# Patient Record
Sex: FEMALE | Race: WHITE | Employment: UNEMPLOYED | URBAN - METROPOLITAN AREA
[De-identification: names, ages, dates, MRNs, and addresses within clinical notes are randomized per-mention and may not be internally consistent; named-entity substitution may affect disease eponyms.]

---

## 2017-01-03 ENCOUNTER — OFFICE VISIT (OUTPATIENT)
Dept: INTERNAL MEDICINE CLINIC | Age: 67
End: 2017-01-03

## 2017-01-03 VITALS
WEIGHT: 277 LBS | BODY MASS INDEX: 46.15 KG/M2 | HEART RATE: 61 BPM | SYSTOLIC BLOOD PRESSURE: 111 MMHG | TEMPERATURE: 95.8 F | RESPIRATION RATE: 18 BRPM | HEIGHT: 65 IN | DIASTOLIC BLOOD PRESSURE: 62 MMHG | OXYGEN SATURATION: 96 %

## 2017-01-03 DIAGNOSIS — E66.01 OBESITY, MORBID, BMI 40.0-49.9 (HCC): ICD-10-CM

## 2017-01-03 DIAGNOSIS — H91.93 HEARING LOSS, BILATERAL: ICD-10-CM

## 2017-01-03 DIAGNOSIS — K58.0 IRRITABLE BOWEL SYNDROME WITH DIARRHEA: Primary | ICD-10-CM

## 2017-01-03 DIAGNOSIS — H93.13 TINNITUS, BILATERAL: ICD-10-CM

## 2017-01-03 DIAGNOSIS — Z23 ENCOUNTER FOR IMMUNIZATION: ICD-10-CM

## 2017-01-03 DIAGNOSIS — E78.5 HYPERLIPIDEMIA, UNSPECIFIED HYPERLIPIDEMIA TYPE: ICD-10-CM

## 2017-01-03 DIAGNOSIS — I10 ESSENTIAL HYPERTENSION: ICD-10-CM

## 2017-01-03 RX ORDER — ATENOLOL AND CHLORTHALIDONE TABLET 100; 25 MG/1; MG/1
1 TABLET ORAL DAILY
Qty: 90 TAB | Refills: 3 | Status: SHIPPED | OUTPATIENT
Start: 2017-01-03 | End: 2018-03-23 | Stop reason: SDUPTHER

## 2017-01-03 RX ORDER — ROSUVASTATIN CALCIUM 10 MG/1
10 TABLET, COATED ORAL
Qty: 90 TAB | Refills: 3 | Status: SHIPPED | OUTPATIENT
Start: 2017-01-03 | End: 2018-02-12 | Stop reason: SDUPTHER

## 2017-01-03 NOTE — PROGRESS NOTES
INTERNISTS [facility]:  1/3/2017, MRN: [id number]      Roman Diehl is a 61 y.o. female and presents to clinic for Hypertension; Irritable Bowel Syndrome (is getting better); and Immunization/Injection (t dap)    Subjective:   1. HTN:   - Scheduled to lab work 2/1/17   - On Tenoretic, which is affordable and w/o adverse side effects    - Pt needs a refill on this rx    2. IBS-Diarrhea:   - Has had IBS-diarrhea for years   - Last colonoscopy was unremarkable per pt hx; f/u colonoscopy is due in 3 yrs per pt hx   - Significant improvement/resolution of sx after taking Viberzi. The rx caused constipation when taking it bid so she only takes it once a day. She has 1 BM per day usually at night when taking Viberzi. Her copay was 30 dollars and affordable per pt hx. She denies any adverse side effects from taking this    3. Right Toe Pain:   - Due to a \"hammer toe\" per pt hx   - Saw Podiatry team since her last apt   - Using \"corn pads\" per pt hx and was asked to return to Podiatry team when sx are worse for surgical intervention    4. Hearing Loss:   - Present for several yrs   - Has a 20yr h/o tinnitus, worse at night   - Had a bout of vertigo in 2016; pt denies sx of vertigo since then   - She wants audiometry to screen for hearing loss   - Pt's  bought the pt 2 [employer] hearing aids. Pt has only used one and had good results with this. - Followed by Neurology team given h/o CVA and migraine HAs   - Pt associates tinnitus beginning after taking NSAIDs yrs ago    5. Health Maintenance:   - Due for a Tdap today   - Mammogram: done since her last apt and unremarkable    6.  Depression/Anxiety:   - Stable sx since d/c mirtazapine due to weight gain   - Pt continues to take lexapro        Patient Active Problem List    Diagnosis Date Noted    Obesity, morbid, BMI 40.0-49.9  12/06/2016    Allergic rhinitis 12/06/2016    Irritable bowel syndrome with diarrhea 12/06/2016    Onychomycosis 12/06/2016    Glucose intolerance (impaired glucose tolerance) 12/01/2016    Single current episode of major depressive disorder (has tried celexa, wellbutrin, mirtazapine with adverse effects) 12/01/2016    Essential hypertension 12/01/2016    Adrenal incidentaloma (1.3 cm and left sided) - per CT scan from 2015 12/01/2016    History of cerebral infarction - per head MRI findings 12/01/2016       Current Outpatient Prescriptions   Medication Sig Dispense Refill    FLUZONE QUAD [id number] susp injection       rosuvastatin (CRESTOR) 10 mg tablet Take 1 Tab by mouth nightly. 90 Tab 3    atenolol-chlorthalidone (TENORETIC) 100-25 mg per tablet Take 1 Tab by mouth daily. 90 Tab 3    escitalopram oxalate (LEXAPRO) 10 mg tablet Take 10 mg by mouth daily.  fluticasone (FLONASE) 50 mcg/actuation nasal spray 2 Sprays by Both Nostrils route daily.  diclofenac EC (VOLTAREN) 50 mg EC tablet Take  by mouth two (2) times a day.  eluxadoline (VIBERZI) 75 mg tablet Take 1 Tab by mouth two (2) times daily (with meals). Max Daily Amount: 150 mg. (Patient taking differently: Take 75 mg by mouth daily.) 60 Tab 5    Cholecalciferol, Vitamin D3, (VITAMIN D3) 1,000 unit cap Take 1,000 Units by mouth daily.  aspirin-dipyridamole (AGGRENOX)  mg per SR capsule Take 1 Cap by mouth two (2) times a day.          Allergies   Allergen Reactions    Sulfa (Sulfonamide Antibiotics) Diarrhea       Past Medical History   Diagnosis Date    Hypercholesterolemia     Hypertension     Stroke [facility])        Past Surgical History   Procedure Laterality Date    Hx cholecystectomy      Hx gyn       partial hysterectomy    Hx appendectomy  09/09/2015       Family History   Problem Relation Age of Onset    Dementia Mother     Stroke Father     Cancer Brother     Hypertension Brother        Social History   Substance Use Topics    Smoking status: Never Smoker    Smokeless tobacco: Never Used    Alcohol use No       ROS   Review of Systems Constitutional: Negative for chills and fever. HENT: Negative for ear pain and sore throat. Eyes: Negative for blurred vision and pain. Respiratory: Negative for cough and shortness of breath. Cardiovascular: Negative for chest pain. Gastrointestinal: Negative for abdominal pain. Genitourinary: Negative for dysuria. Musculoskeletal: Positive for joint pain (mild right toe pain - unchanged). Negative for myalgias. Skin: Negative for rash. Neurological: Negative for focal weakness and headaches. Endo/Heme/Allergies: Positive for environmental allergies. Psychiatric/Behavioral: Negative for substance abuse. Objective     Vitals:    01/03/17 0915   BP: 111/62   Pulse: 61   Resp: 18   Temp: 95.8 °F (35.4 °C)   TempSrc: Oral   SpO2: 96%   Weight: 277 lb (125.6 kg)   Height: 5' 5\" (1.651 m)   PainSc:   0 - No pain       Physical Exam   Constitutional: She is oriented to person, place, and time and well-developed, well-nourished, and in no distress. HENT:   Head: Normocephalic and atraumatic. Right Ear: External ear normal.   Left Ear: External ear normal.   Nose: Nose normal.   Mouth/Throat: Oropharynx is clear and moist. No oropharyngeal exudate. Eyes: Conjunctivae and EOM are normal. Pupils are equal, round, and reactive to light. Right eye exhibits no discharge. Left eye exhibits no discharge. No scleral icterus. Neck: Neck supple. Cardiovascular: Normal rate, regular rhythm, normal heart sounds and intact distal pulses. Exam reveals no gallop and no friction rub. No murmur heard. Pulmonary/Chest: Effort normal and breath sounds normal. No respiratory distress. She has no wheezes. She has no rales. Abdominal: Soft. Bowel sounds are normal. She exhibits no distension. There is no tenderness. There is no rebound and no guarding. Musculoskeletal: She exhibits no edema or tenderness (BUE are NTTP). Lymphadenopathy:     She has no cervical adenopathy.    Neurological: She is alert and oriented to person, place, and time. She exhibits normal muscle tone. Gait normal.   Skin: Skin is warm and dry. No erythema. Left cyst medial to [facility] jt is unchanged in size   Psychiatric: Affect normal.   Nursing note and vitals reviewed. LABS   Data Review:   Lab Results   Component Value Date/Time    WBC 4.7 09/11/2015 01:36 AM    HGB 11.2 09/11/2015 01:36 AM    HCT 35.1 09/11/2015 01:36 AM    PLATELET 665 42/56/8310 01:36 AM    MCV 95.9 09/11/2015 01:36 AM       Lab Results   Component Value Date/Time    Sodium 141 09/11/2015 01:36 AM    Potassium 2.9 09/11/2015 01:36 AM    Chloride 106 09/11/2015 01:36 AM    CO2 28 09/11/2015 01:36 AM    Anion gap 7 09/11/2015 01:36 AM    Glucose 96 09/11/2015 01:36 AM    BUN 15 09/11/2015 01:36 AM    Creatinine 0.93 09/11/2015 01:36 AM    BUN/Creatinine ratio 16 09/11/2015 01:36 AM    GFR est AA >60 09/11/2015 01:36 AM    GFR est non-AA >60 09/11/2015 01:36 AM    Calcium 8.4 09/11/2015 01:36 AM       Lab Results   Component Value Date/Time    Hemoglobin A1c, External 5.8 09/08/2015     Wt Readings from Last 3 Encounters:   01/03/17 277 lb (125.6 kg)   12/06/16 275 lb (124.7 kg)   09/09/15 250 lb (113.4 kg)       Assessment/Plan:   1. Essential hypertension: BP is at goal.  - C/w tenoretic. Tenoretic refilled. ORDERS:  - atenolol-chlorthalidone (TENORETIC) 100-25 mg per tablet; Take 1 Tab by mouth daily. Dispense: 90 Tab; Refill: 3    2. Hyperlipidemia, unspecified hyperlipidemia type  - Crestor refilled. ORDERS:  - rosuvastatin (CRESTOR) 10 mg tablet; Take 1 Tab by mouth nightly. Dispense: 90 Tab; Refill: 3    3. Hearing loss, bilateral: Associated with tinnitus and vertigo  - Audiometry testing ordered and referral to ENT team for evaluation of all sx     ORDERS:  - REFERRAL TO ENT-OTOLARYNGOLOGY    4. IBS-Diarrhea: Improved with Viberzi once a day. BID dosing caused constipation.  - C/w Viberzi    5.  Health Maintenance:  - Tdap given today    ORDERS:  - Tetanus, diphtheria toxoids and acellular pertussis (TDAP) vaccine, in individuals >=7 years, IM  - [place] IMMUNIZ ADMIN,1 SINGLE/COMB VAC/TOXOID    6. Right Toe Pain: from \"hammertoe\"   - Observation and f/u with Podiatry team    7. Depression/Anxiety: Stable. - C/w lexapro. Lab review: orders written for new lab studies as appropriate; see orders    I have discussed the diagnosis with the patient and the intended plan as seen in the above orders. The patient has received an after-visit summary and questions were answered concerning future plans. I have discussed medication side effects and warnings with the patient as well. I have reviewed the plan of care with the patient, accepted their input and they are in agreement with the treatment goals. All questions were answered. The patient understands the plan of care. Handouts provided today with above information. Pt instructed if symptoms worsen to call the office or report to the ED for continued care. Greater than 50% of the visit time was spent in counseling and/or coordination of care. Follow-up Disposition:  Return in about 6 months (around 7/3/2017) for BP check.     Shamar Hyatt MD

## 2017-01-03 NOTE — PROGRESS NOTES
Chief Complaint   Patient presents with    Hypertension    Irritable Bowel Syndrome     is getting better     Patient was given a copy of the Advanced Directive form and understands to bring it in once completed. 1. Have you been to the ER, urgent care clinic since your last visit? Hospitalized since your last visit? No    2. Have you seen or consulted any other health care providers outside of the [address] [place] [address] since your last visit? Include any pap smears or colon screening.  No

## 2017-01-03 NOTE — PROGRESS NOTES
Alexi Morales is a 61 y.o. female who presents for routine immunizations. She denies any symptoms , reactions or allergies that would exclude them from being immunized today. Risks and adverse reactions were discussed and the VIS was given to them. All questions were addressed. She was observed for 5 min post injection. There were no reactions observed.     Brooke Moctezuma LPN

## 2017-01-03 NOTE — PATIENT INSTRUCTIONS
Patient was given a copy of the Advanced Directive form and understands to bring it in once completed. Health Maintenance Due   Topic Date Due    Hepatitis C Screening  1950    DTaP/Tdap/Td series (1 - Tdap) 10/30/1973    FOBT Q 1 YEAR AGE 50-75  10/30/2002    ZOSTER VACCINE AGE 60>  10/30/2012       PLAN:  Key points we discussed today:  1. Call our office if symptoms worsen or if you have any questions    2. Audiometry - hearing testing ordered - with the ENT team. A referral was placed to see ENT team for these services and to investigate your ringing in your ears and occasional vertigo    3. Don't forget to get your lab tests 2/1/17. 4. Crestor and Tenoretic refilled. Dr. Layne Cooks  [facility] of [address] [place] [address] [address]  Phone: [phone]  Fax: [phone]    Thank you for choosing [employer] for all of your health care needs. Hearing Loss: Care Instructions  Your Care Instructions  Hearing loss is a sudden or slow decrease in how well you hear. It can range from mild to severe. Permanent hearing loss can occur with aging. It also can happen when you are exposed long-term to loud noise. Examples include listening to loud music, riding motorcycles, or being around other loud machines. Hearing loss can affect your work and home life. It can make you feel lonely or depressed. You may feel that you have lost your independence. But hearing aids and other devices can help you hear better and feel connected to others. Follow-up care is a key part of your treatment and safety. Be sure to make and go to all appointments, and call your doctor if you are having problems. It's also a good idea to know your test results and keep a list of the medicines you take. How can you care for yourself at home? · Avoid loud noises whenever possible. This helps keep your hearing from getting worse.   · Always wear hearing protection around loud noises. · Wear a hearing aid as directed. See a person who can help you pick a hearing aid that fits you. · Have hearing tests as your doctor suggests. They can show whether your hearing has changed. Your hearing aid may need to be adjusted. · Use other devices as needed. These may include:  ¨ Telephone amplifiers and hearing aids that can connect to a television, stereo, radio, or microphone. ¨ Devices that use lights or vibrations. These alert you to the doorbell, a ringing telephone, or a baby monitor. ¨ Television closed-captioning. This shows the words at the bottom of the screen. Most new TVs can do this. Ranjeet Martinez (text telephone). This lets you type messages back and forth on the telephone instead of talking or listening. These devices are also called TDD. When messages are typed on the keyboard, they are sent over the phone line to a receiving TTY. The message is shown on a monitor. · Use pagers, fax machines, and email if it is hard for you to communicate by telephone. · Try to learn a listening technique called speech-reading. It is not lip-reading. You pay attention to people's gestures, expressions, posture, and tone of voice. These clues can help you understand what a person is saying. Face the person you are talking to, and have him or her face you. Make sure the lighting is good. You need to see the other person's face clearly. · Think about counseling if you need help to adjust to your hearing loss. When should you call for help? Call 911 anytime you think you may need emergency care. For example, call if:  · You have hearing loss that occurs with an injury to the head or ear. · You have symptoms of a stroke. These may include:  ¨ Sudden numbness, tingling, weakness, or loss of movement in your face, arm, or leg, especially on only one side of your body. ¨ Sudden vision changes. ¨ Sudden trouble speaking. ¨ Sudden confusion or trouble understanding simple statements.   ¨ Sudden problems with walking or balance. ¨ A sudden, severe headache that is different from past headaches. Call your doctor now or seek immediate medical care if:  · You have sudden, severe hearing loss. · You have nausea or vomiting. Watch closely for changes in your health, and be sure to contact your doctor if:  · You think your hearing is gradually getting worse. · You wonder if you need a hearing aid. Where can you learn more? Go to http://miguel-mamta.info/. Enter D829 in the search box to learn more about \"Hearing Loss: Care Instructions. \"  Current as of: July 29, 2016  Content Version: 11.1  © 5688-1142 [employer]. Care instructions adapted under license by [employer] (which disclaims liability or warranty for this information). If you have questions about a medical condition or this instruction, always ask your healthcare professional. [address] any warranty or liability for your use of this information.

## 2017-01-03 NOTE — MR AVS SNAPSHOT
Visit Information Date & Time Provider Department Dept. Phone Encounter #  
 1/3/2017  9:15 AM Neema Aragon MD Internist of [address] [place] [address] Place [id number] Follow-up Instructions Return in about 6 months (around 7/3/2017) for BP check. Your Appointments 2/1/2017  8:40 AM  
LAB with [facility] NURSE VISIT Internist of [facility] Appt Note: labs per damico [address], [address] [place] [address] [address]  
  
   
 [address], [address]  
  
    
 7/6/2017  9:00 AM  
Office Visit with Neema Aragon MD  
Internist of [address] [place] [address] Appt Note: 6 mth f/u  
 [address] [address] [address]  
  
   
 [address], [address] Upcoming Health Maintenance Date Due Hepatitis C Screening 1950 DTaP/Tdap/Td series (1 - Tdap) 10/30/1973 FOBT Q 1 YEAR AGE 50-75 10/30/2002 ZOSTER VACCINE AGE 60> 10/30/2012 BREAST CANCER SCRN MAMMOGRAM 12/14/2018 Allergies as of 1/3/2017  Review Complete On: 1/3/2017 By: Neema Aragon MD  
  
 Severity Noted Reaction Type Reactions Sulfa (Sulfonamide Antibiotics)  09/09/2015    Diarrhea Current Immunizations  Never Reviewed Name Date Influenza Vaccine 10/3/2016 Not reviewed this visit You Were Diagnosed With   
  
 Codes Comments Essential hypertension    -  Primary ICD-10-CM: I10 
ICD-9-CM: 401.9 Obesity, morbid, BMI 40.0-49.9 (HCC)     ICD-10-CM: E66.01 
ICD-9-CM: 278.01 Hyperlipidemia, unspecified hyperlipidemia type     ICD-10-CM: E78.5 ICD-9-CM: 272.4 Hearing loss, bilateral     ICD-10-CM: H91.93 
ICD-9-CM: 389.9 Tinnitus, bilateral     ICD-10-CM: H93.13 
ICD-9-CM: 388.30 Vitals BP Pulse Temp Resp Height(growth percentile) Weight(growth percentile) 111/62 (BP 1 Location: Left arm, BP Patient Position: Sitting) 61 95.8 °F (35.4 °C) (Oral) 18 5' 5\" (1.651 m) 277 lb (125.6 kg) SpO2 BMI OB Status Smoking Status 96% 46.1 kg/m2 Postmenopausal Never Smoker BMI and BSA Data Body Mass Index Body Surface Area  
 46.1 kg/m 2 2.4 m 2 Preferred Pharmacy Pharmacy Name Phone [facility] [address] [phone] Your Updated Medication List  
  
   
This list is accurate as of: 1/3/17 10:05 AM.  Always use your most recent med list.  
  
  
  
  
 AGGRENOX  mg per SR capsule Generic drug:  aspirin-dipyridamole Take 1 Cap by mouth two (2) times a day. atenolol-chlorthalidone 100-25 mg per tablet Commonly known as:  Luisa Cheryl Take 1 Tab by mouth daily. diclofenac EC 50 mg EC tablet Commonly known as:  VOLTAREN Take  by mouth two (2) times a day.  
  
 eluxadoline 75 mg tablet Commonly known as:  VIBERZI Take 1 Tab by mouth two (2) times daily (with meals). Max Daily Amount: 150 mg. FLONASE 50 mcg/actuation nasal spray Generic drug:  fluticasone 2 Sprays by Both Nostrils route daily. FLUZONE QUAD [id number] Susp injection Generic drug:  influenza vaccine 2016-17 (6 mos+) LEXAPRO 10 mg tablet Generic drug:  escitalopram oxalate Take 10 mg by mouth daily. rosuvastatin 10 mg tablet Commonly known as:  CRESTOR Take 1 Tab by mouth nightly. VITAMIN D3 1,000 unit Cap Generic drug:  cholecalciferol Take 1,000 Units by mouth daily. Prescriptions Sent to Pharmacy Refills  
 rosuvastatin (CRESTOR) 10 mg tablet 3 Sig: Take 1 Tab by mouth nightly. Class: Normal  
 Pharmacy: [facility] [address] [facility] Ph #: [phone] Route: Oral  
 atenolol-chlorthalidone (TENORETIC) 100-25 mg per tablet 3 Sig: Take 1 Tab by mouth daily.   
 Class: Normal  
 Pharmacy: [facility] [address] [facility] #: [phone] Route: Oral  
  
Follow-up Instructions Return in about 6 months (around 7/3/2017) for BP check. Referral Information Referral ID Referred By Referred To  
  
 [id number] Salome Rhoades Not Available Visits Status Start Date End Date 1 New Request 1/3/17 1/3/18 If your referral has a status of pending review or denied, additional information will be sent to support the outcome of this decision. Patient Instructions Patient was given a copy of the Advanced Directive form and understands to bring it in once completed. Health Maintenance Due Topic Date Due  
 Hepatitis C Screening  1950  DTaP/Tdap/Td series (1 - Tdap) 10/30/1973  
 FOBT Q 1 YEAR AGE 50-75  10/30/2002  ZOSTER VACCINE AGE 60>  10/30/2012 PLAN: 
Key points we discussed today: 1. Call our office if symptoms worsen or if you have any questions 2. Audiometry - hearing testing ordered - with the ENT team. A referral was placed to see ENT team for these services and to investigate your ringing in your ears and occasional vertigo 3. Don't forget to get your lab tests 2/1/17. 4. Crestor and Tenoretic refilled. Dr. Zena Morris Internists of [address] [place] [address] [address] Phone: [phone] Fax: [phone] Thank you for choosing [facility] for all of your health care needs. Hearing Loss: Care Instructions Your Care Instructions Hearing loss is a sudden or slow decrease in how well you hear. It can range from mild to severe. Permanent hearing loss can occur with aging. It also can happen when you are exposed long-term to loud noise. Examples include listening to loud music, riding motorcycles, or being around other loud machines. Hearing loss can affect your work and home life.  It can make you feel lonely or depressed. You may feel that you have lost your independence. But hearing aids and other devices can help you hear better and feel connected to others. Follow-up care is a key part of your treatment and safety. Be sure to make and go to all appointments, and call your doctor if you are having problems. It's also a good idea to know your test results and keep a list of the medicines you take. How can you care for yourself at home? · Avoid loud noises whenever possible. This helps keep your hearing from getting worse. · Always wear hearing protection around loud noises. · Wear a hearing aid as directed. See a person who can help you pick a hearing aid that fits you. · Have hearing tests as your doctor suggests. They can show whether your hearing has changed. Your hearing aid may need to be adjusted. · Use other devices as needed. These may include: ¨ Telephone amplifiers and hearing aids that can connect to a television, stereo, radio, or microphone. ¨ Devices that use lights or vibrations. These alert you to the doorbell, a ringing telephone, or a baby monitor. ¨ Television closed-captioning. This shows the words at the bottom of the screen. Most new TVs can do this. Esha Thomason (text telephone). This lets you type messages back and forth on the telephone instead of talking or listening. These devices are also called TDD. When messages are typed on the keyboard, they are sent over the phone line to a receiving TTY. The message is shown on a monitor. · Use pagers, fax machines, and email if it is hard for you to communicate by telephone. · Try to learn a listening technique called speech-reading. It is not lip-reading. You pay attention to people's gestures, expressions, posture, and tone of voice. These clues can help you understand what a person is saying. Face the person you are talking to, and have him or her face you. Make sure the lighting is good.  You need to see the other person's face clearly. · Think about counseling if you need help to adjust to your hearing loss. When should you call for help? Call 911 anytime you think you may need emergency care. For example, call if: 
· You have hearing loss that occurs with an injury to the head or ear. · You have symptoms of a stroke. These may include: 
¨ Sudden numbness, tingling, weakness, or loss of movement in your face, arm, or leg, especially on only one side of your body. ¨ Sudden vision changes. ¨ Sudden trouble speaking. ¨ Sudden confusion or trouble understanding simple statements. ¨ Sudden problems with walking or balance. ¨ A sudden, severe headache that is different from past headaches. Call your doctor now or seek immediate medical care if: 
· You have sudden, severe hearing loss. · You have nausea or vomiting. Watch closely for changes in your health, and be sure to contact your doctor if: 
· You think your hearing is gradually getting worse. · You wonder if you need a hearing aid. Where can you learn more? Go to http://miguel-mamta.info/. Enter V017 in the search box to learn more about \"Hearing Loss: Care Instructions. \" Current as of: July 29, 2016 Content Version: 11.1 © 3917-4728 [employer]. Care instructions adapted under license by [employer] (which disclaims liability or warranty for this information). If you have questions about a medical condition or this instruction, always ask your healthcare professional. [address] [place] [address] any warranty or liability for your use of this information. Introducing [facility] & HEALTH SERVICES! Dear Curly [place]: Thank you for requesting a [employer] account. Our records indicate that you already have an active [employer] account. You can access your account anytime at [email or web address] [email or web address] Did you know that you can access your hospital and ER discharge instructions at any time in [employer]? You can also review all of your test results from your hospital stay or ER visit. Additional Information If you have questions, please visit the Frequently Asked Questions section of the [employer] website at [email or web address] Remember, [employer] is NOT to be used for urgent needs. For medical emergencies, dial 911. Now available from your iPhone and Android! Please provide this summary of care documentation to your next provider. Your primary care clinician is listed as Rhonda Fuller. If you have any questions after today's visit, please call [phone].

## 2017-01-23 ENCOUNTER — OFFICE VISIT (OUTPATIENT)
Dept: INTERNAL MEDICINE CLINIC | Age: 67
End: 2017-01-23

## 2017-01-23 VITALS
SYSTOLIC BLOOD PRESSURE: 127 MMHG | DIASTOLIC BLOOD PRESSURE: 57 MMHG | HEIGHT: 65 IN | WEIGHT: 277.8 LBS | BODY MASS INDEX: 46.28 KG/M2 | RESPIRATION RATE: 18 BRPM | OXYGEN SATURATION: 97 % | HEART RATE: 61 BPM | TEMPERATURE: 96.4 F

## 2017-01-23 DIAGNOSIS — J01.00 ACUTE NON-RECURRENT MAXILLARY SINUSITIS: Primary | ICD-10-CM

## 2017-01-23 RX ORDER — AZITHROMYCIN 500 MG/1
500 TABLET, FILM COATED ORAL DAILY
Qty: 3 TAB | Refills: 0 | Status: SHIPPED | OUTPATIENT
Start: 2017-01-23 | End: 2017-01-26

## 2017-01-23 RX ORDER — PROMETHAZINE HYDROCHLORIDE AND CODEINE PHOSPHATE 6.25; 1 MG/5ML; MG/5ML
5 SOLUTION ORAL
Qty: 240 ML | Refills: 1 | Status: SHIPPED | OUTPATIENT
Start: 2017-01-23 | End: 2017-07-06 | Stop reason: ALTCHOICE

## 2017-01-23 NOTE — PROGRESS NOTES
INTERNISTS [facility]:  1/23/2017, MRN: [id number]      Warden Palmer is a 61 y.o. female and presents to clinic for Sinus Pain (x 2 weeks); Nasal Discharge (white nasal discharge); Nasal Congestion; and Hoarse (sore throat)    Subjective:   Pt reports a 2 wk h/o \"sinus pain\" and HA associated with a scratchy sore throat (made worse with coughing spells to clear phlegm from her throat) and nasal congestion. Sx are not improved with mucinex. Sx are worsening. Pt denies fever/chills, ear/eye pain, and rash. Her father was just hospitalized for pneumonia. No additional sick contacts other than family members. Pt was referred to ENT team last visit given progressive worsening hearing loss. She was referred from there to audiometry team for hearing aid placement given audiometry results. Pt reported her sx to the ENT team at the time and was given 1 g of azithromycin as a prescription for presumed sinus infection. Pt just completed this rx. Patient Active Problem List    Diagnosis Date Noted    Obesity, morbid, BMI 40.0-49.9  12/06/2016    Allergic rhinitis 12/06/2016    Irritable bowel syndrome with diarrhea 12/06/2016    Onychomycosis 12/06/2016    Glucose intolerance (impaired glucose tolerance) 12/01/2016    Single current episode of major depressive disorder (has tried celexa, wellbutrin, mirtazapine with adverse effects) 12/01/2016    Essential hypertension 12/01/2016    Adrenal incidentaloma (1.3 cm and left sided) - per CT scan from 2015 12/01/2016    History of cerebral infarction - per head MRI findings 12/01/2016       Current Outpatient Prescriptions   Medication Sig Dispense Refill    azithromycin (ZITHROMAX) 500 mg tab Take 1 Tab by mouth daily for 3 days. 3 Tab 0    promethazine-codeine (PHENERGAN WITH CODEINE) 6.25-10 mg/5 mL syrup Take 5 mL by mouth four (4) times daily as needed for Cough.  Max Daily Amount: 20 mL. 240 mL 1    FLUZONE QUAD [id number] susp injection       rosuvastatin (CRESTOR) 10 mg tablet Take 1 Tab by mouth nightly. 90 Tab 3    atenolol-chlorthalidone (TENORETIC) 100-25 mg per tablet Take 1 Tab by mouth daily. (Patient taking differently: Take 1 Tab by mouth daily. Indications: Hypertension) 90 Tab 3    escitalopram oxalate (LEXAPRO) 10 mg tablet Take 10 mg by mouth daily.  fluticasone (FLONASE) 50 mcg/actuation nasal spray 2 Sprays by Both Nostrils route daily.  diclofenac EC (VOLTAREN) 50 mg EC tablet Take 50 mg by mouth two (2) times daily as needed. Indications: for headache      eluxadoline (VIBERZI) 75 mg tablet Take 1 Tab by mouth two (2) times daily (with meals). Max Daily Amount: 150 mg. (Patient taking differently: Take 75 mg by mouth daily.) 60 Tab 5    Cholecalciferol, Vitamin D3, (VITAMIN D3) 1,000 unit cap Take 1,000 Units by mouth daily.  aspirin-dipyridamole (AGGRENOX)  mg per SR capsule Take 1 Cap by mouth two (2) times a day. Allergies   Allergen Reactions    Sulfa (Sulfonamide Antibiotics) Diarrhea       Past Medical History   Diagnosis Date    Hypercholesterolemia     Hypertension     Stroke [facility])        Past Surgical History   Procedure Laterality Date    Hx cholecystectomy      Hx gyn       partial hysterectomy    Hx appendectomy  09/09/2015       Family History   Problem Relation Age of Onset    Dementia Mother     Stroke Father     Cancer Brother     Hypertension Brother        Social History   Substance Use Topics    Smoking status: Never Smoker    Smokeless tobacco: Never Used    Alcohol use No       ROS   Review of Systems   Constitutional: Negative for chills and fever. HENT: Positive for congestion, hearing loss (followed by ENT/Audiometry - in the process of getting a hearing aid) and sore throat. Negative for ear pain. Eyes: Negative for blurred vision and pain. Respiratory: Positive for cough and sputum production. Negative for shortness of breath. Cardiovascular: Negative for chest pain. Gastrointestinal: Negative for abdominal pain. Genitourinary: Negative for dysuria. Musculoskeletal: Negative for joint pain and myalgias. Skin: Negative for rash. Neurological: Positive for headaches. Negative for tingling and focal weakness. Endo/Heme/Allergies: Positive for environmental allergies. Does not bruise/bleed easily. Psychiatric/Behavioral: Negative for substance abuse. Objective     Vitals:    01/23/17 1548   BP: 127/57   Pulse: 61   Resp: 18   Temp: 96.4 °F (35.8 °C)   TempSrc: Oral   SpO2: 97%   Weight: 277 lb 12.8 oz (126 kg)   Height: 5' 5\" (1.651 m)   PainSc:   5   PainLoc: Face       Physical Exam   Constitutional: She is oriented to person, place, and time and well-developed, well-nourished, and in no distress. HENT:   Head: Normocephalic and atraumatic. Right Ear: External ear normal.   Left Ear: External ear normal.   Nose: Nose normal.   Mouth/Throat: Oropharynx is clear and moist. No oropharyngeal exudate. Clear TMs bilaterally. +Nasal congestion with erythematous boggy nasal turbinates   Eyes: Conjunctivae and EOM are normal. Pupils are equal, round, and reactive to light. Right eye exhibits no discharge. Left eye exhibits no discharge. No scleral icterus. Neck: Neck supple. Cardiovascular: Normal rate, regular rhythm, normal heart sounds and intact distal pulses. Exam reveals no gallop and no friction rub. No murmur heard. Pulmonary/Chest: Effort normal and breath sounds normal. No respiratory distress. She has no wheezes. She has no rales. Abdominal: Soft. Bowel sounds are normal. She exhibits no distension and no mass. There is no tenderness. There is no rebound and no guarding. No hepatosplenomegaly   Musculoskeletal: She exhibits no edema or tenderness (BUE are NTTP). Lymphadenopathy:     She has no cervical adenopathy. Neurological: She is alert and oriented to person, place, and time. She exhibits normal muscle tone.  Gait normal.   Skin: Skin is warm and dry. No erythema. Psychiatric: Affect normal.   Nursing note and vitals reviewed. LABS   Data Review:   Lab Results   Component Value Date/Time    WBC 4.7 09/11/2015 01:36 AM    HGB 11.2 09/11/2015 01:36 AM    HCT 35.1 09/11/2015 01:36 AM    PLATELET 914 53/39/4764 01:36 AM    MCV 95.9 09/11/2015 01:36 AM       Lab Results   Component Value Date/Time    Sodium 141 09/11/2015 01:36 AM    Potassium 2.9 09/11/2015 01:36 AM    Chloride 106 09/11/2015 01:36 AM    CO2 28 09/11/2015 01:36 AM    Anion gap 7 09/11/2015 01:36 AM    Glucose 96 09/11/2015 01:36 AM    BUN 15 09/11/2015 01:36 AM    Creatinine 0.93 09/11/2015 01:36 AM    BUN/Creatinine ratio 16 09/11/2015 01:36 AM    GFR est AA >60 09/11/2015 01:36 AM    GFR est non-AA >60 09/11/2015 01:36 AM    Calcium 8.4 09/11/2015 01:36 AM     Lab Results   Component Value Date/Time    Hemoglobin A1c, External 5.8 09/08/2015       Assessment/Plan:   1. Acute non-recurrent maxillary sinusitis  - Azithromycin 500mg daily x 3 days prescribed. Pt asked to contact us if she develops d/c or diarrhea. Pt encouraged to take probiotics while on abx. - C/w OTC nasal steroid. Discussed nasal saline lavage for symptomatic relief. Discouraged use of nasal decongestants  - Requesting ENT notes  - Promethazine-codeine syrup prn cough prescribed; pt asked to use this sparingly for severe coughing sx/spells. Health Maintenance Due   Topic Date Due    Hepatitis C Screening  1950    FOBT Q 1 YEAR AGE 50-75  10/30/2002    ZOSTER VACCINE AGE 60>  10/30/2012       Lab review: labs are reviewed in EHR    I have discussed the diagnosis with the patient and the intended plan as seen in the above orders. The patient has received an after-visit summary and questions were answered concerning future plans. I have discussed medication side effects and warnings with the patient as well.  I have reviewed the plan of care with the patient, accepted their input and they are in agreement with the treatment goals. All questions were answered. The patient understands the plan of care. Handouts provided today with above information. Pt instructed if symptoms worsen to call the office or report to the ED for continued care. Greater than 50% of the visit time was spent in counseling and/or coordination of care. Follow-up Disposition:  Return if symptoms worsen or fail to improve.     Aspen Lau MD

## 2017-01-23 NOTE — PROGRESS NOTES
Chief Complaint   Patient presents with    Sinus Pain     x 2 weeks    Nasal Discharge     white nasal discharge    Nasal Congestion    Hoarse     sore throat     1. Have you been to the ER, urgent care clinic since your last visit? Hospitalized since your last visit? No    2. Have you seen or consulted any other health care providers outside of the [address] [place] [address] since your last visit? Include any pap smears or colon screening.  No

## 2017-01-23 NOTE — PATIENT INSTRUCTIONS
Health Maintenance Due   Topic Date Due    Hepatitis C Screening  1950    FOBT Q 1 YEAR AGE 50-75  10/30/2002    ZOSTER VACCINE AGE 60>  10/30/2012       PLAN:  Key points we discussed today:  1. Call our office if symptoms worsen or if you have any questions    2. Azithromycin - antibiotic - for presumed sinusitis infection. Please take probiotics supplements/food products while on antibiotics to replenish the good bacteria in your intestinal tract that may be destroyed by the antibiotic medication I am prescribing for you today. Notify us if you develop diarrhea (multiple loose stools per day) while on antibiotics. 3. Cough syrup ordered. Take as needed. Dr. Usha Crow  [facility], [address] [place] [address] [address]  Phone: [phone]  Fax: [phone]    Thank you for choosing [facility] for all of your health care needs. Sinusitis: Care Instructions  Your Care Instructions    Sinusitis is an infection of the lining of the sinus cavities in your head. Sinusitis often follows a cold. It causes pain and pressure in your head and face. In most cases, sinusitis gets better on its own in 1 to 2 weeks. But some mild symptoms may last for several weeks. Sometimes antibiotics are needed. Follow-up care is a key part of your treatment and safety. Be sure to make and go to all appointments, and call your doctor if you are having problems. It's also a good idea to know your test results and keep a list of the medicines you take. How can you care for yourself at home? · Take an over-the-counter pain medicine, such as acetaminophen (Tylenol), ibuprofen (Advil, Motrin), or naproxen (Aleve). Read and follow all instructions on the label. · If the doctor prescribed antibiotics, take them as directed. Do not stop taking them just because you feel better. You need to take the full course of antibiotics.   · Be careful when taking over-the-counter cold or flu medicines and Tylenol at the same time. Many of these medicines have acetaminophen, which is Tylenol. Read the labels to make sure that you are not taking more than the recommended dose. Too much acetaminophen (Tylenol) can be harmful. · Breathe warm, moist air from a steamy shower, a hot bath, or a sink filled with hot water. Avoid cold, dry air. Using a humidifier in your home may help. Follow the directions for cleaning the machine. · Use saline (saltwater) nasal washes to help keep your nasal passages open and wash out mucus and bacteria. You can buy saline nose drops at a grocery store or drugstore. Or you can make your own at home by adding 1 teaspoon of salt and 1 teaspoon of baking soda to 2 cups of distilled water. If you make your own, fill a bulb syringe with the solution, insert the tip into your nostril, and squeeze gently. Dimitri Dipika your nose. · Put a hot, wet towel or a warm gel pack on your face 3 or 4 times a day for 5 to 10 minutes each time. · Try a decongestant nasal spray like oxymetazoline (Afrin). Do not use it for more than 3 days in a row. Using it for more than 3 days can make your congestion worse. When should you call for help? Call your doctor now or seek immediate medical care if:  · You have new or worse swelling or redness in your face or around your eyes. · You have a new or higher fever. Watch closely for changes in your health, and be sure to contact your doctor if:  · You have new or worse facial pain. · The mucus from your nose becomes thicker (like pus) or has new blood in it. · You are not getting better as expected. Where can you learn more? Go to http://miguel-mamta.info/. Enter Q722 in the search box to learn more about \"Sinusitis: Care Instructions. \"  Current as of: July 29, 2016  Content Version: 11.1  © 0431-4059 [employer].  Care instructions adapted under license by [employer] (which disclaims liability or warranty for this information). If you have questions about a medical condition or this instruction, always ask your healthcare professional. [place] [address] any warranty or liability for your use of this information.

## 2017-01-23 NOTE — MR AVS SNAPSHOT
Visit Information Date & Time Provider Department Dept. Phone Encounter #  
 1/23/2017  3:30 PM Renetta Barcenas MD Internist of [address] [id number] Follow-up Instructions Return if symptoms worsen or fail to improve. Your Appointments 2/1/2017  8:40 AM  
LAB with [facility] NURSE VISIT Internist of [facility] (Lizabeth Das) Appt Note: labs per damico [address], [address] [place] [address] [address] [place]  
  
   
 [address], [address]  
  
    
 7/6/2017  9:00 AM  
Office Visit with Renetta Barcenas MD  
Internist of [address] [place] [address] Lizabethsara Das Appt Note: 6 mth f/u  
 [address] [facility], [place] [address] [address] [place]  
  
   
 [address], [address] Upcoming Health Maintenance Date Due Hepatitis C Screening 1950 FOBT Q 1 YEAR AGE 50-75 10/30/2002 ZOSTER VACCINE AGE 60> 10/30/2012 BREAST CANCER SCRN MAMMOGRAM 12/14/2018 DTaP/Tdap/Td series (2 - Td) 1/3/2027 Allergies as of 1/23/2017  Review Complete On: 1/23/2017 By: Jami Acevedo Severity Noted Reaction Type Reactions Sulfa (Sulfonamide Antibiotics)  09/09/2015    Diarrhea Current Immunizations  Reviewed on 1/3/2017 Name Date Influenza Vaccine 10/3/2016 Tdap 1/3/2017 Not reviewed this visit You Were Diagnosed With   
  
 Codes Comments Acute non-recurrent maxillary sinusitis    -  Primary ICD-10-CM: J01.00 ICD-9-CM: 461.0 Vitals BP Pulse Temp Resp Height(growth percentile) Weight(growth percentile) 127/57 (BP 1 Location: Left arm, BP Patient Position: Sitting) 61 96.4 °F (35.8 °C) (Oral) 18 5' 5\" (1.651 m) 277 lb 12.8 oz (126 kg) SpO2 BMI OB Status Smoking Status 97% 46.23 kg/m2 Postmenopausal Never Smoker BMI and BSA Data  Body Mass Index Body Surface Area  
 46.23 kg/m 2 2.4 m 2  
  
  
 Preferred Pharmacy Pharmacy Name Phone [facility] [phone] [phone] Your Updated Medication List  
  
   
This list is accurate as of: 1/23/17  4:13 PM.  Always use your most recent med list.  
  
  
  
  
 AGGRENOX  mg per SR capsule Generic drug:  aspirin-dipyridamole Take 1 Cap by mouth two (2) times a day. atenolol-chlorthalidone 100-25 mg per tablet Commonly known as:  Fayrene Punch Take 1 Tab by mouth daily. azithromycin 500 mg Tab Commonly known as:  Charlotta Primrose Take 1 Tab by mouth daily for 3 days. diclofenac EC 50 mg EC tablet Commonly known as:  VOLTAREN Take 50 mg by mouth two (2) times daily as needed. Indications: for headache  
  
 eluxadoline 75 mg tablet Commonly known as:  VIBERZI Take 1 Tab by mouth two (2) times daily (with meals). Max Daily Amount: 150 mg. FLONASE 50 mcg/actuation nasal spray Generic drug:  fluticasone 2 Sprays by Both Nostrils route daily. FLUZONE QUAD [id number] Susp injection Generic drug:  influenza vaccine 2016-17 (6 mos+) LEXAPRO 10 mg tablet Generic drug:  escitalopram oxalate Take 10 mg by mouth daily. promethazine-codeine 6.25-10 mg/5 mL syrup Commonly known as:  PHENERGAN with CODEINE Take 5 mL by mouth four (4) times daily as needed for Cough. Max Daily Amount: 20 mL. rosuvastatin 10 mg tablet Commonly known as:  CRESTOR Take 1 Tab by mouth nightly. VITAMIN D3 1,000 unit Cap Generic drug:  cholecalciferol Take 1,000 Units by mouth daily. Prescriptions Printed Refills  
 promethazine-codeine (PHENERGAN WITH CODEINE) 6.25-10 mg/5 mL syrup 1 Sig: Take 5 mL by mouth four (4) times daily as needed for Cough. Max Daily Amount: 20 mL. Class: Print Route: Oral  
  
Prescriptions Sent to Pharmacy  Refills  
 azithromycin (ZITHROMAX) 500 mg tab 0  
 Sig: Take 1 Tab by mouth daily for 3 days. Class: Normal  
 Pharmacy: [facility] [address] [facility] #: [phone] Route: Oral  
  
Follow-up Instructions Return if symptoms worsen or fail to improve. Patient Instructions Health Maintenance Due Topic Date Due  
 Hepatitis C Screening  1950  FOBT Q 1 YEAR AGE 50-75  10/30/2002  ZOSTER VACCINE AGE 60>  10/30/2012 PLAN: 
Key points we discussed today: 1. Call our office if symptoms worsen or if you have any questions 2. Azithromycin - antibiotic - for presumed sinusitis infection. Please take probiotics supplements/food products while on antibiotics to replenish the good bacteria in your intestinal tract that may be destroyed by the antibiotic medication I am prescribing for you today. Notify us if you develop diarrhea (multiple loose stools per day) while on antibiotics. 3. Cough syrup ordered. Take as needed. Dr. Eyad Arriola Internists of [address] [place] [address], [address] [place] [address] [address] Phone: [phone] Fax: [phone] Thank you for choosing [employer] for all of your health care needs. Sinusitis: Care Instructions Your Care Instructions Sinusitis is an infection of the lining of the sinus cavities in your head. Sinusitis often follows a cold. It causes pain and pressure in your head and face. In most cases, sinusitis gets better on its own in 1 to 2 weeks. But some mild symptoms may last for several weeks. Sometimes antibiotics are needed. Follow-up care is a key part of your treatment and safety. Be sure to make and go to all appointments, and call your doctor if you are having problems. It's also a good idea to know your test results and keep a list of the medicines you take. How can you care for yourself at home?  
· Take an over-the-counter pain medicine, such as acetaminophen (Tylenol), ibuprofen (Advil, Motrin), or naproxen (Aleve). Read and follow all instructions on the label. · If the doctor prescribed antibiotics, take them as directed. Do not stop taking them just because you feel better. You need to take the full course of antibiotics. · Be careful when taking over-the-counter cold or flu medicines and Tylenol at the same time. Many of these medicines have acetaminophen, which is Tylenol. Read the labels to make sure that you are not taking more than the recommended dose. Too much acetaminophen (Tylenol) can be harmful. · Breathe warm, moist air from a steamy shower, a hot bath, or a sink filled with hot water. Avoid cold, dry air. Using a humidifier in your home may help. Follow the directions for cleaning the machine. · Use saline (saltwater) nasal washes to help keep your nasal passages open and wash out mucus and bacteria. You can buy saline nose drops at a grocery store or drugstore. Or you can make your own at home by adding 1 teaspoon of salt and 1 teaspoon of baking soda to 2 cups of distilled water. If you make your own, fill a bulb syringe with the solution, insert the tip into your nostril, and squeeze gently. Willia Slocumb your nose. · Put a hot, wet towel or a warm gel pack on your face 3 or 4 times a day for 5 to 10 minutes each time. · Try a decongestant nasal spray like oxymetazoline (Afrin). Do not use it for more than 3 days in a row. Using it for more than 3 days can make your congestion worse. When should you call for help? Call your doctor now or seek immediate medical care if: 
· You have new or worse swelling or redness in your face or around your eyes. · You have a new or higher fever. Watch closely for changes in your health, and be sure to contact your doctor if: 
· You have new or worse facial pain. · The mucus from your nose becomes thicker (like pus) or has new blood in it. · You are not getting better as expected. Where can you learn more? Go to http://miguel-mamta.info/. Enter D348 in the search box to learn more about \"Sinusitis: Care Instructions. \" Current as of: July 29, 2016 Content Version: 11.1 © 8185-0746 StockUp. Care instructions adapted under license by [employer] direct [employer] (which disclaims liability or warranty for this information). If you have questions about a medical condition or this instruction, always ask your healthcare professional. [address] any warranty or liability for your use of this information. Introducing \[facility]\"" & HEALTH SERVICES! Dear Ivet [place]: Thank you for requesting a [employer] account. Our records indicate that you already have an active [employer] account. You can access your account anytime at [email or web address] Did you know that you can access your hospital and ER discharge instructions at any time in [employer]? You can also review all of your test results from your hospital stay or ER visit. Additional Information If you have questions, please visit the Frequently Asked Questions section of the [employer] website at [email or web address] Remember, [employer] is NOT to be used for urgent needs. For medical emergencies, dial 911. Now available from your iPhone and Android! Please provide this summary of care documentation to your next provider. Your primary care clinician is listed as Alonzo Reinoso. If you have any questions after today's visit, please call [phone].

## 2017-02-01 ENCOUNTER — HOSPITAL ENCOUNTER (OUTPATIENT)
Dept: LAB | Age: 67
Discharge: HOME | End: 2017-02-01
Payer: COMMERCIAL

## 2017-02-01 DIAGNOSIS — E66.01 OBESITY, MORBID, BMI 40.0-49.9 (HCC): ICD-10-CM

## 2017-02-01 DIAGNOSIS — I10 ESSENTIAL HYPERTENSION: ICD-10-CM

## 2017-02-01 DIAGNOSIS — Z86.73 HISTORY OF CEREBRAL INFARCTION: ICD-10-CM

## 2017-02-01 DIAGNOSIS — R73.02 GLUCOSE INTOLERANCE (IMPAIRED GLUCOSE TOLERANCE): ICD-10-CM

## 2017-02-01 DIAGNOSIS — Z11.59 NEED FOR HEPATITIS C SCREENING TEST: ICD-10-CM

## 2017-02-01 LAB
CHOLEST SERPL-MCNC: 118 MG/DL
HBA1C MFR BLD: 6.1 % (ref 4.2–5.6)
HDLC SERPL-MCNC: 58 MG/DL (ref 40–60)
HDLC SERPL: 2 {RATIO} (ref 0–5)
LDLC SERPL CALC-MCNC: 37 MG/DL (ref 0–100)
LIPID PROFILE,FLP: NORMAL
TRIGL SERPL-MCNC: 115 MG/DL (ref ?–150)
VLDLC SERPL CALC-MCNC: 23 MG/DL

## 2017-02-01 PROCEDURE — 86803 HEPATITIS C AB TEST: CPT | Performed by: INTERNAL MEDICINE

## 2017-02-01 PROCEDURE — 83036 HEMOGLOBIN GLYCOSYLATED A1C: CPT | Performed by: INTERNAL MEDICINE

## 2017-02-01 PROCEDURE — 36415 COLL VENOUS BLD VENIPUNCTURE: CPT | Performed by: INTERNAL MEDICINE

## 2017-02-01 PROCEDURE — 82043 UR ALBUMIN QUANTITATIVE: CPT | Performed by: INTERNAL MEDICINE

## 2017-02-01 PROCEDURE — 80061 LIPID PANEL: CPT | Performed by: INTERNAL MEDICINE

## 2017-02-02 LAB
CREAT UR-MCNC: 47.84 MG/DL (ref 30–125)
HCV AB SER IA-ACNC: 0.08 INDEX
HCV AB SERPL QL IA: NEGATIVE
HCV COMMENT,HCGAC: NORMAL
MICROALBUMIN UR-MCNC: 0.3 MG/DL (ref 0–3)
MICROALBUMIN/CREAT UR-RTO: 6 MG/G (ref 0–30)

## 2017-02-02 NOTE — PROGRESS NOTES
Please let Ms. Seo know that her cholesterol labs indicate that the Crestor is working well to reduce her cholesterol and she should continue taking it at the prescribed dose. Meanwhile, her A1C is 6.1 and consistent with her history of prediabetes. Encourage lifestyle modification. No medication is warranted at this time since she is a prediabetic.     Dr. Benny Neri  Internists of [address]  Phone: [phone]  Fax: [phone]

## 2017-02-07 NOTE — PROGRESS NOTES
Please notify pt that she has no kidney disease and no evidence of hepatitis C infection.     Dr. Armando Velazquez  Internists of [address]  Phone: [phone]  Fax: [phone]

## 2017-02-14 ENCOUNTER — TELEPHONE (OUTPATIENT)
Dept: INTERNAL MEDICINE CLINIC | Age: 67
End: 2017-02-14

## 2017-02-14 NOTE — TELEPHONE ENCOUNTER
Pt called to inq about whether we recvd any medical records release forms from her disability/insurance Lillie Toribio

## 2017-02-22 ENCOUNTER — HOSPITAL ENCOUNTER (OUTPATIENT)
Dept: GENERAL RADIOLOGY | Age: 67
Discharge: HOME | End: 2017-02-22
Payer: COMMERCIAL

## 2017-02-22 ENCOUNTER — OFFICE VISIT (OUTPATIENT)
Dept: INTERNAL MEDICINE CLINIC | Age: 67
End: 2017-02-22

## 2017-02-22 VITALS
TEMPERATURE: 100.2 F | SYSTOLIC BLOOD PRESSURE: 128 MMHG | WEIGHT: 280 LBS | RESPIRATION RATE: 16 BRPM | BODY MASS INDEX: 46.65 KG/M2 | OXYGEN SATURATION: 96 % | DIASTOLIC BLOOD PRESSURE: 61 MMHG | HEIGHT: 65 IN | HEART RATE: 70 BPM

## 2017-02-22 DIAGNOSIS — J06.9 UPPER RESPIRATORY TRACT INFECTION, UNSPECIFIED TYPE: Primary | ICD-10-CM

## 2017-02-22 DIAGNOSIS — J06.9 UPPER RESPIRATORY TRACT INFECTION, UNSPECIFIED TYPE: ICD-10-CM

## 2017-02-22 LAB
S PYO AG THROAT QL: NEGATIVE
VALID INTERNAL CONTROL?: YES

## 2017-02-22 PROCEDURE — 71020 XR CHEST PA LAT: CPT

## 2017-02-22 RX ORDER — AMOXICILLIN AND CLAVULANATE POTASSIUM 875; 125 MG/1; MG/1
1 TABLET, FILM COATED ORAL 2 TIMES DAILY
Qty: 14 TAB | Refills: 0 | Status: SHIPPED | OUTPATIENT
Start: 2017-02-22 | End: 2017-07-06

## 2017-02-22 RX ORDER — BENZONATATE 200 MG/1
200 CAPSULE ORAL
Qty: 45 CAP | Refills: 0 | Status: SHIPPED | OUTPATIENT
Start: 2017-02-22 | End: 2017-03-01

## 2017-02-22 NOTE — MR AVS SNAPSHOT
Visit Information Date & Time Provider Department Dept. Phone Encounter #  
 2/22/2017  9:30 AM Kerline Maurice DO Internists at [employer] [phone] Follow-up Instructions Return if symptoms worsen or fail to improve. Your Appointments 7/6/2017  9:00 AM  
Office Visit with Elif Gómez MD  
Internist of [address] [place] [address] [facility]) Appt Note: 6 mth f/u  
 [address] [facility], [address] [address] [address] [place]  
  
   
 [address], [address] Upcoming Health Maintenance Date Due FOBT Q 1 YEAR AGE 50-75 10/30/2002 ZOSTER VACCINE AGE 60> 10/30/2012 BREAST CANCER SCRN MAMMOGRAM 12/14/2018 DTaP/Tdap/Td series (2 - Td) 1/3/2027 Allergies as of 2/22/2017  Review Complete On: 2/22/2017 By: Kerline Maurice DO Severity Noted Reaction Type Reactions Sulfa (Sulfonamide Antibiotics)  09/09/2015    Diarrhea Current Immunizations  Reviewed on 1/3/2017 Name Date Influenza Vaccine 10/3/2016 Tdap 1/3/2017 Not reviewed this visit You Were Diagnosed With   
  
 Codes Comments Upper respiratory tract infection, unspecified type    -  Primary ICD-10-CM: J06.9 ICD-9-CM: 465.9 Vitals BP  
  
  
  
  
  
 128/61 Vitals History BMI and BSA Data Body Mass Index Body Surface Area  
 46.59 kg/m 2 2.41 m 2 Preferred Pharmacy Pharmacy Name Phone [facility] [address] [phone] Your Updated Medication List  
  
   
This list is accurate as of: 2/22/17 10:30 AM.  Always use your most recent med list.  
  
  
  
  
 AGGRENOX  mg per SR capsule Generic drug:  aspirin-dipyridamole Take 1 Cap by mouth two (2) times a day. amoxicillin-clavulanate 875-125 mg per tablet Commonly known as:  AUGMENTIN Take 1 Tab by mouth two (2) times a day. atenolol-chlorthalidone 100-25 mg per tablet Commonly known as:  Edger Se Take 1 Tab by mouth daily. diclofenac EC 50 mg EC tablet Commonly known as:  VOLTAREN Take 50 mg by mouth two (2) times daily as needed. Indications: for headache  
  
 eluxadoline 75 mg tablet Commonly known as:  VIBERZI Take 1 Tab by mouth two (2) times daily (with meals). Max Daily Amount: 150 mg. FLONASE 50 mcg/actuation nasal spray Generic drug:  fluticasone 2 Sprays by Both Nostrils route daily. FLUZONE QUAD [id number] Susp injection Generic drug:  influenza vaccine 2016-17 (6 mos+) LEXAPRO 10 mg tablet Generic drug:  escitalopram oxalate Take 10 mg by mouth daily. promethazine-codeine 6.25-10 mg/5 mL syrup Commonly known as:  PHENERGAN with CODEINE Take 5 mL by mouth four (4) times daily as needed for Cough. Max Daily Amount: 20 mL. rosuvastatin 10 mg tablet Commonly known as:  CRESTOR Take 1 Tab by mouth nightly. VITAMIN D3 1,000 unit Cap Generic drug:  cholecalciferol Take 1,000 Units by mouth daily. Prescriptions Sent to Pharmacy Refills  
 amoxicillin-clavulanate (AUGMENTIN) 875-125 mg per tablet 0 Sig: Take 1 Tab by mouth two (2) times a day. Class: Normal  
 Pharmacy: [facility] [address] [facility] Ph #: [phone] Route: Oral  
  
We Performed the Following AMB POC RAPID STREP A [04694 CPT(R)] Follow-up Instructions Return if symptoms worsen or fail to improve. To-Do List   
 02/22/2017 Imaging:  XR CHEST PA LAT Patient Instructions A Healthy Lifestyle: Care Instructions Your Care Instructions A healthy lifestyle can help you feel good, stay at a healthy weight, and have plenty of energy for both work and play. A healthy lifestyle is something you can share with your whole family.  
A healthy lifestyle also can lower your risk for serious health problems, such as high blood pressure, heart disease, and diabetes. You can follow a few steps listed below to improve your health and the health of your family. Follow-up care is a key part of your treatment and safety. Be sure to make and go to all appointments, and call your doctor if you are having problems. Its also a good idea to know your test results and keep a list of the medicines you take. How can you care for yourself at home? · Do not eat too much sugar, fat, or fast foods. You can still have dessert and treats now and then. The goal is moderation. · Start small to improve your eating habits. Pay attention to portion sizes, drink less juice and soda pop, and eat more fruits and vegetables. ¨ Eat a healthy amount of food. A 3-ounce serving of meat, for example, is about the size of a deck of cards. Fill the rest of your plate with vegetables and whole grains. ¨ Limit the amount of soda and sports drinks you have every day. Drink more water when you are thirsty. ¨ Eat at least 5 servings of fruits and vegetables every day. It may seem like a lot, but it is not hard to reach this goal. A serving or helping is 1 piece of fruit, 1 cup of vegetables, or 2 cups of leafy, raw vegetables. Have an apple or some carrot sticks as an afternoon snack instead of a candy bar. Try to have fruits and/or vegetables at every meal. 
· Make exercise part of your daily routine. You may want to start with simple activities, such as walking, bicycling, or slow swimming. Try to be active 30 to 60 minutes every day. You do not need to do all 30 to 60 minutes all at once. For example, you can exercise 3 times a day for 10 or 20 minutes. Moderate exercise is safe for most people, but it is always a good idea to talk to your doctor before starting an exercise program. 
· Keep moving. Kristin Limb the lawn, work in the garden, or [employer]. Take the stairs instead of the elevator at work. · If you smoke, quit. People who smoke have an increased risk for heart attack, stroke, cancer, and other lung illnesses. Quitting is hard, but there are ways to boost your chance of quitting tobacco for good. ¨ Use nicotine gum, patches, or lozenges. ¨ Ask your doctor about stop-smoking programs and medicines. ¨ Keep trying. In addition to reducing your risk of diseases in the future, you will notice some benefits soon after you stop using tobacco. If you have shortness of breath or asthma symptoms, they will likely get better within a few weeks after you quit. · Limit how much alcohol you drink. Moderate amounts of alcohol (up to 2 drinks a day for men, 1 drink a day for women) are okay. But drinking too much can lead to liver problems, high blood pressure, and other health problems. Family health If you have a family, there are many things you can do together to improve your health. · Eat meals together as a family as often as possible. · Eat healthy foods. This includes fruits, vegetables, lean meats and dairy, and whole grains. · Include your family in your fitness plan. Most people think of activities such as jogging or tennis as the way to fitness, but there are many ways you and your family can be more active. Anything that makes you breathe hard and gets your heart pumping is exercise. Here are some tips: 
¨ Walk to do errands or to take your child to school or the bus. ¨ Go for a family bike ride after dinner instead of watching TV. Where can you learn more? Go to http://miguel-mamta.info/. Enter S213 in the search box to learn more about \"A Healthy Lifestyle: Care Instructions. \" Current as of: July 26, 2016 Content Version: 11.1 © 7875-0723 Cartasite. Care instructions adapted under license by [employer] (which disclaims liability or warranty for this information).  If you have questions about a medical condition or this instruction, always ask your healthcare professional. [address] any warranty or liability for your use of this information. Introducing \[facility]\"" & HEALTH SERVICES! Dear Jama Wall: Thank you for requesting a [employer] account. Our records indicate that you already have an active [employer] account. You can access your account anytime at [email or web address] Did you know that you can access your hospital and ER discharge instructions at any time in [employer]? You can also review all of your test results from your hospital stay or ER visit. Additional Information If you have questions, please visit the Frequently Asked Questions section of the [employer] website at [email or web address] Remember, [employer] is NOT to be used for urgent needs. For medical emergencies, dial 911. Now available from your iPhone and Android! Please provide this summary of care documentation to your next provider. Your primary care clinician is listed as Adrian Segura. If you have any questions after today's visit, please call [phone].

## 2017-02-22 NOTE — PATIENT INSTRUCTIONS

## 2017-02-22 NOTE — PROGRESS NOTES
Pt is here for cough/congestion, sinus headache, chills x 3 days . C Pt. Do you have an advance directive no  Request Pt bring a copy of advance directive for scanning. Do you want information on an advance directive no        1. Have you been to the ER, urgent care clinic since your last visit? Hospitalized since your last visit? No    2. Have you seen or consulted any other health care providers outside of the [address] [place] [address] since your last visit? Include any pap smears or colon screening.  No

## 2017-02-26 NOTE — PROGRESS NOTES
HISTORY OF PRESENT ILLNESS  Johnny Chopra is a 61 y.o. female. HPI  61year old established female patient in today for an acute concern    She reports cough, sore throat, painful glands, rhinorrhea, watery eyes, HA and chills X 3 days. She denies fevers and sweats  Patient has been taking codeine cough syrup and ASA with no improvement. She says cough is worse at night  She denies sick contacts or sputum  She reports recurrent illness    Patient reports emotional distress as she recently lost her father      Allergies   Allergen Reactions    Sulfa (Sulfonamide Antibiotics) Diarrhea       Past Medical History:   Diagnosis Date    Hypercholesterolemia     Hypertension     Stroke ([facility] [address]        Family History   Problem Relation Age of Onset    Dementia Mother     Stroke Father     Cancer Brother     Hypertension Brother        Social History   Substance Use Topics    Smoking status: Never Smoker    Smokeless tobacco: Never Used    Alcohol use No        Current Outpatient Prescriptions   Medication Sig    amoxicillin-clavulanate (AUGMENTIN) 875-125 mg per tablet Take 1 Tab by mouth two (2) times a day.  benzonatate (TESSALON) 200 mg capsule Take 1 Cap by mouth three (3) times daily as needed for Cough for up to 7 days.  promethazine-codeine (PHENERGAN WITH CODEINE) 6.25-10 mg/5 mL syrup Take 5 mL by mouth four (4) times daily as needed for Cough. Max Daily Amount: 20 mL.  rosuvastatin (CRESTOR) 10 mg tablet Take 1 Tab by mouth nightly.  atenolol-chlorthalidone (TENORETIC) 100-25 mg per tablet Take 1 Tab by mouth daily. (Patient taking differently: Take 1 Tab by mouth daily. Indications: Hypertension)    escitalopram oxalate (LEXAPRO) 10 mg tablet Take 10 mg by mouth daily.  fluticasone (FLONASE) 50 mcg/actuation nasal spray 2 Sprays by Both Nostrils route daily.  eluxadoline (VIBERZI) 75 mg tablet Take 1 Tab by mouth two (2) times daily (with meals). Max Daily Amount: 150 mg.  (Patient taking differently: Take 75 mg by mouth daily.)    Cholecalciferol, Vitamin D3, (VITAMIN D3) 1,000 unit cap Take 1,000 Units by mouth daily.  aspirin-dipyridamole (AGGRENOX)  mg per SR capsule Take 1 Cap by mouth two (2) times a day.  FLUZONE QUAD [id number] susp injection     diclofenac EC (VOLTAREN) 50 mg EC tablet Take 50 mg by mouth two (2) times daily as needed. Indications: for headache     No current facility-administered medications for this visit. Past Surgical History:   Procedure Laterality Date    HX APPENDECTOMY  09/09/2015    HX CHOLECYSTECTOMY      HX GYN      partial hysterectomy       ROS  See HPI    Visit Vitals    /61    Pulse 70    Temp 100.2 °F (37.9 °C) (Oral)    Resp 16    Ht 5' 5\" (1.651 m)    Wt 280 lb (127 kg)    SpO2 96%    BMI 46.59 kg/m2     Physical Exam   Constitutional:   Obese habitus, ill appearing   HENT:   Right Ear: No middle ear effusion. Left Ear:  No middle ear effusion. Nose: Mucosal edema and rhinorrhea present. Right sinus exhibits no maxillary sinus tenderness and no frontal sinus tenderness. Left sinus exhibits no maxillary sinus tenderness and no frontal sinus tenderness. Mouth/Throat: Posterior oropharyngeal erythema present. Cardiovascular: Normal rate, regular rhythm and normal heart sounds. Pulmonary/Chest: Effort normal and breath sounds normal. No respiratory distress. Psychiatric:   Tearful during the exam       ASSESSMENT and PLAN    ICD-10-CM ICD-9-CM    1. Upper respiratory tract infection, unspecified type J06.9 465.9 XR CHEST PA LAT      AMB POC RAPID STREP A      amoxicillin-clavulanate (AUGMENTIN) 875-125 mg per tablet      benzonatate (TESSALON) 200 mg capsule     -Advised symptomatic care  -RTC prn    Additional Instructions: The patient understands that they should contact the office at any time if any questions or concerns develop.   They are also aware that they can call our main office number at [phone] at any time if they would like to address any concerns with the physician. They also understand that they should dial 911 if any acute emergency arises. The patient understands that they should give us a minimum of 48 hours to complete prescription refills once they are requested. The patient has also been instructed to contact us by calling the main office number if they have not received feedback within 2 weeks of having any tests completed. The patient is a aware that they should read all package insert information when picking up the medications and that they should consult the pharmacist of a physician if they have any questions or concerns regarding the prescribed medications. Discussed with the patient new medications given and patient instructed to read pharmacy literature regarding side effects and drug interactions. Instructions for taking the medications were provided to the patient and the consequences of not taking it. Follow-up Disposition:   Return if symptoms worsen or fail to improve. Risk and benefits of new medication discussed in detail when indicated, patient was given the opportunity to ask questions   AVS provided  reviewed diet, exercise and weight control when indicated  Alarm signals discussed. ER precautions reviewed when indicated  Plan of care reviewed with patient. Understanding verbalized and they are in agreement with plan of care. Please note that this document was created with voice recognition software. Unrecognized errors in transcription may be present.     Stacy Jolly DO

## 2017-07-06 ENCOUNTER — OFFICE VISIT (OUTPATIENT)
Dept: INTERNAL MEDICINE CLINIC | Age: 67
End: 2017-07-06

## 2017-07-06 VITALS
HEIGHT: 65 IN | WEIGHT: 285.8 LBS | RESPIRATION RATE: 18 BRPM | BODY MASS INDEX: 47.62 KG/M2 | SYSTOLIC BLOOD PRESSURE: 120 MMHG | TEMPERATURE: 96.9 F | OXYGEN SATURATION: 97 % | DIASTOLIC BLOOD PRESSURE: 56 MMHG | HEART RATE: 58 BPM

## 2017-07-06 DIAGNOSIS — I10 ESSENTIAL HYPERTENSION: ICD-10-CM

## 2017-07-06 DIAGNOSIS — Z79.899 CONTROLLED SUBSTANCE AGREEMENT SIGNED: ICD-10-CM

## 2017-07-06 DIAGNOSIS — F32.9 SINGLE CURRENT EPISODE OF MAJOR DEPRESSIVE DISORDER, UNSPECIFIED DEPRESSION EPISODE SEVERITY: ICD-10-CM

## 2017-07-06 DIAGNOSIS — K58.0 IRRITABLE BOWEL SYNDROME WITH DIARRHEA: Primary | ICD-10-CM

## 2017-07-06 NOTE — MR AVS SNAPSHOT
Visit Information Date & Time Provider Department Dept. Phone Encounter #  
 7/6/2017  9:00 AM Arnaud Reyes MD Internist of [facility] [address] [id number] Follow-up Instructions Return in about 6 months (around 1/6/2018) for BP check. Your Appointments 1/9/2018  8:00 AM  
Office Visit with Arnaud Reyes MD  
Internist of [address] [place] [address]) Appt Note: 6 month f/u  
 [address] [facility], [address]  
  
   
 [address] [facility] Upcoming Health Maintenance Date Due FOBT Q 1 YEAR AGE 50-75 10/30/2002 ZOSTER VACCINE AGE 60> 10/30/2012 INFLUENZA AGE 9 TO ADULT 8/1/2017 BREAST CANCER SCRN MAMMOGRAM 12/14/2018 DTaP/Tdap/Td series (2 - Td) 1/3/2027 Allergies as of 7/6/2017  Review Complete On: 7/6/2017 By: Arnaud Reyes MD  
  
 Severity Noted Reaction Type Reactions Amoxicillin-pot Clavulanate High 07/06/2017    Diarrhea Sulfa (Sulfonamide Antibiotics)  09/09/2015    Diarrhea Current Immunizations  Reviewed on 1/3/2017 Name Date Influenza Vaccine 10/3/2016 Tdap 1/3/2017 Not reviewed this visit You Were Diagnosed With   
  
 Codes Comments Irritable bowel syndrome with diarrhea     ICD-10-CM: K58.0 ICD-9-CM: 214.4 Vitals BP Pulse Temp Resp Height(growth percentile) Weight(growth percentile) 120/56 (BP 1 Location: Left arm, BP Patient Position: Sitting) (!) 58 96.9 °F (36.1 °C) (Oral) 18 5' 5\" (1.651 m) 285 lb 12.8 oz (129.6 kg) SpO2 BMI OB Status Smoking Status 97% 47.56 kg/m2 Postmenopausal Never Smoker BMI and BSA Data Body Mass Index Body Surface Area  
 47.56 kg/m 2 2.44 m 2 Preferred Pharmacy Pharmacy Name Phone Counts include 234 beds at the [facility] [address] [phone] Your Updated Medication List  
  
   
 This list is accurate as of: 7/6/17  9:48 AM.  Always use your most recent med list.  
  
  
  
  
 AGGRENOX  mg per SR capsule Generic drug:  aspirin-dipyridamole Take 1 Cap by mouth two (2) times a day. atenolol-chlorthalidone 100-25 mg per tablet Commonly known as:  Eduin Reji Take 1 Tab by mouth daily. diclofenac EC 50 mg EC tablet Commonly known as:  VOLTAREN Take 50 mg by mouth two (2) times daily as needed. Indications: for headache  
  
 eluxadoline 75 mg tablet Commonly known as:  VIBERZI Take 1 Tab by mouth two (2) times daily (with meals). Max Daily Amount: 150 mg. FLONASE 50 mcg/actuation nasal spray Generic drug:  fluticasone 2 Sprays by Both Nostrils route daily. FLUZONE QUAD [id number] Susp injection Generic drug:  influenza vaccine 2016-17 (6 mos+) LEXAPRO 10 mg tablet Generic drug:  escitalopram oxalate Take 10 mg by mouth daily. rosuvastatin 10 mg tablet Commonly known as:  CRESTOR Take 1 Tab by mouth nightly. VITAMIN D3 1,000 unit Cap Generic drug:  cholecalciferol Take 1,000 Units by mouth daily. Prescriptions Printed Refills  
 eluxadoline (VIBERZI) 75 mg tablet 5 Sig: Take 1 Tab by mouth two (2) times daily (with meals). Max Daily Amount: 150 mg.  
 Class: Print Route: Oral  
  
Follow-up Instructions Return in about 6 months (around 1/6/2018) for BP check. Patient Instructions Patient still has a copy of the Advanced Directive form and understands to bring it in once completed. Health Maintenance Due Topic Date Due  
 FOBT Q 1 YEAR AGE 50-75  10/30/2002  ZOSTER VACCINE AGE 60>  10/30/2012 Eluxadoline (By mouth) Eluxadoline (ii-se-EJ-oh-mallorie) Treats irritable bowel syndrome with diarrhea. Brand Name(s): Tru Willingham There may be other brand names for this medicine. When This Medicine Should Not Be Used: This medicine is not right for everyone. Do not use it if you had an allergic reaction to eluxadoline, or if you have a gallbladder blockage, sphincter of Oddi disease, history of pancreatitis, pancreas disease (including a blockage), or a bowel blockage. How to Use This Medicine:  
Tablet · Your doctor will tell you how much medicine to use. Do not use more than directed. · It is best to take this medicine with food or milk. · This medicine should come with a Medication Guide. Ask your pharmacist for a copy if you do not have one. · Missed dose: Skip the missed dose and go back to your regular dosing schedule. Do not double doses. · Store the medicine in a closed container at room temperature, away from heat, moisture, and direct light. Drugs and Foods to Avoid: Ask your doctor or pharmacist before using any other medicine, including over-the-counter medicines, vitamins, and herbal products. · Some foods and medicines can affect how eluxadoline works. Tell your doctor if you are using any of the following: ¨ Alfentanil, alosetron, bupropion, ciprofloxacin, clarithromycin, cyclosporine, dihydroergotamine, eltrombopag, ergotamine, fentanyl, fluconazole, gemfibrozil, loperamide, paroxetine, pimozide, quinidine, rifampin, rosuvastatin, sirolimus, tacrolimus ¨ Medicine to treat HIV/AIDS (including atazanavir, lopinavir, ritonavir, saquinavir, tipranavir) ¨ Narcotic pain medicines · Tell your doctor if you drink alcohol. You should limit the amount of alcohol you drink while you are taking this medicine. You should not take this medicine if you have a history of alcohol addiction or if you drink more than 3 alcoholic drinks a day. Warnings While Using This Medicine: · Tell your doctor right away if you are pregnant or breastfeeding, or if you have liver disease or a history of chronic or severe constipation. Tell your doctor if you do not have a gallbladder. · This medicine may cause the following problems: ¨ Sphincter of Oddi spasm (increased risk if you do not have a gallbladder) ¨ Pancreatitis · This medicine may make you dizzy or drowsy. Do not drive or do anything that could be dangerous until you know how this medicine affects you. · Call your doctor if your symptoms do not improve or if they get worse. Call your doctor if you have constipation for more than 4 days. · Your doctor will do lab tests at regular visits to check on the effects of this medicine. Keep all appointments. · Keep all medicine out of the reach of children. Never share your medicine with anyone. Possible Side Effects While Using This Medicine:  
Call your doctor right away if you notice any of these side effects: · Allergic reaction: Itching or hives, swelling in your face or hands, swelling or tingling in your mouth or throat, chest tightness, trouble breathing · Sudden and severe stomach pain, nausea, vomiting, fever, lightheadedness If you notice these less serious side effects, talk with your doctor: · Constipation If you notice other side effects that you think are caused by this medicine, tell your doctor. Call your doctor for medical advice about side effects. You may report side effects to FDA at 2-378-FDA-4439 © 2017 [address]  Information is for End User's use only and may not be sold, redistributed or otherwise used for commercial purposes. The above information is an  only. It is not intended as medical advice for individual conditions or treatments. Talk to your doctor, nurse or pharmacist before following any medical regimen to see if it is safe and effective for you. Diet for Irritable Bowel Syndrome: Care Instructions Your Care Instructions Irritable bowel syndrome, or IBS, is a problem with the intestines. IBS can cause belly pain, bloating, gas, constipation, and diarrhea.  Most people can control their symptoms by changing their diet and easing stress. No specific foods cause everyone with IBS to have symptoms. Doctors don't offer a specific diet to manage symptoms. But many people find that they feel better when they stop eating certain foods. A high-fiber diet may help if you have constipation. Follow-up care is a key part of your treatment and safety. Be sure to make and go to all appointments, and call your doctor if you are having problems. It's also a good idea to know your test results and keep a list of the medicines you take. How can you care for yourself at home? To reduce constipation · Include fruits, vegetables, beans, and whole grains in your diet each day. These foods are high in fiber. Slowly increase the amount of fiber you eat. This helps you avoid a lot of gas. · Drink plenty of fluids, enough so that your urine is light yellow or clear like water. If you have kidney, heart, or liver disease and have to limit fluids, talk with your doctor before you increase the amount of fluids you drink. · Get some exercise every day. Build up slowly to 30 to 60 minutes a day on 5 or more days of the week. · Take a fiber supplement, such as Citrucel or Metamucil, every day if needed. Read and follow all instructions on the label. · Schedule time each day for a bowel movement. Having a daily routine may help. Take your time and do not strain when having a bowel movement. · Check with your doctor before you increase the amount of fiber in your diet. For some people who have IBS, eating more fiber may make some symptoms worse. This includes bloating. To reduce diarrhea You may try giving up foods or drinks one at a time to see whether symptoms improve. Limit or avoid the following: · Alcohol · Caffeine, which is found in coffee, tea, cola drinks, and chocolate · Nicotine, from smoking or chewing tobacco 
· Gas-producing foods, such as beans, broccoli, cabbage, and apples · Dairy products that contain lactose (milk sugar), such as ice cream, milk, cheese, and sour cream 
· Foods and drinks high in sugar, especially fruit juice, soda, candy, and other packaged sweets (such as cookies) · Foods high in fat, including santos, sausage, butter, oils, and anything deep-fried · Sorbitol and xylitol, artificial sweeteners found in some sugarless candies and chewing gum Keep track of foods · Some people with IBS use a daily food diary to keep track of what they eat and whether they have any symptoms after eating certain foods. The diary also can be a good way to record what is going on in your life. · Stress plays a role in IBS. So if you are aware that certain stresses bring on symptoms, you can try to reduce those stresses. Keep mealtimes pleasant · Try to maintain a pleasant environment when you eat. This may reduce stress that can make symptoms likely to occur. · Give yourself plenty of time to eat, rather than eating on the go. Chew your food slowly. Try not to swallow air, which can cause bloating. Where can you learn more? Go to http://miguel-mamta.info/. Enter L625 in the search box to learn more about \"Diet for Irritable Bowel Syndrome: Care Instructions. \" Current as of: July 26, 2016 Content Version: 11.3 © 2187-8493 [employer]. Care instructions adapted under license by [employer] (which disclaims liability or warranty for this information). If you have questions about a medical condition or this instruction, always ask your healthcare professional. [place] [address] any warranty or liability for your use of this information. Introducing [facility]! Dear Lora Price: Thank you for requesting a [email or web address] account. Our records indicate that you already have an active [email or web address] account. You can access your account anytime at [email or web address] Did you know that you can access your hospital and ER discharge instructions at any time in GLSS? You can also review all of your test results from your hospital stay or ER visit. Additional Information If you have questions, please visit the Frequently Asked Questions section of the GLSS website at [email or web address] Remember, GLSS is NOT to be used for urgent needs. For medical emergencies, dial 911. Now available from your iPhone and Android! Please provide this summary of care documentation to your next provider. Your primary care clinician is listed as Deandra Mariano. If you have any questions after today's visit, please call [phone].

## 2017-07-06 NOTE — PROGRESS NOTES
INTERNISTS [facility]:  7/9/2017, MRN: [id number]      Lg Carbajal is a 61 y.o. female and presents to clinic for Hypertension (follow up); Cholesterol Problem (follow up); Blood sugar problem (Pre Diabetes follow up); and Medication Refill    Subjective: The patient is a 65-year-old female with history of obesity, allergic rhinitis, IBS-diarrhea, onychomycosis, prediabetes, depression, hypertension, adrenal incidentaloma seen on CT scan 2015, and history of cerebral infarction her head MRI findings. 1.  IBS-Diarrhea: This is a chronic condition, present over 6 months. The patient is taking Viberzi. This medication is working very well to control symptoms of IBS predominantly diarrhea. The patient denies ever side effects of taking this medication. She needs a refill on his medication. No melena or hematochezia. No abdominal pain. No drug use. No tobacco use. 2. Depression: The patient's mother recently passed away. She has a history of depression >3 months but has been comforted by multiple family members recently with the death of her mother. Her  has also been a good source of strength. She finds strength with prayer as well. The patient takes Lexapro for underlying depression symptoms. She reports no adverse side effects taking his medication. No refills are needed. 3.  Hypertension: This is a chronic condition, present over 3 months. The patient takes atenolol-chlorthalidone. She reports no adverse side effects to take this medication. No refills are needed. Her blood pressure is well controlled and less than 140/90 today.         Patient Active Problem List    Diagnosis Date Noted    Obesity, morbid, BMI 40.0-49.9  12/06/2016    Allergic rhinitis 12/06/2016    Irritable bowel syndrome with diarrhea 12/06/2016    Onychomycosis 12/06/2016    Glucose intolerance (impaired glucose tolerance) 12/01/2016    Single current episode of major depressive disorder (has tried celexa, wellbutrin, mirtazapine with adverse effects) 12/01/2016    Essential hypertension 12/01/2016    Adrenal incidentaloma (1.3 cm and left sided) - per CT scan from 2015 12/01/2016    History of cerebral infarction - per head MRI findings 12/01/2016       Current Outpatient Prescriptions   Medication Sig Dispense Refill    eluxadoline (VIBERZI) 75 mg tablet Take 1 Tab by mouth two (2) times daily (with meals). Max Daily Amount: 150 mg. 60 Tab 5    FLUZONE QUAD [id number] susp injection       rosuvastatin (CRESTOR) 10 mg tablet Take 1 Tab by mouth nightly. 90 Tab 3    atenolol-chlorthalidone (TENORETIC) 100-25 mg per tablet Take 1 Tab by mouth daily. (Patient taking differently: Take 1 Tab by mouth daily. Indications: Hypertension) 90 Tab 3    escitalopram oxalate (LEXAPRO) 10 mg tablet Take 10 mg by mouth daily.  fluticasone (FLONASE) 50 mcg/actuation nasal spray 2 Sprays by Both Nostrils route daily.  diclofenac EC (VOLTAREN) 50 mg EC tablet Take 50 mg by mouth two (2) times daily as needed. Indications: for headache      Cholecalciferol, Vitamin D3, (VITAMIN D3) 1,000 unit cap Take 1,000 Units by mouth daily.  aspirin-dipyridamole (AGGRENOX)  mg per SR capsule Take 1 Cap by mouth two (2) times a day.          Allergies   Allergen Reactions    Amoxicillin-Pot Clavulanate Diarrhea    Sulfa (Sulfonamide Antibiotics) Diarrhea       Past Medical History:   Diagnosis Date    Hypercholesterolemia     Hypertension     Stroke [facility])        Past Surgical History:   Procedure Laterality Date    HX APPENDECTOMY  09/09/2015    HX CHOLECYSTECTOMY      HX GYN      partial hysterectomy       Family History   Problem Relation Age of Onset    Dementia Mother     Stroke Father     Cancer Brother     Hypertension Brother        Social History   Substance Use Topics    Smoking status: Never Smoker    Smokeless tobacco: Never Used    Alcohol use No       ROS   Review of Systems Constitutional: Negative for chills and fever. HENT: Negative for ear pain and sore throat. Eyes: Negative for blurred vision and pain. Respiratory: Negative for cough and shortness of breath. Cardiovascular: Negative for chest pain. Gastrointestinal: Negative for abdominal pain, blood in stool and melena. Genitourinary: Negative for dysuria and hematuria. Musculoskeletal: Negative for joint pain and myalgias. Skin: Negative for rash. Neurological: Negative for tingling, focal weakness and headaches. Endo/Heme/Allergies: Does not bruise/bleed easily. Psychiatric/Behavioral: Negative for substance abuse. Objective     Vitals:    07/06/17 0907   BP: 120/56   Pulse: (!) 58   Resp: 18   Temp: 96.9 °F (36.1 °C)   TempSrc: Oral   SpO2: 97%   Weight: 285 lb 12.8 oz (129.6 kg)   Height: 5' 5\" (1.651 m)   PainSc:   0 - No pain       Physical Exam   Constitutional: She is oriented to person, place, and time and well-developed, well-nourished, and in no distress. HENT:   Head: Normocephalic and atraumatic. Right Ear: External ear normal.   Left Ear: External ear normal.   Nose: Nose normal.   Mouth/Throat: Oropharynx is clear and moist. No oropharyngeal exudate. Eyes: Conjunctivae and EOM are normal. Pupils are equal, round, and reactive to light. Right eye exhibits no discharge. Left eye exhibits no discharge. No scleral icterus. Neck: Neck supple. Cardiovascular: Normal rate, regular rhythm, normal heart sounds and intact distal pulses. Exam reveals no gallop and no friction rub. No murmur heard. Pulmonary/Chest: Effort normal and breath sounds normal. No respiratory distress. She has no wheezes. She has no rales. Abdominal: Soft. Bowel sounds are normal. She exhibits no distension. There is no tenderness. There is no rebound and no guarding. Musculoskeletal: She exhibits no edema or tenderness (BUE are NTTP). Lymphadenopathy:     She has no cervical adenopathy. Neurological: She is alert and oriented to person, place, and time. She exhibits normal muscle tone. Gait normal.   Skin: Skin is warm and dry. No erythema. Psychiatric: Affect normal.   Nursing note and vitals reviewed. LABS   Data Review:   Lab Results   Component Value Date/Time    WBC 4.7 09/11/2015 01:36 AM    HGB 11.2 09/11/2015 01:36 AM    HCT 35.1 09/11/2015 01:36 AM    PLATELET 823 67/52/4743 01:36 AM    MCV 95.9 09/11/2015 01:36 AM       Lab Results   Component Value Date/Time    Sodium 141 09/11/2015 01:36 AM    Potassium 2.9 09/11/2015 01:36 AM    Chloride 106 09/11/2015 01:36 AM    CO2 28 09/11/2015 01:36 AM    Anion gap 7 09/11/2015 01:36 AM    Glucose 96 09/11/2015 01:36 AM    BUN 15 09/11/2015 01:36 AM    Creatinine 0.93 09/11/2015 01:36 AM    BUN/Creatinine ratio 16 09/11/2015 01:36 AM    GFR est AA >60 09/11/2015 01:36 AM    GFR est non-AA >60 09/11/2015 01:36 AM    Calcium 8.4 09/11/2015 01:36 AM       Lab Results   Component Value Date/Time    Cholesterol, total 118 02/01/2017 08:48 AM    HDL Cholesterol 58 02/01/2017 08:48 AM    LDL, calculated 37 02/01/2017 08:48 AM    VLDL, calculated 23 02/01/2017 08:48 AM    Triglyceride 115 02/01/2017 08:48 AM    CHOL/HDL Ratio 2.0 02/01/2017 08:48 AM       Lab Results   Component Value Date/Time    Hemoglobin A1c 6.1 02/01/2017 08:48 AM    Hemoglobin A1c, External 5.8 09/08/2015       Assessment/Plan:   1. Irritable bowel syndrome with diarrhea:   -Ordering Viberzi. This medication is controlled. The patient was thus asked to complete a controlled substance agreement today. All questions were answered. ORDERS:  - eluxadoline (VIBERZI) 75 mg tablet; Take 1 Tab by mouth two (2) times daily (with meals). Max Daily Amount: 150 mg. Dispense: 60 Tab; Refill: 5    2. Hypertension: Stable. Blood pressure is at goal.  -Continue with medications as prescribed. 3.  Depression: Stable. The patient is naturally grieving the death of her mother. She appears to have a good support system in place.  -Continue with Lexapro. -The patient was instructed to notify us if her symptoms worsen. Health Maintenance Due   Topic Date Due    FOBT Q 1 YEAR AGE 50-75  10/30/2002    ZOSTER VACCINE AGE 60>  10/30/2012       Lab review: labs are reviewed in the EHR    I have discussed the diagnosis with the patient and the intended plan as seen in the above orders. The patient has received an after-visit summary and questions were answered concerning future plans. I have discussed medication side effects and warnings with the patient as well. I have reviewed the plan of care with the patient, accepted their input and they are in agreement with the treatment goals. All questions were answered. The patient understands the plan of care. Handouts provided today with above information. Pt instructed if symptoms worsen to call the office or report to the ED for continued care. Greater than 50% of the visit time was spent in counseling and/or coordination of care. Follow-up Disposition:  Return in about 6 months (around 1/6/2018) for BP check.     Toñito Good MD

## 2017-07-06 NOTE — PROGRESS NOTES
Chief Complaint   Patient presents with    Hypertension     follow up    Cholesterol Problem     follow up    Blood sugar problem     Pre Diabetes follow up    Medication Refill     Patient still has a copy of the Advanced Directive form and understands to bring it in once completed. 1. Have you been to the ER, urgent care clinic since your last visit? Hospitalized since your last visit? No    2. Have you seen or consulted any other health care providers outside of the [address] [place] [address] since your last visit? Include any pap smears or colon screening.  No

## 2017-07-06 NOTE — PATIENT INSTRUCTIONS
Patient still has a copy of the Advanced Directive form and understands to bring it in once completed. Health Maintenance Due   Topic Date Due    FOBT Q 1 YEAR AGE 50-75  10/30/2002    ZOSTER VACCINE AGE 60>  10/30/2012         Eluxadoline (By mouth)   Eluxadoline (si-sf-ID-oh-mallorie)  Treats irritable bowel syndrome with diarrhea. Brand Name(s): Viberzi   There may be other brand names for this medicine. When This Medicine Should Not Be Used: This medicine is not right for everyone. Do not use it if you had an allergic reaction to eluxadoline, or if you have a gallbladder blockage, sphincter of Oddi disease, history of pancreatitis, pancreas disease (including a blockage), or a bowel blockage. How to Use This Medicine:   Tablet  · Your doctor will tell you how much medicine to use. Do not use more than directed. · It is best to take this medicine with food or milk. · This medicine should come with a Medication Guide. Ask your pharmacist for a copy if you do not have one. · Missed dose: Skip the missed dose and go back to your regular dosing schedule. Do not double doses. · Store the medicine in a closed container at room temperature, away from heat, moisture, and direct light. Drugs and Foods to Avoid:   Ask your doctor or pharmacist before using any other medicine, including over-the-counter medicines, vitamins, and herbal products. · Some foods and medicines can affect how eluxadoline works. Tell your doctor if you are using any of the following:  ¨ Alfentanil, alosetron, bupropion, ciprofloxacin, clarithromycin, cyclosporine, dihydroergotamine, eltrombopag, ergotamine, fentanyl, fluconazole, gemfibrozil, loperamide, paroxetine, pimozide, quinidine, rifampin, rosuvastatin, sirolimus, tacrolimus  ¨ Medicine to treat HIV/AIDS (including atazanavir, lopinavir, ritonavir, saquinavir, tipranavir)  ¨ Narcotic pain medicines  · Tell your doctor if you drink alcohol.  You should limit the amount of alcohol you drink while you are taking this medicine. You should not take this medicine if you have a history of alcohol addiction or if you drink more than 3 alcoholic drinks a day. Warnings While Using This Medicine:   · Tell your doctor right away if you are pregnant or breastfeeding, or if you have liver disease or a history of chronic or severe constipation. Tell your doctor if you do not have a gallbladder. · This medicine may cause the following problems:  ¨ Sphincter of Oddi spasm (increased risk if you do not have a gallbladder)  ¨ Pancreatitis  · This medicine may make you dizzy or drowsy. Do not drive or do anything that could be dangerous until you know how this medicine affects you. · Call your doctor if your symptoms do not improve or if they get worse. Call your doctor if you have constipation for more than 4 days. · Your doctor will do lab tests at regular visits to check on the effects of this medicine. Keep all appointments. · Keep all medicine out of the reach of children. Never share your medicine with anyone. Possible Side Effects While Using This Medicine:   Call your doctor right away if you notice any of these side effects:  · Allergic reaction: Itching or hives, swelling in your face or hands, swelling or tingling in your mouth or throat, chest tightness, trouble breathing  · Sudden and severe stomach pain, nausea, vomiting, fever, lightheadedness  If you notice these less serious side effects, talk with your doctor:   · Constipation  If you notice other side effects that you think are caused by this medicine, tell your doctor. Call your doctor for medical advice about side effects. You may report side effects to FDA at 9-523-AUP-2563  © 2017 [facility] Information is for End User's use only and may not be sold, redistributed or otherwise used for commercial purposes. The above information is an  only.  It is not intended as medical advice for individual conditions or treatments. Talk to your doctor, nurse or pharmacist before following any medical regimen to see if it is safe and effective for you. Diet for Irritable Bowel Syndrome: Care Instructions  Your Care Instructions  Irritable bowel syndrome, or IBS, is a problem with the intestines. IBS can cause belly pain, bloating, gas, constipation, and diarrhea. Most people can control their symptoms by changing their diet and easing stress. No specific foods cause everyone with IBS to have symptoms. Doctors don't offer a specific diet to manage symptoms. But many people find that they feel better when they stop eating certain foods. A high-fiber diet may help if you have constipation. Follow-up care is a key part of your treatment and safety. Be sure to make and go to all appointments, and call your doctor if you are having problems. It's also a good idea to know your test results and keep a list of the medicines you take. How can you care for yourself at home? To reduce constipation  · Include fruits, vegetables, beans, and whole grains in your diet each day. These foods are high in fiber. Slowly increase the amount of fiber you eat. This helps you avoid a lot of gas. · Drink plenty of fluids, enough so that your urine is light yellow or clear like water. If you have kidney, heart, or liver disease and have to limit fluids, talk with your doctor before you increase the amount of fluids you drink. · Get some exercise every day. Build up slowly to 30 to 60 minutes a day on 5 or more days of the week. · Take a fiber supplement, such as Citrucel or Metamucil, every day if needed. Read and follow all instructions on the label. · Schedule time each day for a bowel movement. Having a daily routine may help. Take your time and do not strain when having a bowel movement. · Check with your doctor before you increase the amount of fiber in your diet.  For some people who have IBS, eating more fiber may make some symptoms worse. This includes bloating. To reduce diarrhea  You may try giving up foods or drinks one at a time to see whether symptoms improve. Limit or avoid the following:  · Alcohol  · Caffeine, which is found in coffee, tea, cola drinks, and chocolate  · Nicotine, from smoking or chewing tobacco  · Gas-producing foods, such as beans, broccoli, cabbage, and apples  · Dairy products that contain lactose (milk sugar), such as ice cream, milk, cheese, and sour cream  · Foods and drinks high in sugar, especially fruit juice, soda, candy, and other packaged sweets (such as cookies)  · Foods high in fat, including santos, sausage, butter, oils, and anything deep-fried  · Sorbitol and xylitol, artificial sweeteners found in some sugarless candies and chewing gum  Keep track of foods  · Some people with IBS use a daily food diary to keep track of what they eat and whether they have any symptoms after eating certain foods. The diary also can be a good way to record what is going on in your life. · Stress plays a role in IBS. So if you are aware that certain stresses bring on symptoms, you can try to reduce those stresses. Keep mealtimes pleasant  · Try to maintain a pleasant environment when you eat. This may reduce stress that can make symptoms likely to occur. · Give yourself plenty of time to eat, rather than eating on the go. Chew your food slowly. Try not to swallow air, which can cause bloating. Where can you learn more? Go to http://miguel-mamta.info/. Enter Q772 in the search box to learn more about \"Diet for Irritable Bowel Syndrome: Care Instructions. \"  Current as of: July 26, 2016  Content Version: 11.3  © 3827-0098 [employer]. Care instructions adapted under license by [employer] (which disclaims liability or warranty for this information).  If you have questions about a medical condition or this instruction, always ask your healthcare professional. [employer] disclaims any warranty or liability for your use of this information.

## 2017-07-09 PROBLEM — Z79.899 CONTROLLED SUBSTANCE AGREEMENT SIGNED: Status: ACTIVE | Noted: 2017-07-09

## 2017-09-06 RX ORDER — FLUTICASONE PROPIONATE 50 MCG
2 SPRAY, SUSPENSION (ML) NASAL DAILY
Qty: 1 BOTTLE | Refills: 11 | Status: SHIPPED | OUTPATIENT
Start: 2017-09-06

## 2017-12-04 ENCOUNTER — HOSPITAL ENCOUNTER (OUTPATIENT)
Dept: LAB | Age: 67
Discharge: HOME | End: 2017-12-04
Payer: COMMERCIAL

## 2017-12-04 LAB
FERRITIN SERPL-MCNC: 133 NG/ML (ref 8–388)
FOLATE SERPL-MCNC: 11 NG/ML (ref 3.1–17.5)
VIT B12 SERPL-MCNC: 393 PG/ML (ref 211–911)

## 2017-12-04 PROCEDURE — 36415 COLL VENOUS BLD VENIPUNCTURE: CPT | Performed by: PSYCHIATRY & NEUROLOGY

## 2017-12-04 PROCEDURE — 82607 VITAMIN B-12: CPT | Performed by: PSYCHIATRY & NEUROLOGY

## 2017-12-04 PROCEDURE — 82728 ASSAY OF FERRITIN: CPT | Performed by: PSYCHIATRY & NEUROLOGY

## 2017-12-31 DIAGNOSIS — K58.0 IRRITABLE BOWEL SYNDROME WITH DIARRHEA: ICD-10-CM

## 2017-12-31 RX ORDER — ELUXADOLINE 75 MG/1
TABLET, FILM COATED ORAL
Qty: 60 TAB | Refills: 4 | Status: SHIPPED | OUTPATIENT
Start: 2017-12-31 | End: 2018-01-03 | Stop reason: SDUPTHER

## 2018-01-03 DIAGNOSIS — K58.0 IRRITABLE BOWEL SYNDROME WITH DIARRHEA: ICD-10-CM

## 2018-01-03 RX ORDER — ELUXADOLINE 75 MG/1
TABLET, FILM COATED ORAL
Qty: 60 TAB | Refills: 4 | Status: SHIPPED | OUTPATIENT
Start: 2018-01-03 | End: 2018-04-14 | Stop reason: SDUPTHER

## 2018-01-09 ENCOUNTER — OFFICE VISIT (OUTPATIENT)
Dept: INTERNAL MEDICINE CLINIC | Age: 68
End: 2018-01-09

## 2018-01-09 VITALS
OXYGEN SATURATION: 95 % | HEIGHT: 65 IN | SYSTOLIC BLOOD PRESSURE: 124 MMHG | BODY MASS INDEX: 47.68 KG/M2 | TEMPERATURE: 95.9 F | HEART RATE: 54 BPM | DIASTOLIC BLOOD PRESSURE: 59 MMHG | RESPIRATION RATE: 14 BRPM | WEIGHT: 286.2 LBS

## 2018-01-09 DIAGNOSIS — Z12.11 SCREENING FOR COLON CANCER: ICD-10-CM

## 2018-01-09 DIAGNOSIS — E27.8 ADRENAL INCIDENTALOMA (HCC): ICD-10-CM

## 2018-01-09 DIAGNOSIS — I10 ESSENTIAL HYPERTENSION: Primary | ICD-10-CM

## 2018-01-09 DIAGNOSIS — Z78.0 POSTMENOPAUSAL: ICD-10-CM

## 2018-01-09 DIAGNOSIS — Z13.0 SCREENING FOR DEFICIENCY ANEMIA: ICD-10-CM

## 2018-01-09 DIAGNOSIS — E66.01 OBESITY, MORBID, BMI 40.0-49.9 (HCC): ICD-10-CM

## 2018-01-09 DIAGNOSIS — R73.02 GLUCOSE INTOLERANCE (IMPAIRED GLUCOSE TOLERANCE): ICD-10-CM

## 2018-01-09 PROBLEM — F33.9 RECURRENT DEPRESSION (HCC): Status: ACTIVE | Noted: 2018-01-09

## 2018-01-09 RX ORDER — ZOLPIDEM TARTRATE 5 MG/1
5 TABLET ORAL
Refills: 0 | COMMUNITY
Start: 2017-11-16 | End: 2018-01-09 | Stop reason: ALTCHOICE

## 2018-01-09 RX ORDER — GABAPENTIN 300 MG/1
300 CAPSULE ORAL
COMMUNITY
Start: 2018-01-08 | End: 2022-04-28 | Stop reason: ALTCHOICE

## 2018-01-09 NOTE — PROGRESS NOTES
INTERNISTS [facility]:  1/9/2018, MRN: [id number]      Bautista Boyd is a 74 y.o. female and presents to clinic for Hypertension (follow up) and Cholesterol Problem (follow up)    Subjective: The patient is a 62-year-old female with history of obesity, allergic rhinitis, IBS-diarrhea, onychomycosis, prediabetes, depression, hypertension, adrenal incidentaloma seen on CT scan 2015, and history of cerebral infarction her head MRI findings. 1.  Health maintenance: The patient turned 67 since her last appointment. She does not have Medicare part B coverage. Colon cancer screening: last done 6/23/18. No hematochezia/melena. No abdominal pain. Flu vaccine: Already had this vaccine this season  Pneumococcal 13 vaccine: unknown as to whether pt received this. She had a pneumonia vaccine within the past 5 yrs but does not know which one she had. 2.  Hypertension: This is a chronic condition, present for at least 6 months. She is on atenolol-chlorthalidone. She reports no adverse effects of taking his medication. Her blood pressure today is 124/59. 3.  History of an adrenal adenoma: The patient had an incidental finding on a CT scan 2015, showing a left-sided 1.3 cm in size adrenal incidentaloma. 4.  Obesity, Prediabetes, and HLD: The patient's weight today is 286lbs. Her weight has been steadily increasing since her last apt. She has a history of prediabetes and hyperlipidemia. She is presently on Crestor. She reports no adverse side effects with taking this medication. She is not regularly exercising. She is dieting and just joined [employer] one week ago. 5.  IBS-diarrhea: This is a chronic condition, present for at least 6 months. She is on Viberzi which is working well to control her symptoms. She needs a refill on this medication today. A controlled substance agreement in place for this medication. No hematochezia or melena. No abdominal pain.      6. CVA Hx and Depression: She has a h/o depression s/p lacunar infarction. She is followed by the Neurology team. +Aggrenox. She also has neuropathy in her BLE. She was placed on gabapentin in between apts. She has some drowsiness associated with this rx. Otherwise, she is doing well on lexapro. The latter helps to control sx of depression. No suicidal ideation. She will have episodes of tearfulness for no particular reason. PHQ 9 is 14 today. Patient Active Problem List    Diagnosis Date Noted    Recurrent depression ([facility] [address] 01/09/2018    Controlled substance agreement signed 7/7/17 07/09/2017    Obesity, morbid, BMI 40.0-49.9  12/06/2016    Allergic rhinitis 12/06/2016    Irritable bowel syndrome with diarrhea 12/06/2016    Onychomycosis 12/06/2016    Glucose intolerance (impaired glucose tolerance) 12/01/2016    Single current episode of major depressive disorder (has tried celexa, wellbutrin, mirtazapine with adverse effects) 12/01/2016    Essential hypertension 12/01/2016    Adrenal incidentaloma (1.3 cm and left sided) - per CT scan from 2015 12/01/2016    History of cerebral infarction - per head MRI findings 12/01/2016       Current Outpatient Prescriptions   Medication Sig Dispense Refill    gabapentin (NEURONTIN) 300 mg capsule Take 300 mg by mouth nightly.  VIBERZI 75 mg tablet TAKE ONE TABLET BY MOUTH TWO TIMES A DAY WITH MEALS (MAXIMUM DAILY AMOUNT 150MG)  60 Tab 4    fluticasone (FLONASE) 50 mcg/actuation nasal spray 2 Sprays by Both Nostrils route daily. 1 Bottle 11    rosuvastatin (CRESTOR) 10 mg tablet Take 1 Tab by mouth nightly. 90 Tab 3    atenolol-chlorthalidone (TENORETIC) 100-25 mg per tablet Take 1 Tab by mouth daily. (Patient taking differently: Take 1 Tab by mouth daily. Indications: Hypertension) 90 Tab 3    escitalopram oxalate (LEXAPRO) 10 mg tablet Take 10 mg by mouth daily.  diclofenac EC (VOLTAREN) 50 mg EC tablet Take 50 mg by mouth two (2) times daily as needed.  Indications: for headache      Cholecalciferol, Vitamin D3, (VITAMIN D3) 1,000 unit cap Take 1,000 Units by mouth daily.  aspirin-dipyridamole (AGGRENOX)  mg per SR capsule Take 1 Cap by mouth two (2) times a day. Allergies   Allergen Reactions    Amoxicillin-Pot Clavulanate Diarrhea    Sulfa (Sulfonamide Antibiotics) Diarrhea       Past Medical History:   Diagnosis Date    Hypercholesterolemia     Hypertension     Stroke [facility])        Past Surgical History:   Procedure Laterality Date    HX APPENDECTOMY  09/09/2015    HX CHOLECYSTECTOMY      HX GYN      partial hysterectomy       Family History   Problem Relation Age of Onset    Dementia Mother     Stroke Father     Cancer Brother     Hypertension Brother        Social History   Substance Use Topics    Smoking status: Never Smoker    Smokeless tobacco: Never Used    Alcohol use No       ROS   Review of Systems   Constitutional: Negative for chills and fever. HENT: Positive for hearing loss (improved with b/l amplifiers). Negative for ear pain and sore throat. Eyes: Negative for blurred vision and pain. Respiratory: Negative for cough and shortness of breath (at rest). Cardiovascular: Negative for chest pain. Gastrointestinal: Negative for abdominal pain, blood in stool and melena. Genitourinary: Negative for dysuria and hematuria. Musculoskeletal: Negative for myalgias. Skin: Negative for rash. Neurological: Positive for tingling (chronic). Negative for focal weakness and headaches. Endo/Heme/Allergies: Does not bruise/bleed easily. Psychiatric/Behavioral: Positive for depression. Negative for substance abuse and suicidal ideas.        Objective     Vitals:    01/09/18 0814   BP: 124/59   Pulse: (!) 54   Resp: 14   Temp: 95.9 °F (35.5 °C)   TempSrc: Oral   SpO2: 95%   Weight: 286 lb 3.2 oz (129.8 kg)   Height: 5' 5\" (1.651 m)   PainSc:   0 - No pain       Physical Exam   Constitutional: She is oriented to person, place, and time and well-developed, well-nourished, and in no distress. HENT:   Head: Normocephalic and atraumatic. Right Ear: External ear normal.   Left Ear: External ear normal.   Nose: Nose normal.   Mouth/Throat: Oropharynx is clear and moist. No oropharyngeal exudate. Clear TMs   Eyes: Conjunctivae and EOM are normal. Right eye exhibits no discharge. Left eye exhibits no discharge. No scleral icterus. Neck: Neck supple. Cardiovascular: Normal rate, regular rhythm, normal heart sounds and intact distal pulses. Exam reveals no gallop and no friction rub. No murmur heard. Pulmonary/Chest: Effort normal and breath sounds normal. No respiratory distress. She has no wheezes. She has no rales. Abdominal: Soft. Bowel sounds are normal. She exhibits no distension. There is no tenderness. There is no rebound and no guarding. Musculoskeletal: She exhibits no edema or tenderness (Bue). Lymphadenopathy:     She has no cervical adenopathy. Neurological: She is alert and oriented to person, place, and time. She exhibits normal muscle tone. Gait normal.   Skin: Skin is warm and dry. No erythema. Psychiatric:   +Tearfulness despite being euthymic. Nursing note and vitals reviewed.       LABS   Data Review:   Lab Results   Component Value Date/Time    WBC 4.7 09/11/2015 01:36 AM    HGB 11.2 09/11/2015 01:36 AM    HCT 35.1 09/11/2015 01:36 AM    PLATELET 994 13/64/0926 01:36 AM    MCV 95.9 09/11/2015 01:36 AM       Lab Results   Component Value Date/Time    Sodium 141 09/11/2015 01:36 AM    Potassium 2.9 09/11/2015 01:36 AM    Chloride 106 09/11/2015 01:36 AM    CO2 28 09/11/2015 01:36 AM    Anion gap 7 09/11/2015 01:36 AM    Glucose 96 09/11/2015 01:36 AM    BUN 15 09/11/2015 01:36 AM    Creatinine 0.93 09/11/2015 01:36 AM    BUN/Creatinine ratio 16 09/11/2015 01:36 AM    GFR est AA >60 09/11/2015 01:36 AM    GFR est non-AA >60 09/11/2015 01:36 AM    Calcium 8.4 09/11/2015 01:36 AM       Lab Results   Component Value Date/Time    Cholesterol, total 118 02/01/2017 08:48 AM    HDL Cholesterol 58 02/01/2017 08:48 AM    LDL, calculated 37 02/01/2017 08:48 AM    VLDL, calculated 23 02/01/2017 08:48 AM    Triglyceride 115 02/01/2017 08:48 AM    CHOL/HDL Ratio 2.0 02/01/2017 08:48 AM       Lab Results   Component Value Date/Time    Hemoglobin A1c 6.1 02/01/2017 08:48 AM    Hemoglobin A1c, External 5.8 09/08/2015       Assessment/Plan:   1. Obesity, morbid, BMI 40.0-49.9: Weight today is 286lbs. +H/o HLD and Prediabetes. -I encouraged the patient to lose weight as a means to improve her cardiovascular health. We discussed the importance of portion control and getting regular aerobic exercise. I encouraged her to c/w Weight Watchers plan. I will recheck her weight at her follow-up appointment.  -Checking a lipid panel and an A1c.    2. Adrenal incidentaloma (1.3 cm and left sided) - per CT scan from 2015  -Checking a BMP and an A1c.    3. IBS-Diarrhea: Stable.  -Viberzi refilled. 4. Essential hypertension: Stable. -Checking an A1c, lipid panel, BMP, and the urine protein screen.  -Continue with medication as prescribed. 5. Health Maintenance:  -We discussed getting a bone density study to rule out osteoporosis. A dexa scan was ordered.  -We discussed getting the flu and pneumococcal 13 vaccine today. Requesting her vaccine records.  -We discussed the importance of colon cancer screening. Referral placed for colonoscopy    6. Depression, BLE neuropathy, and H/o CVA:  +PHQ9 score is 14 today. - C/w Neurology f/u. C/w rx (aggrenox) per Neurology recommendations.   - C/w lexapro          Health Maintenance Due   Topic Date Due    FOBT Q 1 YEAR AGE 50-75  10/30/2002    ZOSTER VACCINE AGE 60>  08/30/2012    OSTEOPOROSIS SCREENING (DEXA)  10/30/2017    Pneumococcal 65+ Low/Medium Risk (1 of 2 - PCV13) 10/30/2017     Lab review: labs are reviewed in the EHR    I have discussed the diagnosis with the patient and the intended plan as seen in the above orders. The patient has received an after-visit summary and questions were answered concerning future plans. I have discussed medication side effects and warnings with the patient as well. I have reviewed the plan of care with the patient, accepted their input and they are in agreement with the treatment goals. All questions were answered. The patient understands the plan of care. Handouts provided today with above information. Pt instructed if symptoms worsen to call the office or report to the ED for continued care. Greater than 50% of the visit time was spent in counseling and/or coordination of care. Follow-up Disposition:  Return in about 6 months (around 7/9/2018) for BP check.     Patricia Mccabe MD

## 2018-01-09 NOTE — PATIENT INSTRUCTIONS
Body Mass Index: Care Instructions  Your Care Instructions    Body mass index (BMI) can help you see if your weight is raising your risk for health problems. It uses a formula to compare how much you weigh with how tall you are. · A BMI lower than 18.5 is considered underweight. · A BMI between 18.5 and 24.9 is considered healthy. · A BMI between 25 and 29.9 is considered overweight. A BMI of 30 or higher is considered obese. If your BMI is in the normal range, it means that you have a lower risk for weight-related health problems. If your BMI is in the overweight or obese range, you may be at increased risk for weight-related health problems, such as high blood pressure, heart disease, stroke, arthritis or joint pain, and diabetes. If your BMI is in the underweight range, you may be at increased risk for health problems such as fatigue, lower protection (immunity) against illness, muscle loss, bone loss, hair loss, and hormone problems. BMI is just one measure of your risk for weight-related health problems. You may be at higher risk for health problems if you are not active, you eat an unhealthy diet, or you drink too much alcohol or use tobacco products. Follow-up care is a key part of your treatment and safety. Be sure to make and go to all appointments, and call your doctor if you are having problems. It's also a good idea to know your test results and keep a list of the medicines you take. How can you care for yourself at home? · Practice healthy eating habits. This includes eating plenty of fruits, vegetables, whole grains, lean protein, and low-fat dairy. · If your doctor recommends it, get more exercise. Walking is a good choice. Bit by bit, increase the amount you walk every day. Try for at least 30 minutes on most days of the week. · Do not smoke. Smoking can increase your risk for health problems. If you need help quitting, talk to your doctor about stop-smoking programs and medicines. These can increase your chances of quitting for good. · Limit alcohol to 2 drinks a day for men and 1 drink a day for women. Too much alcohol can cause health problems. If you have a BMI higher than 25  · Your doctor may do other tests to check your risk for weight-related health problems. This may include measuring the distance around your waist. A waist measurement of more than 40 inches in men or 35 inches in women can increase the risk of weight-related health problems. · Talk with your doctor about steps you can take to stay healthy or improve your health. You may need to make lifestyle changes to lose weight and stay healthy, such as changing your diet and getting regular exercise. If you have a BMI lower than 18.5  · Your doctor may do other tests to check your risk for health problems. · Talk with your doctor about steps you can take to stay healthy or improve your health. You may need to make lifestyle changes to gain or maintain weight and stay healthy, such as getting more healthy foods in your diet and doing exercises to build muscle. Where can you learn more? Go to http://miguel-mamta.info/. Enter S176 in the search box to learn more about \"Body Mass Index: Care Instructions. \"  Current as of: October 13, 2016  Content Version: 11.4  © 1468-5474 [employer]. Care instructions adapted under license by [employer] (which disclaims liability or warranty for this information). If you have questions about a medical condition or this instruction, always ask your healthcare professional. [place] [address] any warranty or liability for your use of this information. Patient still has a copy of the Advanced Directive form and understands to bring it in once completed.   Health Maintenance Due   Topic Date Due    FOBT Q 1 YEAR AGE 50-75  10/30/2002    ZOSTER VACCINE AGE 60>  08/30/2012    OSTEOPOROSIS SCREENING (DEXA)  10/30/2017    Pneumococcal 65+ Low/Medium Risk (1 of 2 - PCV13) 10/30/2017

## 2018-01-09 NOTE — PROGRESS NOTES
Chief Complaint   Patient presents with    Hypertension     follow up    Cholesterol Problem     follow up     Patient still has a copy of the Advanced Directive form and understands to bring it in once completed. 1. Have you been to the ER, urgent care clinic since your last visit? Hospitalized since your last visit? No    2. Have you seen or consulted any other health care providers outside of the [address] [place] [address] since your last visit? Include any pap smears or colon screening.  No

## 2018-01-09 NOTE — MR AVS SNAPSHOT
Visit Information Date & Time Provider Department Dept. Phone Encounter #  
 1/9/2018  8:00 AM Korey Tesfaye MD [facility] [phone] Follow-up Instructions Return in about 6 months (around 7/9/2018) for BP check. Your Appointments 1/23/2018  7:50 AM  
LAB with C NURSE VISIT [facility] ([address]) Appt Note: labs per [address] [facility], [address] [place]  
  
   
 [address], [address]  
  
    
 7/9/2018  8:00 AM  
Office Visit with Korey Tesfaye MD  
[facility] [address]) Appt Note: 6 month f/u  
 [address] [facility], [address] [place]  
  
   
 [address], [address] Upcoming Health Maintenance Date Due FOBT Q 1 YEAR AGE 50-75 10/30/2002 ZOSTER VACCINE AGE 60> 8/30/2012 OSTEOPOROSIS SCREENING (DEXA) 10/30/2017 Pneumococcal 65+ Low/Medium Risk (1 of 2 - PCV13) 10/30/2017 BREAST CANCER SCRN MAMMOGRAM 12/14/2018 MEDICARE YEARLY EXAM 1/10/2019 GLAUCOMA SCREENING Q2Y 3/8/2019 DTaP/Tdap/Td series (2 - Td) 1/3/2027 Allergies as of 1/9/2018  Review Complete On: 1/9/2018 By: Billie Gómez Severity Noted Reaction Type Reactions Amoxicillin-pot Clavulanate High 07/06/2017    Diarrhea Sulfa (Sulfonamide Antibiotics)  09/09/2015    Diarrhea Current Immunizations  Reviewed on 1/3/2017 Name Date Influenza Vaccine 10/5/2017 Tdap 1/3/2017 Not reviewed this visit You Were Diagnosed With   
  
 Codes Comments Obesity, morbid, BMI 40.0-49.9 ([facility] [address]    -  Primary ICD-10-CM: E66.01 
ICD-9-CM: 278.01 Adrenal incidentaloma ([facility] [address]     ICD-10-CM: E27.8 ICD-9-CM: 255.8 Glucose intolerance (impaired glucose tolerance)     ICD-10-CM: R73.02 
ICD-9-CM: 790.22  Essential hypertension     ICD-10-CM: I10 
 ICD-9-CM: 401.9 Postmenopausal     ICD-10-CM: Z78.0 ICD-9-CM: V49.81 Screening for deficiency anemia     ICD-10-CM: Z13.0 ICD-9-CM: V78.1 Screening for colon cancer     ICD-10-CM: Z12.11 ICD-9-CM: V76.51 Vitals BP Pulse Temp Resp Height(growth percentile) Weight(growth percentile) 124/59 (BP 1 Location: Left arm, BP Patient Position: Sitting) (!) 54 95.9 °F (35.5 °C) (Oral) 14 5' 5\" (1.651 m) 286 lb 3.2 oz (129.8 kg) SpO2 BMI OB Status Smoking Status 95% 47.63 kg/m2 Postmenopausal Never Smoker BMI and BSA Data Body Mass Index Body Surface Area  
 47.63 kg/m 2 2.44 m 2 Preferred Pharmacy Pharmacy Name Phone [facility] [address] [place] [address] [phone] Your Updated Medication List  
  
   
This list is accurate as of: 1/9/18  8:59 AM.  Always use your most recent med list.  
  
  
  
  
 AGGRENOX  mg per SR capsule Generic drug:  aspirin-dipyridamole Take 1 Cap by mouth two (2) times a day. atenolol-chlorthalidone 100-25 mg per tablet Commonly known as:  Renelda Hay Take 1 Tab by mouth daily. diclofenac EC 50 mg EC tablet Commonly known as:  VOLTAREN Take 50 mg by mouth two (2) times daily as needed. Indications: for headache  
  
 fluticasone 50 mcg/actuation nasal spray Commonly known as:  Theadore Darius 2 Sprays by Both Nostrils route daily. gabapentin 300 mg capsule Commonly known as:  NEURONTIN Take 300 mg by mouth nightly. LEXAPRO 10 mg tablet Generic drug:  escitalopram oxalate Take 10 mg by mouth daily. rosuvastatin 10 mg tablet Commonly known as:  CRESTOR Take 1 Tab by mouth nightly. VIBERZI 75 mg tablet Generic drug:  eluxadoline TAKE ONE TABLET BY MOUTH TWO TIMES A DAY WITH MEALS (MAXIMUM DAILY AMOUNT 150MG) VITAMIN D3 1,000 unit Cap Generic drug:  cholecalciferol Take 1,000 Units by mouth daily. We Performed the Following REFERRAL TO GASTROENTEROLOGY [KYS96 Custom] Comments:  
 Colonoscopy is due after June 23, 2018. Follow-up Instructions Return in about 6 months (around 7/9/2018) for BP check. To-Do List   
 01/09/2018 Lab:  CBC WITH AUTOMATED DIFF   
  
 01/09/2018 Imaging:  DEXA BONE DENSITY STUDY AXIAL Around 01/09/2018 Lab:  HEMOGLOBIN A1C W/O EAG   
  
 01/09/2018 Lab:  LIPID PANEL Around 01/09/2018 Lab:  METABOLIC PANEL, BASIC   
  
 01/09/2018 Lab:  MICROALBUMIN, UR, RAND W/ MICROALBUMIN/CREA RATIO Referral Information Referral ID Referred By Referred To  
  
 [id number] Jaye Darius Not Available Visits Status Start Date End Date 1 New Request 1/9/18 1/9/19 If your referral has a status of pending review or denied, additional information will be sent to support the outcome of this decision. Referral ID Referred By Referred To  
 [id number] Vitoyulissa Mccoy Not Available Visits Status Start Date End Date 1 New Request 1/9/18 1/9/19 If your referral has a status of pending review or denied, additional information will be sent to support the outcome of this decision. Patient Instructions Body Mass Index: Care Instructions Your Care Instructions Body mass index (BMI) can help you see if your weight is raising your risk for health problems. It uses a formula to compare how much you weigh with how tall you are. · A BMI lower than 18.5 is considered underweight. · A BMI between 18.5 and 24.9 is considered healthy. · A BMI between 25 and 29.9 is considered overweight. A BMI of 30 or higher is considered obese. If your BMI is in the normal range, it means that you have a lower risk for weight-related health problems.  If your BMI is in the overweight or obese range, you may be at increased risk for weight-related health problems, such as high blood pressure, heart disease, stroke, arthritis or joint pain, and diabetes. If your BMI is in the underweight range, you may be at increased risk for health problems such as fatigue, lower protection (immunity) against illness, muscle loss, bone loss, hair loss, and hormone problems. BMI is just one measure of your risk for weight-related health problems. You may be at higher risk for health problems if you are not active, you eat an unhealthy diet, or you drink too much alcohol or use tobacco products. Follow-up care is a key part of your treatment and safety. Be sure to make and go to all appointments, and call your doctor if you are having problems. It's also a good idea to know your test results and keep a list of the medicines you take. How can you care for yourself at home? · Practice healthy eating habits. This includes eating plenty of fruits, vegetables, whole grains, lean protein, and low-fat dairy. · If your doctor recommends it, get more exercise. Walking is a good choice. Bit by bit, increase the amount you walk every day. Try for at least 30 minutes on most days of the week. · Do not smoke. Smoking can increase your risk for health problems. If you need help quitting, talk to your doctor about stop-smoking programs and medicines. These can increase your chances of quitting for good. · Limit alcohol to 2 drinks a day for men and 1 drink a day for women. Too much alcohol can cause health problems. If you have a BMI higher than 25 · Your doctor may do other tests to check your risk for weight-related health problems. This may include measuring the distance around your waist. A waist measurement of more than 40 inches in men or 35 inches in women can increase the risk of weight-related health problems. · Talk with your doctor about steps you can take to stay healthy or improve your health.  You may need to make lifestyle changes to lose weight and stay healthy, such as changing your diet and getting regular exercise. If you have a BMI lower than 18.5 · Your doctor may do other tests to check your risk for health problems. · Talk with your doctor about steps you can take to stay healthy or improve your health. You may need to make lifestyle changes to gain or maintain weight and stay healthy, such as getting more healthy foods in your diet and doing exercises to build muscle. Where can you learn more? Go to http://miguel-mamta.info/. Enter S176 in the search box to learn more about \"Body Mass Index: Care Instructions. \" Current as of: October 13, 2016 Content Version: 11.4 © 1196-4450 Space Star Technology. Care instructions adapted under license by [employer] (which disclaims liability or warranty for this information). If you have questions about a medical condition or this instruction, always ask your healthcare professional. [place] [address] any warranty or liability for your use of this information. Patient still has a copy of the Advanced Directive form and understands to bring it in once completed. Health Maintenance Due Topic Date Due  
 FOBT Q 1 YEAR AGE 50-75  10/30/2002  ZOSTER VACCINE AGE 60>  08/30/2012  
 OSTEOPOROSIS SCREENING (DEXA)  10/30/2017  Pneumococcal 65+ Low/Medium Risk (1 of 2 - PCV13) 10/30/2017 Introducing [facility] & HEALTH SERVICES! Dear Joseph Granado: Thank you for requesting a Semmle account. Our records indicate that you already have an active Semmle account. You can access your account anytime at [email or web address] Did you know that you can access your hospital and ER discharge instructions at any time in Semmle? You can also review all of your test results from your hospital stay or ER visit. Additional Information If you have questions, please visit the Frequently Asked Questions section of the StopTheHacker website at [email or web address] Remember, StopTheHacker is NOT to be used for urgent needs. For medical emergencies, dial 911. Now available from your iPhone and Android! Please provide this summary of care documentation to your next provider. Your primary care clinician is listed as Joanne Guzman. If you have any questions after today's visit, please call [phone].

## 2018-01-22 ENCOUNTER — HOSPITAL ENCOUNTER (OUTPATIENT)
Dept: BONE DENSITY | Age: 68
Discharge: HOME | End: 2018-01-22
Attending: INTERNAL MEDICINE
Payer: COMMERCIAL

## 2018-01-22 DIAGNOSIS — Z78.0 POSTMENOPAUSAL: ICD-10-CM

## 2018-01-22 PROCEDURE — 77080 DXA BONE DENSITY AXIAL: CPT

## 2018-01-23 ENCOUNTER — HOSPITAL ENCOUNTER (OUTPATIENT)
Dept: LAB | Age: 68
Discharge: HOME | End: 2018-01-23
Payer: COMMERCIAL

## 2018-01-23 ENCOUNTER — TELEPHONE (OUTPATIENT)
Dept: INTERNAL MEDICINE CLINIC | Age: 68
End: 2018-01-23

## 2018-01-23 DIAGNOSIS — E66.01 OBESITY, MORBID, BMI 40.0-49.9 (HCC): ICD-10-CM

## 2018-01-23 DIAGNOSIS — E27.8 ADRENAL INCIDENTALOMA (HCC): ICD-10-CM

## 2018-01-23 DIAGNOSIS — Z13.0 SCREENING FOR DEFICIENCY ANEMIA: ICD-10-CM

## 2018-01-23 DIAGNOSIS — I10 ESSENTIAL HYPERTENSION: ICD-10-CM

## 2018-01-23 DIAGNOSIS — R73.02 GLUCOSE INTOLERANCE (IMPAIRED GLUCOSE TOLERANCE): ICD-10-CM

## 2018-01-23 LAB
ANION GAP SERPL CALC-SCNC: 10 MMOL/L (ref 3–18)
BASOPHILS # BLD: 0 K/UL (ref 0–0.06)
BASOPHILS NFR BLD: 0 % (ref 0–2)
BUN SERPL-MCNC: 18 MG/DL (ref 7–18)
BUN/CREAT SERPL: 22 (ref 12–20)
CALCIUM SERPL-MCNC: 9.7 MG/DL (ref 8.5–10.1)
CHLORIDE SERPL-SCNC: 102 MMOL/L (ref 100–108)
CHOLEST SERPL-MCNC: 113 MG/DL
CO2 SERPL-SCNC: 31 MMOL/L (ref 21–32)
CREAT SERPL-MCNC: 0.83 MG/DL (ref 0.6–1.3)
DIFFERENTIAL METHOD BLD: ABNORMAL
EOSINOPHIL # BLD: 0.1 K/UL (ref 0–0.4)
EOSINOPHIL NFR BLD: 2 % (ref 0–5)
ERYTHROCYTE [DISTWIDTH] IN BLOOD BY AUTOMATED COUNT: 15 % (ref 11.6–14.5)
GLUCOSE SERPL-MCNC: 108 MG/DL (ref 74–99)
HBA1C MFR BLD: 6.2 % (ref 4.2–5.6)
HCT VFR BLD AUTO: 45.4 % (ref 35–45)
HDLC SERPL-MCNC: 49 MG/DL (ref 40–60)
HDLC SERPL: 2.3 {RATIO} (ref 0–5)
HGB BLD-MCNC: 13.6 G/DL (ref 12–16)
LDLC SERPL CALC-MCNC: 42.4 MG/DL (ref 0–100)
LIPID PROFILE,FLP: NORMAL
LYMPHOCYTES # BLD: 1.7 K/UL (ref 0.9–3.6)
LYMPHOCYTES NFR BLD: 24 % (ref 21–52)
MCH RBC QN AUTO: 30.8 PG (ref 24–34)
MCHC RBC AUTO-ENTMCNC: 30 G/DL (ref 31–37)
MCV RBC AUTO: 102.7 FL (ref 74–97)
MONOCYTES # BLD: 0.5 K/UL (ref 0.05–1.2)
MONOCYTES NFR BLD: 8 % (ref 3–10)
NEUTS SEG # BLD: 4.6 K/UL (ref 1.8–8)
NEUTS SEG NFR BLD: 66 % (ref 40–73)
PLATELET # BLD AUTO: 241 K/UL (ref 135–420)
PMV BLD AUTO: 11.5 FL (ref 9.2–11.8)
POTASSIUM SERPL-SCNC: 3.5 MMOL/L (ref 3.5–5.5)
RBC # BLD AUTO: 4.42 M/UL (ref 4.2–5.3)
SODIUM SERPL-SCNC: 143 MMOL/L (ref 136–145)
TRIGL SERPL-MCNC: 108 MG/DL (ref ?–150)
VLDLC SERPL CALC-MCNC: 21.6 MG/DL
WBC # BLD AUTO: 6.9 K/UL (ref 4.6–13.2)

## 2018-01-23 PROCEDURE — 80061 LIPID PANEL: CPT | Performed by: INTERNAL MEDICINE

## 2018-01-23 PROCEDURE — 82043 UR ALBUMIN QUANTITATIVE: CPT | Performed by: INTERNAL MEDICINE

## 2018-01-23 PROCEDURE — 85025 COMPLETE CBC W/AUTO DIFF WBC: CPT | Performed by: INTERNAL MEDICINE

## 2018-01-23 PROCEDURE — 36415 COLL VENOUS BLD VENIPUNCTURE: CPT | Performed by: INTERNAL MEDICINE

## 2018-01-23 PROCEDURE — 80048 BASIC METABOLIC PNL TOTAL CA: CPT | Performed by: INTERNAL MEDICINE

## 2018-01-23 PROCEDURE — 83036 HEMOGLOBIN GLYCOSYLATED A1C: CPT | Performed by: INTERNAL MEDICINE

## 2018-01-23 NOTE — PROGRESS NOTES
Please let the pt know that she has osteopenia. A f/u bone density study will need to be obtained in 3-5 yrs to assess her bone density. Her bones at present, are not soft enough, to warrant medication.     Dr. Raheem Jarrett  [facility] [address] [facility] [address]  Phone: [phone]  Fax: [phone]

## 2018-01-23 NOTE — TELEPHONE ENCOUNTER
----- Message from Rafael Carvajal MD sent at 1/23/2018 12:32 PM EST -----  Please let the pt know that she has osteopenia. A f/u bone density study will need to be obtained in 3-5 yrs to assess her bone density. Her bones at present, are not soft enough, to warrant medication.     Dr. Lacie Oshea  [facility] [place] [address] [address]  Phone: [phone]  Fax: [phone]

## 2018-01-24 LAB
CREAT UR-MCNC: 191.75 MG/DL (ref 30–125)
MICROALBUMIN UR-MCNC: 0.8 MG/DL (ref 0–3)
MICROALBUMIN/CREAT UR-RTO: 4 MG/G (ref 0–30)

## 2018-01-24 NOTE — PROGRESS NOTES
Please let the pt know that her cholesterol is roughly unchanged from last year. Her cholesterol values look reassuring/well. Meanwhile, her A1C is 6.2, slightly up from 1 year ago. She needs to lower her carb intake to prevent progression to type 2 DM and reduce her weight with regular aerobic activity. Her blood counts show no significant abnormality. There is no evidence of chronic kidney disease. She should continue all medications as prescribed.     Dr. Vesna Isaac  [facility] [address]  Phone: [phone]  Fax: [phone]

## 2018-02-12 DIAGNOSIS — E66.01 OBESITY, MORBID, BMI 40.0-49.9 (HCC): ICD-10-CM

## 2018-02-12 DIAGNOSIS — E78.5 HYPERLIPIDEMIA, UNSPECIFIED HYPERLIPIDEMIA TYPE: ICD-10-CM

## 2018-02-12 RX ORDER — ROSUVASTATIN CALCIUM 10 MG/1
10 TABLET, COATED ORAL
Qty: 90 TAB | Refills: 3 | Status: SHIPPED | OUTPATIENT
Start: 2018-02-12 | End: 2019-01-25 | Stop reason: SDUPTHER

## 2018-03-23 DIAGNOSIS — I10 ESSENTIAL HYPERTENSION: ICD-10-CM

## 2018-03-23 RX ORDER — ATENOLOL AND CHLORTHALIDONE TABLET 100; 25 MG/1; MG/1
TABLET ORAL
Qty: 90 TAB | Refills: 2 | Status: SHIPPED | OUTPATIENT
Start: 2018-03-23 | End: 2018-12-16 | Stop reason: SDUPTHER

## 2018-04-14 DIAGNOSIS — K58.0 IRRITABLE BOWEL SYNDROME WITH DIARRHEA: ICD-10-CM

## 2018-04-16 DIAGNOSIS — K58.0 IRRITABLE BOWEL SYNDROME WITH DIARRHEA: ICD-10-CM

## 2018-04-16 NOTE — TELEPHONE ENCOUNTER
[employer]. Pt is a [employer] employee and they are loosing their job and benefits. Wants to know if patient can get a 90 day supply of Viberzi so they can fill before her benefits end.

## 2018-04-16 NOTE — TELEPHONE ENCOUNTER
PHONE IN RX    The patient requests a 90 d/s of medication since she will be losing benefits soon. Requested Prescriptions     Pending Prescriptions Disp Refills    eluxadoline (VIBERZI) 75 mg tablet 180 Tab 1     Sig: Take 1 Tab by mouth two (2) times daily (with meals). Max Daily Amount: 150 mg.

## 2018-04-16 NOTE — TELEPHONE ENCOUNTER
From: Socorro Daly  To: Elaine Ojeda MD  Sent: 4/14/2018 10:42 AM EDT  Subject: Medication Renewal Request    Original authorizing provider: MD Kevin Montilla. Butt would like a refill of the following medications:  VIBERZI 75 mg tablet Elaine Ojeda MD]    Preferred pharmacy: [facility]    Comment:  Please do for a 90-day supply. Thank you.

## 2018-07-09 ENCOUNTER — HOSPITAL ENCOUNTER (OUTPATIENT)
Dept: GENERAL RADIOLOGY | Age: 68
Discharge: HOME | End: 2018-07-09
Payer: COMMERCIAL

## 2018-07-09 ENCOUNTER — OFFICE VISIT (OUTPATIENT)
Dept: INTERNAL MEDICINE CLINIC | Age: 68
End: 2018-07-09

## 2018-07-09 VITALS
BODY MASS INDEX: 44.82 KG/M2 | WEIGHT: 269 LBS | HEIGHT: 65 IN | RESPIRATION RATE: 18 BRPM | HEART RATE: 54 BPM | DIASTOLIC BLOOD PRESSURE: 59 MMHG | OXYGEN SATURATION: 97 % | TEMPERATURE: 96.5 F | SYSTOLIC BLOOD PRESSURE: 115 MMHG

## 2018-07-09 DIAGNOSIS — F32.9 SINGLE CURRENT EPISODE OF MAJOR DEPRESSIVE DISORDER, UNSPECIFIED DEPRESSION EPISODE SEVERITY: ICD-10-CM

## 2018-07-09 DIAGNOSIS — E66.01 OBESITY, MORBID, BMI 40.0-49.9 (HCC): ICD-10-CM

## 2018-07-09 DIAGNOSIS — Z13.0 SCREENING FOR DEFICIENCY ANEMIA: ICD-10-CM

## 2018-07-09 DIAGNOSIS — Z86.73 HISTORY OF CEREBRAL INFARCTION: ICD-10-CM

## 2018-07-09 DIAGNOSIS — Z23 ENCOUNTER FOR IMMUNIZATION: ICD-10-CM

## 2018-07-09 DIAGNOSIS — I10 ESSENTIAL HYPERTENSION: ICD-10-CM

## 2018-07-09 DIAGNOSIS — R73.02 GLUCOSE INTOLERANCE (IMPAIRED GLUCOSE TOLERANCE): ICD-10-CM

## 2018-07-09 DIAGNOSIS — K58.0 IRRITABLE BOWEL SYNDROME WITH DIARRHEA: ICD-10-CM

## 2018-07-09 DIAGNOSIS — Z12.31 ENCOUNTER FOR SCREENING MAMMOGRAM FOR BREAST CANCER: ICD-10-CM

## 2018-07-09 DIAGNOSIS — M54.16 LUMBAR RADICULOPATHY: ICD-10-CM

## 2018-07-09 DIAGNOSIS — M54.16 LUMBAR RADICULOPATHY: Primary | ICD-10-CM

## 2018-07-09 PROCEDURE — 72114 X-RAY EXAM L-S SPINE BENDING: CPT

## 2018-07-09 NOTE — PATIENT INSTRUCTIONS
Body Mass Index: Care Instructions  Your Care Instructions    Body mass index (BMI) can help you see if your weight is raising your risk for health problems. It uses a formula to compare how much you weigh with how tall you are. · A BMI lower than 18.5 is considered underweight. · A BMI between 18.5 and 24.9 is considered healthy. · A BMI between 25 and 29.9 is considered overweight. A BMI of 30 or higher is considered obese. If your BMI is in the normal range, it means that you have a lower risk for weight-related health problems. If your BMI is in the overweight or obese range, you may be at increased risk for weight-related health problems, such as high blood pressure, heart disease, stroke, arthritis or joint pain, and diabetes. If your BMI is in the underweight range, you may be at increased risk for health problems such as fatigue, lower protection (immunity) against illness, muscle loss, bone loss, hair loss, and hormone problems. BMI is just one measure of your risk for weight-related health problems. You may be at higher risk for health problems if you are not active, you eat an unhealthy diet, or you drink too much alcohol or use tobacco products. Follow-up care is a key part of your treatment and safety. Be sure to make and go to all appointments, and call your doctor if you are having problems. It's also a good idea to know your test results and keep a list of the medicines you take. How can you care for yourself at home? · Practice healthy eating habits. This includes eating plenty of fruits, vegetables, whole grains, lean protein, and low-fat dairy. · If your doctor recommends it, get more exercise. Walking is a good choice. Bit by bit, increase the amount you walk every day. Try for at least 30 minutes on most days of the week. · Do not smoke. Smoking can increase your risk for health problems. If you need help quitting, talk to your doctor about stop-smoking programs and medicines. These can increase your chances of quitting for good. · Limit alcohol to 2 drinks a day for men and 1 drink a day for women. Too much alcohol can cause health problems. If you have a BMI higher than 25  · Your doctor may do other tests to check your risk for weight-related health problems. This may include measuring the distance around your waist. A waist measurement of more than 40 inches in men or 35 inches in women can increase the risk of weight-related health problems. · Talk with your doctor about steps you can take to stay healthy or improve your health. You may need to make lifestyle changes to lose weight and stay healthy, such as changing your diet and getting regular exercise. If you have a BMI lower than 18.5  · Your doctor may do other tests to check your risk for health problems. · Talk with your doctor about steps you can take to stay healthy or improve your health. You may need to make lifestyle changes to gain or maintain weight and stay healthy, such as getting more healthy foods in your diet and doing exercises to build muscle. Where can you learn more? Go to http://miguel-mamta.info/. Enter S176 in the search box to learn more about \"Body Mass Index: Care Instructions. \"  Current as of: October 13, 2016  Content Version: 11.4  © 5367-7157 Healthwise, Incorporated. Care instructions adapted under license by Sk[employer] (which disclaims liability or warranty for this information). If you have questions about a medical condition or this instruction, always ask your healthcare professional. [address] any warranty or liability for your use of this information.

## 2018-07-09 NOTE — MR AVS SNAPSHOT
Alberto Membrenon 
 
 
 [address], [facility] [address] [facility] [address] 
[phone] Patient: Adelia Ibarra MRN: [id number] Visit Information Date & Time Provider Department Dept. Phone Encounter #  
 7/9/2018  8:00 AM Rudy Luu MD [facility] [phone] [id number] Your Appointments 12/4/2018  9:15 AM  
LAB with IOC NURSE VISIT [facility] ([facility]) Appt Note: labs [address], [address] [facility] [address] [place]  
  
   
 [address], [address]  
  
    
 12/10/2018  9:30 AM  
Office Visit with Rudy Luu MD  
[facility]) Appt Note: ov 6mos. damico [address], [facility] [place] [place]  
  
   
 [address], [address] Upcoming Health Maintenance Date Due Pneumococcal 65+ Low/Medium Risk (1 of 2 - PCV13) 10/30/2017 COLONOSCOPY 7/1/2019* Influenza Age 5 to Adult 8/1/2018 BREAST CANCER SCRN MAMMOGRAM 12/14/2018 GLAUCOMA SCREENING Q2Y 3/8/2020 DTaP/Tdap/Td series (2 - Td) 1/3/2027 *Topic was postponed. The date shown is not the original due date. Allergies as of 7/9/2018  Review Complete On: 7/9/2018 By: Rudy Luu MD  
  
 Severity Noted Reaction Type Reactions Amoxicillin-pot Clavulanate High 07/06/2017    Diarrhea Sulfa (Sulfonamide Antibiotics)  09/09/2015    Diarrhea Current Immunizations  Reviewed on 1/3/2017 Name Date Influenza Vaccine 10/5/2017 Pneumococcal Conjugate (PCV-13)  Incomplete Pneumococcal Polysaccharide (PPSV-23) 1/5/2013 Tdap 1/3/2017 Zoster Vaccine, Live 3/3/2013 Not reviewed this visit You Were Diagnosed With   
  
 Codes Comments Lumbar radiculopathy    -  Primary ICD-10-CM: M54.16 
ICD-9-CM: 724.4 Encounter for screening mammogram for breast cancer     ICD-10-CM: Z12.31 
ICD-9-CM: V76.12 Encounter for immunization     ICD-10-CM: X00 ICD-9-CM: V03.89 Vitals BP Pulse Temp Resp Height(growth percentile) Weight(growth percentile) 115/59 (!) 54 96.5 °F (35.8 °C) 18 5' 5\" (1.651 m) 269 lb (122 kg) SpO2 BMI OB Status Smoking Status 97% 44.76 kg/m2 Postmenopausal Never Smoker Vitals History BMI and BSA Data Body Mass Index Body Surface Area 44.76 kg/m 2 2.37 m 2 Your Updated Medication List  
  
   
This list is accurate as of 7/9/18  8:44 AM.  Always use your most recent med list.  
  
  
  
  
 AGGRENOX  mg per SR capsule Generic drug:  aspirin-dipyridamole Take 1 Cap by mouth two (2) times a day. atenolol-chlorthalidone 100-25 mg per tablet Commonly known as:  TENORETIC  
TAKE 1 TABLET BY MOUTH ONCE DAILY  
  
 eluxadoline 75 mg tablet Commonly known as:  VIBERZI Take 1 Tab by mouth two (2) times daily (with meals). Max Daily Amount: 150 mg.  
  
 fluticasone 50 mcg/actuation nasal spray Commonly known as:  Caffie Euler 2 Sprays by Both Nostrils route daily. gabapentin 300 mg capsule Commonly known as:  NEURONTIN Take 300 mg by mouth nightly. LEXAPRO 10 mg tablet Generic drug:  escitalopram oxalate Take 10 mg by mouth daily. rosuvastatin 10 mg tablet Commonly known as:  CRESTOR Take 1 Tab by mouth nightly. VITAMIN D3 1,000 unit Cap Generic drug:  cholecalciferol Take 1,000 Units by mouth daily. We Performed the Following PNEUMOCOCCAL CONJ VACCINE 13 VALENT IM [id number] CPT(R)] To-Do List   
 07/09/2018 Imaging:  LISA MAMMO BI SCREENING INCL CAD Around 07/09/2018 Imaging:  XR SPINE LUMB COMP W BEND Patient Instructions Body Mass Index: Care Instructions Your Care Instructions Body mass index (BMI) can help you see if your weight is raising your risk for health problems. It uses a formula to compare how much you weigh with how tall you are. · A BMI lower than 18.5 is considered underweight. · A BMI between 18.5 and 24.9 is considered healthy. · A BMI between 25 and 29.9 is considered overweight. A BMI of 30 or higher is considered obese. If your BMI is in the normal range, it means that you have a lower risk for weight-related health problems. If your BMI is in the overweight or obese range, you may be at increased risk for weight-related health problems, such as high blood pressure, heart disease, stroke, arthritis or joint pain, and diabetes. If your BMI is in the underweight range, you may be at increased risk for health problems such as fatigue, lower protection (immunity) against illness, muscle loss, bone loss, hair loss, and hormone problems. BMI is just one measure of your risk for weight-related health problems. You may be at higher risk for health problems if you are not active, you eat an unhealthy diet, or you drink too much alcohol or use tobacco products. Follow-up care is a key part of your treatment and safety. Be sure to make and go to all appointments, and call your doctor if you are having problems. It's also a good idea to know your test results and keep a list of the medicines you take. How can you care for yourself at home? · Practice healthy eating habits. This includes eating plenty of fruits, vegetables, whole grains, lean protein, and low-fat dairy. · If your doctor recommends it, get more exercise. Walking is a good choice. Bit by bit, increase the amount you walk every day. Try for at least 30 minutes on most days of the week. · Do not smoke. Smoking can increase your risk for health problems. If you need help quitting, talk to your doctor about stop-smoking programs and medicines. These can increase your chances of quitting for good. · Limit alcohol to 2 drinks a day for men and 1 drink a day for women. Too much alcohol can cause health problems. If you have a BMI higher than 25 · Your doctor may do other tests to check your risk for weight-related health problems. This may include measuring the distance around your waist. A waist measurement of more than 40 inches in men or 35 inches in women can increase the risk of weight-related health problems. · Talk with your doctor about steps you can take to stay healthy or improve your health. You may need to make lifestyle changes to lose weight and stay healthy, such as changing your diet and getting regular exercise. If you have a BMI lower than 18.5 · Your doctor may do other tests to check your risk for health problems. · Talk with your doctor about steps you can take to stay healthy or improve your health. You may need to make lifestyle changes to gain or maintain weight and stay healthy, such as getting more healthy foods in your diet and doing exercises to build muscle. Where can you learn more? Go to http://miguel-mamta.info/. Enter S176 in the search box to learn more about \"Body Mass Index: Care Instructions. \" Current as of: October 13, 2016 Content Version: 11.4 © 9089-8214 iCurrent. Care instructions adapted under license by [employer] (which disclaims liability or warranty for this information). If you have questions about a medical condition or this instruction, always ask your healthcare professional. [address] any warranty or liability for your use of this information. Introducing [facility] & HEALTH SERVICES! Dear Bill Moreno: Thank you for requesting a DialedIN account. Our records indicate that you already have an active DialedIN account. You can access your account anytime at [email or web address] Did you know that you can access your hospital and ER discharge instructions at any time in GiveSurance? You can also review all of your test results from your hospital stay or ER visit. Additional Information If you have questions, please visit the Frequently Asked Questions section of the GiveSurance website at [email or web address] Remember, GiveSurance is NOT to be used for urgent needs. For medical emergencies, dial 911. Now available from your iPhone and Android! Please provide this summary of care documentation to your next provider. Your primary care clinician is listed as Shara Anaya. If you have any questions after today's visit, please call [phone].

## 2018-07-09 NOTE — PROGRESS NOTES
INTERNISTS [facility]:  7/18/2018, MRN: [id number]      Carl Hope is a 74 y.o. female and presents to clinic for Hypertension (follow up); Numbness (feet and left hand); and Immunization/Injection (prevnar 13)    Subjective: The patient is a 72-year-old female with history of obesity, allergic rhinitis, IBS-diarrhea, onychomycosis, prediabetes, depression, hypertension, adrenal incidentaloma seen on CT scan 2015, and history of cerebral infarction her head MRI findings (followed by Neurology). 1. Health Maintenance:  - Breast cancer screening: Overdue. No breast pain/masses. - Colon cancer screening: She plans to get this done soon. No hematochezia/melena. 2. HTN: Present >6 months. On atenolol-chlorthalidone. Her BP is 115/59 today. She reports no adverse side effects of taking his medication. 3.  Obesity/Prediabetes and Hyperlipidemia: She is on Crestor for hyperlipidemia, which she has been taking for the past 6 months at least.  Her weight today is 269lbs. At her last appointment, she just joined [employer]. She is losing weight! Wt Readings from Last 3 Encounters:   07/09/18 269 lb (122 kg)   01/09/18 286 lb 3.2 oz (129.8 kg)   07/06/17 285 lb 12.8 oz (129.6 kg)     4. IBS-Diarrhea: Present for over 6 months. +On Viberzi. No adverse side effects to this medication. 5. Depression, BLE Neuropathy, and CVA Hx: Present since diagnosis >6 months ago of lacunar infarctions. On Lexapro. She reports no adverse side effects from taking his medication. Her PHQ 9 score was 14 at her last apt. Depression sx will manifest as episodes of unexplained tearfulness. She is on aggrenox for h/o CVA. She is followed by the Neurology team given h/o CVA and BLE neuropathy, treated with gabapentin. After her first TIA, she had LLE paresthesias along her toes. After her first CVA, she had LLE paresthesias along her foot/calf areas. Eventually, she had similar sx that developed along her RLE. Recently she has had left hand paresthesias that began 2 wks ago. No weakness. No neck pain. Patient Active Problem List    Diagnosis Date Noted    Controlled substance agreement signed 7/7/17 07/09/2017    Obesity, morbid, BMI 40.0-49.9  12/06/2016    Allergic rhinitis 12/06/2016    Irritable bowel syndrome with diarrhea 12/06/2016    Onychomycosis 12/06/2016    Glucose intolerance (impaired glucose tolerance) 12/01/2016    Single current episode of major depressive disorder (has tried celexa, wellbutrin, mirtazapine with adverse effects) 12/01/2016    Essential hypertension 12/01/2016    Adrenal incidentaloma (1.3 cm and left sided) - per CT scan from 2015 12/01/2016    History of cerebral infarction - per head MRI findings 12/01/2016       Current Outpatient Prescriptions   Medication Sig Dispense Refill    eluxadoline (VIBERZI) 75 mg tablet Take 1 Tab by mouth two (2) times daily (with meals). Max Daily Amount: 150 mg. 180 Tab 1    atenolol-chlorthalidone (TENORETIC) 100-25 mg per tablet TAKE 1 TABLET BY MOUTH ONCE DAILY  90 Tab 2    rosuvastatin (CRESTOR) 10 mg tablet Take 1 Tab by mouth nightly. 90 Tab 3    gabapentin (NEURONTIN) 300 mg capsule Take 300 mg by mouth nightly.  fluticasone (FLONASE) 50 mcg/actuation nasal spray 2 Sprays by Both Nostrils route daily. 1 Bottle 11    escitalopram oxalate (LEXAPRO) 10 mg tablet Take 10 mg by mouth daily.  Cholecalciferol, Vitamin D3, (VITAMIN D3) 1,000 unit cap Take 1,000 Units by mouth daily.  aspirin-dipyridamole (AGGRENOX)  mg per SR capsule Take 1 Cap by mouth two (2) times a day.          Allergies   Allergen Reactions    Amoxicillin-Pot Clavulanate Diarrhea    Sulfa (Sulfonamide Antibiotics) Diarrhea       Past Medical History:   Diagnosis Date    Hypercholesterolemia     Hypertension     Stroke [facility])        Past Surgical History:   Procedure Laterality Date    HX APPENDECTOMY  09/09/2015    HX CHOLECYSTECTOMY      HX GYN partial hysterectomy       Family History   Problem Relation Age of Onset    Dementia Mother     Stroke Father     Cancer Brother     Hypertension Brother        Social History   Substance Use Topics    Smoking status: Never Smoker    Smokeless tobacco: Never Used    Alcohol use No       ROS   Review of Systems   Constitutional: Negative for chills and fever. HENT: Negative for ear pain and sore throat. Eyes: Negative for blurred vision and pain. Respiratory: Negative for cough and shortness of breath. Cardiovascular: Negative for chest pain. Gastrointestinal: Negative for abdominal pain, blood in stool and melena. Genitourinary: Negative for dysuria and hematuria. Musculoskeletal: Positive for joint pain. Negative for myalgias and neck pain. Skin: Negative for rash. Neurological: Positive for tingling (chronic). Negative for focal weakness and headaches. Endo/Heme/Allergies: Does not bruise/bleed easily. Psychiatric/Behavioral: Negative for substance abuse. Objective     Vitals:    07/09/18 0802   BP: 115/59   Pulse: (!) 54   Resp: 18   Temp: 96.5 °F (35.8 °C)   SpO2: 97%   Weight: 269 lb (122 kg)   Height: 5' 5\" (1.651 m)   PainSc:   0 - No pain       Physical Exam   Constitutional: She is oriented to person, place, and time and well-developed, well-nourished, and in no distress. HENT:   Head: Normocephalic and atraumatic. Right Ear: External ear normal.   Left Ear: External ear normal.   Nose: Nose normal.   Mouth/Throat: Oropharynx is clear and moist. No oropharyngeal exudate. Eyes: Conjunctivae and EOM are normal. Right eye exhibits no discharge. Left eye exhibits no discharge. No scleral icterus. Neck: Neck supple. Cardiovascular: Normal rate, regular rhythm, normal heart sounds and intact distal pulses. Exam reveals no gallop and no friction rub. No murmur heard. Pulmonary/Chest: Effort normal and breath sounds normal. No respiratory distress.  She has no wheezes. She has no rales. Abdominal: Soft. Bowel sounds are normal. She exhibits no distension. There is no tenderness. There is no rebound and no guarding. Musculoskeletal: She exhibits no edema or tenderness (BUE). Lymphadenopathy:     She has no cervical adenopathy. Neurological: She is alert and oriented to person, place, and time. She exhibits normal muscle tone. Gait normal.   Negative b/l Phalen's sign. +Symmetric strength with flexion/extension exercises in BUE/BLE. She has intact sensation along BUE/BLE. All spinous processes are NTTP except along her lumbar spine. No paraspinal muscles are TTP. Skin: Skin is warm and dry. No erythema. Psychiatric: Affect normal.   Nursing note and vitals reviewed. LABS   Data Review:   Lab Results   Component Value Date/Time    WBC 6.9 01/23/2018 07:47 AM    HGB 13.6 01/23/2018 07:47 AM    HCT 45.4 (H) 01/23/2018 07:47 AM    PLATELET 862 86/59/4101 07:47 AM    .7 (H) 01/23/2018 07:47 AM       Lab Results   Component Value Date/Time    Sodium 143 01/23/2018 07:47 AM    Potassium 3.5 01/23/2018 07:47 AM    Chloride 102 01/23/2018 07:47 AM    CO2 31 01/23/2018 07:47 AM    Anion gap 10 01/23/2018 07:47 AM    Glucose 108 (H) 01/23/2018 07:47 AM    BUN 18 01/23/2018 07:47 AM    Creatinine 0.83 01/23/2018 07:47 AM    BUN/Creatinine ratio 22 (H) 01/23/2018 07:47 AM    GFR est AA >60 01/23/2018 07:47 AM    GFR est non-AA >60 01/23/2018 07:47 AM    Calcium 9.7 01/23/2018 07:47 AM       Lab Results   Component Value Date/Time    Cholesterol, total 113 01/23/2018 07:47 AM    HDL Cholesterol 49 01/23/2018 07:47 AM    LDL, calculated 42.4 01/23/2018 07:47 AM    VLDL, calculated 21.6 01/23/2018 07:47 AM    Triglyceride 108 01/23/2018 07:47 AM    CHOL/HDL Ratio 2.3 01/23/2018 07:47 AM       Lab Results   Component Value Date/Time    Hemoglobin A1c 6.2 (H) 01/23/2018 07:47 AM    Hemoglobin A1c, External 5.8 09/08/2015       Assessment/Plan:   1.  Lumbar radiculopathy:  Suspected based on hx. Her hand paresthesias are likely from a peripheral neuropathy - which was discussed with her today. Note that her sx may also be masked by her chronic use of gabapentin.   -Ordering a lumbar x-ray series given BLE paresthesias. ORDERS:  - XR SPINE LUMB COMP W BEND; Future    2. Health Maintenance:  -A mammogram was ordered to screen for breast cancer.  -I encouraged her to get her colon cancer screening.  -A pneumococcal 13 vaccine was administered today. - Screening for anemia with a CBC just before her f/u apt    ORDERS:  - LISA MAMMO BI SCREENING INCL CAD; Future  - PNEUMOCOCCAL CONJ VACCINE 13 VALENT IM (Age 48 and over)    3. IBS: Stable. -Continue with Naaman Simple. 4.  Hypertension: +HLD  -Continue with medication as prescribed.  -I encouraged her to continue her weight loss journey. I encouraged her to continue with weight watchers. I will recheck her weight at her follow-up appointment. - Checking routine labs including an A1C (given h/o prediabetes) just before her f/u apt.    5. CVA Hx and Depression Hx: Stable. -Continue with medication as prescribed. There are no preventive care reminders to display for this patient. Lab review: labs are reviewed in the EHR    I have discussed the diagnosis with the patient and the intended plan as seen in the above orders. The patient has received an after-visit summary and questions were answered concerning future plans. I have discussed medication side effects and warnings with the patient as well. I have reviewed the plan of care with the patient, accepted their input and they are in agreement with the treatment goals. All questions were answered. The patient understands the plan of care. Handouts provided today with above information. Pt instructed if symptoms worsen to call the office or report to the ED for continued care.   Greater than 50% of the visit time was spent in counseling and/or coordination of care.      Voice recognition was used to generate this report, which may have resulted in some phonetic based errors in grammar and contents. Even though attempts were made to correct all the mistakes, some may have been missed, and remained in the body of the document.       Mouna Palafox MD

## 2018-07-10 ENCOUNTER — TELEPHONE (OUTPATIENT)
Dept: INTERNAL MEDICINE CLINIC | Age: 68
End: 2018-07-10

## 2018-07-10 NOTE — TELEPHONE ENCOUNTER
----- Message from Mouna Palafox MD sent at 7/10/2018  7:38 AM EDT -----  Please let her know that her xray findings are suggestive of pinched nerves along her lower back and disc disease. Her neuropathic pain- numbness/tingling in her legs are likely from these findings. If her symptoms worsen, she should let me know so that I can refer her to Orthopedics.     Dr. Vera Arguello  [facility] of [address]  Phone: [phone]  Fax: [phone]

## 2018-07-10 NOTE — PROGRESS NOTES
Please let her know that her xray findings are suggestive of pinched nerves along her lower back and disc disease. Her neuropathic pain- numbness/tingling in her legs are likely from these findings. If her symptoms worsen, she should let me know so that I can refer her to Orthopedics.     Dr. Kristina Caro  [facility] of [address], [address] [place] [address] Str[address]  Phone: [phone]  Fax: [phone]

## 2018-07-27 ENCOUNTER — HOSPITAL ENCOUNTER (OUTPATIENT)
Dept: MAMMOGRAPHY | Age: 68
Discharge: HOME | End: 2018-07-27
Attending: INTERNAL MEDICINE
Payer: COMMERCIAL

## 2018-07-27 DIAGNOSIS — Z12.31 VISIT FOR SCREENING MAMMOGRAM: ICD-10-CM

## 2018-07-27 PROCEDURE — 77067 SCR MAMMO BI INCL CAD: CPT

## 2018-07-29 NOTE — PROGRESS NOTES
Please let her know that her mammogram shows no suspicious findings for breast cancer.     Dr. Birder Cushing  [facility] of [facility], [address] [facility], [address]  Phone: [phone]  Fax: [phone]

## 2018-08-03 ENCOUNTER — TELEPHONE (OUTPATIENT)
Dept: INTERNAL MEDICINE CLINIC | Age: 68
End: 2018-08-03

## 2018-08-03 NOTE — TELEPHONE ENCOUNTER
Pt called and stated shant Hassan sent over a pre-auth for norma viberzi on 07/30/18, pls check to see it has been recvd, JM

## 2018-08-06 NOTE — TELEPHONE ENCOUNTER
Received a message from a representative with [employer] stating additional information is needed in order to process the prior authorization.  Please contact [phone]

## 2018-08-07 ENCOUNTER — TELEPHONE (OUTPATIENT)
Dept: INTERNAL MEDICINE CLINIC | Age: 68
End: 2018-08-07

## 2018-08-07 NOTE — TELEPHONE ENCOUNTER
Message left for patient to return call I need to know if she has ever taken dicyclomine amitriptyline or any tricyclic antidepressant if not her viberzi will not be approved by her insurance company until she has tried one of them.

## 2018-08-07 NOTE — TELEPHONE ENCOUNTER
Patient called back and has not taken any of the meds listed I will fax prior auth back and wait for results

## 2018-08-10 ENCOUNTER — TELEPHONE (OUTPATIENT)
Dept: INTERNAL MEDICINE CLINIC | Age: 68
End: 2018-08-10

## 2018-08-10 NOTE — TELEPHONE ENCOUNTER
Pt called and stated tht she is at home and wants to discuss viberzi with nurse per prev message, JM

## 2018-08-16 ENCOUNTER — TELEPHONE (OUTPATIENT)
Dept: INTERNAL MEDICINE CLINIC | Age: 68
End: 2018-08-16

## 2018-08-20 ENCOUNTER — TELEPHONE (OUTPATIENT)
Dept: INTERNAL MEDICINE CLINIC | Age: 68
End: 2018-08-20

## 2018-08-20 ENCOUNTER — OFFICE VISIT (OUTPATIENT)
Dept: INTERNAL MEDICINE CLINIC | Age: 68
End: 2018-08-20

## 2018-08-20 VITALS
WEIGHT: 269.8 LBS | RESPIRATION RATE: 14 BRPM | HEIGHT: 65 IN | SYSTOLIC BLOOD PRESSURE: 105 MMHG | OXYGEN SATURATION: 95 % | DIASTOLIC BLOOD PRESSURE: 44 MMHG | BODY MASS INDEX: 44.95 KG/M2 | HEART RATE: 61 BPM | TEMPERATURE: 96.5 F

## 2018-08-20 DIAGNOSIS — K58.0 IRRITABLE BOWEL SYNDROME WITH DIARRHEA: Primary | ICD-10-CM

## 2018-08-20 NOTE — PROGRESS NOTES
[facility] OF [facility]:  8/20/2018, MRN: [id number]      Payam Becker is a 74 y.o. female and presents to clinic for Diarrhea (x 3 weeks)    Subjective: The patient is a 74-year-old female with history of obesity, allergic rhinitis, IBS-diarrhea, onychomycosis, prediabetes, depression, hypertension, adrenal incidentaloma seen on CT scan 2015, and history of cerebral infarction her head MRI findings (followed by Neurology). Diarrhea: From IBS. Present for 3 wks. Her insurance is no longer covering Viberzi. They are requiring her to now try additional rx before considering covering this rx again. Note that she took this rx over the past yr and it worked great to control her sx. No hematochezia/melena. No relief with Imodium. She was told by her insurance company that she needs to fail: amitriptyline, desipramine, dicyclomine, and rifaximin. She is having on avg 2 loose stools per day - but will have \"sometimes more. \"       Patient Active Problem List    Diagnosis Date Noted    Controlled substance agreement signed 7/7/17 07/09/2017    Obesity, morbid, BMI 40.0-49.9  12/06/2016    Allergic rhinitis 12/06/2016    Irritable bowel syndrome with diarrhea 12/06/2016    Onychomycosis 12/06/2016    Glucose intolerance (impaired glucose tolerance) 12/01/2016    Single current episode of major depressive disorder (has tried celexa, wellbutrin, mirtazapine with adverse effects) 12/01/2016    Essential hypertension 12/01/2016    Adrenal incidentaloma (1.3 cm and left sided) - per CT scan from 2015 12/01/2016    History of cerebral infarction - per head MRI findings 12/01/2016       Current Outpatient Prescriptions   Medication Sig Dispense Refill    rifAXIMin (XIFAXAN) 550 mg tablet Take 1 Tab by mouth three (3) times daily for 14 days. 42 Tab 0    atenolol-chlorthalidone (TENORETIC) 100-25 mg per tablet TAKE 1 TABLET BY MOUTH ONCE DAILY  90 Tab 2    rosuvastatin (CRESTOR) 10 mg tablet Take 1 Tab by mouth nightly. 90 Tab 3    gabapentin (NEURONTIN) 300 mg capsule Take 300 mg by mouth nightly.  fluticasone (FLONASE) 50 mcg/actuation nasal spray 2 Sprays by Both Nostrils route daily. 1 Bottle 11    escitalopram oxalate (LEXAPRO) 10 mg tablet Take 10 mg by mouth daily.  Cholecalciferol, Vitamin D3, (VITAMIN D3) 1,000 unit cap Take 1,000 Units by mouth daily.  aspirin-dipyridamole (AGGRENOX)  mg per SR capsule Take 1 Cap by mouth two (2) times a day. Allergies   Allergen Reactions    Amoxicillin-Pot Clavulanate Diarrhea    Azithromycin Diarrhea    Seroquel [Quetiapine] Other (comments)     Tremors    Sulfa (Sulfonamide Antibiotics) Diarrhea    Wellbutrin [Bupropion Hcl] Itching       Past Medical History:   Diagnosis Date    Hypercholesterolemia     Hypertension     Stroke [facility])        Past Surgical History:   Procedure Laterality Date    HX APPENDECTOMY  09/09/2015    HX CHOLECYSTECTOMY      HX GYN      partial hysterectomy       Family History   Problem Relation Age of Onset    Dementia Mother     Stroke Father     Cancer Brother     Hypertension Brother        Social History   Substance Use Topics    Smoking status: Never Smoker    Smokeless tobacco: Never Used    Alcohol use No       ROS   Review of Systems   Constitutional: Negative for chills and fever. HENT: Negative for ear pain and sore throat. Eyes: Negative for blurred vision and pain. Respiratory: Negative for cough and shortness of breath. Cardiovascular: Negative for chest pain. Gastrointestinal: Negative for abdominal pain, blood in stool and melena. Genitourinary: Negative for dysuria and hematuria. Musculoskeletal: Positive for joint pain. Negative for myalgias. Skin: Negative for rash. Neurological: Positive for tingling (chronic). Negative for focal weakness and headaches. Endo/Heme/Allergies: Does not bruise/bleed easily. Psychiatric/Behavioral: Negative for substance abuse. Objective     Vitals:    08/20/18 0841   BP: 105/44   Pulse: 61   Resp: 14   Temp: 96.5 °F (35.8 °C)   TempSrc: Oral   SpO2: 95%   Weight: 269 lb 12.8 oz (122.4 kg)   Height: 5' 5\" (1.651 m)   PainSc:   0 - No pain       Physical Exam   Constitutional: She is oriented to person, place, and time and well-developed, well-nourished, and in no distress. HENT:   Head: Normocephalic and atraumatic. Right Ear: External ear normal.   Left Ear: External ear normal.   Nose: Nose normal.   Mouth/Throat: Oropharynx is clear and moist. No oropharyngeal exudate. Eyes: Conjunctivae and EOM are normal. Right eye exhibits no discharge. Left eye exhibits no discharge. No scleral icterus. Neck: Neck supple. Cardiovascular: Normal rate, regular rhythm, normal heart sounds and intact distal pulses. Exam reveals no gallop and no friction rub. No murmur heard. Pulmonary/Chest: Effort normal and breath sounds normal. No respiratory distress. She has no wheezes. She has no rales. Abdominal: Soft. Bowel sounds are normal. She exhibits no distension. There is no tenderness. There is no rebound and no guarding. Musculoskeletal: She exhibits no edema or tenderness (BUE). Lymphadenopathy:     She has no cervical adenopathy. Neurological: She is alert and oriented to person, place, and time. She exhibits normal muscle tone. Gait normal.   Skin: Skin is warm and dry. No erythema. Psychiatric: Affect normal.   Nursing note and vitals reviewed.       LABS   Data Review:   Lab Results   Component Value Date/Time    WBC 6.9 01/23/2018 07:47 AM    HGB 13.6 01/23/2018 07:47 AM    HCT 45.4 (H) 01/23/2018 07:47 AM    PLATELET 693 00/60/4418 07:47 AM    .7 (H) 01/23/2018 07:47 AM       Lab Results   Component Value Date/Time    Sodium 143 01/23/2018 07:47 AM    Potassium 3.5 01/23/2018 07:47 AM    Chloride 102 01/23/2018 07:47 AM    CO2 31 01/23/2018 07:47 AM    Anion gap 10 01/23/2018 07:47 AM    Glucose 108 (H) 01/23/2018 07:47 AM    BUN 18 01/23/2018 07:47 AM    Creatinine 0.83 01/23/2018 07:47 AM    BUN/Creatinine ratio 22 (H) 01/23/2018 07:47 AM    GFR est AA >60 01/23/2018 07:47 AM    GFR est non-AA >60 01/23/2018 07:47 AM    Calcium 9.7 01/23/2018 07:47 AM       Lab Results   Component Value Date/Time    Cholesterol, total 113 01/23/2018 07:47 AM    HDL Cholesterol 49 01/23/2018 07:47 AM    LDL, calculated 42.4 01/23/2018 07:47 AM    VLDL, calculated 21.6 01/23/2018 07:47 AM    Triglyceride 108 01/23/2018 07:47 AM    CHOL/HDL Ratio 2.3 01/23/2018 07:47 AM       Lab Results   Component Value Date/Time    Hemoglobin A1c 6.2 (H) 01/23/2018 07:47 AM    Hemoglobin A1c, External 5.8 09/08/2015       Assessment/Plan:   Diarrhea:  From IBS. She had a great response to Fanny Bays but her insurance is now requiring her to try other meds first.  - Trying a trial of Rifaximin.  - RTC if sx do not improve at which time she will need to try desipramine, amitripline, and dicyclomine. Health Maintenance Due   Topic Date Due    Influenza Age 5 to Adult  08/01/2018     Lab review: labs are reviewed in the EHR    I have discussed the diagnosis with the patient and the intended plan as seen in the above orders. The patient has received an after-visit summary and questions were answered concerning future plans. I have discussed medication side effects and warnings with the patient as well. I have reviewed the plan of care with the patient, accepted their input and they are in agreement with the treatment goals. All questions were answered. The patient understands the plan of care. Handouts provided today with above information. Pt instructed if symptoms worsen to call the office or report to the ED for continued care. Greater than 50% of the visit time was spent in counseling and/or coordination of care.       Voice recognition was used to generate this report, which may have resulted in some phonetic based errors in grammar and contents. Even though attempts were made to correct all the mistakes, some may have been missed, and remained in the body of the document. Follow-up Disposition:  Return if symptoms worsen or fail to improve.     Andreia De La Cruz MD

## 2018-08-20 NOTE — PATIENT INSTRUCTIONS
Health Maintenance Due   Topic Date Due    Influenza Age 5 to Adult  08/01/2018          Diarrhea: Care Instructions  Your Care Instructions    Diarrhea is loose, watery stools (bowel movements). The exact cause is often hard to find. Sometimes diarrhea is your body's way of getting rid of what caused an upset stomach. Viruses, food poisoning, and many medicines can cause diarrhea. Some people get diarrhea in response to emotional stress, anxiety, or certain foods. Almost everyone has diarrhea now and then. It usually isn't serious, and your stools will return to normal soon. The important thing to do is replace the fluids you have lost, so you can prevent dehydration. The doctor has checked you carefully, but problems can develop later. If you notice any problems or new symptoms, get medical treatment right away. Follow-up care is a key part of your treatment and safety. Be sure to make and go to all appointments, and call your doctor if you are having problems. It's also a good idea to know your test results and keep a list of the medicines you take. How can you care for yourself at home? · Watch for signs of dehydration, which means your body has lost too much water. Dehydration is a serious condition and should be treated right away. Signs of dehydration are:  ¨ Increasing thirst and dry eyes and mouth. ¨ Feeling faint or lightheaded. ¨ Darker urine, and a smaller amount of urine than normal.  · To prevent dehydration, drink plenty of fluids, enough so that your urine is light yellow or clear like water. Choose water and other caffeine-free clear liquids until you feel better. If you have kidney, heart, or liver disease and have to limit fluids, talk with your doctor before you increase the amount of fluids you drink. · Begin eating small amounts of mild foods the next day, if you feel like it. ¨ Try yogurt that has live cultures of Lactobacillus.  (Check the label.)  ¨ Avoid spicy foods, fruits, alcohol, and caffeine until 48 hours after all symptoms are gone. ¨ Avoid chewing gum that contains sorbitol. ¨ Avoid dairy products (except for yogurt with Lactobacillus) while you have diarrhea and for 3 days after symptoms are gone. · The doctor may recommend that you take over-the-counter medicine, such as loperamide (Imodium), if you still have diarrhea after 6 hours. Read and follow all instructions on the label. Do not use this medicine if you have bloody diarrhea, a high fever, or other signs of serious illness. Call your doctor if you think you are having a problem with your medicine. When should you call for help? Call 911 anytime you think you may need emergency care. For example, call if:    · You passed out (lost consciousness).     · Your stools are maroon or very bloody.    Call your doctor now or seek immediate medical care if:    · You are dizzy or lightheaded, or you feel like you may faint.     · Your stools are black and look like tar, or they have streaks of blood.     · You have new or worse belly pain.     · You have symptoms of dehydration, such as:  ¨ Dry eyes and a dry mouth. ¨ Passing only a little dark urine. ¨ Feeling thirstier than usual.     · You have a new or higher fever.    Watch closely for changes in your health, and be sure to contact your doctor if:    · Your diarrhea is getting worse.     · You see pus in the diarrhea.     · You are not getting better after 2 days (48 hours). Where can you learn more? Go to http://miguel-mamta.info/. Enter J728 in the search box to learn more about \"Diarrhea: Care Instructions. \"  Current as of: November 20, 2017  Content Version: 11.7  © 4250-7420 [employer]. Care instructions adapted under license by [employer] (which disclaims liability or warranty for this information).  If you have questions about a medical condition or this instruction, always ask your healthcare professional. [employer], [facility] disclaims any warranty or liability for your use of this information. Irritable Bowel Syndrome: Care Instructions  Your Care Instructions  Irritable bowel syndrome, or IBS, is a problem with the intestines that causes belly pain, bloating, gas, constipation, and diarrhea. The cause of IBS is not well known. IBS can last for many years, but it does not get worse over time or lead to serious disease. Most people can control their symptoms by changing their diet and reducing stress. Follow-up care is a key part of your treatment and safety. Be sure to make and go to all appointments, and call your doctor if you are having problems. It's also a good idea to know your test results and keep a list of the medicines you take. How can you care for yourself at home? · For constipation:  ¨ Include fruits, vegetables, beans, and whole grains in your diet each day. These foods are high in fiber. ¨ Drink plenty of fluids, enough so that your urine is light yellow or clear like water. If you have kidney, heart, or liver disease and have to limit fluids, talk with your doctor before you increase the amount of fluids you drink. ¨ Get some exercise every day. Build up slowly to 30 to 60 minutes a day on 5 or more days of the week. ¨ Take a fiber supplement, such as Citrucel or Metamucil, every day if needed. Read and follow all instructions on the label. ¨ Schedule time each day for a bowel movement. Having a daily routine may help. Take your time and do not strain when having a bowel movement. · If you often have diarrhea, limit foods and drinks that make it worse. These are different for each person but may include caffeine (found in coffee, tea, chocolate, and cola drinks), alcohol, fatty foods, gas-producing foods (such as beans, cabbage, and broccoli), some dairy products, and spicy foods. Do not eat candy or gum that contains sorbitol.   · Keep a daily diary of what you eat and what symptoms you have. This may help find foods that cause you problems. · Eat slowly. Try to make mealtime relaxing. · Find ways to reduce stress. · Get at least 30 minutes of exercise on most days of the week. Exercise can help reduce tension and prevent constipation. Walking is a good choice. You also may want to do other activities, such as running, swimming, cycling, or playing tennis or team sports. When should you call for help? Call your doctor now or seek immediate medical care if:    · Your pain is different than usual or occurs with fever.     · You lose weight without trying, or you lose your appetite and you do not know why.     · Your symptoms often wake you from sleep.     · Your stools are black and tarlike or have streaks of blood.    Watch closely for changes in your health, and be sure to contact your doctor if:    · Your IBS symptoms get worse or begin to disrupt your day-to-day life.     · You become more tired than usual.     · Your home treatment stops working. Where can you learn more? Go to http://miguel-mamta.info/. Enter W661 in the search box to learn more about \"Irritable Bowel Syndrome: Care Instructions. \"  Current as of: May 12, 2017  Content Version: 11.7  © 1482-4339 [employer]. Care instructions adapted under license by [employer] (which disclaims liability or warranty for this information). If you have questions about a medical condition or this instruction, always ask your healthcare professional. [place] [address] any warranty or liability for your use of this information.

## 2018-08-20 NOTE — PROGRESS NOTES
Chief Complaint   Patient presents with    Diarrhea     x 3 weeks     1. Have you been to the ER, urgent care clinic since your last visit? Hospitalized since your last visit? No    2. Have you seen or consulted any other health care providers outside of the [address] [place] [address] since your last visit? Include any pap smears or colon screening.  No

## 2018-08-20 NOTE — MR AVS SNAPSHOT
[facility] 
 
 
 [address], [address] [place] [facility] 
[phone] Patient: Blanca Romano MRN: [id number] Visit Information Date & Time Provider Department Dept. Phone Encounter #  
 8/20/2018  9:00 AM Zoe Deleon MD [facility] [phone] [id number] Follow-up Instructions Return if symptoms worsen or fail to improve. Your Appointments 12/4/2018  9:15 AM  
LAB with [facility] NURSE VISIT [facility] ([facility]) Appt Note: labs [address], [facility] [address] [place]  
  
   
 [address], [facility]  
  
    
 12/10/2018  9:30 AM  
Office Visit with Zoe Deleon MD  
[facility]) Appt Note: ov 6mos. damico [address], [address] [place]  
  
   
 [address], [facility] Upcoming Health Maintenance Date Due Influenza Age 5 to Adult 8/1/2018 COLONOSCOPY 7/1/2019* Pneumococcal 65+ Low/Medium Risk (2 of 2 - PPSV23) 7/9/2019 GLAUCOMA SCREENING Q2Y 3/8/2020 BREAST CANCER SCRN MAMMOGRAM 7/27/2020 DTaP/Tdap/Td series (2 - Td) 1/3/2027 *Topic was postponed. The date shown is not the original due date. Allergies as of 8/20/2018  Review Complete On: 8/20/2018 By: Zoe Deleon MD  
  
 Severity Noted Reaction Type Reactions Amoxicillin-pot Clavulanate High 07/06/2017    Diarrhea Azithromycin High 08/20/2018    Diarrhea Seroquel [Quetiapine] High 08/20/2018    Other (comments) Tremors Sulfa (Sulfonamide Antibiotics) High 09/09/2015    Diarrhea Wellbutrin [Bupropion Hcl] High 08/20/2018    Itching Current Immunizations  Reviewed on 7/9/2018 Name Date Influenza Vaccine 10/5/2017 Pneumococcal Conjugate (PCV-13) 7/9/2018 Pneumococcal Polysaccharide (PPSV-23) 1/5/2013 Tdap 1/3/2017 Zoster Vaccine, Live 3/3/2013 Not reviewed this visit You Were Diagnosed With   
  
 Codes Comments Irritable bowel syndrome with diarrhea    -  Primary ICD-10-CM: K58.0 ICD-9-CM: 016.3 Vitals BP Pulse Temp Resp Height(growth percentile) Weight(growth percentile) 105/44 (BP 1 Location: Left arm, BP Patient Position: Sitting) 61 96.5 °F (35.8 °C) (Oral) 14 5' 5\" (1.651 m) 269 lb 12.8 oz (122.4 kg) SpO2 BMI OB Status Smoking Status 95% 44.9 kg/m2 Postmenopausal Never Smoker BMI and BSA Data Body Mass Index Body Surface Area 44.9 kg/m 2 2.37 m 2 Preferred Pharmacy Pharmacy Name Phone [address] [place] [address] [phone] Your Updated Medication List  
  
   
This list is accurate as of 8/20/18  9:26 AM.  Always use your most recent med list.  
  
  
  
  
 AGGRENOX  mg per SR capsule Generic drug:  aspirin-dipyridamole Take 1 Cap by mouth two (2) times a day. atenolol-chlorthalidone 100-25 mg per tablet Commonly known as:  TENORETIC  
TAKE 1 TABLET BY MOUTH ONCE DAILY  
  
 eluxadoline 75 mg tablet Commonly known as:  VIBERZI Take 1 Tab by mouth two (2) times daily (with meals). Max Daily Amount: 150 mg.  
  
 fluticasone 50 mcg/actuation nasal spray Commonly known as:  Cathlyn Alexa 2 Sprays by Both Nostrils route daily. gabapentin 300 mg capsule Commonly known as:  NEURONTIN Take 300 mg by mouth nightly. LEXAPRO 10 mg tablet Generic drug:  escitalopram oxalate Take 10 mg by mouth daily. rifAXIMin 550 mg tablet Commonly known as:  Coral Potters Take 1 Tab by mouth three (3) times daily for 14 days. rosuvastatin 10 mg tablet Commonly known as:  CRESTOR Take 1 Tab by mouth nightly. VITAMIN D3 1,000 unit Cap Generic drug:  cholecalciferol Take 1,000 Units by mouth daily. Prescriptions Sent to Pharmacy Refills  
 rifAXIMin (XIFAXAN) 550 mg tablet 0 Sig: Take 1 Tab by mouth three (3) times daily for 14 days. Class: Normal  
 Pharmacy: [id number] - [place] [address], [address]  #: [phone] Route: Oral  
  
Follow-up Instructions Return if symptoms worsen or fail to improve. Patient Instructions Health Maintenance Due Topic Date Due  Influenza Age 5 to Adult  08/01/2018 Diarrhea: Care Instructions Your Care Instructions Diarrhea is loose, watery stools (bowel movements). The exact cause is often hard to find. Sometimes diarrhea is your body's way of getting rid of what caused an upset stomach. Viruses, food poisoning, and many medicines can cause diarrhea. Some people get diarrhea in response to emotional stress, anxiety, or certain foods. Almost everyone has diarrhea now and then. It usually isn't serious, and your stools will return to normal soon. The important thing to do is replace the fluids you have lost, so you can prevent dehydration. The doctor has checked you carefully, but problems can develop later. If you notice any problems or new symptoms, get medical treatment right away. Follow-up care is a key part of your treatment and safety. Be sure to make and go to all appointments, and call your doctor if you are having problems. It's also a good idea to know your test results and keep a list of the medicines you take. How can you care for yourself at home? · Watch for signs of dehydration, which means your body has lost too much water. Dehydration is a serious condition and should be treated right away. Signs of dehydration are: 
¨ Increasing thirst and dry eyes and mouth. ¨ Feeling faint or lightheaded. ¨ Darker urine, and a smaller amount of urine than normal. 
· To prevent dehydration, drink plenty of fluids, enough so that your urine is light yellow or clear like water.  Choose water and other caffeine-free clear liquids until you feel better. If you have kidney, heart, or liver disease and have to limit fluids, talk with your doctor before you increase the amount of fluids you drink. · Begin eating small amounts of mild foods the next day, if you feel like it. ¨ Try yogurt that has live cultures of Lactobacillus. (Check the label.) ¨ Avoid spicy foods, fruits, alcohol, and caffeine until 48 hours after all symptoms are gone. ¨ Avoid chewing gum that contains sorbitol. ¨ Avoid dairy products (except for yogurt with Lactobacillus) while you have diarrhea and for 3 days after symptoms are gone. · The doctor may recommend that you take over-the-counter medicine, such as loperamide (Imodium), if you still have diarrhea after 6 hours. Read and follow all instructions on the label. Do not use this medicine if you have bloody diarrhea, a high fever, or other signs of serious illness. Call your doctor if you think you are having a problem with your medicine. When should you call for help? Call 911 anytime you think you may need emergency care. For example, call if: 
  · You passed out (lost consciousness).  
  · Your stools are maroon or very bloody.  
 Call your doctor now or seek immediate medical care if: 
  · You are dizzy or lightheaded, or you feel like you may faint.  
  · Your stools are black and look like tar, or they have streaks of blood.  
  · You have new or worse belly pain.  
  · You have symptoms of dehydration, such as: ¨ Dry eyes and a dry mouth. ¨ Passing only a little dark urine. ¨ Feeling thirstier than usual.  
  · You have a new or higher fever.  
 Watch closely for changes in your health, and be sure to contact your doctor if: 
  · Your diarrhea is getting worse.  
  · You see pus in the diarrhea.  
  · You are not getting better after 2 days (48 hours). Where can you learn more? Go to http://miguel-mamta.info/. Enter O552 in the search box to learn more about \"Diarrhea: Care Instructions. \" Current as of: November 20, 2017 Content Version: 11.7 © 8019-7026 [employer]. Care instructions adapted under license by [employer] (which disclaims liability or warranty for this information). If you have questions about a medical condition or this instruction, always ask your healthcare professional. [address] any warranty or liability for your use of this information. Irritable Bowel Syndrome: Care Instructions Your Care Instructions Irritable bowel syndrome, or IBS, is a problem with the intestines that causes belly pain, bloating, gas, constipation, and diarrhea. The cause of IBS is not well known. IBS can last for many years, but it does not get worse over time or lead to serious disease. Most people can control their symptoms by changing their diet and reducing stress. Follow-up care is a key part of your treatment and safety. Be sure to make and go to all appointments, and call your doctor if you are having problems. It's also a good idea to know your test results and keep a list of the medicines you take. How can you care for yourself at home? · For constipation: 
¨ Include fruits, vegetables, beans, and whole grains in your diet each day. These foods are high in fiber. ¨ Drink plenty of fluids, enough so that your urine is light yellow or clear like water. If you have kidney, heart, or liver disease and have to limit fluids, talk with your doctor before you increase the amount of fluids you drink. ¨ Get some exercise every day. Build up slowly to 30 to 60 minutes a day on 5 or more days of the week. ¨ Take a fiber supplement, such as Citrucel or Metamucil, every day if needed. Read and follow all instructions on the label. ¨ Schedule time each day for a bowel movement.  Having a daily routine may help. Take your time and do not strain when having a bowel movement. · If you often have diarrhea, limit foods and drinks that make it worse. These are different for each person but may include caffeine (found in coffee, tea, chocolate, and cola drinks), alcohol, fatty foods, gas-producing foods (such as beans, cabbage, and broccoli), some dairy products, and spicy foods. Do not eat candy or gum that contains sorbitol. · Keep a daily diary of what you eat and what symptoms you have. This may help find foods that cause you problems. · Eat slowly. Try to make mealtime relaxing. · Find ways to reduce stress. · Get at least 30 minutes of exercise on most days of the week. Exercise can help reduce tension and prevent constipation. Walking is a good choice. You also may want to do other activities, such as running, swimming, cycling, or playing tennis or team sports. When should you call for help? Call your doctor now or seek immediate medical care if: 
  · Your pain is different than usual or occurs with fever.  
  · You lose weight without trying, or you lose your appetite and you do not know why.  
  · Your symptoms often wake you from sleep.  
  · Your stools are black and tarlike or have streaks of blood.  
 Watch closely for changes in your health, and be sure to contact your doctor if: 
  · Your IBS symptoms get worse or begin to disrupt your day-to-day life.  
  · You become more tired than usual.  
  · Your home treatment stops working. Where can you learn more? Go to http://miguel-mamta.info/. Enter S788 in the search box to learn more about \"Irritable Bowel Syndrome: Care Instructions. \" Current as of: May 12, 2017 Content Version: 11.7 © 6203-9002 [employer]. Care instructions adapted under license by [employer] (which disclaims liability or warranty for this information).  If you have questions about a medical condition or this instruction, always ask your healthcare professional. [address] any warranty or liability for your use of this information. Introducing [facility]! Dear [place]: Thank you for requesting a HashParade account. Our records indicate that you already have an active HashParade account. You can access your account anytime at [email or web address]. [employer] Did you know that you can access your hospital and ER discharge instructions at any time in HashParade? You can also review all of your test results from your hospital stay or ER visit. Additional Information If you have questions, please visit the Frequently Asked Questions section of the HashParade website at [email or web address]/. Remember, HashParade is NOT to be used for urgent needs. For medical emergencies, dial 911. Now available from your iPhone and Android! Please provide this summary of care documentation to your next provider. Your primary care clinician is listed as Kishor Stewart. If you have any questions after today's visit, please call [phone].

## 2018-08-23 DIAGNOSIS — K58.0 IRRITABLE BOWEL SYNDROME WITH DIARRHEA: Primary | ICD-10-CM

## 2018-08-23 RX ORDER — DICYCLOMINE HYDROCHLORIDE 10 MG/1
20 CAPSULE ORAL 4 TIMES DAILY
Qty: 112 CAP | Refills: 0 | Status: SHIPPED | OUTPATIENT
Start: 2018-08-23 | End: 2018-09-03 | Stop reason: SDUPTHER

## 2018-08-27 ENCOUNTER — DOCUMENTATION ONLY (OUTPATIENT)
Dept: INTERNAL MEDICINE CLINIC | Age: 68
End: 2018-08-27

## 2018-08-27 ENCOUNTER — OFFICE VISIT (OUTPATIENT)
Dept: INTERNAL MEDICINE CLINIC | Age: 68
End: 2018-08-27

## 2018-08-27 VITALS
WEIGHT: 269.4 LBS | SYSTOLIC BLOOD PRESSURE: 100 MMHG | DIASTOLIC BLOOD PRESSURE: 50 MMHG | HEIGHT: 65 IN | BODY MASS INDEX: 44.89 KG/M2 | HEART RATE: 54 BPM | OXYGEN SATURATION: 98 % | TEMPERATURE: 98 F | RESPIRATION RATE: 14 BRPM

## 2018-08-27 DIAGNOSIS — J06.9 UPPER RESPIRATORY TRACT INFECTION, UNSPECIFIED TYPE: Primary | ICD-10-CM

## 2018-08-27 RX ORDER — DOXYCYCLINE 100 MG/1
100 CAPSULE ORAL 2 TIMES DAILY
Qty: 20 CAP | Refills: 0 | Status: SHIPPED | OUTPATIENT
Start: 2018-08-27 | End: 2019-01-10 | Stop reason: ALTCHOICE

## 2018-08-27 NOTE — MR AVS SNAPSHOT
Christin Dupont 
 
 
 [address], [address] [place] [address] [place] [phone] Patient: Julio Fairchild MRN: [id number] CUP:[id number] Visit Information Date & Time Provider Department Dept. Phone Encounter #  
 8/27/2018 11:00 AM Acosta Vicente MD [facility] [id number] Your Appointments 12/4/2018  9:15 AM  
LAB with IOC NURSE VISIT [facility] ([address] [place] [address]) Appt Note: labs [address], [facility] [place] [address] [place]  
  
   
 [address], [facility]  
  
    
 12/10/2018  9:30 AM  
Office Visit with Shea Vogel MD  
[facility] [address]) Appt Note: ov 6mos. damico [address], [facility] [place] [address] [place]  
  
   
 [address], [facility] Upcoming Health Maintenance Date Due Influenza Age 5 to Adult 8/1/2018 COLONOSCOPY 7/1/2019* Pneumococcal 65+ Low/Medium Risk (2 of 2 - PPSV23) 7/9/2019 GLAUCOMA SCREENING Q2Y 3/8/2020 BREAST CANCER SCRN MAMMOGRAM 7/27/2020 DTaP/Tdap/Td series (2 - Td) 1/3/2027 *Topic was postponed. The date shown is not the original due date. Allergies as of 8/27/2018  Review Complete On: 8/27/2018 By: Acosta Vicente MD  
  
 Severity Noted Reaction Type Reactions Amoxicillin-pot Clavulanate High 07/06/2017    Diarrhea Azithromycin High 08/20/2018    Diarrhea Seroquel [Quetiapine] High 08/20/2018    Other (comments) Tremors Sulfa (Sulfonamide Antibiotics) High 09/09/2015    Diarrhea Wellbutrin [Bupropion Hcl] High 08/20/2018    Itching Current Immunizations  Reviewed on 7/9/2018 Name Date Influenza Vaccine 10/5/2017 Pneumococcal Conjugate (PCV-13) 7/9/2018 Pneumococcal Polysaccharide (PPSV-23) 1/5/2013 Tdap 1/3/2017 Zoster Vaccine, Live 3/3/2013 Not reviewed this visit You Were Diagnosed With   
  
 Codes Comments Upper respiratory tract infection, unspecified type    -  Primary ICD-10-CM: J06.9 ICD-9-CM: 465.9 Vitals BP Pulse Temp Resp Height(growth percentile) Weight(growth percentile) 100/50 (!) 54 98 °F (36.7 °C) (Oral) 14 5' 5\" (1.651 m) 269 lb 6.4 oz (122.2 kg) SpO2 BMI OB Status Smoking Status 98% 44.83 kg/m2 Postmenopausal Never Smoker Vitals History BMI and BSA Data Body Mass Index Body Surface Area 44.83 kg/m 2 2.37 m 2 Preferred Pharmacy Pharmacy Name Phone [address] [place] [address] [phone] Your Updated Medication List  
  
   
This list is accurate as of 8/27/18 11:38 AM.  Always use your most recent med list.  
  
  
  
  
 AGGRENOX  mg per SR capsule Generic drug:  aspirin-dipyridamole Take 1 Cap by mouth two (2) times a day. atenolol-chlorthalidone 100-25 mg per tablet Commonly known as:  TENORETIC  
TAKE 1 TABLET BY MOUTH ONCE DAILY  
  
 dicyclomine 10 mg capsule Commonly known as:  BENTYL Take 2 Caps by mouth four (4) times daily for 14 days. doxycycline 100 mg capsule Commonly known as:  Hillary Jarvis Take 1 Cap by mouth two (2) times a day. fluticasone 50 mcg/actuation nasal spray Commonly known as:  Tona Jumper 2 Sprays by Both Nostrils route daily. gabapentin 300 mg capsule Commonly known as:  NEURONTIN Take 300 mg by mouth nightly. LEXAPRO 10 mg tablet Generic drug:  escitalopram oxalate Take 10 mg by mouth daily. rosuvastatin 10 mg tablet Commonly known as:  CRESTOR Take 1 Tab by mouth nightly. VITAMIN D3 1,000 unit Cap Generic drug:  cholecalciferol Take 1,000 Units by mouth daily. Prescriptions Sent to Pharmacy Refills  
 doxycycline (MONODOX) 100 mg capsule 0 Sig: Take 1 Cap by mouth two (2) times a day.   
 Class: Normal  
 Pharmacy: [facility] Serr[facility] [address], [address] Ph #: [phone] Route: Oral  
  
Introducing [facility]! Dear Anne Maldonado: Thank you for requesting a [employer] account. Our records indicate that you already have an active [employer] account. You can access your account anytime at [email or web address] Did you know that you can access your hospital and ER discharge instructions at any time in [employer]? You can also review all of your test results from your hospital stay or ER visit. Additional Information If you have questions, please visit the Frequently Asked Questions section of the [employer] website at [email or web address] ([email or web address] Remember, [employer] is NOT to be used for urgent needs. For medical emergencies, dial 911. Now available from your iPhone and Android! Please provide this summary of care documentation to your next provider. Your primary care clinician is listed as Marshall Johnson. If you have any questions after today's visit, please call [phone].

## 2018-08-27 NOTE — PROGRESS NOTES
PA for Xifaxan denied. Insurance requires a prescription by a gastroenterologist and a trial of, or contraindication to, A tricyclic anti-depressant or dicyclomine. Patient has already contacted Dr. Richard Mendoza to get a new script for Dicyclomine.

## 2018-08-27 NOTE — PROGRESS NOTES
Gracie Duarte 1950, is a 74 y.o. female, who is seen today for a one-week history of respiratory symptoms. Initially she had some soreness of her throat scratchiness of her throat and then some nasal congestion but over the last 5 days she has had worsening cough. She coughs a lot through the nighttime as well as intermittently through the daytime is used a lot of cough drops to help suppress cough. She is tried Mucinex without benefit and she has continued to use Flonase. She has not had any chills or sweats. Cough is never productive. Nasal congestion persists sometimes with slight pressure into the maxillary regions but no symptoms in her ears. Past Medical History:   Diagnosis Date    Hypercholesterolemia     Hypertension     Stroke [facility])      Current Outpatient Prescriptions   Medication Sig Dispense Refill    dicyclomine (BENTYL) 10 mg capsule Take 2 Caps by mouth four (4) times daily for 14 days. 112 Cap 0    atenolol-chlorthalidone (TENORETIC) 100-25 mg per tablet TAKE 1 TABLET BY MOUTH ONCE DAILY  90 Tab 2    rosuvastatin (CRESTOR) 10 mg tablet Take 1 Tab by mouth nightly. 90 Tab 3    gabapentin (NEURONTIN) 300 mg capsule Take 300 mg by mouth nightly.  fluticasone (FLONASE) 50 mcg/actuation nasal spray 2 Sprays by Both Nostrils route daily. 1 Bottle 11    escitalopram oxalate (LEXAPRO) 10 mg tablet Take 10 mg by mouth daily.  Cholecalciferol, Vitamin D3, (VITAMIN D3) 1,000 unit cap Take 1,000 Units by mouth daily.  aspirin-dipyridamole (AGGRENOX)  mg per SR capsule Take 1 Cap by mouth two (2) times a day. Visit Vitals    /50    Pulse (!) 54    Temp 98 °F (36.7 °C) (Oral)    Resp 14    Ht 5' 5\" (1.651 m)    Wt 269 lb 6.4 oz (122.2 kg)    SpO2 98%    BMI 44.83 kg/m2     Nasal passages reveal no secretions bilaterally. Pharynx reveals no redness exudate or drainage. Neck reveals no adenopathy or tenderness. Sinuses are nontender.   Lungs are clear to percussion. Good breath sounds with no wheezing or crackles. She did not cough while she was here in the office today. Assessment: Upper respiratory infection, cough continues to worsen with no evidence of bronchospasm and no good evidence for bacterial infection, but worsening somewhat. She will continue Flonase and we will add doxycycline to be taken 100 mg twice a day with food. I have explained to her that it is less likely cause nausea if she takes it with food. Wale Shrestha MD FACP    Please note: This document has been produced using voice recognition software. Unrecognized errors in transcription may be present.

## 2018-08-30 DIAGNOSIS — K58.0 IRRITABLE BOWEL SYNDROME WITH DIARRHEA: ICD-10-CM

## 2018-09-03 DIAGNOSIS — K58.0 IRRITABLE BOWEL SYNDROME WITH DIARRHEA: ICD-10-CM

## 2018-09-04 RX ORDER — DICYCLOMINE HYDROCHLORIDE 10 MG/1
CAPSULE ORAL
Qty: 112 CAP | Refills: 1 | Status: SHIPPED | OUTPATIENT
Start: 2018-09-04 | End: 2019-01-10 | Stop reason: ALTCHOICE

## 2018-09-11 NOTE — PROGRESS NOTES
PA appeal for Xifaxan approved for a maximum of 1 fill from 09/08/2018 - 12/01/2018. The approval is approved for 42 tablets for 14 days. Sent to provider & Pharmacy.

## 2018-09-12 ENCOUNTER — TELEPHONE (OUTPATIENT)
Dept: INTERNAL MEDICINE CLINIC | Age: 68
End: 2018-09-12

## 2018-09-12 NOTE — TELEPHONE ENCOUNTER
Castro Mandujano is calling with Cover My Meds. Stating the Xifaxan prior [place] was denied. They faxed over an appeal form to us on 8/27 and 8/29. She is following up to see if we received that and if is has been completed.     Reference Key [facility]

## 2018-09-12 NOTE — TELEPHONE ENCOUNTER
Po Can, TRUONG        0/61/10 79:84 AM   Note      Pardeep Higginbotham received an approval yesterday on this.     Status: Signed               PA appeal for Xifaxan approved for a maximum of 1 fill from 09/08/2018 - 12/01/2018.  The approval is approved for 42 tablets for 14 days. Sent to provider & Pharmacy              9-12-18 around 1:11 pm I left a voice message for the patient to check her pharmacy for  on this medication.

## 2018-09-12 NOTE — TELEPHONE ENCOUNTER
Gilma Good received an approval yesterday on this. Status: Signed          PA appeal for Xifaxan approved for a maximum of 1 fill from 09/08/2018 - 12/01/2018. The approval is approved for 42 tablets for 14 days. Sent to provider & Pharmacy.

## 2018-09-20 ENCOUNTER — TELEPHONE (OUTPATIENT)
Dept: INTERNAL MEDICINE CLINIC | Age: 68
End: 2018-09-20

## 2018-10-03 ENCOUNTER — DOCUMENTATION ONLY (OUTPATIENT)
Dept: INTERNAL MEDICINE CLINIC | Age: 68
End: 2018-10-03

## 2018-10-03 NOTE — PROGRESS NOTES
PA for Viberzi 75mg approved. Insurance approval reasoning: The authorization is effective for a maximum of 3 fills from 10/02/2018 - 12/25/2018. The request is approved for up to 2 tablets per day. Sent copy to patient pharmacy.

## 2018-10-08 DIAGNOSIS — K58.0 IRRITABLE BOWEL SYNDROME WITH DIARRHEA: ICD-10-CM

## 2018-10-09 ENCOUNTER — TELEPHONE (OUTPATIENT)
Dept: INTERNAL MEDICINE CLINIC | Age: 68
End: 2018-10-09

## 2018-11-23 ENCOUNTER — DOCUMENTATION ONLY (OUTPATIENT)
Dept: INTERNAL MEDICINE CLINIC | Age: 68
End: 2018-11-23

## 2018-12-06 DIAGNOSIS — K58.0 IRRITABLE BOWEL SYNDROME WITH DIARRHEA: ICD-10-CM

## 2018-12-07 RX ORDER — ELUXADOLINE 75 MG/1
TABLET, FILM COATED ORAL
Qty: 60 TAB | Refills: 5 | Status: SHIPPED | OUTPATIENT
Start: 2018-12-07 | End: 2019-05-13 | Stop reason: ALTCHOICE

## 2018-12-07 NOTE — TELEPHONE ENCOUNTER
Chief Complaint   Patient presents with    Medication Refill     Printed for Viberzi 75 mg per Dr Giana Scott is ready for      Patient reached and informed, and understands this printed script is in a secure area at the  at [facility] [place] [facility]\"".

## 2018-12-16 DIAGNOSIS — I10 ESSENTIAL HYPERTENSION: ICD-10-CM

## 2018-12-17 RX ORDER — ATENOLOL AND CHLORTHALIDONE TABLET 100; 25 MG/1; MG/1
TABLET ORAL
Qty: 90 TAB | Refills: 1 | Status: SHIPPED | OUTPATIENT
Start: 2018-12-17 | End: 2019-05-13 | Stop reason: SDUPTHER

## 2019-01-10 ENCOUNTER — HOSPITAL ENCOUNTER (OUTPATIENT)
Dept: LAB | Age: 69
Discharge: HOME | End: 2019-01-10
Payer: COMMERCIAL

## 2019-01-10 ENCOUNTER — OFFICE VISIT (OUTPATIENT)
Dept: INTERNAL MEDICINE CLINIC | Age: 69
End: 2019-01-10

## 2019-01-10 ENCOUNTER — TELEPHONE (OUTPATIENT)
Dept: INTERNAL MEDICINE CLINIC | Age: 69
End: 2019-01-10

## 2019-01-10 VITALS
RESPIRATION RATE: 18 BRPM | SYSTOLIC BLOOD PRESSURE: 129 MMHG | TEMPERATURE: 95.6 F | WEIGHT: 275.4 LBS | HEIGHT: 65 IN | DIASTOLIC BLOOD PRESSURE: 58 MMHG | HEART RATE: 56 BPM | BODY MASS INDEX: 45.88 KG/M2 | OXYGEN SATURATION: 97 %

## 2019-01-10 DIAGNOSIS — Z86.73 HISTORY OF CEREBRAL INFARCTION: ICD-10-CM

## 2019-01-10 DIAGNOSIS — K58.0 IRRITABLE BOWEL SYNDROME WITH DIARRHEA: ICD-10-CM

## 2019-01-10 DIAGNOSIS — R73.02 GLUCOSE INTOLERANCE (IMPAIRED GLUCOSE TOLERANCE): ICD-10-CM

## 2019-01-10 DIAGNOSIS — Z13.0 SCREENING FOR DEFICIENCY ANEMIA: ICD-10-CM

## 2019-01-10 DIAGNOSIS — E66.01 OBESITY, MORBID, BMI 40.0-49.9 (HCC): ICD-10-CM

## 2019-01-10 DIAGNOSIS — I10 ESSENTIAL HYPERTENSION: ICD-10-CM

## 2019-01-10 DIAGNOSIS — L03.316 CELLULITIS, UMBILICAL: Primary | ICD-10-CM

## 2019-01-10 LAB
ANION GAP SERPL CALC-SCNC: 8 MMOL/L (ref 3–18)
BASOPHILS # BLD: 0 K/UL (ref 0–0.1)
BASOPHILS NFR BLD: 0 % (ref 0–2)
BUN SERPL-MCNC: 19 MG/DL (ref 7–18)
BUN/CREAT SERPL: 23 (ref 12–20)
CALCIUM SERPL-MCNC: 9.3 MG/DL (ref 8.5–10.1)
CHLORIDE SERPL-SCNC: 101 MMOL/L (ref 100–108)
CHOLEST SERPL-MCNC: 140 MG/DL
CO2 SERPL-SCNC: 31 MMOL/L (ref 21–32)
CREAT SERPL-MCNC: 0.83 MG/DL (ref 0.6–1.3)
DIFFERENTIAL METHOD BLD: ABNORMAL
EOSINOPHIL # BLD: 0.1 K/UL (ref 0–0.4)
EOSINOPHIL NFR BLD: 1 % (ref 0–5)
ERYTHROCYTE [DISTWIDTH] IN BLOOD BY AUTOMATED COUNT: 15.1 % (ref 11.6–14.5)
GLUCOSE SERPL-MCNC: 95 MG/DL (ref 74–99)
HCT VFR BLD AUTO: 44.6 % (ref 35–45)
HDLC SERPL-MCNC: 57 MG/DL (ref 40–60)
HDLC SERPL: 2.5 {RATIO} (ref 0–5)
HGB BLD-MCNC: 13.6 G/DL (ref 12–16)
LDLC SERPL CALC-MCNC: 59.8 MG/DL (ref 0–100)
LIPID PROFILE,FLP: NORMAL
LYMPHOCYTES # BLD: 1.8 K/UL (ref 0.9–3.6)
LYMPHOCYTES NFR BLD: 25 % (ref 21–52)
MCH RBC QN AUTO: 30.4 PG (ref 24–34)
MCHC RBC AUTO-ENTMCNC: 30.5 G/DL (ref 31–37)
MCV RBC AUTO: 99.6 FL (ref 74–97)
MONOCYTES # BLD: 0.5 K/UL (ref 0.05–1.2)
MONOCYTES NFR BLD: 7 % (ref 3–10)
NEUTS SEG # BLD: 4.7 K/UL (ref 1.8–8)
NEUTS SEG NFR BLD: 67 % (ref 40–73)
PLATELET # BLD AUTO: 247 K/UL (ref 135–420)
PMV BLD AUTO: 11.2 FL (ref 9.2–11.8)
POTASSIUM SERPL-SCNC: 3.3 MMOL/L (ref 3.5–5.5)
RBC # BLD AUTO: 4.48 M/UL (ref 4.2–5.3)
SODIUM SERPL-SCNC: 140 MMOL/L (ref 136–145)
TRIGL SERPL-MCNC: 116 MG/DL (ref ?–150)
VLDLC SERPL CALC-MCNC: 23.2 MG/DL
WBC # BLD AUTO: 7.1 K/UL (ref 4.6–13.2)

## 2019-01-10 PROCEDURE — 83036 HEMOGLOBIN GLYCOSYLATED A1C: CPT

## 2019-01-10 PROCEDURE — 36415 COLL VENOUS BLD VENIPUNCTURE: CPT

## 2019-01-10 PROCEDURE — 80061 LIPID PANEL: CPT

## 2019-01-10 PROCEDURE — 82043 UR ALBUMIN QUANTITATIVE: CPT

## 2019-01-10 PROCEDURE — 80048 BASIC METABOLIC PNL TOTAL CA: CPT

## 2019-01-10 PROCEDURE — 85025 COMPLETE CBC W/AUTO DIFF WBC: CPT

## 2019-01-10 RX ORDER — DOXYCYCLINE 100 MG/1
100 TABLET ORAL 2 TIMES DAILY
Qty: 14 TAB | Refills: 0 | Status: SHIPPED | OUTPATIENT
Start: 2019-01-10 | End: 2019-01-17

## 2019-01-10 NOTE — TELEPHONE ENCOUNTER
Dr. Paul Joseph prescribed Monohydrate for the patient, unfortunately [facility] run out of that med. They want to find out if they can replace it with Doxycycline Hyclate instead.

## 2019-01-10 NOTE — PROGRESS NOTES
Chief Complaint   Patient presents with    Bleeding/Bruising     x 4 days bleeding discharge from the [facility]     1. Have you been to the ER, urgent care clinic since your last visit? Hospitalized since your last visit? No    2. Have you seen or consulted any other health care providers outside of the [address] [place] [address] since your last visit? Include any pap smears or colon screening.  No    Health Maintenance Due   Topic Date Due    Shingrix Vaccine Age 49> (1 of 2) 10/30/2002    MEDICARE YEARLY EXAM  01/10/2019

## 2019-01-10 NOTE — PROGRESS NOTES
[facility]:  1/19/2019, MRN: [id number]      Bobbi Garvin is a 79 y.o. female and presents to clinic for Bleeding/Bruising (x 4 days bleeding discharge from the [facility])    Subjective: The patient is a 57-year-old female with history of obesity, allergic rhinitis, IBS-diarrhea, onychomycosis, prediabetes, depression, hypertension, adrenal incidentaloma seen on CT scan 2015, and history of cerebral infarction her head MRI findings (followed by Neurology). 1. Periumbilical Pain: She reports a pain x 4 days along her umbilicus associated with bleeding. This has never happened before. No h/o trauma. No pruritus. No fever/chills. No alleviating factors are known. She applied hydrogen peroxide w/o relief. It continues to hurt. Pain is localized to the affected area. Sx are not worsening or improving; they are persisting. 2. HTN/Obesity/Prediabetes: Her weight is up to 275lbs. She is not exercising regularly or dieting and splurged a bit with her diet over the holidays. She has a h/o prediabetes and HTN. BP is 129/58 today. She is taking atenolol-chlorthalidone. No adverse side effects. She is also on Crestor.       Patient Active Problem List    Diagnosis Date Noted    Controlled substance agreement signed 7/7/17 07/09/2017    Obesity, morbid, BMI 40.0-49.9  12/06/2016    Allergic rhinitis 12/06/2016    Irritable bowel syndrome with diarrhea 12/06/2016    Onychomycosis 12/06/2016    Glucose intolerance (impaired glucose tolerance) 12/01/2016    Single current episode of major depressive disorder (has tried celexa, wellbutrin, mirtazapine with adverse effects) 12/01/2016    Essential hypertension 12/01/2016    Adrenal incidentaloma (1.3 cm and left sided) - per CT scan from 2015 12/01/2016    History of cerebral infarction - per head MRI findings 12/01/2016       Current Outpatient Medications   Medication Sig Dispense Refill    atenolol-chlorthalidone (TENORETIC) 100-25 mg per tablet TAKE 1 TABLET BY MOUTH ONCE DAILY 90 Tab 1    VIBERZI 75 mg tablet TAKE ONE TABLET BY MOUTH TWICE A DAY WITH MEALS 60 Tab 5    rosuvastatin (CRESTOR) 10 mg tablet Take 1 Tab by mouth nightly. 90 Tab 3    gabapentin (NEURONTIN) 300 mg capsule Take 300 mg by mouth nightly.  fluticasone (FLONASE) 50 mcg/actuation nasal spray 2 Sprays by Both Nostrils route daily. 1 Bottle 11    escitalopram oxalate (LEXAPRO) 10 mg tablet Take 10 mg by mouth daily.  Cholecalciferol, Vitamin D3, (VITAMIN D3) 1,000 unit cap Take 1,000 Units by mouth daily.  aspirin-dipyridamole (AGGRENOX)  mg per SR capsule Take 1 Cap by mouth two (2) times a day.  potassium chloride (K-DUR, KLOR-CON) 20 mEq tablet Take 1 Tab by mouth daily. 30 Tab 11       Allergies   Allergen Reactions    Amoxicillin-Pot Clavulanate Diarrhea    Azithromycin Diarrhea    Seroquel [Quetiapine] Other (comments)     Tremors    Sulfa (Sulfonamide Antibiotics) Diarrhea    Wellbutrin [Bupropion Hcl] Itching       Past Medical History:   Diagnosis Date    Hypercholesterolemia     Hypertension     Stroke [facility])        Past Surgical History:   Procedure Laterality Date    HX APPENDECTOMY  09/09/2015    HX CHOLECYSTECTOMY      HX GYN      partial hysterectomy       Family History   Problem Relation Age of Onset    Dementia Mother     Stroke Father     Cancer Brother     Hypertension Brother        Social History     Tobacco Use    Smoking status: Never Smoker    Smokeless tobacco: Never Used   Substance Use Topics    Alcohol use: No       ROS   Review of Systems   Constitutional: Negative for chills and fever. HENT: Negative for ear pain and sore throat. Eyes: Negative for blurred vision and pain. Respiratory: Negative for cough and shortness of breath. Cardiovascular: Negative for chest pain. Gastrointestinal: Positive for abdominal pain and diarrhea (chronic). Negative for blood in stool and melena.    Genitourinary: Negative for dysuria and hematuria. Musculoskeletal: Positive for joint pain (chronic). Negative for myalgias. Skin: Positive for rash. Negative for itching. Neurological: Positive for tingling (chronic). Negative for focal weakness and headaches. Endo/Heme/Allergies: Does not bruise/bleed easily. Psychiatric/Behavioral: Negative for substance abuse. Objective     Vitals:    01/10/19 0810   BP: 129/58   Pulse: (!) 56   Resp: 18   Temp: 95.6 °F (35.3 °C)   TempSrc: Oral   SpO2: 97%   Weight: 275 lb 6.4 oz (124.9 kg)   Height: 5' 5\" (1.651 m)   PainSc:   4   PainLoc: Abdomen       Physical Exam   Constitutional: She is oriented to person, place, and time and well-developed, well-nourished, and in no distress. HENT:   Head: Normocephalic and atraumatic. Right Ear: External ear normal.   Left Ear: External ear normal.   Nose: Nose normal.   Mouth/Throat: Oropharynx is clear and moist. No oropharyngeal exudate. Eyes: Conjunctivae and EOM are normal. Right eye exhibits no discharge. Left eye exhibits no discharge. No scleral icterus. Neck: Neck supple. Cardiovascular: Normal rate, regular rhythm, normal heart sounds and intact distal pulses. Exam reveals no gallop and no friction rub. No murmur heard. Pulmonary/Chest: Effort normal and breath sounds normal. No respiratory distress. She has no wheezes. She has no rales. Abdominal: Soft. Bowel sounds are normal. She exhibits no distension. There is tenderness (Ttp only along her umbilicus which is slightly erythematous but w/o drainage and surrounding erythema; no increased warmth along this area). There is no rebound and no guarding. Musculoskeletal: She exhibits no edema or tenderness (BUE). Lymphadenopathy:     She has no cervical adenopathy. Neurological: She is alert and oriented to person, place, and time. She exhibits normal muscle tone. Gait normal.   Skin: Skin is warm and dry. No erythema.    Psychiatric: Affect normal.   Nursing note and vitals reviewed. LABS   Data Review:   Lab Results   Component Value Date/Time    WBC 7.1 01/10/2019 08:55 AM    HGB 13.6 01/10/2019 08:55 AM    HCT 44.6 01/10/2019 08:55 AM    PLATELET 578 33/48/4241 08:55 AM    MCV 99.6 (H) 01/10/2019 08:55 AM       Lab Results   Component Value Date/Time    Sodium 140 01/10/2019 08:55 AM    Potassium 3.3 (L) 01/10/2019 08:55 AM    Chloride 101 01/10/2019 08:55 AM    CO2 31 01/10/2019 08:55 AM    Anion gap 8 01/10/2019 08:55 AM    Glucose 95 01/10/2019 08:55 AM    BUN 19 (H) 01/10/2019 08:55 AM    Creatinine 0.83 01/10/2019 08:55 AM    BUN/Creatinine ratio 23 (H) 01/10/2019 08:55 AM    GFR est AA >60 01/10/2019 08:55 AM    GFR est non-AA >60 01/10/2019 08:55 AM    Calcium 9.3 01/10/2019 08:55 AM       Lab Results   Component Value Date/Time    Cholesterol, total 140 01/10/2019 08:55 AM    HDL Cholesterol 57 01/10/2019 08:55 AM    LDL, calculated 59.8 01/10/2019 08:55 AM    VLDL, calculated 23.2 01/10/2019 08:55 AM    Triglyceride 116 01/10/2019 08:55 AM    CHOL/HDL Ratio 2.5 01/10/2019 08:55 AM       Lab Results   Component Value Date/Time    Hemoglobin A1c 6.1 (H) 01/10/2019 08:55 AM    Hemoglobin A1c, External 5.8 09/08/2015       Assessment/Plan:   1. Cellulitis, umbilical: Per PE findings.  - Doxycycline ordered. RTC if sx worsen or do not resolve with abx. I instructed her to notify me if she develops worsening of her chronic diarrhea. I encouraged her to take probiotics while on abx,    ORDERS:  - doxycycline (ADOXA) 100 mg tablet; Take 1 Tab by mouth two (2) times a day for 7 days. Dispense: 14 Tab; Refill: 0    2. HTN/Prediabetes/Obesity: Her weight is 275lbs. - C/w rx as prescribed. - I encouraged her to limit her carb intake and caloric intake.  I will recheck her weight at her f/u apt      Health Maintenance Due   Topic Date Due    Shingrix Vaccine Age 49> (1 of 2) 10/30/2002    MEDICARE YEARLY EXAM  01/10/2019     Lab review: labs are reviewed in the EHR    I have discussed the diagnosis with the patient and the intended plan as seen in the above orders. The patient has received an after-visit summary and questions were answered concerning future plans. I have discussed medication side effects and warnings with the patient as well. I have reviewed the plan of care with the patient, accepted their input and they are in agreement with the treatment goals. All questions were answered. The patient understands the plan of care. Handouts provided today with above information. Pt instructed if symptoms worsen to call the office or report to the ED for continued care. Greater than 50% of the visit time was spent in counseling and/or coordination of care. Voice recognition was used to generate this report, which may have resulted in some phonetic based errors in grammar and contents. Even though attempts were made to correct all the mistakes, some may have been missed, and remained in the body of the document. Follow-up Disposition:  Return in about 4 months (around 5/3/2019) for BP check, weight.     Cat Hernandez MD

## 2019-01-10 NOTE — TELEPHONE ENCOUNTER
Ok the switch to the doxycycline that they [pharmacy] have in stock.     Dr. Devyn London  [facility], [address] [place] [address], [address] [place] [address] Str.  Phone: [phone]  Fax: [phone]

## 2019-01-10 NOTE — PATIENT INSTRUCTIONS
Health Maintenance Due Topic Date Due  Shingrix Vaccine Age 50> (1 of 2) 10/30/2002  MEDICARE YEARLY EXAM  01/10/2019 Cellulitis: Care Instructions Your Care Instructions Cellulitis is a skin infection caused by bacteria, most often strep or staph. It often occurs after a break in the skin from a scrape, cut, bite, or puncture, or after a rash. Cellulitis may be treated without doing tests to find out what caused it. But your doctor may do tests, if needed, to look for a specific bacteria, like methicillin-resistant Staphylococcus aureus (MRSA). The doctor has checked you carefully, but problems can develop later. If you notice any problems or new symptoms, get medical treatment right away. Follow-up care is a key part of your treatment and safety. Be sure to make and go to all appointments, and call your doctor if you are having problems. It's also a good idea to know your test results and keep a list of the medicines you take. How can you care for yourself at home? · Take your antibiotics as directed. Do not stop taking them just because you feel better. You need to take the full course of antibiotics. · Prop up the infected area on pillows to reduce pain and swelling. Try to keep the area above the level of your heart as often as you can. · If your doctor told you how to care for your wound, follow your doctor's instructions. If you did not get instructions, follow this general advice: 
? Wash the wound with clean water 2 times a day. Don't use hydrogen peroxide or alcohol, which can slow healing. ? You may cover the wound with a thin layer of petroleum jelly, such as Vaseline, and a nonstick bandage. ? Apply more petroleum jelly and replace the bandage as needed. · Be safe with medicines. Take pain medicines exactly as directed. ? If the doctor gave you a prescription medicine for pain, take it as prescribed. ? If you are not taking a prescription pain medicine, ask your doctor if you can take an over-the-counter medicine. To prevent cellulitis in the future · Try to prevent cuts, scrapes, or other injuries to your skin. Cellulitis most often occurs where there is a break in the skin. · If you get a scrape, cut, mild burn, or bite, wash the wound with clean water as soon as you can to help avoid infection. Don't use hydrogen peroxide or alcohol, which can slow healing. · If you have swelling in your legs (edema), support stockings and good skin care may help prevent leg sores and cellulitis. · Take care of your feet, especially if you have diabetes or other conditions that increase the risk of infection. Wear shoes and socks. Do not go barefoot. If you have athlete's foot or other skin problems on your feet, talk to your doctor about how to treat them. When should you call for help? Call your doctor now or seek immediate medical care if: 
  · You have signs that your infection is getting worse, such as: 
? Increased pain, swelling, warmth, or redness. ? Red streaks leading from the area. ? Pus draining from the area. ? A fever.  
  · You get a rash.  
 Watch closely for changes in your health, and be sure to contact your doctor if: 
  · You do not get better as expected. Where can you learn more? Go to http://miguel-mamta.info/. Jalil Valadez in the search box to learn more about \"Cellulitis: Care Instructions. \" Current as of: April 18, 2018 Content Version: 11.8 © 8919-8907 [employer]. Care instructions adapted under license by [employer] (which disclaims liability or warranty for this information). If you have questions about a medical condition or this instruction, always ask your healthcare professional. [address] any warranty or liability for your use of this information.

## 2019-01-11 LAB
CREAT UR-MCNC: 123 MG/DL (ref 30–125)
HBA1C MFR BLD: 6.1 % (ref 4.2–5.6)
MICROALBUMIN UR-MCNC: 0.6 MG/DL (ref 0–3)
MICROALBUMIN/CREAT UR-RTO: 5 MG/G (ref 0–30)

## 2019-01-14 ENCOUNTER — TELEPHONE (OUTPATIENT)
Dept: INTERNAL MEDICINE CLINIC | Age: 69
End: 2019-01-14

## 2019-01-14 DIAGNOSIS — I10 ESSENTIAL HYPERTENSION: Primary | ICD-10-CM

## 2019-01-14 DIAGNOSIS — L03.316 CELLULITIS, UMBILICAL: ICD-10-CM

## 2019-01-14 RX ORDER — POTASSIUM CHLORIDE 20 MEQ/1
20 TABLET, EXTENDED RELEASE ORAL DAILY
Qty: 30 TAB | Refills: 11 | Status: SHIPPED | OUTPATIENT
Start: 2019-01-14 | End: 2019-05-13 | Stop reason: ALTCHOICE

## 2019-01-14 NOTE — PROGRESS NOTES
Please let her know that her labs show a slight improvement in her A1C to 6.1 from 6.2. She needs to continue losing weight, lowering her carb intake as a means to prevent progression to type 2 DM. Meanwhile, her renal function is normal. Her potassium is slightly low at 3.3. I am ordering potassium for her to take daily. Her cholesterol is 140. Her triglycerides are 116. Her HDL is 57. Her LDL is 59. No changes need to be made to the remainder of her medications. Her CBC shows no significant abnormalities. Dr. Wenceslao Ibarra [facility] [address] [place] [address], [facility] [facility], [facility] [address] Phone: [phone] Fax: [phone]

## 2019-01-14 NOTE — TELEPHONE ENCOUNTER
----- Message from Cat Hernandez MD sent at 1/14/2019  5:08 AM EST -----  Please let her know that her labs show a slight improvement in her A1C to 6.1 from 6.2. She needs to continue losing weight, lowering her carb intake as a means to prevent progression to type 2 DM. Meanwhile, her renal function is normal. Her potassium is slightly low at 3.3. I am ordering potassium for her to take daily. Her cholesterol is 140. Her triglycerides are 116. Her HDL is 57. Her LDL is 59. No changes need to be made to the remainder of her medications. Her CBC shows no significant abnormalities.     Dr. Robbie Benavidez  [facility], [facility], [facility] [address]  Phone: [phone]  Fax: [phone]

## 2019-01-14 NOTE — TELEPHONE ENCOUNTER
Patient contacted, patient identified with two identifiers (Name & ). Patient is aware of results per DR. Fab Elliott. Patient states that she has redness around the area of the [employer].   Per DR. Fab Elliott, patient advised to c/w with her antibiotic, and to call if the redness spreads. Patient verbalizes understanding.

## 2019-01-23 ENCOUNTER — HOSPITAL ENCOUNTER (EMERGENCY)
Age: 69
Discharge: HOME | End: 2019-01-23
Attending: EMERGENCY MEDICINE
Payer: COMMERCIAL

## 2019-01-23 VITALS
BODY MASS INDEX: 41.78 KG/M2 | DIASTOLIC BLOOD PRESSURE: 72 MMHG | SYSTOLIC BLOOD PRESSURE: 152 MMHG | OXYGEN SATURATION: 97 % | WEIGHT: 260 LBS | HEART RATE: 72 BPM | RESPIRATION RATE: 16 BRPM | HEIGHT: 66 IN | TEMPERATURE: 97.6 F

## 2019-01-23 DIAGNOSIS — R19.8 UMBILICAL BLEEDING: Primary | ICD-10-CM

## 2019-01-23 PROCEDURE — 99282 EMERGENCY DEPT VISIT SF MDM: CPT

## 2019-01-23 NOTE — ED PROVIDER NOTES
EMERGENCY DEPARTMENT HISTORY AND PHYSICAL EXAM 
 
Date: 1/23/2019 Patient Name: Tomeka Fernandez History of Presenting Illness Chief Complaint Patient presents with  
 Skin Problem History Provided By: Patient Chief Complaint: Umbilical bleeding Duration: 1 Days Timing:  Resolved Location: Umbilicus Quality: N/A Severity: Mild Modifying Factors: N/a Associated Symptoms: denies any other associated signs or symptoms Additional History (Context): Tomeka Fernandez is a 79 y.o. female with hypertension, hyperlipidemia and stroke who presents for umbilical bleeding that occurred earlier today and has since resolved on its own. Pt reports  On 1/10 she was seen by PCP for umbilical bleeding, periumbilical swelling, erythema, and pain, dx with cellulitis, rx doxycycline. She completed the antibiotics as prescribed which resolved all the symptoms. Today she noted the bleeding, but denies noting any swelling, erythema, warmth, tenderness, or pain. She has not tried anything for it. She denies fever, chills, easier bruising/bleeding, abd pain, N/V, or any other complaints at this time. Pt is on ASA/aggrenox. PCP: Kaila Sesay MD 
 
Current Outpatient Medications Medication Sig Dispense Refill  potassium chloride (K-DUR, KLOR-CON) 20 mEq tablet Take 1 Tab by mouth daily. 30 Tab 11  
 atenolol-chlorthalidone (TENORETIC) 100-25 mg per tablet TAKE 1 TABLET BY MOUTH ONCE DAILY 90 Tab 1  VIBERZI 75 mg tablet TAKE ONE TABLET BY MOUTH TWICE A DAY WITH MEALS 60 Tab 5  
 rosuvastatin (CRESTOR) 10 mg tablet Take 1 Tab by mouth nightly. 90 Tab 3  
 gabapentin (NEURONTIN) 300 mg capsule Take 300 mg by mouth nightly.  fluticasone (FLONASE) 50 mcg/actuation nasal spray 2 Sprays by Both Nostrils route daily. 1 Bottle 11  
 escitalopram oxalate (LEXAPRO) 10 mg tablet Take 10 mg by mouth daily.  Cholecalciferol, Vitamin D3, (VITAMIN D3) 1,000 unit cap Take 1,000 Units by mouth daily.  aspirin-dipyridamole (AGGRENOX)  mg per SR capsule Take 1 Cap by mouth two (2) times a day. Past History Past Medical History: 
Past Medical History:  
Diagnosis Date  Hypercholesterolemia  Hypertension  Stroke ([address] Past Surgical History: 
Past Surgical History:  
Procedure Laterality Date  HX APPENDECTOMY  09/09/2015  HX CHOLECYSTECTOMY  HX GYN    
 partial hysterectomy Family History: 
Family History Problem Relation Age of Onset  Dementia Mother  Stroke Father  Cancer Brother  Hypertension Brother Social History: 
Social History Tobacco Use  Smoking status: Never Smoker  Smokeless tobacco: Never Used Substance Use Topics  Alcohol use: No  
 Drug use: No  
 
 
Allergies: Allergies Allergen Reactions  Amoxicillin-Pot Clavulanate Diarrhea  Azithromycin Diarrhea  Seroquel [Quetiapine] Other (comments) Tremors  Sulfa (Sulfonamide Antibiotics) Diarrhea  Wellbutrin [Bupropion Hcl] Itching Review of Systems Review of Systems Constitutional: Negative for chills and fever. HENT: Negative for ear pain, rhinorrhea and sore throat. Eyes: Negative for pain and redness. Respiratory: Negative for cough and shortness of breath. Cardiovascular: Negative for chest pain. Gastrointestinal: Negative for abdominal pain, constipation, diarrhea, nausea and vomiting. Genitourinary: Negative for dysuria. Skin:  
     Bleeding umbilicus Neurological: Negative for dizziness, light-headedness and headaches. Psychiatric/Behavioral: Negative. All other systems reviewed and are negative. All Other Systems Negative Physical Exam  
 
Vitals:  
 01/23/19 1736 BP: 152/72 Pulse: 72 Resp: 16 Temp: 97.6 °F (36.4 °C) SpO2: 97% Weight: 117.9 kg (260 lb) Height: 5' 6\" (1.676 m) Physical Exam  
Constitutional: She is oriented to person, place, and time.  She appears well-developed and well-nourished. HENT:  
Head: Normocephalic and atraumatic. Right Ear: Tympanic membrane, external ear and ear canal normal.  
Left Ear: Tympanic membrane, external ear and ear canal normal.  
Nose: Nose normal.  
Mouth/Throat: Oropharynx is clear and moist and mucous membranes are normal.  
Eyes: Conjunctivae and EOM are normal. Pupils are equal, round, and reactive to light. Neck: Normal range of motion. Cardiovascular: Normal rate, regular rhythm and normal heart sounds. Pulmonary/Chest: Effort normal and breath sounds normal.  
Abdominal: Soft. Bowel sounds are normal. There is tenderness. There is no rigidity, no rebound, no guarding, no CVA tenderness, no tenderness at McBurney's point and negative Hathaway's sign. Musculoskeletal: Normal range of motion. Neurological: She is alert and oriented to person, place, and time. Skin: Skin is warm and dry. No bruising, no ecchymosis, no petechiae and no purpura noted. Psychiatric: She has a normal mood and affect. Her behavior is normal. Judgment and thought content normal.  
Nursing note and vitals reviewed. Diagnostic Study Results Labs - No results found for this or any previous visit (from the past 12 hour(s)). Radiologic Studies - No orders to display CT Results  (Last 48 hours) None CXR Results  (Last 48 hours) None Medical Decision Making I am the first provider for this patient. I reviewed the vital signs, available nursing notes, past medical history, past surgical history, family history and social history. Vital Signs-Reviewed the patient's vital signs. Pulse Oximetry Analysis - 97% on RA Records Reviewed: Nursing Notes, Old Medical Records, Previous Radiology Studies and Previous Laboratory Studies Procedures: 
Procedures Provider Notes (Medical Decision Making): Pt presents for umbilical bleeding. No active bleeding on exam currently. No signs of infectious process such as abscess or cellulitis, do not feel antibx indicated at this time. No other easier bruising or bleeding, doubt d/t anticoagulated state. Do not feel furhter workup in ED indicated at this time. Will plan to dc to home with observation for any s/s of infection, have pt f/u with PCP. Strict ED return precautions given. MED RECONCILIATION: 
No current facility-administered medications for this encounter. Current Outpatient Medications Medication Sig  potassium chloride (K-DUR, KLOR-CON) 20 mEq tablet Take 1 Tab by mouth daily.  atenolol-chlorthalidone (TENORETIC) 100-25 mg per tablet TAKE 1 TABLET BY MOUTH ONCE DAILY  VIBERZI 75 mg tablet TAKE ONE TABLET BY MOUTH TWICE A DAY WITH MEALS  rosuvastatin (CRESTOR) 10 mg tablet Take 1 Tab by mouth nightly.  gabapentin (NEURONTIN) 300 mg capsule Take 300 mg by mouth nightly.  fluticasone (FLONASE) 50 mcg/actuation nasal spray 2 Sprays by Both Nostrils route daily.  escitalopram oxalate (LEXAPRO) 10 mg tablet Take 10 mg by mouth daily.  Cholecalciferol, Vitamin D3, (VITAMIN D3) 1,000 unit cap Take 1,000 Units by mouth daily.  aspirin-dipyridamole (AGGRENOX)  mg per SR capsule Take 1 Cap by mouth two (2) times a day. Disposition: 
Discharge to home. DISCHARGE NOTE:  
Pt has been reexamined. Stable. Patient has no new complaints, changes, or physical findings. Patient is ready to go home. Pt results have been reviewed with the patient and any family present. They have been counseled regarding diagnosis, treatment, and plan. They verbally convey understanding and agreement of the signs, symptoms, diagnosis, treatment and prognosis and additionally agrees to follow up as discussed. They also agree with the care-plan and convey that all of their questions have been answered.  I have also provided discharge instructions for them that include: educational information regarding their diagnosis and treatment, and list of reasons why they would want to return to the ED prior to their follow-up appointment, should their condition change. Casimiro Mina PA-C Follow-up Information Follow up With Specialties Details Why Contact Info [id number] [facility] EMERGENCY DEPT Emergency Medicine  As needed, If symptoms worsen [id number] [facility] [place] [id number] [phone] Kale Frost MD [facility] Go in 2 days  [address] [address] [address] [facility] 
[phone] Current Discharge Medication List  
  
CONTINUE these medications which have NOT CHANGED Details  
potassium chloride (K-DUR, KLOR-CON) 20 mEq tablet Take 1 Tab by mouth daily. Qty: 30 Tab, Refills: 11  
 Associated Diagnoses: Essential hypertension  
  
atenolol-chlorthalidone (TENORETIC) 100-25 mg per tablet TAKE 1 TABLET BY MOUTH ONCE DAILY Qty: 90 Tab, Refills: 1 Associated Diagnoses: Essential hypertension VIBERZI 75 mg tablet TAKE ONE TABLET BY MOUTH TWICE A DAY WITH MEALS Qty: 60 Tab, Refills: 5 Associated Diagnoses: Irritable bowel syndrome with diarrhea  
  
rosuvastatin (CRESTOR) 10 mg tablet Take 1 Tab by mouth nightly. Qty: 90 Tab, Refills: 3 Associated Diagnoses: Obesity, morbid, BMI 40.0-49.9 ([address] Hyperlipidemia, unspecified hyperlipidemia type  
  
gabapentin (NEURONTIN) 300 mg capsule Take 300 mg by mouth nightly. fluticasone (FLONASE) 50 mcg/actuation nasal spray 2 Sprays by Both Nostrils route daily. Qty: 1 Bottle, Refills: 11  
  
escitalopram oxalate (LEXAPRO) 10 mg tablet Take 10 mg by mouth daily. Cholecalciferol, Vitamin D3, (VITAMIN D3) 1,000 unit cap Take 1,000 Units by mouth daily. aspirin-dipyridamole (AGGRENOX)  mg per SR capsule Take 1 Cap by mouth two (2) times a day. Diagnosis Clinical Impression: 1. Umbilical bleeding

## 2019-01-23 NOTE — ED TRIAGE NOTES
Patient states bleeding from umbilicus. She states receiving evaluation and treatment with abx therapy from PCP on January 10th for same issue. She states bleeding stopped after taking abx for seven days only to return tonight.

## 2019-01-24 ENCOUNTER — OFFICE VISIT (OUTPATIENT)
Dept: INTERNAL MEDICINE CLINIC | Age: 69
End: 2019-01-24

## 2019-01-24 ENCOUNTER — TELEPHONE (OUTPATIENT)
Dept: INTERNAL MEDICINE CLINIC | Age: 69
End: 2019-01-24

## 2019-01-24 VITALS
SYSTOLIC BLOOD PRESSURE: 114 MMHG | DIASTOLIC BLOOD PRESSURE: 62 MMHG | HEIGHT: 66 IN | RESPIRATION RATE: 14 BRPM | BODY MASS INDEX: 44.52 KG/M2 | HEART RATE: 67 BPM | OXYGEN SATURATION: 97 % | WEIGHT: 277 LBS | TEMPERATURE: 97.6 F

## 2019-01-24 DIAGNOSIS — Q89.9 UMBILICAL ABNORMALITY: Primary | ICD-10-CM

## 2019-01-24 NOTE — PROGRESS NOTES
HPI/History German Mahoney is a 79 y.o.  female who presents for evaluation. Pt dx with umbilical cellulitis on 5/05 and prescribed doxycycline. She reports some bleeding from the area at the time which resolved but then recurred yesterday and visited ED. Bleeding had stopped by the time of ED evaluation and reports examination was unremarkable and was discharged. No significant bleeding since ED visit. No signs of cellulitis but some suggestion of fungal infection. No fevers or constitutional sxs. No other complaints. Patient Active Problem List  
Diagnosis Code  Glucose intolerance (impaired glucose tolerance) R73.02  
 Single current episode of major depressive disorder (has tried celexa, wellbutrin, mirtazapine with adverse effects) F32.9  
 Essential hypertension I10  
 Adrenal incidentaloma (1.3 cm and left sided) - per CT scan from 2015 E27.8  History of cerebral infarction - per head MRI findings Z86.73  
 Obesity, morbid, BMI 40.0-49.9  E66.01  
 Allergic rhinitis J30.9  Irritable bowel syndrome with diarrhea K58.0  Onychomycosis B35.1  Controlled substance agreement signed 7/7/17 Z79.899 Past Medical History:  
Diagnosis Date  Hypercholesterolemia  Hypertension  Stroke ([facility] [address] Past Surgical History:  
Procedure Laterality Date  HX APPENDECTOMY  09/09/2015  HX CHOLECYSTECTOMY  HX GYN    
 partial hysterectomy Social History Socioeconomic History  Marital status:  Spouse name: Not on file  Number of children: Not on file  Years of education: Not on file  Highest education level: Not on file Social Needs  Financial resource strain: Not on file  Food insecurity - worry: Not on file  Food insecurity - inability: Not on file  Transportation needs - medical: Not on file  Transportation needs - non-medical: Not on file Occupational History  Not on file Tobacco Use  Smoking status: Never Smoker  Smokeless tobacco: Never Used Substance and Sexual Activity  Alcohol use: No  
 Drug use: No  
 Sexual activity: Not on file Other Topics Concern  Not on file Social History Narrative  Not on file Family History Problem Relation Age of Onset  Dementia Mother  Stroke Father  Cancer Brother  Hypertension Brother Current Outpatient Medications Medication Sig  potassium chloride (K-DUR, KLOR-CON) 20 mEq tablet Take 1 Tab by mouth daily.  atenolol-chlorthalidone (TENORETIC) 100-25 mg per tablet TAKE 1 TABLET BY MOUTH ONCE DAILY  VIBERZI 75 mg tablet TAKE ONE TABLET BY MOUTH TWICE A DAY WITH MEALS  rosuvastatin (CRESTOR) 10 mg tablet Take 1 Tab by mouth nightly.  gabapentin (NEURONTIN) 300 mg capsule Take 300 mg by mouth nightly.  fluticasone (FLONASE) 50 mcg/actuation nasal spray 2 Sprays by Both Nostrils route daily.  escitalopram oxalate (LEXAPRO) 10 mg tablet Take 10 mg by mouth daily.  Cholecalciferol, Vitamin D3, (VITAMIN D3) 1,000 unit cap Take 1,000 Units by mouth daily.  aspirin-dipyridamole (AGGRENOX)  mg per SR capsule Take 1 Cap by mouth two (2) times a day. No current facility-administered medications for this visit. Allergies Allergen Reactions  Amoxicillin-Pot Clavulanate Diarrhea  Azithromycin Diarrhea  Seroquel [Quetiapine] Other (comments) Tremors  Sulfa (Sulfonamide Antibiotics) Diarrhea  Wellbutrin [Bupropion Hcl] Itching Review of Systems Aside from those included in HPI, remainder of ROS negative. Physical Examination Visit Vitals /62 (BP 1 Location: Left arm, BP Patient Position: Sitting) Pulse 67 Temp 97.6 °F (36.4 °C) (Oral) Resp 14 Ht 5' 6\" (1.676 m) Wt 277 lb (125.6 kg) SpO2 97% BMI 44.71 kg/m² General - Alert and in no acute distress. Pt appears well, comfortable, and in good spirits. Pleasant, engaging. Nontoxic. Not anxious, non-diaphoretic. Mental status - Appropriate mood, behavior, speech content, dress, and thought processes. Pulm - No tachypnea, retractions, or cyanosis. Good respiratory effort. Cardiovascular - Normal rate. Periumbilical region unremarkable. Looks to have small ring of dried blood vs dried serosanguinous fluid at the rim of umbilicus. Exam with otoscope (with speculum) noted intact skin with no signs of cellulitis. No identifiable lesions. Scant serous fluid noted deep but no evidence of active bleeding. Malodor similar to intertrigo noted. No other findings. Assessment and Plan 1. Pt recently dx with umbilical cellulitis and completed doxycycline. Recent bleeding is more likely to be serosanguinous drainage. There is currently some suggestion of intertrigo, which may be a subsequent issue, secondary to recent abx, or the initial causative factor. However, there are no signs of residual cellulitis. She will apply clotrimazole to umbilicus gently with a q-tip for the next couple of weeks. If continued drainage (or bleeding), will consider derm vs gen surg for eval.    Further planning as warranted. Pt happily agrees with plan. PLEASE NOTE:  
This document has been produced using voice recognition software. Unrecognized errors in transcription may be present. Zackery Ivory PA-C [facility] [address] [place] [address] 
[phone] 
1/24/2019

## 2019-01-24 NOTE — PROGRESS NOTES
1. Have you been to the ER, urgent care clinic or hospitalized since your last visit? YES 1/23/19   
 
2. Have you seen or consulted any other health care providers outside of the [address] [place] [address] since your last visit (Include any pap smears or colon screening)?  NO

## 2019-01-25 DIAGNOSIS — E66.01 OBESITY, MORBID, BMI 40.0-49.9 (HCC): ICD-10-CM

## 2019-01-25 DIAGNOSIS — E78.5 HYPERLIPIDEMIA, UNSPECIFIED HYPERLIPIDEMIA TYPE: ICD-10-CM

## 2019-01-28 RX ORDER — ROSUVASTATIN CALCIUM 10 MG/1
TABLET, COATED ORAL
Qty: 90 TAB | Refills: 2 | Status: SHIPPED | OUTPATIENT
Start: 2019-01-28 | End: 2019-11-04 | Stop reason: SDUPTHER

## 2019-05-13 ENCOUNTER — OFFICE VISIT (OUTPATIENT)
Dept: INTERNAL MEDICINE CLINIC | Age: 69
End: 2019-05-13

## 2019-05-13 VITALS
HEART RATE: 60 BPM | OXYGEN SATURATION: 95 % | BODY MASS INDEX: 44.84 KG/M2 | SYSTOLIC BLOOD PRESSURE: 116 MMHG | WEIGHT: 279 LBS | TEMPERATURE: 97 F | DIASTOLIC BLOOD PRESSURE: 55 MMHG | RESPIRATION RATE: 14 BRPM | HEIGHT: 66 IN

## 2019-05-13 DIAGNOSIS — K58.0 IRRITABLE BOWEL SYNDROME WITH DIARRHEA: ICD-10-CM

## 2019-05-13 DIAGNOSIS — F32.9 CURRENT EPISODE OF MAJOR DEPRESSIVE DISORDER WITHOUT PRIOR EPISODE, UNSPECIFIED DEPRESSION EPISODE SEVERITY: ICD-10-CM

## 2019-05-13 DIAGNOSIS — E78.2 MIXED HYPERLIPIDEMIA: ICD-10-CM

## 2019-05-13 DIAGNOSIS — Z12.31 ENCOUNTER FOR SCREENING MAMMOGRAM FOR BREAST CANCER: ICD-10-CM

## 2019-05-13 DIAGNOSIS — Z71.89 ADVANCE CARE PLANNING: ICD-10-CM

## 2019-05-13 DIAGNOSIS — I10 ESSENTIAL HYPERTENSION: Primary | ICD-10-CM

## 2019-05-13 DIAGNOSIS — R73.02 GLUCOSE INTOLERANCE (IMPAIRED GLUCOSE TOLERANCE): ICD-10-CM

## 2019-05-13 DIAGNOSIS — Z00.00 INITIAL MEDICARE ANNUAL WELLNESS VISIT: ICD-10-CM

## 2019-05-13 RX ORDER — ATENOLOL AND CHLORTHALIDONE TABLET 100; 25 MG/1; MG/1
1 TABLET ORAL DAILY
Qty: 90 TAB | Refills: 3 | Status: SHIPPED | OUTPATIENT
Start: 2019-05-13 | End: 2019-12-05

## 2019-05-13 NOTE — LETTER
5/13/2019 1:11 PM 
 
Ms. Logan Garcia [phone] [address] [address] Mrs. Mally Romeo is my patient and is under my medical care. She can participate in water aerobics/exercise classes. These classes will benefit her health. Please let me know if you have any additional questions.  
 
 
 
Respectfully, 
 
 
 
 
 
Deepa Peacock MD

## 2019-05-13 NOTE — PROGRESS NOTES
INTERNISTS [facility]:  5/13/2019, MRN: [id number]      Emilee Wright is a 79 y.o. female and presents to clinic for Follow-up (4 month f/u) and Annual Wellness Visit    Subjective: The patient is a 71-year-old female with history of obesity, allergic rhinitis, IBS-diarrhea, onychomycosis, prediabetes, depression, hypertension, adrenal incidentaloma seen on CT scan 2015, and history of cerebral infarction her head MRI findings (followed by Neurology). 1.  Hypertension: Present for over 6 months. On atenolol- hydrochlorothiazide. She stopped potassium rx after having some diarrhea she associated with taking this rx. She reports no adverse effects of taking his medications. Her BP is 116/55. 2. HLD: He takes Crestor. She reports no adverse effects of taking this medication. She is not regularly exercising or dieting. Her weight today is 279lbs. 3.  IBS- Diarrhea: Present for over a year. Treated with Mark Katja in the past but it is no longer covered by her insurance despite the fact that Mark Katja worked well to relieve her sx. No adverse side effects of taking this medication. Since she can't afford Viberzi out of pocket, she is taking imodium. +Taking probiotics. 4. Depression: On Lexapro. No adverse side effects or take this medication. No suicidal ideation.       Patient Active Problem List    Diagnosis Date Noted    Controlled substance agreement signed 7/7/17 07/09/2017    Obesity, morbid, BMI 40.0-49.9  12/06/2016    Allergic rhinitis 12/06/2016    Irritable bowel syndrome with diarrhea 12/06/2016    Onychomycosis 12/06/2016    Glucose intolerance (impaired glucose tolerance) 12/01/2016    Single current episode of major depressive disorder (has tried celexa, wellbutrin, mirtazapine with adverse effects) 12/01/2016    Essential hypertension 12/01/2016    Adrenal incidentaloma (1.3 cm and left sided) - per CT scan from 2015 12/01/2016    History of cerebral infarction - per head MRI findings 12/01/2016       Current Outpatient Medications   Medication Sig Dispense Refill    rosuvastatin (CRESTOR) 10 mg tablet TAKE 1 TABLET BY MOUTH NIGHTLY 90 Tab 2    atenolol-chlorthalidone (TENORETIC) 100-25 mg per tablet TAKE 1 TABLET BY MOUTH ONCE DAILY 90 Tab 1    gabapentin (NEURONTIN) 300 mg capsule Take 300 mg by mouth nightly.  fluticasone (FLONASE) 50 mcg/actuation nasal spray 2 Sprays by Both Nostrils route daily. 1 Bottle 11    escitalopram oxalate (LEXAPRO) 10 mg tablet Take 10 mg by mouth daily.  Cholecalciferol, Vitamin D3, (VITAMIN D3) 1,000 unit cap Take 1,000 Units by mouth daily.  aspirin-dipyridamole (AGGRENOX)  mg per SR capsule Take 1 Cap by mouth two (2) times a day.  potassium chloride (K-DUR, KLOR-CON) 20 mEq tablet Take 1 Tab by mouth daily. 30 Tab 11    VIBERZI 75 mg tablet TAKE ONE TABLET BY MOUTH TWICE A DAY WITH MEALS 60 Tab 5       Allergies   Allergen Reactions    Amoxicillin-Pot Clavulanate Diarrhea    Azithromycin Diarrhea    Seroquel [Quetiapine] Other (comments)     Tremors    Sulfa (Sulfonamide Antibiotics) Diarrhea    Wellbutrin [Bupropion Hcl] Itching       Past Medical History:   Diagnosis Date    Hypercholesterolemia     Hypertension     Stroke [facility])        Past Surgical History:   Procedure Laterality Date    HX APPENDECTOMY  09/09/2015    HX CHOLECYSTECTOMY      HX GYN      partial hysterectomy       Family History   Problem Relation Age of Onset    Dementia Mother     Stroke Father     Cancer Brother     Hypertension Brother        Social History     Tobacco Use    Smoking status: Never Smoker    Smokeless tobacco: Never Used   Substance Use Topics    Alcohol use: No       ROS   Review of Systems   Constitutional: Negative for chills, fever and malaise/fatigue. HENT: Negative for ear pain and sore throat. Eyes: Negative for blurred vision and pain. Respiratory: Negative for cough and shortness of breath. Cardiovascular: Negative for chest pain. Gastrointestinal: Negative for abdominal pain, blood in stool and melena. Genitourinary: Negative for dysuria and hematuria. Musculoskeletal: Negative for joint pain and myalgias. Skin: Negative for rash. Neurological: Negative for tingling, focal weakness and headaches. Endo/Heme/Allergies: Does not bruise/bleed easily. Psychiatric/Behavioral: Negative for substance abuse. Objective     Vitals:    05/13/19 1232   BP: 116/55   Pulse: 60   Resp: 14   Temp: 97 °F (36.1 °C)   TempSrc: Oral   SpO2: 95%   Weight: 279 lb (126.6 kg)   Height: 5' 6\" (1.676 m)   PainSc:   0 - No pain       Physical Exam   Constitutional: She is oriented to person, place, and time and well-developed, well-nourished, and in no distress. HENT:   Head: Normocephalic and atraumatic. Right Ear: External ear normal.   Left Ear: External ear normal.   Nose: Nose normal.   Mouth/Throat: Oropharynx is clear and moist. No oropharyngeal exudate. Eyes: Pupils are equal, round, and reactive to light. Conjunctivae and EOM are normal. Right eye exhibits no discharge. Left eye exhibits no discharge. No scleral icterus. Neck: Neck supple. Cardiovascular: Normal rate, regular rhythm, normal heart sounds and intact distal pulses. Exam reveals no gallop and no friction rub. No murmur heard. Pulmonary/Chest: Effort normal and breath sounds normal. No respiratory distress. She has no wheezes. She has no rales. Abdominal: Soft. Bowel sounds are normal. She exhibits no distension. There is no tenderness. There is no rebound and no guarding. Musculoskeletal: She exhibits no edema or tenderness (BUE). Lymphadenopathy:     She has no cervical adenopathy. Neurological: She is alert and oriented to person, place, and time. She exhibits normal muscle tone. Gait normal.   Skin: Skin is warm and dry. No erythema. Psychiatric: Affect normal.   Nursing note and vitals reviewed.       LABS Data Review:   Lab Results   Component Value Date/Time    WBC 7.1 01/10/2019 08:55 AM    HGB 13.6 01/10/2019 08:55 AM    HCT 44.6 01/10/2019 08:55 AM    PLATELET 306 13/22/3663 08:55 AM    MCV 99.6 (H) 01/10/2019 08:55 AM       Lab Results   Component Value Date/Time    Sodium 140 01/10/2019 08:55 AM    Potassium 3.3 (L) 01/10/2019 08:55 AM    Chloride 101 01/10/2019 08:55 AM    CO2 31 01/10/2019 08:55 AM    Anion gap 8 01/10/2019 08:55 AM    Glucose 95 01/10/2019 08:55 AM    BUN 19 (H) 01/10/2019 08:55 AM    Creatinine 0.83 01/10/2019 08:55 AM    BUN/Creatinine ratio 23 (H) 01/10/2019 08:55 AM    GFR est AA >60 01/10/2019 08:55 AM    GFR est non-AA >60 01/10/2019 08:55 AM    Calcium 9.3 01/10/2019 08:55 AM       Lab Results   Component Value Date/Time    Cholesterol, total 140 01/10/2019 08:55 AM    HDL Cholesterol 57 01/10/2019 08:55 AM    LDL, calculated 59.8 01/10/2019 08:55 AM    VLDL, calculated 23.2 01/10/2019 08:55 AM    Triglyceride 116 01/10/2019 08:55 AM    CHOL/HDL Ratio 2.5 01/10/2019 08:55 AM       Lab Results   Component Value Date/Time    Hemoglobin A1c 6.1 (H) 01/10/2019 08:55 AM    Hemoglobin A1c, External 5.8 09/08/2015       Assessment/Plan:   1. HTN: Stable. -Continue with rx as prescribed. - Return to clinic for BP check. 2. HLD:  -I encouraged her to reduce her weight by exercising as tolerated and by reducing her processed food intake. I will recheck her weight at her follow-up appointment.  -Continue with Crestor. 3.  Prediabetes:  -Low-carb diet encouraged. 4.  Depression: Stable. -Continue with Lexapro; she will discuss weaning off of this rx with her Neurology team - who originally placed her on this rx.    5.  IBS-Diarrhea:  -Continue with Imodium. We will see if her insurance will cover Doris Carr this year.       Health Maintenance Due   Topic Date Due    Shingrix Vaccine Age 49> (1 of 2) 10/30/2002    MEDICARE YEARLY EXAM  01/10/2019     Lab review: labs are reviewed in the EHR    I have discussed the diagnosis with the patient and the intended plan as seen in the above orders. The patient has received an after-visit summary and questions were answered concerning future plans. I have discussed medication side effects and warnings with the patient as well. I have reviewed the plan of care with the patient, accepted their input and they are in agreement with the treatment goals. All questions were answered. The patient understands the plan of care. Handouts provided today with above information. Pt instructed if symptoms worsen to call the office or report to the ED for continued care. Greater than 50% of the visit time was spent in counseling and/or coordination of care. Voice recognition was used to generate this report, which may have resulted in some phonetic based errors in grammar and contents. Even though attempts were made to correct all the mistakes, some may have been missed, and remained in the body of the document.           Dakota Swanson MD

## 2019-05-13 NOTE — PROGRESS NOTES
Chief Complaint   Patient presents with    Follow-up     4 month f/u    Annual Wellness Visit       This is an Initial Medicare Annual Wellness Exam (AWV) (Performed 12 months after IPPE or effective date of Medicare Part B enrollment, Once in a lifetime)    I have reviewed the patient's medical history in detail and updated the computerized patient record. History   The patient is a 62-year-old female with history of obesity, allergic rhinitis, IBS-diarrhea, onychomycosis, prediabetes, depression, hypertension, adrenal incidentaloma seen on CT scan 2015, and history of cerebral infarction her head MRI findings (followed by Neurology). Health Maintenance History  Immunizations reviewed: Tdap up to date   Pneumovax: up to date   Flu: up to date  Zoster: over-due    Immunization History   Administered Date(s) Administered    Influenza High Dose Vaccine PF 10/31/2018    Influenza Vaccine 10/05/2017    Pneumococcal Conjugate (PCV-13) 07/09/2018    Pneumococcal Polysaccharide (PPSV-23) 01/05/2013    Tdap 01/03/2017    Zoster Vaccine, Live 03/03/2013     Health Maintenance Due   Topic Date Due    Shingrix Vaccine Age 50> (1 of 2) 10/30/2002    MEDICARE YEARLY EXAM  01/10/2019     Colonoscopy: Up to date. No hematochezia/melena     Eye exam: Up to date. No vision changes. Mammo: Up to date. No breast pain/masses    Dexascan: Up to date.     Pelvic/Pap: No vaginal bleeding        Past Medical History:   Diagnosis Date    Hypercholesterolemia     Hypertension     Stroke [facility])       Past Surgical History:   Procedure Laterality Date    HX APPENDECTOMY  09/09/2015    HX CHOLECYSTECTOMY      HX GYN      partial hysterectomy     Current Outpatient Medications   Medication Sig Dispense Refill    rosuvastatin (CRESTOR) 10 mg tablet TAKE 1 TABLET BY MOUTH NIGHTLY 90 Tab 2    atenolol-chlorthalidone (TENORETIC) 100-25 mg per tablet TAKE 1 TABLET BY MOUTH ONCE DAILY 90 Tab 1    gabapentin (NEURONTIN) 300 mg capsule Take 300 mg by mouth nightly.  fluticasone (FLONASE) 50 mcg/actuation nasal spray 2 Sprays by Both Nostrils route daily. 1 Bottle 11    escitalopram oxalate (LEXAPRO) 10 mg tablet Take 10 mg by mouth daily.  Cholecalciferol, Vitamin D3, (VITAMIN D3) 1,000 unit cap Take 1,000 Units by mouth daily.  aspirin-dipyridamole (AGGRENOX)  mg per SR capsule Take 1 Cap by mouth two (2) times a day.        Allergies   Allergen Reactions    Amoxicillin-Pot Clavulanate Diarrhea    Azithromycin Diarrhea    Seroquel [Quetiapine] Other (comments)     Tremors    Sulfa (Sulfonamide Antibiotics) Diarrhea    Wellbutrin [Bupropion Hcl] Itching     Family History   Problem Relation Age of Onset    Dementia Mother     Stroke Father     Cancer Brother     Hypertension Brother      Social History     Tobacco Use    Smoking status: Never Smoker    Smokeless tobacco: Never Used   Substance Use Topics    Alcohol use: No     Patient Active Problem List   Diagnosis Code    Glucose intolerance (impaired glucose tolerance) R73.02    Single current episode of major depressive disorder (has tried celexa, wellbutrin, mirtazapine with adverse effects) F32.9    Essential hypertension I10    Adrenal incidentaloma (1.3 cm and left sided) - per CT scan from 2015 E27.8    History of cerebral infarction - per head MRI findings Z86.73    Obesity, morbid, BMI 40.0-49.9  E66.01    Allergic rhinitis J30.9    Irritable bowel syndrome with diarrhea K58.0    Onychomycosis B35.1    Controlled substance agreement signed 7/7/17 Z79.899       Depression Risk Factor Screening:     3 most recent PHQ Screens 5/13/2019   Little interest or pleasure in doing things Not at all   Feeling down, depressed, irritable, or hopeless Not at all   Total Score PHQ 2 0   Trouble falling or staying asleep, or sleeping too much -   Feeling tired or having little energy -   Poor appetite, weight loss, or overeating -   Feeling bad about yourself - or that you are a failure or have let yourself or your family down -   Trouble concentrating on things such as school, work, reading, or watching TV -   Moving or speaking so slowly that other people could have noticed; or the opposite being so fidgety that others notice -   Thoughts of being better off dead, or hurting yourself in some way -   PHQ 9 Score -   How difficult have these problems made it for you to do your work, take care of your home and get along with others -     Alcohol Risk Factor Screening: You do not drink alcohol or very rarely. Functional Ability and Level of Safety:     Hearing Loss  The patient wears hearing aids. Activities of Daily Living  The home contains: no safety equipment. Patient uses walking cane. Patient does total self care     She does not need assistance with ADLs/IADLs    Fall Risk  Fall Risk Assessment, last 12 mths 5/13/2019   Able to walk? Yes   Fall in past 12 months? No       Abuse Screen  Patient is not abused     ROS:  Gen: No fever/chlls  HEENT: No sore throat, eye pain, ear pain, or congestion. No HA  CV: No CP  Resp: No cough/SOB  GI: No abdominal pain  : No hematuria/dysuria  Derm: No rash  Neuro: No new paresthesias/weakness  Musc: No new myalgias/jt pain  Psych: No depression sx      Cognitive Screening   Evaluation of Cognitive Function:  Has your family/caregiver stated any concerns about your memory: no  Normal    Visit Vitals  /55   Pulse 60   Temp 97 °F (36.1 °C) (Oral)   Resp 14   Ht 5' 6\" (1.676 m)   Wt 279 lb (126.6 kg)   SpO2 95%   BMI 45.03 kg/m²     General:  Alert, cooperative, no distress   Head:  Normocephalic, without obvious abnormality, atraumatic. Eyes:  Conjunctivae clear   Ears:  Clear external auditory canals   Nose: Nares normal. Septum midline. Mucosa normal. No drainage or sinus tenderness.    Throat: Lips, mucosa, and tongue normal. Unremarkable oropharynx   Neck: Supple, symmetrical, trachea midline, no adenopathy. Lungs:   Clear to auscultation bilaterally. Heart:  Regular rate and rhythm, S1, S2 normal, no murmur, click, rub or gallop. Abdomen:   Soft, non-tender. Bowel sounds normal. No masses. Extremities: Extremities normal no edema. Pulses: +2 radial pulses b/l   Skin:  No rashes or lesions. Lymph nodes: Cervical LN normal.   Neurologic:  Psych: Stable gait  Euthymic       3/3 short item recall  Unremarkable clock drawing exercise    Patient Care Team   Patient Care Team:  Ferny Warren MD as PCP - General (Family Practice)  Michelle Gary MD as Surgeon (General Surgery)  Cathi Landon MD as Consulting Provider (Ophthalmology)    Assessment/Plan   Education and counseling provided:  Are appropriate based on today's review and evaluation  End-of-Life planning (with patient's consent)  Pneumococcal Vaccine  Screening Mammography  Screening Pap and pelvic (covered once every 2 years)  Colorectal cancer screening tests  Cardiovascular screening blood test  Bone mass measurement (DEXA)  Screening for glaucoma  Diabetes screening test    Diagnoses and all orders for this visit:    1. Encounter for screening mammogram for breast cancer  -     LISA MAMMO BI SCREENING INCL CAD; Future         Health Maintenance Due   Topic Date Due    Shingrix Vaccine Age 49> (1 of 2) 10/30/2002    MEDICARE YEARLY EXAM  01/10/2019       Health Maintenance:  - I encouraged him to get his shingrix vaccine. Advance Care Planning (ACP) Provider Conversation Snapshot    Date of ACP Conversation: 05/13/19  Persons included in Conversation:  patient  Length of ACP Conversation in minutes:  <16 minutes (Non-Billable)    Authorized Decision Maker (if patient is incapable of making informed decisions):    This person is:   Healthcare Agent/Medical Power of  under Advance Directive     For Patients with Decision Making Capacity:   Values/Goals: Exploration of values, goals, and preferences if recovery is not expected, even with continued medical treatment in the event of:  Imminent death  Severe, permanent brain injury    Conversation Outcomes / Follow-Up Plan:   Reviewed existing Advance Directive . She has no changes to make to her advance directive - which we reviewed today during her apt.       Dr. Gertrudis Fang  [facility], [address] [place] [address], [address]  Phone: [phone]  Fax: [phone]

## 2019-05-13 NOTE — PROGRESS NOTES
Cristashad Padillabetty presents today for   Chief Complaint   Patient presents with    Follow-up     4 month f/u    Annual Wellness Visit              Depression Screening:  3 most recent PHQ Screens 1/10/2019   Little interest or pleasure in doing things Not at all   Feeling down, depressed, irritable, or hopeless Not at all   Total Score PHQ 2 0   Trouble falling or staying asleep, or sleeping too much -   Feeling tired or having little energy -   Poor appetite, weight loss, or overeating -   Feeling bad about yourself - or that you are a failure or have let yourself or your family down -   Trouble concentrating on things such as school, work, reading, or watching TV -   Moving or speaking so slowly that other people could have noticed; or the opposite being so fidgety that others notice -   Thoughts of being better off dead, or hurting yourself in some way -   PHQ 9 Score -   How difficult have these problems made it for you to do your work, take care of your home and get along with others -       Learning Assessment:  Learning Assessment 1/10/2019   PRIMARY LEARNER Patient   HIGHEST LEVEL OF EDUCATION - PRIMARY LEARNER  SOME COLLEGE   BARRIERS PRIMARY LEARNER NONE   CO-LEARNER CAREGIVER No   PRIMARY LANGUAGE ENGLISH   LEARNER PREFERENCE PRIMARY DEMONSTRATION   ANSWERED BY patient   RELATIONSHIP SELF       Abuse Screening:  Abuse Screening Questionnaire 1/10/2019   Do you ever feel afraid of your partner? N   Are you in a relationship with someone who physically or mentally threatens you? N   Is it safe for you to go home? Y       Fall Risk  Fall Risk Assessment, last 12 mths 1/24/2019   Able to walk? Yes   Fall in past 12 months? No           Coordination of Care:  1. Have you been to the ER, urgent care clinic since your last visit? Hospitalized since your last visit? no    2. Have you seen or consulted any other health care providers outside of the [address] [place] [address] since your last visit? Include any pap smears or colon screening. yes      Advance Directive:  1. Do you have an advance directive in place? Patient Reply:yes      2. Per patient no changes to their ACP contact no.

## 2019-05-13 NOTE — PATIENT INSTRUCTIONS
Medicare Wellness Visit, Female The best way to live healthy is to have a lifestyle where you eat a well-balanced diet, exercise regularly, limit alcohol use, and quit all forms of tobacco/nicotine, if applicable. Regular preventive services are another way to keep healthy. Preventive services (vaccines, screening tests, monitoring & exams) can help personalize your care plan, which helps you manage your own care. Screening tests can find health problems at the earliest stages, when they are easiest to treat. [facility] follows the current, evidence-based guidelines published by the [facility] ([facility]) when recommending preventive services for our patients. Because we follow these guidelines, sometimes recommendations change over time as research supports it. (For example, mammograms used to be recommended annually. Even though Medicare will still pay for an annual mammogram, the newer guidelines recommend a mammogram every two years for women of average risk.) Of course, you and your doctor may decide to screen more often for some diseases, based on your risk and your health status. Preventive services for you include: - Medicare offers their members a free annual wellness visit, which is time for you and your primary care provider to discuss and plan for your preventive service needs. Take advantage of this benefit every year! 
-All adults over the age of 72 should receive the recommended pneumonia vaccines. Current USPSTF guidelines recommend a series of two vaccines for the best pneumonia protection.  
-All adults should have a flu vaccine yearly and a tetanus vaccine every 10 years. All adults age 61 and older should receive a shingles vaccine once in their lifetime.   
-A bone mass density test is recommended when a woman turns 65 to screen for osteoporosis. This test is only recommended one time, as a screening. Some providers will use this same test as a disease monitoring tool if you already have osteoporosis. -All adults age 38-68 who are overweight should have a diabetes screening test once every three years.  
-Other screening tests and preventive services for persons with diabetes include: an eye exam to screen for diabetic retinopathy, a kidney function test, a foot exam, and stricter control over your cholesterol.  
-Cardiovascular screening for adults with routine risk involves an electrocardiogram (ECG) at intervals determined by your doctor.  
-Colorectal cancer screenings should be done for adults age 54-65 with no increased risk factors for colorectal cancer. There are a number of acceptable methods of screening for this type of cancer. Each test has its own benefits and drawbacks. Discuss with your doctor what is most appropriate for you during your annual wellness visit. The different tests include: colonoscopy (considered the best screening method), a fecal occult blood test, a fecal DNA test, and sigmoidoscopy. -Breast cancer screenings are recommended every other year for women of normal risk, age 54-69. 
-Cervical cancer screenings for women over age 72 are only recommended with certain risk factors.  
-All adults born between [facility] should be screened once for Hepatitis C. Here is a list of your current Health Maintenance items (your personalized list of preventive services) with a due date: 
Health Maintenance Due Topic Date Due  Shingles Vaccine (1 of 2) 10/30/2002 [address] [place] [address] Annual Well Visit  01/10/2019 Medicare Part B Preventive Services Limitations Recommendation Scheduled Bone Mass Measurement 
(age 72 & older, biennial) Requires diagnosis related to osteoporosis or estrogen deficiency.  Biennial benefit unless patient has history of long-term glucocorticoid tx or baseline is needed because initial test was by other method This should be done at age 72 and again if on osteoporosis medication at 2-3 year intervals. Up to date Cardiovascular Screening Blood Tests (every 5 years) Total cholesterol, HDL, Triglycerides Order as a panel if possible We should check your cholesterol panel at least once every 5 years. Up to date Colorectal Cancer Screening 
-Fecal occult blood test (annual) -Flexible sigmoidoscopy (5y) 
-Screening colonoscopy (10y) -Barium Enema  Due per your Gastroenterologist's recommendations. Up to date Counseling to Prevent Tobacco Use (up to 8 sessions per year) - Counseling greater than 3 and up to 10 minutes - Counseling greater than 10 minutes Patients must be asymptomatic of tobacco-related conditions to receive as preventive service Continue with smoking cessation Diabetes Screening Tests (at least every 3 years, Medicare covers annually or at 6-month intervals for prediabetic patients) Fasting blood sugar (FBS) or glucose tolerance test (GTT) Patient must be diagnosed with one of the following: 
-Hypertension, Dyslipidemia, obesity, previous impaired FBS or GTT 
Or any two of the following: overweight, FH of diabetes, age ? 72, history of gestational diabetes, birth of baby weighing more than 9 pounds Should be done yearly Up to date Diabetes Self-Management Training (DSMT) (no USPSTF recommendation) Requires referral by treating physician for patient with diabetes or renal disease. 10 hours of initial DSMT session of no less than 30 minutes each in a continuous 12-month period. 2 hours of follow-up DSMT in subsequent years. Not applicable Not applicable Glaucoma Screening (no USPSTF recommendation) Diabetes mellitus, family history, , age 48 or over,  American, age 72 or over Continue with annual eye exams. Up to date Human Immunodeficiency Virus (HIV) Screening (annually for increased risk patients) HIV-1 and HIV-2 by EIA, MORENO, rapid antibody test, or oral mucosa transudate Patient must be at increased risk for HIV infection per USPSTF guidelines or pregnant. Tests covered annually for patients at increased risk. Pregnant patients may receive up to 3 test during pregnancy. Not applicable Not applicable Medical Nutrition Therapy (MNT) (for diabetes or renal disease not recommended schedule) Requires referral by treating physician for patient with diabetes or renal disease. Can be provided in same year as diabetes self-management training (DSMT), and CMS recommends medical nutrition therapy take place after DSMT. Up to 3 hours for initial year and 2 hours in subsequent years. Not applicable Not applicable Shingles Vaccination A shingles vaccine is also recommended once in a lifetime after age 61 Vaccination recommended for shingles vaccination. Overdue Seasonal Influenza Vaccination (annually)  Continue with yearly \"flu\" shot annually Up to date Pneumococcal Vaccination (once after 65)  Please receive this vaccination at age 72. Up to date Hepatitis B Vaccinations (if medium/high risk) Medium/high risk factors:  End-stage renal disease, Hemophiliacs who received Factor VIII or IX concentrates, Clients of institutions for the mentally retarded, Persons who live in the same house as a HepB virus carrier, Homosexual men, Illicit injectable drug abusers. Not applicable Not applicable Screening Mammography (biennial age 54-69) Annually (age 36 or over) You need a mammogram yearly to screen for breast cancer. Up to date Screening Pap Tests and Pelvic Examination (up to age 79 and after 79 if unknown history or abnormal study last 10 years) Every 24 months except high risk You need no Pap smear at this time. Not warranted Ultrasound Screening for Abdominal Aortic Aneurysm (AAA) (once) Patient must be referred through IPPE and not have had a screening for abdominal aortic aneurysm before under Medicare. Limited to patients who meet one of the following criteria: 
- Men who are 73-68 years old and have smoked more than 100 cigarettes in their lifetime. 
-Anyone with a FH of AAA 
-Anyone recommended for screening by USPSTF Not applicable Not applicable Body Mass Index: Care Instructions Your Care Instructions Body mass index (BMI) can help you see if your weight is raising your risk for health problems. It uses a formula to compare how much you weigh with how tall you are. · A BMI lower than 18.5 is considered underweight. · A BMI between 18.5 and 24.9 is considered healthy. · A BMI between 25 and 29.9 is considered overweight. A BMI of 30 or higher is considered obese. If your BMI is in the normal range, it means that you have a lower risk for weight-related health problems. If your BMI is in the overweight or obese range, you may be at increased risk for weight-related health problems, such as high blood pressure, heart disease, stroke, arthritis or joint pain, and diabetes. If your BMI is in the underweight range, you may be at increased risk for health problems such as fatigue, lower protection (immunity) against illness, muscle loss, bone loss, hair loss, and hormone problems. BMI is just one measure of your risk for weight-related health problems. You may be at higher risk for health problems if you are not active, you eat an unhealthy diet, or you drink too much alcohol or use tobacco products. Follow-up care is a key part of your treatment and safety. Be sure to make and go to all appointments, and call your doctor if you are having problems. It's also a good idea to know your test results and keep a list of the medicines you take. How can you care for yourself at home? · Practice healthy eating habits. This includes eating plenty of fruits, vegetables, whole grains, lean protein, and low-fat dairy. · If your doctor recommends it, get more exercise. Walking is a good choice. Bit by bit, increase the amount you walk every day. Try for at least 30 minutes on most days of the week. · Do not smoke. Smoking can increase your risk for health problems. If you need help quitting, talk to your doctor about stop-smoking programs and medicines. These can increase your chances of quitting for good. · Limit alcohol to 2 drinks a day for men and 1 drink a day for women. Too much alcohol can cause health problems. If you have a BMI higher than 25 · Your doctor may do other tests to check your risk for weight-related health problems. This may include measuring the distance around your waist. A waist measurement of more than 40 inches in men or 35 inches in women can increase the risk of weight-related health problems. · Talk with your doctor about steps you can take to stay healthy or improve your health. You may need to make lifestyle changes to lose weight and stay healthy, such as changing your diet and getting regular exercise. If you have a BMI lower than 18.5 · Your doctor may do other tests to check your risk for health problems. · Talk with your doctor about steps you can take to stay healthy or improve your health. You may need to make lifestyle changes to gain or maintain weight and stay healthy, such as getting more healthy foods in your diet and doing exercises to build muscle. Where can you learn more? Go to http://miguel-mamta.info/. Enter S176 in the search box to learn more about \"Body Mass Index: Care Instructions. \" Current as of: June 25, 2018 Content Version: 11.9 © 3155-2822 [employer]. Care instructions adapted under license by [employer] (which disclaims liability or warranty for this information).  If you have questions about a medical condition or this instruction, always ask your healthcare professional. [employer] disclaims any warranty or liability for your use of this information. Medicare Wellness Visit, Female The best way to live healthy is to have a lifestyle where you eat a well-balanced diet, exercise regularly, limit alcohol use, and quit all forms of tobacco/nicotine, if applicable. Regular preventive services are another way to keep healthy. Preventive services (vaccines, screening tests, monitoring & exams) can help personalize your care plan, which helps you manage your own care. Screening tests can find health problems at the earliest stages, when they are easiest to treat. [facility] follows the current, evidence-based guidelines published by the [facility] (USPSTF) when recommending preventive services for our patients. Because we follow these guidelines, sometimes recommendations change over time as research supports it. (For example, mammograms used to be recommended annually. Even though Medicare will still pay for an annual mammogram, the newer guidelines recommend a mammogram every two years for women of average risk.) Of course, you and your doctor may decide to screen more often for some diseases, based on your risk and your health status. Preventive services for you include: - Medicare offers their members a free annual wellness visit, which is time for you and your primary care provider to discuss and plan for your preventive service needs. Take advantage of this benefit every year! 
-All adults over the age of 72 should receive the recommended pneumonia vaccines. Current USPSTF guidelines recommend a series of two vaccines for the best pneumonia protection.  
-All adults should have a flu vaccine yearly and a tetanus vaccine every 10 years.  All adults age 61 and older should receive a shingles vaccine once in their lifetime.   
-A bone mass density test is recommended when a woman turns 65 to screen for osteoporosis. This test is only recommended one time, as a screening. Some providers will use this same test as a disease monitoring tool if you already have osteoporosis. -All adults age 38-68 who are overweight should have a diabetes screening test once every three years.  
-Other screening tests and preventive services for persons with diabetes include: an eye exam to screen for diabetic retinopathy, a kidney function test, a foot exam, and stricter control over your cholesterol.  
-Cardiovascular screening for adults with routine risk involves an electrocardiogram (ECG) at intervals determined by your doctor.  
-Colorectal cancer screenings should be done for adults age 54-65 with no increased risk factors for colorectal cancer. There are a number of acceptable methods of screening for this type of cancer. Each test has its own benefits and drawbacks. Discuss with your doctor what is most appropriate for you during your annual wellness visit. The different tests include: colonoscopy (considered the best screening method), a fecal occult blood test, a fecal DNA test, and sigmoidoscopy. -Breast cancer screenings are recommended every other year for women of normal risk, age 54-69. 
-Cervical cancer screenings for women over age 72 are only recommended with certain risk factors.  
-All adults born between [facility] should be screened once for Hepatitis C. Here is a list of your current Health Maintenance items (your personalized list of preventive services) with a due date: 
Health Maintenance Due Topic Date Due  Shingles Vaccine (1 of 2) 10/30/2002 Gil Perla Annual Well Visit  01/10/2019

## 2019-05-14 ENCOUNTER — DOCUMENTATION ONLY (OUTPATIENT)
Dept: INTERNAL MEDICINE CLINIC | Age: 69
End: 2019-05-14

## 2019-05-14 NOTE — PROGRESS NOTES
PA submitted to [employer] for Kathy Andrews for patient on 5/14/19    Phyllistine He, PharmD, BCPS, BCACP, BC-ADM

## 2019-05-15 ENCOUNTER — DOCUMENTATION ONLY (OUTPATIENT)
Dept: INTERNAL MEDICINE CLINIC | Age: 69
End: 2019-05-15

## 2019-05-15 DIAGNOSIS — K58.0 IRRITABLE BOWEL SYNDROME WITH DIARRHEA: ICD-10-CM

## 2019-05-15 NOTE — TELEPHONE ENCOUNTER
PCP: Levi Stein MD    Last appt: 5/13/2019  Future Appointments   Date Time Provider Leanna Pack   11/13/2019  8:00 AM Levi Stein MD [address]       Requested Prescriptions     Pending Prescriptions Disp Refills    eluxadoline (VIBERZI) 75 mg tablet 60 Tab 5     Sig: TAKE ONE TABLET BY MOUTH TWICE A DAY WITH MEALS

## 2019-05-15 NOTE — PROGRESS NOTES
Prior [employer] approval for [employer]. Approval dates from 2/13/19-5/13/20. Approval faxed to pharmacy.

## 2019-09-30 ENCOUNTER — PATIENT MESSAGE (OUTPATIENT)
Dept: INTERNAL MEDICINE CLINIC | Age: 69
End: 2019-09-30

## 2019-10-02 ENCOUNTER — OFFICE VISIT (OUTPATIENT)
Dept: INTERNAL MEDICINE CLINIC | Age: 69
End: 2019-10-02

## 2019-10-02 VITALS
HEIGHT: 66 IN | OXYGEN SATURATION: 97 % | DIASTOLIC BLOOD PRESSURE: 53 MMHG | SYSTOLIC BLOOD PRESSURE: 124 MMHG | HEART RATE: 60 BPM | WEIGHT: 283.6 LBS | TEMPERATURE: 96.8 F | RESPIRATION RATE: 20 BRPM | BODY MASS INDEX: 45.58 KG/M2

## 2019-10-02 DIAGNOSIS — M70.61 TROCHANTERIC BURSITIS OF BOTH HIPS: Primary | ICD-10-CM

## 2019-10-02 DIAGNOSIS — E66.01 OBESITY, MORBID, BMI 40.0-49.9 (HCC): ICD-10-CM

## 2019-10-02 DIAGNOSIS — M70.62 TROCHANTERIC BURSITIS OF BOTH HIPS: Primary | ICD-10-CM

## 2019-10-02 RX ORDER — PREDNISONE 20 MG/1
40 TABLET ORAL
Qty: 10 TAB | Refills: 0 | Status: SHIPPED | OUTPATIENT
Start: 2019-10-02 | End: 2019-11-07

## 2019-10-02 NOTE — PATIENT INSTRUCTIONS
Body Mass Index: Care Instructions Your Care Instructions Body mass index (BMI) can help you see if your weight is raising your risk for health problems. It uses a formula to compare how much you weigh with how tall you are. · A BMI lower than 18.5 is considered underweight. · A BMI between 18.5 and 24.9 is considered healthy. · A BMI between 25 and 29.9 is considered overweight. A BMI of 30 or higher is considered obese. If your BMI is in the normal range, it means that you have a lower risk for weight-related health problems. If your BMI is in the overweight or obese range, you may be at increased risk for weight-related health problems, such as high blood pressure, heart disease, stroke, arthritis or joint pain, and diabetes. If your BMI is in the underweight range, you may be at increased risk for health problems such as fatigue, lower protection (immunity) against illness, muscle loss, bone loss, hair loss, and hormone problems. BMI is just one measure of your risk for weight-related health problems. You may be at higher risk for health problems if you are not active, you eat an unhealthy diet, or you drink too much alcohol or use tobacco products. Follow-up care is a key part of your treatment and safety. Be sure to make and go to all appointments, and call your doctor if you are having problems. It's also a good idea to know your test results and keep a list of the medicines you take. How can you care for yourself at home? · Practice healthy eating habits. This includes eating plenty of fruits, vegetables, whole grains, lean protein, and low-fat dairy. · If your doctor recommends it, get more exercise. Walking is a good choice. Bit by bit, increase the amount you walk every day. Try for at least 30 minutes on most days of the week. · Do not smoke. Smoking can increase your risk for health problems.  If you need help quitting, talk to your doctor about stop-smoking programs and medicines. These can increase your chances of quitting for good. · Limit alcohol to 2 drinks a day for men and 1 drink a day for women. Too much alcohol can cause health problems. If you have a BMI higher than 25 · Your doctor may do other tests to check your risk for weight-related health problems. This may include measuring the distance around your waist. A waist measurement of more than 40 inches in men or 35 inches in women can increase the risk of weight-related health problems. · Talk with your doctor about steps you can take to stay healthy or improve your health. You may need to make lifestyle changes to lose weight and stay healthy, such as changing your diet and getting regular exercise. If you have a BMI lower than 18.5 · Your doctor may do other tests to check your risk for health problems. · Talk with your doctor about steps you can take to stay healthy or improve your health. You may need to make lifestyle changes to gain or maintain weight and stay healthy, such as getting more healthy foods in your diet and doing exercises to build muscle. Where can you learn more? Go to http://miguel-mamta.info/. Enter S176 in the search box to learn more about \"Body Mass Index: Care Instructions. \" Current as of: March 28, 2019 Content Version: 12.2 © 0410-2518 [employer]. Care instructions adapted under license by [employer] (which disclaims liability or warranty for this information). If you have questions about a medical condition or this instruction, always ask your healthcare professional. [place] [address] any warranty or liability for your use of this information. Hip Bursitis: Care Instructions Your Care Instructions Bursitis is inflammation of the bursa. A bursa is a small sac of fluid that cushions a joint and helps it move easily.  A bursa sits between a bone in the hip and the muscles and tendons in the thigh and buttock. Injury or overuse of the hip can cause bursitis. Activities that can lead to bursitis include twisting and rapid joint movement. Bursitis can cause hip pain. Bursitis usually gets better if you avoid the activity that caused it. If pain lasts or gets worse despite home treatment, your doctor may draw fluid from the bursa through a needle. This may relieve your pain and help your doctor know if you have an infection. If so, your doctor will prescribe antibiotics. If you have inflammation only, you may get a corticosteroid shot to reduce swelling and pain. Sometimes surgery is needed to drain or remove the bursa. Follow-up care is a key part of your treatment and safety. Be sure to make and go to all appointments, and call your doctor if you are having problems. It's also a good idea to know your test results and keep a list of the medicines you take. How can you care for yourself at home? · Put ice or a cold pack on your hip for 10 to 20 minutes at a time. Put a thin cloth between the ice and your skin. · After 3 days of using ice, you may use heat on your hip. You can use a hot water bottle, a heating pad set on low, or a warm, moist towel. · Rest your hip. Stop any activities that cause pain. Switch to activities that do not stress your hip. · Take your medicines exactly as prescribed. Call your doctor if you think you are having a problem with your medicine. · Ask your doctor if you can take an over-the-counter pain medicine, such as acetaminophen (Tylenol), ibuprofen (Advil, Motrin), or naproxen (Aleve). Be safe with medicines. Read and follow all instructions on the label. · To prevent stiffness, gently move the hip joint as much as you can without pain every day. As the pain gets better, keep doing range-of-motion exercises. Ask your doctor for exercises that will make the muscles around the hip joint stronger. Do these as directed. · You can slowly return to the activity that caused the pain, but do it with less effort until you can do it without pain or swelling. Be sure to warm up before and stretch after you do the activity. When should you call for help? Call your doctor now or seek immediate medical care if: 
  · You have a fever.  
  · You have increased swelling or redness in your hip.  
  · You cannot use your hip, or the pain in your hip gets worse.  
 Watch closely for changes in your health, and be sure to contact your doctor if: 
  · You have pain for 2 weeks or longer despite home treatment. Where can you learn more? Go to http://miguel-mamta.info/. Enter I629 in the search box to learn more about \"Hip Bursitis: Care Instructions. \" Current as of: June 26, 2019 Content Version: 12.2 © 1209-0372 [employer]. Care instructions adapted under license by [employer] (which disclaims liability or warranty for this information). If you have questions about a medical condition or this instruction, always ask your healthcare professional. [place] [address] any warranty or liability for your use of this information. Trochanteric Bursitis: Exercises Introduction Here are some examples of exercises for you to try. The exercises may be suggested for a condition or for rehabilitation. Start each exercise slowly. Ease off the exercises if you start to have pain. You will be told when to start these exercises and which ones will work best for you. How to do the exercises Hamstring wall stretch 1. Lie on your back in a doorway, with your good leg through the open door. 2. Slide your affected leg up the wall to straighten your knee. You should feel a gentle stretch down the back of your leg. 1. Do not arch your back. 2. Do not bend either knee. 3. Keep one heel touching the floor and the other heel touching the wall. Do not point your toes. 3. Hold the stretch for at least 1 minute to begin. Then try to lengthen the time you hold the stretch to as long as 6 minutes. 4. Repeat 2 to 4 times. 5. If you do not have a place to do this exercise in a doorway, there is another way to do it: 6. Lie on your back, and bend the knee of your affected leg. 7. Loop a towel under the ball and toes of that foot, and hold the ends of the towel in your hands. 8. Straighten your knee, and slowly pull back on the towel. You should feel a gentle stretch down the back of your leg. 9. Hold the stretch for 15 to 30 seconds. Or even better, hold the stretch for 1 minute if you can. 10. Repeat 2 to 4 times. Straight-leg raises to the outside 1. Lie on your side, with your affected leg on top. 2. Tighten the front thigh muscles of your top leg to keep your knee straight. 3. Keep your hip and your leg straight in line with the rest of your body, and keep your knee pointing forward. Do not drop your hip back. 4. Lift your top leg straight up toward the ceiling, about 12 inches off the floor. Hold for about 6 seconds, then slowly lower your leg. 5. Repeat 8 to 12 times. Clamshell 1. Lie on your side, with your affected leg on top and your head propped on a pillow. Keep your feet and knees together and your knees bent. 2. Raise your top knee, but keep your feet together. Do not let your hips roll back. Your legs should open up like a clamshell. 3. Hold for 6 seconds. 4. Slowly lower your knee back down. Rest for 10 seconds. 5. Repeat 8 to 12 times. Standing quadriceps stretch 1. If you are not steady on your feet, hold on to a chair, counter, or wall. You can also lie on your stomach or your side to do this exercise. 2. Bend the knee of the leg you want to stretch, and reach behind you to grab the front of your foot or ankle with the hand on the same side. For example, if you are stretching your right leg, use your right hand. 3. Keeping your knees next to each other, pull your foot toward your buttock until you feel a gentle stretch across the front of your hip and down the front of your thigh. Your knee should be pointed directly to the ground, and not out to the side. 4. Hold the stretch for 15 to 30 seconds. 5. Repeat 2 to 4 times. Piriformis stretch 1. Lie on your back with your legs straight. 2. Lift your affected leg and bend your knee. With your opposite hand, reach across your body, and then gently pull your knee toward your opposite shoulder. 3. Hold the stretch for 15 to 30 seconds. 4. Repeat 2 to 4 times. Double knee-to-chest 
 
1. Lie on your back with your knees bent and your feet flat on the floor. You can put a small pillow under your head and neck if it is more comfortable. 2. Bring both knees to your chest. 
3. Keep your lower back pressed to the floor. Hold for 15 to 30 seconds. 4. Relax, and lower your knees to the starting position. 5. Repeat 2 to 4 times. Follow-up care is a key part of your treatment and safety. Be sure to make and go to all appointments, and call your doctor if you are having problems. It's also a good idea to know your test results and keep a list of the medicines you take. Where can you learn more? Go to http://miguel-mamta.info/. Enter X134 in the search box to learn more about \"Trochanteric Bursitis: Exercises. \" Current as of: June 26, 2019 Content Version: 12.2 © 5636-3305 [employer]. Care instructions adapted under license by [employer] (which disclaims liability or warranty for this information). If you have questions about a medical condition or this instruction, always ask your healthcare professional. [address] any warranty or liability for your use of this information.

## 2019-10-02 NOTE — PROGRESS NOTES
Chief Complaint   Patient presents with    Hip Pain     since June 2019 Bilateral Hip     Patient has been seeing the Chiropractor mid-August 2019, however the Bilateral Hip Pain is getting worse. 1. Have you been to the ER, urgent care clinic since your last visit? Hospitalized since your last visit? No    2. Have you seen or consulted any other health care providers outside of the [address] [place] [address] since your last visit? Include any pap smears or colon screening. No    Patient was given a copy of the Advanced Directive and understands to bring it in once completed. Health Maintenance Due   Topic Date Due    COLONOSCOPY  06/23/2018     NOTE: patient has already received both Pneumococcal 13, and Pneumococcal 23 Vaccines at her local pharmacy, the patient will have her pharmacy send us the dates and vaccine information to our office to update her Immunization Record.

## 2019-10-02 NOTE — PROGRESS NOTES
INTERNISTS OF [facility]:  10/2/2019, MRN: [id number]      Mckayla Elmore is a 79 y.o. female and presents to clinic for Hip Pain (since June 2019 Bilateral Hip)    Subjective: The patient is a 45-year-old female with history of obesity, allergic rhinitis, IBS-diarrhea, onychomycosis, prediabetes, depression, hypertension, adrenal incidentaloma seen on CT scan 2015, and history of cerebral infarction her head MRI findings (followed by Neurology). Bilateral Hip Pain: Sx have been present since June. Quality of pain: sharp. She initially had improvement in pain sx with rx from a chiropractor but pain has worsened. No h/o trauma. No back pain. She has chronic numbness in her foot but not BLE. No weakness. No falls. +Taking aleve for sx relief but it's not working at all to control her pain. Standing/walking worsens her sx. She is not regularly exercising or dieting. Her weight has been increasing. Wt Readings from Last 3 Encounters:   10/02/19 283 lb 9.6 oz (128.6 kg)   05/13/19 279 lb (126.6 kg)   01/24/19 277 lb (125.6 kg)         Patient Active Problem List    Diagnosis Date Noted    Controlled substance agreement signed 7/7/17 07/09/2017    Obesity, morbid, BMI 40.0-49.9  12/06/2016    Allergic rhinitis 12/06/2016    Irritable bowel syndrome with diarrhea 12/06/2016    Onychomycosis 12/06/2016    Glucose intolerance (impaired glucose tolerance) 12/01/2016    Single current episode of major depressive disorder (has tried celexa, wellbutrin, mirtazapine with adverse effects) 12/01/2016    Essential hypertension 12/01/2016    Adrenal incidentaloma (1.3 cm and left sided) - per CT scan from 2015 12/01/2016    History of cerebral infarction - per head MRI findings 12/01/2016       Current Outpatient Medications   Medication Sig Dispense Refill    atenolol-chlorthalidone (TENORETIC) 100-25 mg per tablet Take 1 Tab by mouth daily.  90 Tab 3    rosuvastatin (CRESTOR) 10 mg tablet TAKE 1 TABLET BY MOUTH NIGHTLY 90 Tab 2    gabapentin (NEURONTIN) 300 mg capsule Take 300 mg by mouth nightly.  fluticasone (FLONASE) 50 mcg/actuation nasal spray 2 Sprays by Both Nostrils route daily. 1 Bottle 11    escitalopram oxalate (LEXAPRO) 10 mg tablet Take 10 mg by mouth daily.  Cholecalciferol, Vitamin D3, (VITAMIN D3) 1,000 unit cap Take 1,000 Units by mouth daily.  aspirin-dipyridamole (AGGRENOX)  mg per SR capsule Take 1 Cap by mouth two (2) times a day. Allergies   Allergen Reactions    Amoxicillin-Pot Clavulanate Diarrhea    Azithromycin Diarrhea    Seroquel [Quetiapine] Other (comments)     Tremors    Sulfa (Sulfonamide Antibiotics) Diarrhea    Wellbutrin [Bupropion Hcl] Itching       Past Medical History:   Diagnosis Date    Hypercholesterolemia     Hypertension     Stroke [facility])        Past Surgical History:   Procedure Laterality Date    HX APPENDECTOMY  09/09/2015    HX CHOLECYSTECTOMY      HX GYN      partial hysterectomy       Family History   Problem Relation Age of Onset    Dementia Mother     Stroke Father     Cancer Brother     Hypertension Brother        Social History     Tobacco Use    Smoking status: Never Smoker    Smokeless tobacco: Never Used   Substance Use Topics    Alcohol use: No       ROS   Review of Systems   Constitutional: Negative for chills and fever. HENT: Negative for ear pain and sore throat. Eyes: Negative for blurred vision and pain. Respiratory: Negative for cough and shortness of breath. Cardiovascular: Negative for chest pain. Gastrointestinal: Negative for abdominal pain, blood in stool and melena. Genitourinary: Negative for dysuria and hematuria. Musculoskeletal: Positive for joint pain. Negative for myalgias. Skin: Negative for rash. Neurological: Positive for tingling. Negative for focal weakness and headaches. Endo/Heme/Allergies: Does not bruise/bleed easily.    Psychiatric/Behavioral: Negative for substance abuse.       Objective     Vitals:    10/02/19 1403   BP: 124/53   Pulse: 60   Resp: 20   Temp: 96.8 °F (36 °C)   TempSrc: Oral   SpO2: 97%   Weight: 283 lb 9.6 oz (128.6 kg)   Height: 5' 6\" (1.676 m)   PainSc:   8   PainLoc: Hip       Physical Exam   Constitutional: She is oriented to person, place, and time and well-developed, well-nourished, and in no distress. HENT:   Head: Normocephalic and atraumatic. Right Ear: External ear normal.   Left Ear: External ear normal.   Nose: Nose normal.   Mouth/Throat: Oropharynx is clear and moist. No oropharyngeal exudate. Eyes: Conjunctivae and EOM are normal. Right eye exhibits no discharge. Left eye exhibits no discharge. No scleral icterus. Neck: Neck supple. Cardiovascular: Normal rate, regular rhythm, normal heart sounds and intact distal pulses. Exam reveals no gallop and no friction rub. No murmur heard. Pulmonary/Chest: Effort normal and breath sounds normal. No respiratory distress. She has no wheezes. She has no rales. Abdominal: Soft. Bowel sounds are normal. She exhibits no distension. There is no tenderness. There is no rebound and no guarding. Musculoskeletal: She exhibits no edema or tenderness (BUE). She is tender to palpation along her trochanteric bursa   Lymphadenopathy:     She has no cervical adenopathy. Neurological: She is alert and oriented to person, place, and time. She exhibits normal muscle tone. Gait normal.   Skin: Skin is warm and dry. No erythema. Psychiatric: Affect normal.   Nursing note and vitals reviewed.       LABS   Data Review:   Lab Results   Component Value Date/Time    WBC 7.1 01/10/2019 08:55 AM    HGB 13.6 01/10/2019 08:55 AM    HCT 44.6 01/10/2019 08:55 AM    PLATELET 576 45/75/6773 08:55 AM    MCV 99.6 (H) 01/10/2019 08:55 AM       Lab Results   Component Value Date/Time    Sodium 140 01/10/2019 08:55 AM    Potassium 3.3 (L) 01/10/2019 08:55 AM    Chloride 101 01/10/2019 08:55 AM    CO2 31 01/10/2019 08:55 AM    Anion gap 8 01/10/2019 08:55 AM    Glucose 95 01/10/2019 08:55 AM    BUN 19 (H) 01/10/2019 08:55 AM    Creatinine 0.83 01/10/2019 08:55 AM    BUN/Creatinine ratio 23 (H) 01/10/2019 08:55 AM    GFR est AA >60 01/10/2019 08:55 AM    GFR est non-AA >60 01/10/2019 08:55 AM    Calcium 9.3 01/10/2019 08:55 AM       Lab Results   Component Value Date/Time    Cholesterol, total 140 01/10/2019 08:55 AM    HDL Cholesterol 57 01/10/2019 08:55 AM    LDL, calculated 59.8 01/10/2019 08:55 AM    VLDL, calculated 23.2 01/10/2019 08:55 AM    Triglyceride 116 01/10/2019 08:55 AM    CHOL/HDL Ratio 2.5 01/10/2019 08:55 AM       Lab Results   Component Value Date/Time    Hemoglobin A1c 6.1 (H) 01/10/2019 08:55 AM    Hemoglobin A1c, External 5.8 09/08/2015       Assessment/Plan:   Trochanteric bursitis of both hips:   - We will try a course of steroids to see if this helps improve her symptoms. -Activity as tolerated. -I encouraged her to do stretching exercises. She was given a handout on such exercises. - She declined a referral to PT and Orthopedics at this time will let me know if she changes her mind at her follow-up appointment.  -I encouraged her to reduce her weight by limiting her processed food intake and caloric intake. I will recheck her weight at her follow-up appoint. ORDERS:  - predniSONE (DELTASONE) 20 mg tablet; Take 40 mg by mouth daily (with breakfast). Dispense: 10 Tab; Refill: 0      Health Maintenance Due   Topic Date Due    COLONOSCOPY  06/23/2018     Lab review: labs are reviewed in the EHR    I have discussed the diagnosis with the patient and the intended plan as seen in the above orders. The patient has received an after-visit summary and questions were answered concerning future plans. I have discussed medication side effects and warnings with the patient as well. I have reviewed the plan of care with the patient, accepted their input and they are in agreement with the treatment goals.  All questions were answered. The patient understands the plan of care. Handouts provided today with above information. Pt instructed if symptoms worsen to call the office or report to the ED for continued care. Greater than 50% of the visit time was spent in counseling and/or coordination of care. Voice recognition was used to generate this report, which may have resulted in some phonetic based errors in grammar and contents. Even though attempts were made to correct all the mistakes, some may have been missed, and remained in the body of the document.           Yo Colby MD

## 2019-10-07 ENCOUNTER — TELEPHONE (OUTPATIENT)
Dept: INTERNAL MEDICINE CLINIC | Age: 69
End: 2019-10-07

## 2019-10-07 DIAGNOSIS — M70.61 TROCHANTERIC BURSITIS OF BOTH HIPS: Primary | ICD-10-CM

## 2019-10-07 DIAGNOSIS — M70.62 TROCHANTERIC BURSITIS OF BOTH HIPS: Primary | ICD-10-CM

## 2019-10-07 NOTE — TELEPHONE ENCOUNTER
----- Message from Truchris Seo sent at 10/7/2019  2:09 PM EDT -----  Regarding: RE:[facility] After Visit Follow-Up Message. Contact: [phone]  I have finished the Predinoze with no relief. Please schedule me with an orthopedist like we discussed. Thank you. Fernando Whitehead  ----- Message -----  From: Nayan Miranda MD  Sent: 10/5/2019 12:00 AM EDT  To: Yessenia Pugh  Subject: [facility] After Visit Follow-Up Message. [place] Ms. Seo,    Thank you for your recent visit to [address] [place] [address]. Your health is important to us and we are privileged to be your healthcare provider and partner. If you have follow-up questions regarding your treatment plan from your recent visit, please contact us through the [facility] Patient Portal by replying to this message. In the near future, you may receive a survey about your experience with us. We hope you will take the time to let us know how we did so we can continue to improve the care you receive.       Thank you,    Internist [facility]

## 2019-10-07 NOTE — TELEPHONE ENCOUNTER
----- Message from Ro Seo sent at 10/7/2019  2:09 PM EDT -----  Regarding: RE:[facility] After Visit Follow-Up Message. Contact: [phone]  I have finished the Predinoze with no relief. Please schedule me with an orthopedist like we discussed. Thank you. Stephani Vences  ----- Message -----  From: Jason Moreau MD  Sent: 10/5/2019 12:00 AM EDT  To: Suma Godwin  Subject: [facility] After Visit Follow-Up Message. [place] Ms. Seo,    Thank you for your recent visit to [address] [address] [address]. Your health is important to us and we are privileged to be your healthcare provider and partner. If you have follow-up questions regarding your treatment plan from your recent visit, please contact us through the [facility] Patient Portal by replying to this message. In the near future, you may receive a survey about your experience with us. We hope you will take the time to let us know how we did so we can continue to improve the care you receive.       Thank you,    Internist [facility]

## 2019-10-25 ENCOUNTER — OFFICE VISIT (OUTPATIENT)
Dept: ORTHOPEDIC SURGERY | Age: 69
End: 2019-10-25

## 2019-10-25 VITALS
BODY MASS INDEX: 45.38 KG/M2 | OXYGEN SATURATION: 94 % | SYSTOLIC BLOOD PRESSURE: 106 MMHG | HEART RATE: 60 BPM | RESPIRATION RATE: 14 BRPM | WEIGHT: 282.4 LBS | HEIGHT: 66 IN | TEMPERATURE: 96.8 F | DIASTOLIC BLOOD PRESSURE: 54 MMHG

## 2019-10-25 DIAGNOSIS — M70.61 TROCHANTERIC BURSITIS OF BOTH HIPS: Primary | ICD-10-CM

## 2019-10-25 DIAGNOSIS — M70.62 TROCHANTERIC BURSITIS OF BOTH HIPS: Primary | ICD-10-CM

## 2019-10-25 DIAGNOSIS — M25.551 BILATERAL HIP PAIN: ICD-10-CM

## 2019-10-25 DIAGNOSIS — M54.50 LOW BACK PAIN, UNSPECIFIED BACK PAIN LATERALITY, UNSPECIFIED CHRONICITY, UNSPECIFIED WHETHER SCIATICA PRESENT: ICD-10-CM

## 2019-10-25 DIAGNOSIS — M25.552 BILATERAL HIP PAIN: ICD-10-CM

## 2019-10-25 RX ORDER — BETAMETHASONE SODIUM PHOSPHATE AND BETAMETHASONE ACETATE 3; 3 MG/ML; MG/ML
6 INJECTION, SUSPENSION INTRA-ARTICULAR; INTRALESIONAL; INTRAMUSCULAR; SOFT TISSUE ONCE
Qty: 1 ML | Refills: 0
Start: 2019-10-25 | End: 2019-10-25

## 2019-10-25 NOTE — PROGRESS NOTES
1. Have you been to the ER, urgent care clinic since your last visit? Hospitalized since your last visit? No    2. Have you seen or consulted any other health care providers outside of the [address] [place] [address] since your last visit? Include any pap smears or colon screening.  No

## 2019-10-25 NOTE — PROGRESS NOTES
[address] [facility], [address] [facility]  [phone]           Patient: Emery Snow                MRN: [id number]       SSN: [id number]  YOB: 1950        AGE: 79 y.o. SEX: female  Body mass index is 45.58 kg/m². PCP: Michael Walker MD  10/25/19      This office note has been dictated. REVIEW OF SYSTEMS:  Constitutional: Negative for fever, chills, weight loss and malaise/fatigue. HENT: Negative. Eyes: Negative. Respiratory: Negative. Cardiovascular: Negative. Gastrointestinal: No bowel incontinence or constipation. Genitourinary: No bladder incontinence or saddle anesthesia. Skin: Negative. Neurological: Negative. Endo/Heme/Allergies: Negative. Psychiatric/Behavioral: Negative. Musculoskeletal: As per HPI above. Past Medical History:   Diagnosis Date    Hypercholesterolemia     Hypertension     Stroke [facility])          Current Outpatient Medications:     atenolol-chlorthalidone (TENORETIC) 100-25 mg per tablet, Take 1 Tab by mouth daily. , Disp: 90 Tab, Rfl: 3    rosuvastatin (CRESTOR) 10 mg tablet, TAKE 1 TABLET BY MOUTH NIGHTLY, Disp: 90 Tab, Rfl: 2    gabapentin (NEURONTIN) 300 mg capsule, Take 300 mg by mouth nightly., Disp: , Rfl:     fluticasone (FLONASE) 50 mcg/actuation nasal spray, 2 Sprays by Both Nostrils route daily. , Disp: 1 Bottle, Rfl: 11    escitalopram oxalate (LEXAPRO) 10 mg tablet, Take 10 mg by mouth daily. , Disp: , Rfl:     Cholecalciferol, Vitamin D3, (VITAMIN D3) 1,000 unit cap, Take 1,000 Units by mouth daily. , Disp: , Rfl:     aspirin-dipyridamole (AGGRENOX)  mg per SR capsule, Take 1 Cap by mouth two (2) times a day., Disp: , Rfl:     predniSONE (DELTASONE) 20 mg tablet, Take 40 mg by mouth daily (with breakfast). , Disp: 10 Tab, Rfl: 0    Allergies   Allergen Reactions    Amoxicillin-Pot Clavulanate Diarrhea    Azithromycin Diarrhea    Seroquel [Quetiapine] Other (comments)     Tremors    Sulfa (Sulfonamide Antibiotics) Diarrhea    Wellbutrin [Bupropion Hcl] Itching       Social History     Socioeconomic History    Marital status:      Spouse name: Not on file    Number of children: Not on file    Years of education: Not on file    Highest education level: Not on file   Occupational History    Not on file   Social Needs    Financial resource strain: Not on file    Food insecurity:     Worry: Not on file     Inability: Not on file    Transportation needs:     Medical: Not on file     Non-medical: Not on file   Tobacco Use    Smoking status: Never Smoker    Smokeless tobacco: Never Used   Substance and Sexual Activity    Alcohol use: No    Drug use: No    Sexual activity: Not on file   Lifestyle    Physical activity:     Days per week: Not on file     Minutes per session: Not on file    Stress: Not on file   Relationships    Social connections:     Talks on phone: Not on file     Gets together: Not on file     Attends Mandaeism service: Not on file     Active member of club or organization: Not on file     Attends meetings of clubs or organizations: Not on file     Relationship status: Not on file    Intimate partner violence:     Fear of current or ex partner: Not on file     Emotionally abused: Not on file     Physically abused: Not on file     Forced sexual activity: Not on file   Other Topics Concern    Not on file   Social History Narrative    Not on file       Past Surgical History:   Procedure Laterality Date    HX APPENDECTOMY  09/09/2015    HX CHOLECYSTECTOMY      HX GYN      partial hysterectomy           * Patient was identified by name and date of birth   * Agreement on procedure being performed was verified  * Risks and Benefits explained to the patient  * Procedure site verified and marked as necessary  * Patient was positioned for comfort  * Consent was signed and verified  4:09 PM    The patient was instructed on post injection care. We did see Ms. Jessica Tabor today in the office for evaluation and opinion and advice regarding low back pain and bilateral hip pain. The patient states this problem has been going on since June without injury. She is getting stabbing pains in her low back, as well as into the sides of her hips worse on the left side. She has pain in the posterior and lateral sides. She has no numbness or tingling down the lower extremities. She has been to a chiropractor in the past, which has not helped her. She has also taken a Prednisone pack by her PCP without help. She does report a history of some mini strokes. She does take Aggrenox. She takes Tylenol for pain. There has been no recent fevers or chills or injuries to report. PHYSICAL EXAMINATION:  In general, the patient is alert and oriented x 3 in no acute distress. The patient is well-developed, well-nourished, with a normal affect. The patient is afebrile. HEENT:  Head is normocephalic and atraumatic. Pupils are equally round and reactive to light and accommodation. Extraocular eye movements are intact. Neck is supple. Trachea is midline. No JVD is present. Breathing is nonlabored. Examination of the back reveals the skin is intact. There is no ecchymosis and no warmth. There are no signs for infection or cellulitis present. He does have some discomfort to the lower lumbar spine with paraspinal muscle spasms present. The pelvis is stable with no pain to palpation to either hip. He does have pain to palpation to the trochanteric bursitis bilaterally. There is negative straight leg raise. There is negative calf tenderness. There is negative Johnathan's. There is no evidence of DVT present. RADIOGRAPHS:  Radiographs in the office today, including AP of the pelvis and AP and lateral of the lumbar spine, show multilevel degenerative changes worse at L4-5 and L5-S1 with bone to bone degenerative changes.       AP of the pelvis shows no acute bony abnormalities without significant osteoarthritic change to the hips. ASSESSMENT:      1. Lumbar degenerative disc disease. 2. Bilateral hip trochanteric bursitis. PLAN:  At this point, we are going to move forward with an MRI of the lumbar spine for further evaluation. We are going to move forward with a cortisone injection for each of the hips today. After informed and written consent with an appropriate time out performed, and under sterile conditions, with ultrasound-guided assistance, each hip was prepped with Betadine and 6 mg of Celestone was injected to each hip without complications. The patient tolerated the injection well. The patient was instructed on post injection care. We will see her back after the MRI for evaluation.                   JR Juan Pablo HAYS, PAULINA, ATC

## 2019-11-04 DIAGNOSIS — E78.5 HYPERLIPIDEMIA, UNSPECIFIED HYPERLIPIDEMIA TYPE: ICD-10-CM

## 2019-11-04 DIAGNOSIS — E66.01 OBESITY, MORBID, BMI 40.0-49.9 (HCC): ICD-10-CM

## 2019-11-04 RX ORDER — ROSUVASTATIN CALCIUM 10 MG/1
TABLET, COATED ORAL
Qty: 90 TAB | Refills: 1 | Status: SHIPPED | OUTPATIENT
Start: 2019-11-04 | End: 2020-05-04

## 2019-11-05 ENCOUNTER — HOSPITAL ENCOUNTER (OUTPATIENT)
Age: 69
Discharge: HOME | End: 2019-11-05
Attending: PHYSICIAN ASSISTANT
Payer: MEDICARE

## 2019-11-05 DIAGNOSIS — M54.50 LOW BACK PAIN, UNSPECIFIED BACK PAIN LATERALITY, UNSPECIFIED CHRONICITY, UNSPECIFIED WHETHER SCIATICA PRESENT: ICD-10-CM

## 2019-11-05 PROCEDURE — 72148 MRI LUMBAR SPINE W/O DYE: CPT

## 2019-11-07 ENCOUNTER — OFFICE VISIT (OUTPATIENT)
Dept: INTERNAL MEDICINE CLINIC | Age: 69
End: 2019-11-07

## 2019-11-07 ENCOUNTER — HOSPITAL ENCOUNTER (OUTPATIENT)
Dept: LAB | Age: 69
Discharge: HOME | End: 2019-11-07
Payer: MEDICARE

## 2019-11-07 VITALS
SYSTOLIC BLOOD PRESSURE: 129 MMHG | HEART RATE: 58 BPM | RESPIRATION RATE: 20 BRPM | BODY MASS INDEX: 45.16 KG/M2 | OXYGEN SATURATION: 96 % | DIASTOLIC BLOOD PRESSURE: 58 MMHG | HEIGHT: 66 IN | WEIGHT: 281 LBS | TEMPERATURE: 95.4 F

## 2019-11-07 DIAGNOSIS — E66.01 OBESITY, MORBID, BMI 40.0-49.9 (HCC): ICD-10-CM

## 2019-11-07 DIAGNOSIS — N32.81 OAB (OVERACTIVE BLADDER): ICD-10-CM

## 2019-11-07 DIAGNOSIS — F32.9 CURRENT EPISODE OF MAJOR DEPRESSIVE DISORDER WITHOUT PRIOR EPISODE, UNSPECIFIED DEPRESSION EPISODE SEVERITY: Primary | ICD-10-CM

## 2019-11-07 DIAGNOSIS — I10 ESSENTIAL HYPERTENSION: ICD-10-CM

## 2019-11-07 DIAGNOSIS — R73.9 HYPERGLYCEMIA: ICD-10-CM

## 2019-11-07 DIAGNOSIS — M25.552 BILATERAL HIP PAIN: ICD-10-CM

## 2019-11-07 DIAGNOSIS — R35.0 URINARY FREQUENCY: ICD-10-CM

## 2019-11-07 DIAGNOSIS — M79.672 LEFT FOOT PAIN: ICD-10-CM

## 2019-11-07 DIAGNOSIS — M48.061 SPINAL STENOSIS OF LUMBAR REGION, UNSPECIFIED WHETHER NEUROGENIC CLAUDICATION PRESENT: ICD-10-CM

## 2019-11-07 DIAGNOSIS — M25.551 BILATERAL HIP PAIN: ICD-10-CM

## 2019-11-07 LAB
ALBUMIN SERPL-MCNC: 3.7 G/DL (ref 3.4–5)
ALBUMIN/GLOB SERPL: 1.1 {RATIO} (ref 0.8–1.7)
ALP SERPL-CCNC: 131 U/L (ref 45–117)
ALT SERPL-CCNC: 40 U/L (ref 13–56)
ANION GAP SERPL CALC-SCNC: 4 MMOL/L (ref 3–18)
APPEARANCE UR: CLEAR
AST SERPL-CCNC: 27 U/L (ref 10–38)
BILIRUB SERPL-MCNC: 0.6 MG/DL (ref 0.2–1)
BILIRUB UR QL: NEGATIVE
BUN SERPL-MCNC: 21 MG/DL (ref 7–18)
BUN/CREAT SERPL: 25 (ref 12–20)
CALCIUM SERPL-MCNC: 9.7 MG/DL (ref 8.5–10.1)
CHLORIDE SERPL-SCNC: 104 MMOL/L (ref 100–111)
CHOLEST SERPL-MCNC: 147 MG/DL
CO2 SERPL-SCNC: 34 MMOL/L (ref 21–32)
COLOR UR: YELLOW
CREAT SERPL-MCNC: 0.84 MG/DL (ref 0.6–1.3)
CREAT UR-MCNC: <13 MG/DL (ref 30–125)
GLOBULIN SER CALC-MCNC: 3.5 G/DL (ref 2–4)
GLUCOSE SERPL-MCNC: 98 MG/DL (ref 74–99)
GLUCOSE UR STRIP.AUTO-MCNC: NEGATIVE MG/DL
HBA1C MFR BLD: 6.2 % (ref 4.2–5.6)
HDLC SERPL-MCNC: 53 MG/DL (ref 40–60)
HDLC SERPL: 2.8 {RATIO} (ref 0–5)
HGB UR QL STRIP: NEGATIVE
KETONES UR QL STRIP.AUTO: NEGATIVE MG/DL
LDLC SERPL CALC-MCNC: 61.8 MG/DL (ref 0–100)
LEUKOCYTE ESTERASE UR QL STRIP.AUTO: NEGATIVE
LIPID PROFILE,FLP: ABNORMAL
MICROALBUMIN UR-MCNC: <0.5 MG/DL (ref 0–3)
MICROALBUMIN/CREAT UR-RTO: ABNORMAL MG/G (ref 0–30)
NITRITE UR QL STRIP.AUTO: NEGATIVE
PH UR STRIP: 6.5 [PH] (ref 5–8)
POTASSIUM SERPL-SCNC: 3.6 MMOL/L (ref 3.5–5.5)
PROT SERPL-MCNC: 7.2 G/DL (ref 6.4–8.2)
PROT UR STRIP-MCNC: NEGATIVE MG/DL
SODIUM SERPL-SCNC: 142 MMOL/L (ref 136–145)
SP GR UR REFRACTOMETRY: 1 (ref 1–1.03)
TRIGL SERPL-MCNC: 161 MG/DL (ref ?–150)
UROBILINOGEN UR QL STRIP.AUTO: 0.2 EU/DL (ref 0.2–1)
VLDLC SERPL CALC-MCNC: 32.2 MG/DL

## 2019-11-07 PROCEDURE — 81003 URINALYSIS AUTO W/O SCOPE: CPT

## 2019-11-07 PROCEDURE — 80061 LIPID PANEL: CPT

## 2019-11-07 PROCEDURE — 36415 COLL VENOUS BLD VENIPUNCTURE: CPT

## 2019-11-07 PROCEDURE — 83036 HEMOGLOBIN GLYCOSYLATED A1C: CPT

## 2019-11-07 PROCEDURE — 82043 UR ALBUMIN QUANTITATIVE: CPT

## 2019-11-07 PROCEDURE — 80053 COMPREHEN METABOLIC PANEL: CPT

## 2019-11-07 RX ORDER — ESCITALOPRAM OXALATE 5 MG/1
5 TABLET ORAL DAILY
Qty: 90 TAB | Refills: 3 | Status: SHIPPED | OUTPATIENT
Start: 2019-11-07 | End: 2020-03-05 | Stop reason: ALTCHOICE

## 2019-11-07 RX ORDER — HYDROCODONE BITARTRATE AND ACETAMINOPHEN 5; 325 MG/1; MG/1
1 TABLET ORAL
Qty: 28 TAB | Refills: 0 | Status: SHIPPED | OUTPATIENT
Start: 2019-11-07 | End: 2019-11-14

## 2019-11-07 NOTE — PROGRESS NOTES
Jorge Dominguez presents today for   Chief Complaint   Patient presents with    Follow-up     6 month f/u              Depression Screening:  3 most recent PHQ Screens 5/13/2019   Little interest or pleasure in doing things Not at all   Feeling down, depressed, irritable, or hopeless Not at all   Total Score PHQ 2 0   Trouble falling or staying asleep, or sleeping too much -   Feeling tired or having little energy -   Poor appetite, weight loss, or overeating -   Feeling bad about yourself - or that you are a failure or have let yourself or your family down -   Trouble concentrating on things such as school, work, reading, or watching TV -   Moving or speaking so slowly that other people could have noticed; or the opposite being so fidgety that others notice -   Thoughts of being better off dead, or hurting yourself in some way -   PHQ 9 Score -   How difficult have these problems made it for you to do your work, take care of your home and get along with others -       Learning Assessment:  Learning Assessment 1/10/2019   PRIMARY LEARNER Patient   HIGHEST LEVEL OF EDUCATION - PRIMARY LEARNER  SOME COLLEGE   BARRIERS PRIMARY LEARNER NONE   CO-LEARNER CAREGIVER No   PRIMARY LANGUAGE ENGLISH   LEARNER PREFERENCE PRIMARY DEMONSTRATION   ANSWERED BY patient   RELATIONSHIP SELF       Abuse Screening:  Abuse Screening Questionnaire 1/10/2019   Do you ever feel afraid of your partner? N   Are you in a relationship with someone who physically or mentally threatens you? N   Is it safe for you to go home? Y       Fall Risk  Fall Risk Assessment, last 12 mths 10/25/2019   Able to walk? Yes   Fall in past 12 months? No           Coordination of Care:  1. Have you been to the ER, urgent care clinic since your last visit? Hospitalized since your last visit? no    2. Have you seen or consulted any other health care providers outside of the [address] [place] [address] since your last visit? Include any pap smears or colon screening. no      Advance Directive:  1. Do you have an advance directive in place? Patient Reply:yes      2. Per patient no changes to their ACP contact no.

## 2019-11-07 NOTE — PATIENT INSTRUCTIONS
Hip Pain: Care Instructions Your Care Instructions Hip pain may be caused by many things, including overuse, a fall, or a twisting movement. Another cause of hip pain is arthritis. Your pain may increase when you stand up, walk, or squat. The pain may come and go or may be constant. Home treatment can help relieve hip pain, swelling, and stiffness. If your pain is ongoing, you may need more tests and treatment. Follow-up care is a key part of your treatment and safety. Be sure to make and go to all appointments, and call your doctor if you are having problems. It's also a good idea to know your test results and keep a list of the medicines you take. How can you care for yourself at home? · Take pain medicines exactly as directed. ? If the doctor gave you a prescription medicine for pain, take it as prescribed. ? If you are not taking a prescription pain medicine, ask your doctor if you can take an over-the-counter medicine. · Rest and protect your hip. Take a break from any activity, including standing or walking, that may cause pain. · Put ice or a cold pack against your hip for 10 to 20 minutes at a time. Try to do this every 1 to 2 hours for the next 3 days (when you are awake) or until the swelling goes down. Put a thin cloth between the ice and your skin. · Sleep on your healthy side with a pillow between your knees, or sleep on your back with pillows under your knees. · If there is no swelling, you can put moist heat, a heating pad, or a warm cloth on your hip. Do gentle stretching exercises to help keep your hip flexible. · Learn how to prevent falls. Have your vision and hearing checked regularly. Wear slippers or shoes with a nonskid sole. · Stay at a healthy weight. · Wear comfortable shoes. When should you call for help? Call 911 anytime you think you may need emergency care. For example, call if: 
  · You have sudden chest pain and shortness of breath, or you cough up blood.   · You are not able to stand or walk or bear weight.  
  · Your buttocks, legs, or feet feel numb or tingly.  
  · Your leg or foot is cool or pale or changes color.  
  · You have severe pain.  
 Call your doctor now or seek immediate medical care if: 
  · You have signs of infection, such as: 
? Increased pain, swelling, warmth, or redness in the hip area. ? Red streaks leading from the hip area. ? Pus draining from the hip area. ? A fever.  
  · You have signs of a blood clot, such as: 
? Pain in your calf, back of the knee, thigh, or groin. ? Redness and swelling in your leg or groin.  
  · You are not able to bend, straighten, or move your leg normally.  
  · You have trouble urinating or having bowel movements.  
 Watch closely for changes in your health, and be sure to contact your doctor if: 
  · You do not get better as expected. Where can you learn more? Go to http://miguel-mamta.info/. Enter X793 in the search box to learn more about \"Hip Pain: Care Instructions. \" Current as of: June 26, 2019 Content Version: 12.2 © 8869-0539 [employer]. Care instructions adapted under license by [employer] (which disclaims liability or warranty for this information). If you have questions about a medical condition or this instruction, always ask your healthcare professional. [place] [address] any warranty or liability for your use of this information.

## 2019-11-07 NOTE — PROGRESS NOTES
[facility] OF [facility]:  11/7/2019, MRN: [id number]      Gogo Clark is a 81 y.o. female and presents to clinic for Follow-up (6 month f/u)    Subjective: The patient is a 82-year-old female with history of obesity, allergic rhinitis, IBS-diarrhea, onychomycosis, prediabetes, lumbar spinal stenosis, depression, hypertension, adrenal incidentaloma seen on CT scan 2015, and history of cerebral infarction her head MRI findings (followed by Neurology). 1.  Bilateral Hip Pain: At her last appointment, she reported bilateral hip pain since June. She reports the pain was sharp in quality and present despite treatment provided by a chiropractor. She reported no history of trauma. She had associated chronic numbness in her BLE. Aleve is not working well to control her symptoms. At her last appointment, she was given a course of steroids. Since then, hip pain has worsened despite the po steroid course and b/l hip injections. She fell in her garage (10/26/19) a day after her b/l hip injections - accidentally. She fell along her right knee, right hip, and lower back. Her back pain and knee pain are at her baseline. No LOC. Recently she had a lumbar MRI. Findings are listed below. She also takes gabapentin 300 mg nightly. She is scheduled next wk to see Orthopedics. B/l hip pain: 7/10.     11/5/19 Lumbar MRI: Mild spinal canal stenosis at L2-3 and L4-5. No significant foraminal stenosis and no evidence of nerve root compression. Incidental 1.4 x 1.2 cm left adrenal nodule. Stable when compared to a 9/9/2015 abdominal CT. 2.  Hypertension: BP is 129/58. Her weight is 281 pounds. She takes atenolol-chlorthalidone. No adverse side effects of taking his medicines. 3. HLD: Taking Crestor. No adverse side effects or take his medication. She is not regularly exercising or dieting. 4. Prediabetes: Her last A1C was 6.1. She's been gaining weight. 5. Depression: Taking lexapro.  Hip pain has caused her depression sx to worsen. It has limited her activity level and she is less social as a result. 6.  OAB: She reports increased urinary frequency and urgency. Symptoms have led to some incontinence. No hematuria or dysuria. No relieving factors are known. Patient Active Problem List    Diagnosis Date Noted    Controlled substance agreement signed 7/7/17 07/09/2017    Obesity, morbid, BMI 40.0-49.9  12/06/2016    Allergic rhinitis 12/06/2016    Irritable bowel syndrome with diarrhea 12/06/2016    Onychomycosis 12/06/2016    Glucose intolerance (impaired glucose tolerance) 12/01/2016    Single current episode of major depressive disorder (has tried celexa, wellbutrin, mirtazapine with adverse effects) 12/01/2016    Essential hypertension 12/01/2016    History of cerebral infarction - per head MRI findings 12/01/2016       Current Outpatient Medications   Medication Sig Dispense Refill    rosuvastatin (CRESTOR) 10 mg tablet TAKE 1 TABLET BY MOUTH NIGHTLY 90 Tab 1    atenolol-chlorthalidone (TENORETIC) 100-25 mg per tablet Take 1 Tab by mouth daily. 90 Tab 3    gabapentin (NEURONTIN) 300 mg capsule Take 300 mg by mouth nightly.  fluticasone (FLONASE) 50 mcg/actuation nasal spray 2 Sprays by Both Nostrils route daily. 1 Bottle 11    escitalopram oxalate (LEXAPRO) 10 mg tablet Take 10 mg by mouth daily.  Cholecalciferol, Vitamin D3, (VITAMIN D3) 1,000 unit cap Take 1,000 Units by mouth daily.  aspirin-dipyridamole (AGGRENOX)  mg per SR capsule Take 1 Cap by mouth two (2) times a day.  predniSONE (DELTASONE) 20 mg tablet Take 40 mg by mouth daily (with breakfast).  10 Tab 0       Allergies   Allergen Reactions    Amoxicillin-Pot Clavulanate Diarrhea    Azithromycin Diarrhea    Seroquel [Quetiapine] Other (comments)     Tremors    Sulfa (Sulfonamide Antibiotics) Diarrhea    Wellbutrin [Bupropion Hcl] Itching       Past Medical History:   Diagnosis Date    Hypercholesterolemia     Hypertension     Stroke [facility])        Past Surgical History:   Procedure Laterality Date    HX APPENDECTOMY  09/09/2015    HX CHOLECYSTECTOMY      HX GYN      partial hysterectomy       Family History   Problem Relation Age of Onset    Dementia Mother     Stroke Father     Cancer Brother     Hypertension Brother        Social History     Tobacco Use    Smoking status: Never Smoker    Smokeless tobacco: Never Used   Substance Use Topics    Alcohol use: No       ROS   Review of Systems   Constitutional: Negative for chills and fever. HENT: Negative for ear pain and sore throat. Eyes: Negative for blurred vision and pain. Respiratory: Negative for shortness of breath. Cardiovascular: Negative for chest pain. Gastrointestinal: Negative for abdominal pain. Genitourinary: Positive for frequency (she has urinary frequency in which small amts of urine come out and can lead to some incontinence) and urgency (off/on). Negative for dysuria and hematuria. Musculoskeletal: Positive for back pain and joint pain. Negative for myalgias. Left heel/ 3rdtoe pain has developed since her last apt and is not associated with her recent fall. No h/o trauma. No alleviating factors are known. Skin: Negative for rash. Neurological: Positive for tingling. Negative for focal weakness and headaches. Endo/Heme/Allergies: Does not bruise/bleed easily. Psychiatric/Behavioral: Positive for depression. Negative for substance abuse. Objective     Vitals:    11/07/19 1222   BP: 129/58   Pulse: (!) 58   Resp: 20   Temp: 95.4 °F (35.2 °C)   TempSrc: Oral   SpO2: 96%   Weight: 281 lb (127.5 kg)   Height: 5' 6\" (1.676 m)   PainSc:   7   PainLoc: Hip       Physical Exam   Constitutional: She is oriented to person, place, and time and well-developed, well-nourished, and in no distress. HENT:   Head: Normocephalic and atraumatic.    Right Ear: External ear normal.   Left Ear: External ear normal.   Nose: Nose normal.   Mouth/Throat: Oropharynx is clear and moist. No oropharyngeal exudate. Eyes: Conjunctivae and EOM are normal. Right eye exhibits no discharge. Left eye exhibits no discharge. No scleral icterus. Neck: Neck supple. Cardiovascular: Normal rate, regular rhythm, normal heart sounds and intact distal pulses. Exam reveals no gallop and no friction rub. No murmur heard. Pulmonary/Chest: Effort normal and breath sounds normal. No respiratory distress. She has no wheezes. She has no rales. Abdominal: Soft. Bowel sounds are normal. She exhibits no distension. There is no tenderness. There is no rebound and no guarding. Musculoskeletal: She exhibits no edema or tenderness (Bue). She has extreme pain with bilateral range of motion exercises along her hips. Her back exam is unremarkable. There is tenderness palpation along her left foot transmetatarsal plate. Lymphadenopathy:     She has no cervical adenopathy. Neurological: She is alert and oriented to person, place, and time. She exhibits normal muscle tone. Gait normal.   Skin: Skin is warm and dry. No erythema. Psychiatric:   Tearful when discussing her pain. Nursing note and vitals reviewed.       LABS   Data Review:   Lab Results   Component Value Date/Time    WBC 7.1 01/10/2019 08:55 AM    HGB 13.6 01/10/2019 08:55 AM    HCT 44.6 01/10/2019 08:55 AM    PLATELET 650 18/37/2482 08:55 AM    MCV 99.6 (H) 01/10/2019 08:55 AM       Lab Results   Component Value Date/Time    Sodium 140 01/10/2019 08:55 AM    Potassium 3.3 (L) 01/10/2019 08:55 AM    Chloride 101 01/10/2019 08:55 AM    CO2 31 01/10/2019 08:55 AM    Anion gap 8 01/10/2019 08:55 AM    Glucose 95 01/10/2019 08:55 AM    BUN 19 (H) 01/10/2019 08:55 AM    Creatinine 0.83 01/10/2019 08:55 AM    BUN/Creatinine ratio 23 (H) 01/10/2019 08:55 AM    GFR est AA >60 01/10/2019 08:55 AM    GFR est non-AA >60 01/10/2019 08:55 AM    Calcium 9.3 01/10/2019 08:55 AM Lab Results   Component Value Date/Time    Cholesterol, total 140 01/10/2019 08:55 AM    HDL Cholesterol 57 01/10/2019 08:55 AM    LDL, calculated 59.8 01/10/2019 08:55 AM    VLDL, calculated 23.2 01/10/2019 08:55 AM    Triglyceride 116 01/10/2019 08:55 AM    CHOL/HDL Ratio 2.5 01/10/2019 08:55 AM       Lab Results   Component Value Date/Time    Hemoglobin A1c 6.1 (H) 01/10/2019 08:55 AM    Hemoglobin A1c, External 5.8 09/08/2015       Assessment/Plan:   1. Essential hypertension: Stable BP. +Prediabetes. +Gaining weight. +HLD. -Continue with Rx as prescribed.  -Checking labs. -I encouraged her to reduce her weight with reducing her processed food and caloric intake. I will recheck her weight at her follow-up appointment. ORDERS:  - METABOLIC PANEL, COMPREHENSIVE; Future  - LIPID PANEL; Future  - HEMOGLOBIN A1C W/O EAG; Future  - MICROALBUMIN, UR, RAND W/ MICROALB/CREAT RATIO; Future    2. Current episode of major depressive disorder without prior episode:   -Increasing her Lexapro from 10 mg daily to 50 mg daily.  -I instructed her to notify me if she develops any adverse effects.  -Return to clinic to assess her response. ORDERS:  - escitalopram oxalate (LEXAPRO) 5 mg tablet; Take 1 Tab by mouth daily. Take daily with a 10mg daily tab for a total daily dose of 15mg daily. Dispense: 90 Tab; Refill: 3    3. B/l Hip Pain: +Lumbar spinal stenosis hx.   -Prescribing a short course of Norco.  We discussed the side effects of this medicine. This is for severe pain symptoms only. She is to use over-the-counter Rx as needed. - Activity as tolerated. - F/u with Orthopedics for treatment recommendations. ORDERS:  - HYDROcodone-acetaminophen (NORCO) 5-325 mg per tablet; Take 1 Tab by mouth every six (6) hours as needed for Pain for up to 7 days. Max Daily Amount: 4 Tabs. Dispense: 28 Tab; Refill: 0    4. OAB: Her history. +Urinary frequency. -Oxybutynin prescribed.  -Checking labs.     ORDERS:  - URINALYSIS W/ RFLX MICROSCOPIC; Future    5. Left Foot Pain:   -Referral to Podiatry    ORDERS:  - REFERRAL TO [facility] Maintenance Due   Topic Date Due    COLONOSCOPY  06/23/2018    Pneumococcal 65+ years (2 of 2 - PPSV23) 07/09/2019     Lab review: labs are reviewed in the EHR    I have discussed the diagnosis with the patient and the intended plan as seen in the above orders. The patient has received an after-visit summary and questions were answered concerning future plans. I have discussed medication side effects and warnings with the patient as well. I have reviewed the plan of care with the patient, accepted their input and they are in agreement with the treatment goals. All questions were answered. The patient understands the plan of care. Handouts provided today with above information. Pt instructed if symptoms worsen to call the office or report to the ED for continued care. Greater than 50% of the visit time was spent in counseling and/or coordination of care. Voice recognition was used to generate this report, which may have resulted in some phonetic based errors in grammar and contents. Even though attempts were made to correct all the mistakes, some may have been missed, and remained in the body of the document.           Felecia Conroy MD

## 2019-11-15 ENCOUNTER — OFFICE VISIT (OUTPATIENT)
Dept: ORTHOPEDIC SURGERY | Age: 69
End: 2019-11-15

## 2019-11-15 VITALS
OXYGEN SATURATION: 95 % | WEIGHT: 281 LBS | BODY MASS INDEX: 45.16 KG/M2 | DIASTOLIC BLOOD PRESSURE: 54 MMHG | HEART RATE: 61 BPM | HEIGHT: 66 IN | TEMPERATURE: 95.9 F | SYSTOLIC BLOOD PRESSURE: 112 MMHG | RESPIRATION RATE: 18 BRPM

## 2019-11-15 DIAGNOSIS — M25.552 BILATERAL HIP PAIN: Primary | ICD-10-CM

## 2019-11-15 DIAGNOSIS — M70.62 TROCHANTERIC BURSITIS OF BOTH HIPS: ICD-10-CM

## 2019-11-15 DIAGNOSIS — M54.50 LUMBAR PAIN: ICD-10-CM

## 2019-11-15 DIAGNOSIS — Z91.81 HISTORY OF RECENT FALL: ICD-10-CM

## 2019-11-15 DIAGNOSIS — M25.551 BILATERAL HIP PAIN: Primary | ICD-10-CM

## 2019-11-15 DIAGNOSIS — M70.61 TROCHANTERIC BURSITIS OF BOTH HIPS: ICD-10-CM

## 2019-11-15 NOTE — PROGRESS NOTES
1. Have you been to the ER, urgent care clinic since your last visit? Hospitalized since your last visit? No    2. Have you seen or consulted any other health care providers outside of the [address] [place] [address] since your last visit? Include any pap smears or colon screening.  No

## 2019-11-17 RX ORDER — OXYBUTYNIN CHLORIDE 5 MG/1
5 TABLET ORAL 3 TIMES DAILY
Qty: 90 TAB | Refills: 11 | Status: SHIPPED | OUTPATIENT
Start: 2019-11-17 | End: 2019-12-05 | Stop reason: SINTOL

## 2019-11-18 ENCOUNTER — HOSPITAL ENCOUNTER (OUTPATIENT)
Dept: PHYSICAL THERAPY | Age: 69
Discharge: HOME | End: 2019-11-18
Payer: MEDICARE

## 2019-11-18 PROCEDURE — 97162 PT EVAL MOD COMPLEX 30 MIN: CPT

## 2019-11-18 PROCEDURE — 97140 MANUAL THERAPY 1/> REGIONS: CPT

## 2019-11-18 PROCEDURE — 97110 THERAPEUTIC EXERCISES: CPT

## 2019-11-18 NOTE — PROGRESS NOTES
[facility]  [address]  [place], [address]  [phone] fax    Plan of Care/ Statement of Necessity for Physical Therapy Services    Patient name: Denise Sandhoff Start of Care: 2019   Referral source: Kai Shoemaker MD : 1950    Medical Diagnosis: Pain in right hip [M25.551]  Left hip pain [M25.552]  Payor: [place] MEDICARE / Plan: VA MEDICARE PART A & B / Product Type: Medicare /  Onset Date:2019    Treatment Diagnosis: Bilateral Hip Pain   Prior Hospitalization: see medical history Provider#: [id number]   Medications: Verified on Patient summary List    Comorbidities: L knee OATS surgery with continued pain, Stroke 2009 affecting balance and emotions. Prior Level of Function: Patient was participating in Trudy Abernathy at [facility], [facility] walking and walking dog in neighborhood without limitations. The Plan of Care and following information is based on the information from the initial evaluation. Assessment/ key information:   Patient presenting with 5 month history of Bilateral Hip pain. She first noticed symptoms after participating in one month of [employer] classes where she was chair marching. She first noticed it was pain in Left hip that progressed to Right hip. She was having difficulty with ambulation at this point and had to quit going to the [facility]. She underwent a Steroid injection, however is unsure if she received any benefit as she took a fall the next day. She now presents with complications from the fall increasing muscular tone in Left Lumbosacral region. Patient is extremely sensitive to gentle touch and movement at this time. She will benefit from decreasing acute symptoms, body mechanics, and functional strengthening.   Evaluation Complexity History HIGH Complexity :3+ comorbidities / personal factors will impact the outcome/ POC ; Examination MEDIUM Complexity : 3 Standardized tests and measures addressing body structure, function, activity limitation and / or participation in recreation  ;Presentation MEDIUM Complexity : Evolving with changing characteristics  ; Clinical Decision Making MEDIUM Complexity : FOTO score of 26-74  Overall Complexity Rating: MEDIUM  Problem List: pain affecting function, decrease ROM, decrease strength, impaired gait/ balance, decrease ADL/ functional abilitiies, decrease flexibility/ joint mobility and decrease transfer abilities   Treatment Plan may include any combination of the following: Therapeutic exercise, Therapeutic activities, Neuromuscular re-education, Physical agent/modality, Gait/balance training, Manual therapy, Patient education, Functional mobility training and Stair training  Patient / Family readiness to learn indicated by: asking questions, trying to perform skills and interest  Persons(s) to be included in education: patient (P)  Barriers to Learning/Limitations: None  Patient Goal (s): Return to walking dog, and mall walking without pain.   Patient Self Reported Health Status: fair  Rehabilitation Potential: good    Short Term Goals: To be accomplished in 2 weeks:   1. Patient  Will report performance of HEP daily to facilitate improved outcomes and improved self management. Long Term Goals: To be accomplished in 4  weeks:   1. Patient will report FOTO Score of 49 or greater to indicate significant improvement in functional status. 2.  Patient will  Tolerate walking dog 2 blocks without difficulty. 3.  Patient will tolerate putting on socks without difficulty. 4.  Patient to go up and down stairs to garage/laundry room with minimal difficulty. Frequency / Duration: Patient to be seen 2-3  times per week for 4 weeks.     Patient/ Caregiver education and instruction: Diagnosis, prognosis, self care, activity modification and exercises   [x]  Plan of care has been reviewed with PTA    Certification Period: 11/18/19 - 12/17/19  Una Dougherty PT 11/18/2019 1:42 PM    ________________________________________________________________________    I certify that the above Therapy Services are being furnished while the patient is under my care. I agree with the treatment plan and certify that this therapy is necessary.     [de-identified] Signature:____________________  Date:____________Time: _________    Please sign and return to [facility] [address] [address] [address] [place] [address] [address]  [phone] fax

## 2019-11-18 NOTE — PROGRESS NOTES
PT DAILY TREATMENT NOTE/HIP [id number]    Patient Name: Asher Frias  Date:2019  : 1950  [x]  Patient  Verified  Payor: VA MEDICARE / Plan: VA MEDICARE PART A & B / Product Type: Medicare /    In OIAC:2208  Out time:1015  Total Treatment Time (min): 60  Visit #: 1 of 12    Medicare/BCBS Only   Total Timed Codes (min):  33 1:1 Treatment Time:  62     Treatment Area: Pain in right hip [M25.551]  Left hip pain [M25.552]    SUBJECTIVE  Pain Level (0-10 scale): 7  [x]constant []intermittent []improving [x]worsening []no change since onset    Any medication changes, allergies to medications, adverse drug reactions, diagnosis change, or new procedure performed?: [x] No    [] Yes (see summary sheet for update)  Subjective functional status/changes:     PLOF: Patient was Independent attending [employer] and using exercise equipment at the [facility]. She does report having ongoing pain and weakness from Left knee OATS surgery. She reports problems with balance and neuropathy following a Stroke she had in . She reports having difficulty in speech center and increase emotional response. Mechanism of Injury: Patient reports attending [employer] during month of May and marching in chair. She began noticing the symptoms in Left side following this and was no longer able to use the weight machines at the [facility]. She than reports difficulty with gait and leaning over to the left while walking. Eventually she began having pain in her Right hip also. She had cortisone injections on 10/25/19 and then fell in the Garage the following day on her Right side increasing those symptoms. Current symptoms/Complaints: Patient reports pain in bilateral hips, Lumbosacral spine. She reports difficulty with walking, household ADL's and community chores. Previous Treatment/Compliance: Cortisone medication and cortisone injections.   PMHx/Surgical Hx: Left Knee OATS , see intake for other surgical history  Work Hx: Retired desk job  Living Situation:   Pt Goals: To return to walking dog in neighborhood and mall walking. Barriers: [x]pain []financial []time []transportation [x]other Pain in supine, and standing. Motivation: Good  Substance use: []Alcohol []Tobacco []other:   Cognition: A & O x 3    Other: Patient has increased emotional response following Stroke. OBJECTIVE/EXAMINATION  Activity/Recreational Limitations: Patient limited to short duration ambulation, household distance. Mobility: Pain with transfers supine-sit-stand, pain with step ambulation to front landing, garage  Self Care: Difficulty getting dressed with LE mobility. Modality rationale: decrease inflammation, decrease pain and increase tissue extensibility to improve the patients ability to ambulate    8 Min Type Additional Details   [id number] [x]  Ultrasound: [x]Continuous   [] Pulsed                           [x]1MHz   []3MHz Location: Left hip  W/cm2:1.5  8 min Left hip     [x] Skin assessment post-treatment:  [x]intact []redness- no adverse reaction    []redness  adverse reaction:     25 min [x]Eval                  []Re-Eval       15 min Therapeutic Exercise:  [x] See flow sheet : Emphais on positioning to avoid pain. Rationale: increase ROM and increase strength to improve the patients ability to ambulate. 10 min Manual Therapy:  Bilateral hip/pelvis, MFR Gentle grade I pressure to TFL, IT band, and Glute muscles. Rationale: decrease pain, increase ROM and increase tissue extensibility to improve transfers and mobility. With   [x] TE   [] TA   [] neuro   [] other: Patient Education: [x] Review HEP    [] Progressed/Changed HEP based on:   [x] positioning   [] body mechanics   [] transfers   [x] heat/ice application    [x] other: Instructed patient to use MHP to Bilateral Hips 10-20 minutes to decrease muscle tone symptoms.   Educated patient to find positions of comfort and to avoid positions or activities that increase pain or symptoms. Other Objective/Functional Measures:   Single Leg Stance: Unable    Physical Therapy Evaluation- Hip    Posture: R ilium depressed, R posterior sacrum, thoracic kyphosis/Lumbar lordosis    Gait:  [] Normal    [x] Abnormal    [x] Antalgic    [] NWB    Device:    Describe:Spencer ambulates with Right trunk lean antalgic         ROM/Strength        AROM                     PROM        Strength (1-5)  Hip Left Right Left Right Left Right   Flexion   95 95 4- 4-   Extension   0 0 NT NT   Abduction   40 40 Pain Pain   Adduction   20 20 NT NT   ER   20  3+ 3+   IR     3+ 3+   Knee Left Right Left Right Left Right   Extension     4 4   Flexion 120 118   4 4      Pain limited Strength testing    Flexibility: [] Unable to assess at this time  Hamstrings:    (L) Tightness= [] WNL   [] Min   [x] Mod   [] Severe    (R) Tightness= [] WNL   [] Min   [x] Mod   [] Severe                                    Palpation  [] Min  [] Mod  [x] Severe    Location: R Femoral Trochanter, IT, TFL, QL, Glute  [] Min  [] Mod  [x] Severe    Location: L Femoral Trochanter, IT, TFL  [] Min  [] Mod  [x] Severe    Location: L knee region    Optional Tests  Riky/ Rolf Test: [] Neg    [x] Pos  Scouring Test: [] Neg    [] Pos Not Tolerated  Trendelenberg:  [] Neg    [x] Pos [] Left    [x] Right  OberTest:   [] Neg    [x] Pos  Piriformis Test:  [] Neg    [x] Pos     Pain Level (0-10 scale) post treatment: 7/10    ASSESSMENT/Changes in Function: Pt presents with bilateral hip/pelvic pain that has been exacerbated by recent fall on R side. Palpation sensitivity to mild touch and antalgic with position changes. Moderate tone R>L bilateral hip femoral trochanter and Lateral Rotators. Pain limited ROM and Strength testing at this time. Poor Postural awareness and poor balance with inability to single limb stand.       Patient will continue to benefit from skilled PT services to address functional mobility deficits, address ROM deficits, address strength deficits, analyze and address soft tissue restrictions, analyze and cue movement patterns, analyze and modify body mechanics/ergonomics, assess and modify postural abnormalities and address imbalance/dizziness to attain remaining goals. Progress towards goals / Updated goals:  Short Term Goals: To be accomplished in 2 weeks:   1. Patient  Will report performance of HEP daily to facilitate improved outcomes and improved self management. EVAL Status:  Issued    Long Term Goals: To be accomplished in 4  weeks:   1. Patient will report FOTO Score of 49 or greater to indicate significant improvement in functional status. EVAL Status:  34   2. Patient will  Tolerate walking dog 2 blocks without difficulty. EVAL Status: walking between rooms moderate difficulty. 3.  Patient will tolerate putting on socks without difficulty. EVAL Status: Moderate difficulty. 4.  Patient to go up and down stairs to garage/laundry room with minimal difficulty. EVAL Status: Quite a bit of difficulty. PLAN  [x]  Upgrade activities as tolerated     []  Continue plan of care  []  Update interventions per flow sheet       []  Discharge due to:_  [x]  Other: Focus on pain management.     Hola Flower 11/18/2019  10:39 AM

## 2019-11-19 NOTE — PROGRESS NOTES
Please let her know that her kidney function labs are normal.  Her liver function tests are unremarkable. Her total cholesterol is 147. Her triglycerides are 161. Her HDL is 53. Her LDL 61. Her A1c is 6.2, up from 6.1 just 10 months ago. Her urinalysis is unremarkable. She should continue taking all medication as prescribed. She needs to limit her carb intake in order to prevent progression to type 2 diabetes.     Dr. Yolanda Carroll  [facility], [address] [place] [address], [address] [place] [address] Str.  Phone: [phone]  Fax: [phone]

## 2019-11-20 ENCOUNTER — TELEPHONE (OUTPATIENT)
Dept: INTERNAL MEDICINE CLINIC | Age: 69
End: 2019-11-20

## 2019-11-20 NOTE — TELEPHONE ENCOUNTER
----- Message from Chetan Aquino MD sent at 11/19/2019  4:54 PM EST -----  Please let her know that her kidney function labs are normal.  Her liver function tests are unremarkable. Her total cholesterol is 147. Her triglycerides are 161. Her HDL is 53. Her LDL 61. Her A1c is 6.2, up from 6.1 just 10 months ago. Her urinalysis is unremarkable. She should continue taking all medication as prescribed. She needs to limit her carb intake in order to prevent progression to type 2 diabetes.     Dr. Brianda Santamaria  [facility], [address] [place] [address], [address] [place] [address] Str.  Phone: [phone]  Fax: [phone]

## 2019-11-20 NOTE — TELEPHONE ENCOUNTER
Patient contacted, patient identified with two identifiers (Name & ). Patient aware of results per  and verbalizes understanding.

## 2019-11-22 ENCOUNTER — HOSPITAL ENCOUNTER (OUTPATIENT)
Dept: PHYSICAL THERAPY | Age: 69
Discharge: HOME | End: 2019-11-22
Payer: MEDICARE

## 2019-11-22 PROCEDURE — 97140 MANUAL THERAPY 1/> REGIONS: CPT

## 2019-11-22 PROCEDURE — 97110 THERAPEUTIC EXERCISES: CPT

## 2019-11-22 PROCEDURE — 97035 APP MDLTY 1+ULTRASOUND EA 15: CPT

## 2019-11-22 NOTE — PROGRESS NOTES
PT DAILY TREATMENT NOTE 10-18    Patient Name: Loree Stauffer  Date:2019  : 1950  [x]  Patient  Verified  Payor: VA MEDICARE / Plan: VA MEDICARE PART A & B / Product Type: Medicare /    In time:10:00  Out time:10:49  Total Treatment Time (min): 52  Visit #: 2 of 12    Medicare/BCBS Only   Total Timed Codes (min):  49 1:1 Treatment Time:  42       Treatment Area: Pain in right hip [M25.551]  Left hip pain [M25.552]    SUBJECTIVE  Pain Level (0-10 scale): 6  Any medication changes, allergies to medications, adverse drug reactions, diagnosis change, or new procedure performed?: [x] No    [] Yes (see summary sheet for update)  Subjective functional status/changes:   [] No changes reported  Pt reports pain in the B hips today in the left more than the right. OBJECTIVE    Modality rationale: decrease pain and increase tissue extensibility to improve the patients ability to ambulate with ease.    Min Type Additional Details    [] Estim:  []Unatt       []IFC  []Premod                        []Other:  []w/ice   []w/heat  Position:  Location:    [] Estim: []Att    []TENS instruct  []NMES                    []Other:  []w/US   []w/ice   []w/heat  Position:  Location:    []  Traction: [] Cervical       []Lumbar                       [] Prone          []Supine                       []Intermittent   []Continuous Lbs:  [] before manual  [] after manual   8 [x]  Ultrasound: [x]Continuous   [] Pulsed                           [x]1MHz   []3MHz W/cm2: 1.5  Location:left hip    []  Iontophoresis with dexamethasone         Location: [] Take home patch   [] In clinic    []  Ice     []  heat  []  Ice massage  []  Laser   []  Anodyne Position:  Location:    []  Laser with stim  []  Other:  Position:  Location:    []  Vasopneumatic Device Pressure:       [] lo [] med [] hi   Temperature: [] lo [] med [] hi   [x] Skin assessment post-treatment:  [x]intact []redness- no adverse reaction    []redness  adverse reaction: 33 min Therapeutic Exercise:  [x] See flow sheet :   Rationale: increase ROM and increase strength to improve the patients ability to tolerate ADL's within protocol. 8 min Manual Therapy:  Bilateral hip/pelvis, MFR Gentle grade I pressure to TFL, IT band, and Glute muscles. Rationale: decrease pain, increase ROM and increase tissue extensibility to improve transfer and mobility. With   [] TE   [] TA   [] neuro   [] other: Patient Education: [x] Review HEP    [] Progressed/Changed HEP based on:   [] positioning   [] body mechanics   [] transfers   [] heat/ice application    [] other:      Other Objective/Functional Measures:   Clamshells- 10x3\" ea     Pain Level (0-10 scale) post treatment: 6    ASSESSMENT/Changes in Function:   Pt displays good tolerance to first f/u treatment displaying good effort with exercises and reporting no increased pain with exercises. Myofascial restrictions noted in the Left hip/glute and through the ITB more than the right. Pt TTP in the left glute/pirif and down into the ITB. Pt reports no change in pain post treatment. Patient will continue to benefit from skilled PT services to modify and progress therapeutic interventions, address functional mobility deficits, address ROM deficits, address strength deficits, analyze and address soft tissue restrictions, analyze and cue movement patterns, analyze and modify body mechanics/ergonomics and assess and modify postural abnormalities to attain remaining goals. [x]  See Plan of Care  []  See progress note/recertification  []  See Discharge Summary         Progress towards goals / Updated goals:  Short Term Goals: To be accomplished in 2 weeks:              1.  Patient  Will report performance of HEP daily to facilitate improved outcomes and improved self management. EVAL Status:  Issued   Current: 11/22/19- Pt reports initiation of HEP      Long Term Goals:  To be accomplished in 4  weeks: 1.  Patient will report FOTO Score of 49 or greater to indicate significant improvement in functional status. EVAL Status:  34              2.  Patient will  Tolerate walking dog 2 blocks without difficulty. EVAL Status: walking between rooms moderate difficulty. 3.  Patient will tolerate putting on socks without difficulty. EVAL Status: Moderate difficulty. 4.  Patient to go up and down stairs to garage/laundry room with minimal difficulty. EVAL Status: Quite a bit of difficulty.     PLAN  []  Upgrade activities as tolerated     [x]  Continue plan of care  []  Update interventions per flow sheet       []  Discharge due to:_  []  Other:_      Lorna Vaca PTA 11/22/2019  8:34 AM    Future Appointments   Date Time Provider Leanna Sirena   11/22/2019 10:00 AM [facility]   11/23/2019 11:00 AM HBV [facility] RM 1 [facility]   11/23/2019  2:00 PM HBV MRI RM 1 [facility]   11/25/2019 12:00 PM Germán Fofana, PT [facility]   11/27/2019  9:00 AM Kathy Fofana, PT [facility]   12/2/2019  9:30 AM Derril Lab, PT [facility]   12/4/2019  9:30 AM Derril Lab, PT [facility]   12/5/2019 11:45 AM Gertrudis Carmichael MD [facility]   12/9/2019  9:30 AM Derril Lab, PT [facility]   12/11/2019  9:30 AM Derril Lab, PT [facility]   12/20/2019  9:30 AM Duong Mitchell MD [place] [id number]   1/6/2020  2:30 PM Hemalatha Perez  [address]

## 2019-11-23 ENCOUNTER — HOSPITAL ENCOUNTER (OUTPATIENT)
Age: 69
Discharge: HOME | End: 2019-11-23
Attending: ORTHOPAEDIC SURGERY
Payer: MEDICARE

## 2019-11-23 ENCOUNTER — HOSPITAL ENCOUNTER (EMERGENCY)
Age: 69
Discharge: STILL ADMITTED OR ADMINISTRATIVE | End: 2019-11-23
Attending: EMERGENCY MEDICINE

## 2019-11-23 DIAGNOSIS — M25.551 BILATERAL HIP PAIN: ICD-10-CM

## 2019-11-23 DIAGNOSIS — M25.552 BILATERAL HIP PAIN: ICD-10-CM

## 2019-11-23 PROCEDURE — 73721 MRI JNT OF LWR EXTRE W/O DYE: CPT

## 2019-11-25 ENCOUNTER — HOSPITAL ENCOUNTER (OUTPATIENT)
Dept: PHYSICAL THERAPY | Age: 69
Discharge: HOME | End: 2019-11-25
Payer: MEDICARE

## 2019-11-25 PROCEDURE — 97140 MANUAL THERAPY 1/> REGIONS: CPT

## 2019-11-25 PROCEDURE — 97110 THERAPEUTIC EXERCISES: CPT

## 2019-11-25 PROCEDURE — 97035 APP MDLTY 1+ULTRASOUND EA 15: CPT

## 2019-11-25 NOTE — PROGRESS NOTES
PT DAILY TREATMENT NOTE 10-18    Patient Name: Cristiano Borrero  Date:2019  : 1950  [x]  Patient  Verified  Payor: Dirk Warren / Plan: VA MEDICARE PART A & B / Product Type: Medicare /    In time:1200 Out time:1:08  Total Treatment Time (min): 63  5 minutes between while putting personal items away. Visit #: 03 of 12    Medicare/BCBS Only   Total Timed Codes (min):  53 1:1 Treatment Time:  48       Treatment Area: Pain in right hip [M25.551]  Left hip pain [M25.552]    SUBJECTIVE  Pain Level (0-10 scale): 7/10  Any medication changes, allergies to medications, adverse drug reactions, diagnosis change, or new procedure performed?: [x] No    [] Yes (see summary sheet for update)  Subjective functional status/changes:   [x] No changes reported  Patient reports symptoms are about the same, she woke up this morning with really high symptoms. OBJECTIVE    Modality rationale: decrease inflammation, decrease pain and increase tissue extensibility to improve the patients ability to improve transfers and ambulation. Min Type Additional Details   8 [x]  Ultrasound: [x]Continuous   [] Pulsed                           [x]1MHz   []3MHz W/cm2: 1.5  Location: Left hip   10 [x]  Ice     []  heat  []  Ice massage  []  Laser   []  Anodyne Position: Sitting  Location:Bilateral hip   [x] Skin assessment post-treatment:  [x]intact []redness- no adverse reaction    []redness  adverse reaction:     30 min Therapeutic Exercise:  [x] See flow sheet :  Added TA marching, Single leg to chest exercise. Patient can increase time on bike next visit. Worked on transfer supine, sideline, sitting. Rationale: increase ROM and increase strength to improve the patients ability to Improve positional tolerance. 15 min Manual Therapy:  Bilateral hip/pelvis, MFR Gentle grade I pressure to TFL, IT band, and Glute muscles.  MFR Hip unwinding technique.    Rationale: decrease pain, increase ROM and increase tissue extensibility to improve walking tolerance/ positional tolerance. With   [x] TE   [] TA   [] neuro   [] other: Patient Education: [x] Review HEP    [x] Progressed/Changed HEP based on:   [x] positioning   [] body mechanics   [x] transfers   [] heat/ice application    [] other:      Other Objective/Functional Measures: Added TA Marching, SKTC Stretching, Ice Seated improved pain symptoms. Pain Level (0-10 scale) post treatment: 6/10    ASSESSMENT/Changes in Function:   Patient continues to be extremely sensitive to soft tissues surrounding Left greater than Right Hip. Unwinding technique to Left hip did not increase symptoms and ice following treatment decreased symptoms. Patient had MRI past weekend and per report only revealed Soft tissue inflammation. Emphasis on increasing strength program.   Patient will continue to benefit from skilled PT services to address functional mobility deficits, address ROM deficits, address strength deficits, analyze and address soft tissue restrictions, assess and modify postural abnormalities and instruct in home and community integration to attain remaining goals. []  See Plan of Care  []  See progress note/recertification  []  See Discharge Summary         Progress towards goals / Updated goals:  Short Term Goals: To be accomplished in 2 weeks:              6.  Patient  Will report performance of HEP daily to facilitate improved outcomes and improved self management.              EVAL Status:  Issued              Current: 11/22/19- Pt reports initiation of HEP      Long Term Goals: To be accomplished in 4  weeks:              1.  Patient will report FOTO Score of 49 or greater to indicate significant improvement in functional status.             EVAL Status:  34              2.  Patient will  Tolerate walking dog 2 blocks without difficulty.             EVAL Status: walking between rooms moderate difficulty.   Lyla Luciano will tolerate putting on socks without difficulty.             EVAL Status:  Moderate difficulty.             4.  Patient to go up and down stairs to garage/laundry room with minimal difficulty.             EVAL Status: Quite a bit of difficulty.     PLAN  [x]  Upgrade activities as tolerated     [x]  Continue plan of care  []  Update interventions per flow sheet       []  Discharge due to:_  []  Other:_      Mary Hartman, PT 11/25/2019  11:59 AM    Future Appointments   Date Time Provider Leanna Pack   11/25/2019 12:00 PM [facility]   11/27/2019  9:00 AM [facility]   12/2/2019  9:30 AM [facility]   12/4/2019  9:30 AM [facility]   12/5/2019 11:45 AM Zachary Candelario MD [facility]   12/9/2019  9:30 AM [facility]ino[facility]   12/11/2019  9:30 AM [facility] Martino[facility]   12/20/2019  9:30 AM Jacqueline Sabillon [place] [id number] [facility]   1/6/2020  2:30 PM Aileen Howe  [address]

## 2019-11-27 ENCOUNTER — HOSPITAL ENCOUNTER (OUTPATIENT)
Dept: PHYSICAL THERAPY | Age: 69
Discharge: HOME | End: 2019-11-27
Payer: MEDICARE

## 2019-11-27 PROCEDURE — 97014 ELECTRIC STIMULATION THERAPY: CPT

## 2019-11-27 PROCEDURE — 97112 NEUROMUSCULAR REEDUCATION: CPT

## 2019-11-27 PROCEDURE — 97110 THERAPEUTIC EXERCISES: CPT

## 2019-11-27 NOTE — PROGRESS NOTES
PT DAILY TREATMENT NOTE 10-18    Patient Name: Daphnie Solitario  Date:2019  : 1950  [x]  Patient  Verified  Payor: [place] MED[place]ARE / Plan: [place] MEDICARE PART A & B / Product Type: Medicare /    In time:0900  Out time:1012  Total Treatment Time (min): 60  12 extra minutes accounted for Rest following transitions from supine/sit. Set up and Take off of modality. Visit #: 04 of 12    Medicare/BCBS Only   Total Timed Codes (min):  45 1:1 Treatment Time:  60       Treatment Area: Pain in right hip [M25.551]  Left hip pain [M25.552]    SUBJECTIVE  Pain Level (0-10 scale): 810  Any medication changes, allergies to medications, adverse drug reactions, diagnosis change, or new procedure performed?: [x] No    [] Yes (see summary sheet for update)  Subjective functional status/changes:   [] No changes reported  Patient reports having a lot of pain after last treatment, however she ran errands and it may have been too much. Saw pediatrist who said he has heel spurs on Left foot. OBJECTIVE    Modality rationale: decrease inflammation, decrease pain and increase tissue extensibility to improve the patients ability to to ambulate. Min Type Additional Details   15 [] Estim:  []Unatt       []IFC  [x]Premod                        []Other:  [x]w/ice   []w/heat  Position:Sitting   Location:Bilatetal Hips Intensity 5   [x] Skin assessment post-treatment:  [x]intact []redness- no adverse reaction    []redness  adverse reaction:         10 min Therapeutic Exercise:  [x] See flow sheet :   Rationale: increase ROM and increase strength to improve the patients ability to decrease pain with activity. 10 min Therapeutic Activity:  []  See flow sheet : Transfers from Supine to Side line to Stand on Reformer. Rationale: increase ROM, increase strength and improve coordination  to improve the patients ability to transfer without pain.       25 min Neuromuscular Re-education:  [x]  See flow sheet :Reformer Leg Pressing cuing TA and Breath. Feet on heels, parallel, V, wide parallel and V.  Marching to table top. Working on Transfer Techiniques. Rationale: increase strength, improve coordination and Core Stability. to improve the patients ability to ambulate without pain, and decrease overall pain sensitivity. With   [x] TE   [x] TA   [x] neuro   [] other: Patient Education: [x] Review HEP    [x] Progressed/Changed HEP based on:   [x] positioning   [x] body mechanics   [x] transfers   [x] heat/ice application    [] other:      Other Objective/Functional Measures:   Transfer Sit/Supine:  Supine/Sit:  Min Assist, Patient having decreased awareness of body in space. Pain Level (0-10 scale) post treatment: 6/10    ASSESSMENT/Changes in Function: Patient improved with motion. Good tolerance to recumbent bike, decreasing symptoms. Patient benefited from [address] work today, however very challenging with transfers on/off carriage. While on Reformer Decreasing pain response., and less back pain then while on table for exercise. Emphasis on breath work to decrease pain response. Trail with PreMod Stimulation versus US today. Pain level less when patient left. Patient will continue to benefit from skilled PT services to modify and progress therapeutic interventions, address functional mobility deficits, address ROM deficits, address strength deficits, analyze and address soft tissue restrictions, analyze and modify body mechanics/ergonomics, assess and modify postural abnormalities and instruct in home and community integration to attain remaining goals.      [x]  See Plan of Care  []  See progress note/recertification  []  See Discharge Summary         Progress towards goals / Updated goals:  Short Term Goals: To be accomplished in 2 weeks:              4.  Patient  Will report performance of HEP daily to facilitate improved outcomes and improved self management.              EVAL Status:  Issued              11/22/19- Pt reports initiation of HEP    11/27/19 - Performing HEP Daily     Long Term Goals: To be accomplished in 4  weeks:              1.  Patient will report FOTO Score of 49 or greater to indicate significant improvement in functional status.             EVAL Status:  34              2.  Patient will  Tolerate walking dog 2 blocks without difficulty.             EVAL Status: walking between rooms moderate difficulty. Current:  11/25/19 Progressing household distances still challenging.               3.  Patient will tolerate putting on socks without difficulty.             EVAL Status:  Moderate difficulty.             4.  Patient to go up and down stairs to garage/laundry room with minimal difficulty.             EVAL Status: Quite a bit of difficulty. PLAN  [x]  Upgrade activities as tolerated     []  Continue plan of care  []  Update interventions per flow sheet       []  Discharge due to:_  [x]  Other: Assess patient's symptoms following last treatment.        [place], [place] 11/27/2019  9:05 AM    Future Appointments   Date Time Provider Leanna Lovei   12/2/2019  9:30 AM [facility] Dunlap, [facility]   12/4/2019  9:30 AM Susannah Keen, PT [facility]   12/5/2019 11:45 AM Josiah Montaño MD [facility]   12/9/2019  9:30 AM Susannah Keen PT [facility]   12/11/2019  9:30 AM Susannah Keen, PT [facility]   12/20/2019  9:30 AM Gabriela Morejon MD [facility] [address]   1/6/2020  2:30 PM Meliton Echols  [address]

## 2019-12-02 ENCOUNTER — HOSPITAL ENCOUNTER (OUTPATIENT)
Dept: PHYSICAL THERAPY | Age: 69
Discharge: HOME | End: 2019-12-02
Payer: MEDICARE

## 2019-12-02 PROCEDURE — 97112 NEUROMUSCULAR REEDUCATION: CPT

## 2019-12-02 PROCEDURE — 97110 THERAPEUTIC EXERCISES: CPT

## 2019-12-02 PROCEDURE — 97014 ELECTRIC STIMULATION THERAPY: CPT

## 2019-12-02 NOTE — PROGRESS NOTES
PT DAILY TREATMENT NOTE 10-18    Patient Name: Mckayla Elmore  Date:2019  : 1950  [x]  Patient  Verified  Payor: [place] MEDICARE / Plan: [place] MEDICARE PART A & B / Product Type: Medicare /    In time:916  Out time:  09 minutes of Set Up/Off of ESTIM  Total Treatment Time (min): 60  Visit #: 5 of 12    Medicare/BCBS Only   Total Timed Codes (min):  45 1:1 Treatment Time:  45       Treatment Area: Pain in right hip [M25.551]  Left hip pain [M25.552]    SUBJECTIVE  Pain Level (0-10 scale): 6/10  Any medication changes, allergies to medications, adverse drug reactions, diagnosis change, or new procedure performed?: [x] No    [] Yes (see summary sheet for update)  Subjective functional status/changes:   [] No changes reported  Patient reports no increased symptoms following last Weeks therapy session. She felt good on Thursday and was able to walk the dog a short distance. She does report increased R Hip pain and LB pain yesterday. Patient follows up with  On Dec 20th. OBJECTIVE    Modality rationale: decrease inflammation, decrease pain and increase tissue extensibility to improve the patients ability to Ambulate and Transfer. Min Type Additional Details   15 [] Estim:  []Unatt       []IFC  [x]Premod                        []Other:  [x]w/ice   []w/heat  Position:Sitting R: 6, L 8  Location: bilateral Hips   [] Skin assessment post-treatment:  [x]intact [x]redness- no adverse reaction    []redness  adverse reaction:       10 min Therapeutic Exercise:  [x] See flow sheet :   Rationale: increase strength to improve the patients ability to Transfer    5 min Therapeutic Activity:  []  See flow sheet : Transfer Training Supine/sit Log roll technique. Rationale: increase strength and Decrease pain  to improve the patients ability to Improve painfree transfers. 30 min Neuromuscular Re-education:  [x]  See flow sheet : Reformer  Removed Shoulder Rests.  Footwork 2 reds and 1 blue, single leg alternating foot work 1 red 1 blue. Rationale: increase strength, increase proprioception and core/hip/spinal stability  to improve the patients ability to perform ADL's and ambulate           With   [x] TE   [x] TA   [x] neuro   [] other: Patient Education: [x] Review HEP    [] Progressed/Changed HEP based on:   [x] positioning   [x] body mechanics   [x] transfers   [x] heat/ice application    [x] other:      Other Objective/Functional Measures: Improved transfers on/off Reformer. Pain Level (0-10 scale) post treatment: 4/10    ASSESSMENT/Changes in Function:   Patient demonstrated improved transfer on/off Reformer. However, Patient has decreased body awareness and fear of pain with transferring. Took shoulder rests off today. Patient does need cuing for breathing and to relax thoracolumbar junction. Patient reported lowest pain level today post treatment. Patient will continue to benefit from skilled PT services to modify and progress therapeutic interventions, address functional mobility deficits, address ROM deficits, address strength deficits, analyze and address soft tissue restrictions, analyze and cue movement patterns, analyze and modify body mechanics/ergonomics and instruct in home and community integration to attain remaining goals. [x]  See Plan of Care  []  See progress note/recertification  []  See Discharge Summary         Progress towards goals / Updated goals:  Short Term Goals: To be accomplished in 2 weeks:              0.  Patient  Will report performance of HEP daily to facilitate improved outcomes and improved self management.              EVAL Status:  Issued              11/22/19- Pt reports initiation of HEP               11/27/19 - Performing HEP Daily     Long Term Goals: To be accomplished in 4  weeks:              1.  Patient will report FOTO Score of 49 or greater to indicate significant improvement in functional status.             EVAL Status:  71              2.  Patient will  Tolerate walking dog 2 blocks without difficulty.             EVAL Status: walking between rooms moderate difficulty. 11/25/19 Progressing household distances still challenging. Current:  12/1/19 Able to walk 1 block 1x. First time in weeks.              7. Franklin Reveal will tolerate putting on socks without difficulty.             EVAL Status:  Moderate difficulty.             4.  Patient to go up and down stairs to garage/laundry room with minimal difficulty.             EVAL Status: Quite a bit of difficulty.     PLAN  [x]  Upgrade activities as tolerated     [x]  Continue plan of care  []  Update interventions per flow sheet       []  Discharge due to:_  []  Other:_      Tessy Matamoros, PT 12/2/2019  9:20 AM    Future Appointments   Date Time Provider Leanna Pack   12/2/2019  9:30 AM [facility]   12/4/2019  9:30 AM [facility]   12/5/2019 11:45 AM Isha Cifuentes MD [address]   12/9/2019  9:30 AM [facility]   12/11/2019  9:30 AM [facility]   12/20/2019  9:30 AM Stanton Thomas MD [facility] [address]   1/6/2020  2:30 PM Jarret Russell  [address]

## 2019-12-04 ENCOUNTER — HOSPITAL ENCOUNTER (OUTPATIENT)
Dept: PHYSICAL THERAPY | Age: 69
Discharge: HOME | End: 2019-12-04
Payer: MEDICARE

## 2019-12-04 PROCEDURE — 97112 NEUROMUSCULAR REEDUCATION: CPT

## 2019-12-04 PROCEDURE — 97014 ELECTRIC STIMULATION THERAPY: CPT

## 2019-12-04 PROCEDURE — 97110 THERAPEUTIC EXERCISES: CPT

## 2019-12-04 PROCEDURE — 97530 THERAPEUTIC ACTIVITIES: CPT

## 2019-12-04 NOTE — PROGRESS NOTES
PT DAILY TREATMENT NOTE 10-18    Patient Name: Mckayla Elmore  Date:2019  : 1950  [x]  Patient  Verified  Payor: Bret Mitchell / Plan: [place] MEDICARE PART A & B / Product Type: Medicare /    In OIFY:9495  Out time:1040  4 minutes of modality set up. Total Treatment Time (min): 65  Visit #: 6 of 12    Medicare/BCBS Only   Total Timed Codes (min):  45 1:1 Treatment Time:  40       Treatment Area: Pain in right hip [M25.551]  Left hip pain [M25.552]    SUBJECTIVE  Pain Level (0-10 scale): 6/10  Any medication changes, allergies to medications, adverse drug reactions, diagnosis change, or new procedure performed?: [x] No    [] Yes (see summary sheet for update)  Subjective functional status/changes:   [] No changes reported  Patient reports initially feeling good after Monday's treatment, however had pain later in the day. She would really like to be able to walk dog on a regular basis. OBJECTIVE    Modality rationale: decrease inflammation, decrease pain and increase tissue extensibility to improve the patients ability to Ambulate painfree. Min Type Additional Details   15 [x] Estim:  []Unatt       []IFC  [x]Premod                        []Other:  [x]w/ice   []w/heat  Position: sitting   Location: bilateral Hips   [x] Skin assessment post-treatment:  [x]intact []redness- no adverse reaction    []redness  adverse reaction:       10 min Therapeutic Exercise:  [x] See flow sheet :    Rationale: increase strength to improve the patients ability to ambulate. 15 min Therapeutic Activity:  []  See flow sheet : Transfer training on and off chair, reviewed supine sit transfers for home. Rationale: increase strength and Decrease pain  to improve the patients ability to transfer      20 min Neuromuscular Re-education:  [x]  See flow sheet :  Footwork on Wunda Chair. Two spring in middle position.     Rationale: increase strength and stability  to improve the patients ability to transfer and perform ADL's.            With   [x] TE   [] TA   [x] neuro   [] other: Patient Education: [x] Review HEP    [] Progressed/Changed HEP based on:   [x] positioning   [x] body mechanics   [x] transfers   [x] heat/ice application    [] other:      Other Objective/Functional Measures: Improved sit<>stand ability, Gait less antalgic     Pain Level (0-10 scale) post treatment: 4/10    ASSESSMENT/Changes in Function: Patient progressing slowly toward goals. Limited by pain, but improved ease of motion. Will upgrade standing exercise program next visit to address conditioning and decrease inflammatory response. Patient will continue to benefit from skilled PT services to modify and progress therapeutic interventions, address functional mobility deficits and address strength deficits to attain remaining goals. [x]  See Plan of Care  []  See progress note/recertification  []  See Discharge Summary         Progress towards goals / Updated goals:  Short Term Goals: To be accomplished in 2 weeks:              1.  Patient  Will report performance of HEP daily to facilitate improved outcomes and improved self management.              EVAL Status:  Issued              11/22/19- Pt reports initiation of HEP               11/27/19 - Performing HEP Daily     Long Term Goals: To be accomplished in 4  weeks:              1.  Patient will report FOTO Score of 49 or greater to indicate significant improvement in functional status.             EVAL Status:  34              2.  Patient will  Tolerate walking dog 2 blocks without difficulty.             EVAL Status: walking between rooms moderate difficulty.             11/25/19 Progressing household distances still challenging. Current:  12/1/19 Able to walk 1 block 1x. First time in weeks.              2. Carlene Virgen will tolerate putting on socks without difficulty.             EVAL Status:  Moderate difficulty. Current status:  Moderate difficulty              4.  Patient to go up and down stairs to garage/laundry room with minimal difficulty.             EVAL Status: Quite a bit of difficulty. PLAN  [x]  Upgrade activities as tolerated     []  Continue plan of care  []  Update interventions per flow sheet       []  Discharge due to:_  [x]  Other:Upgrade therex standing program as tolerated.       [place], [place] 12/4/2019  12:24 PM    Future Appointments   Date Time Provider Leanna Youisti   12/5/2019 11:45 AM Lorena Ram MD [facility]   12/6/2019 11:00 AM Caitlin Leigh, PTA [facility]   12/9/2019  9:30 AM Savilla Begin, PT [facility]   12/11/2019  9:30 AM Savilla Begin, PT [facility]   12/13/2019  9:00 AM Caitlin Leigh, PTA [facility]   12/16/2019 10:00 AM Savilla Begin, PT [facility]   12/18/2019 10:00 AM Savilla Begin, PT [facility]   12/20/2019  9:30 AM Hussein Seth MD [facility] [address]   12/30/2019  1:00 PM Caitlin Leigh, PTA [facility]   1/2/2020  1:30 PM Caitlin Leigh, PTA [facility]   1/3/2020 10:00 AM Caitlin Leigh, PTA [facility]   1/6/2020  2:30 PM Janell Dakins,  [address]

## 2019-12-05 ENCOUNTER — OFFICE VISIT (OUTPATIENT)
Dept: INTERNAL MEDICINE CLINIC | Age: 69
End: 2019-12-05

## 2019-12-05 VITALS
HEIGHT: 66 IN | RESPIRATION RATE: 16 BRPM | DIASTOLIC BLOOD PRESSURE: 46 MMHG | BODY MASS INDEX: 45.16 KG/M2 | TEMPERATURE: 97.4 F | OXYGEN SATURATION: 98 % | WEIGHT: 281 LBS | SYSTOLIC BLOOD PRESSURE: 109 MMHG | HEART RATE: 60 BPM

## 2019-12-05 DIAGNOSIS — M25.552 PAIN OF BOTH HIP JOINTS: ICD-10-CM

## 2019-12-05 DIAGNOSIS — Z12.11 SCREENING FOR COLON CANCER: ICD-10-CM

## 2019-12-05 DIAGNOSIS — N32.81 OAB (OVERACTIVE BLADDER): Primary | ICD-10-CM

## 2019-12-05 DIAGNOSIS — E66.01 OBESITY, MORBID, BMI 40.0-49.9 (HCC): ICD-10-CM

## 2019-12-05 DIAGNOSIS — I10 ESSENTIAL HYPERTENSION: ICD-10-CM

## 2019-12-05 DIAGNOSIS — M79.672 INTRACTABLE LEFT HEEL PAIN: ICD-10-CM

## 2019-12-05 DIAGNOSIS — R73.02 GLUCOSE INTOLERANCE (IMPAIRED GLUCOSE TOLERANCE): ICD-10-CM

## 2019-12-05 DIAGNOSIS — M25.551 PAIN OF BOTH HIP JOINTS: ICD-10-CM

## 2019-12-05 RX ORDER — ATENOLOL AND CHLORTHALIDONE TABLET 50; 25 MG/1; MG/1
1 TABLET ORAL DAILY
Qty: 90 TAB | Refills: 3 | Status: SHIPPED | OUTPATIENT
Start: 2019-12-05 | End: 2020-11-20

## 2019-12-05 RX ORDER — TERBINAFINE HYDROCHLORIDE 250 MG/1
250 TABLET ORAL DAILY
COMMUNITY
Start: 2019-11-29 | End: 2020-03-05 | Stop reason: ALTCHOICE

## 2019-12-05 RX ORDER — TOLTERODINE 2 MG/1
2 CAPSULE, EXTENDED RELEASE ORAL DAILY
Qty: 30 CAP | Refills: 5 | Status: SHIPPED | OUTPATIENT
Start: 2019-12-05 | End: 2020-03-05 | Stop reason: SDUPTHER

## 2019-12-05 NOTE — PROGRESS NOTES
Clayton Meehan presents today for   Chief Complaint   Patient presents with    Hypertension     follow up ROOM 3    Labs     done 11-07-19     Patient states she had a side effect to Oxybutynin with dry mouth and problems swallowing, and has stopped taking the medication. Is someone accompanying this pt? no    Is the patient using any DME equipment during [address]? no    Depression Screening:  3 most recent PHQ Screens 11/7/2019   Little interest or pleasure in doing things Nearly every day   Feeling down, depressed, irritable, or hopeless Nearly every day   Total Score PHQ 2 6   Trouble falling or staying asleep, or sleeping too much More than half the days   Feeling tired or having little energy More than half the days   Poor appetite, weight loss, or overeating More than half the days   Feeling bad about yourself - or that you are a failure or have let yourself or your family down Several days   Trouble concentrating on things such as school, work, reading, or watching TV Several days   Moving or speaking so slowly that other people could have noticed; or the opposite being so fidgety that others notice Not at all   Thoughts of being better off dead, or hurting yourself in some way Not at all   PHQ 9 Score 14   How difficult have these problems made it for you to do your work, take care of your home and get along with others Very difficult       Learning Assessment:  Learning Assessment 1/10/2019   PRIMARY LEARNER Patient   HIGHEST LEVEL OF EDUCATION - PRIMARY LEARNER  SOME COLLEGE   BARRIERS PRIMARY LEARNER NONE   CO-LEARNER CAREGIVER No   PRIMARY LANGUAGE ENGLISH   LEARNER PREFERENCE PRIMARY DEMONSTRATION   ANSWERED BY patient   RELATIONSHIP SELF       Abuse Screening:  Abuse Screening Questionnaire 1/10/2019   Do you ever feel afraid of your partner? N   Are you in a relationship with someone who physically or mentally threatens you? N   Is it safe for you to go home?  Y       Fall Risk  Fall Risk Assessment, last 12 mths 11/15/2019   Able to walk? Yes   Fall in past 12 months? Yes   Fall with injury? No   Number of falls in past 12 months 1   Fall Risk Score 1       Health Maintenance reviewed and discussed and ordered per Provider. Health Maintenance Due   Topic Date Due    COLONOSCOPY  06/23/2018    Pneumococcal 65+ years (2 of 2 - PPSV23) 07/09/2019   . Patient had the Pneumococcal 23 Vaccine on 01-05-13. Coordination of Care:  1. Have you been to the ER, urgent care clinic since your last visit? Hospitalized since your last visit? no    2. Have you seen or consulted any other health care providers outside of the [address] [place] [address] since your last visit? Include any pap smears or colon screening.  no

## 2019-12-05 NOTE — PROGRESS NOTES
INTERNISTS OF [facility]:  12/5/2019, MRN: [id number]      Cristiano Borrero is a 81 y.o. female and presents to clinic for Hypertension (follow up ROOM 3) and Labs (done 11-07-19)    Subjective: The patient is a 62-year-old female with history of obesity, allergic rhinitis, IBS-diarrhea, onychomycosis, prediabetes, lumbar spinal stenosis, depression, hypertension, adrenal incidentaloma seen on CT scan 2015, and history of cerebral infarction her head MRI findings (followed by Neurology). 1.  Hip Pain: Present since June. Pain was described previously as sharp. No relief with treatment from her chiropractor in the past.  No history of trauma. She had associated numbness and tingling along her legs. NSAIDs did not relieve her symptoms. Symptoms not improved with steroid injections. At time of her last appointment, she reported falling down her garage accidentally. She reported worsening of her bilateral hip pain. There is no loss of consciousness. She had an MRI last month of her spine. Findings are listed below. She is on gabapentin 300 mg daily. At her last appointment, given the severity of her pain symptoms, Norco was added for temporary relief. Since then, she also saw Orthopedics. MRI studies of her hips were ordered. Today, she reports: 4/10 pain. Pain has improved with PT. She continues to have lower back pain associated with paresthesias/weakness. Lower back pain is worsening. She is being sent to a spine specialist in January by Romi Martin.      11/23/19 Hip MRI: Paucity of findings at the bilateral hips. Mild right and trace left hip greater trochanter enthesopathy. More notable degenerative findings at the lower lumbar spine; reference separate dedicated imaging of MRI 11/5/2019 11/5/19 Lumbar MRI: Mild spinal canal stenosis at L2-3 and L4-5. No significant foraminal stenosis and no evidence of nerve root compression. Incidental 1.4 x 1.2 cm left adrenal nodule.  Stable when compared to a 9/9/2015 abdominal CT. 2. HTN/Prediabetes: Her BP is 109/46 today. She is taking: Atenolol-chlorthalidone. No adverse side effects of taking this medication. No dizzy spells. Her most recent labs show normal kidney function. Her liver function tests are normal as well. Her most recent labs also show elevation of her A1c to 6.2 from 6.1 just 10 months ago. She is not regularly exercising or dieting. Her weight is 281 pounds. 3. HLD: Her most recent labs show: Her total cholesterol is 147. Her triglycerides are 161. Her HDL is 53. Her LDL 61. She is taking Crestor and reports no adverse side effects from his medicine. 4. OAB: At her last appointment, she reported increased urinary urgency and frequency. She had associated incontinence from the symptoms. No dysuria or hematuria were described. A trial of oxybutynin was ordered. Since then, she had severe mouth dryness from oxybutynin. The medicine was \"starting to have an effect\" on her urgency sx. 5. LLE Pain: She was referred at her last appointment to Podiatry after having left heel pain. Since then,she met with Podiatry; she was diagnosed with a left heel spur. Heel inserts were ordered but are not improving her sx. She is scheduled to f/u with Podiatry in January. She was diagnosed with an ingrown toe nail as well and treated since her last apt. She was also placed on terbinafine since her last apt for onychomycosis.        Patient Active Problem List    Diagnosis Date Noted    Controlled substance agreement signed 7/7/17 07/09/2017    Obesity, morbid, BMI 40.0-49.9  12/06/2016    Allergic rhinitis 12/06/2016    Irritable bowel syndrome with diarrhea 12/06/2016    Onychomycosis 12/06/2016    Glucose intolerance (impaired glucose tolerance) 12/01/2016    Single current episode of major depressive disorder (has tried celexa, wellbutrin, mirtazapine with adverse effects) 12/01/2016    Essential hypertension 12/01/2016    History of cerebral infarction - per head MRI findings 12/01/2016       Current Outpatient Medications   Medication Sig Dispense Refill    terbinafine HCl (LAMISIL) 250 mg tablet Take 250 mg by mouth daily.  escitalopram oxalate (LEXAPRO) 5 mg tablet Take 1 Tab by mouth daily. Take daily with a 10mg daily tab for a total daily dose of 15mg daily. 90 Tab 3    rosuvastatin (CRESTOR) 10 mg tablet TAKE 1 TABLET BY MOUTH NIGHTLY 90 Tab 1    atenolol-chlorthalidone (TENORETIC) 100-25 mg per tablet Take 1 Tab by mouth daily. 90 Tab 3    gabapentin (NEURONTIN) 300 mg capsule Take 300 mg by mouth nightly.  fluticasone (FLONASE) 50 mcg/actuation nasal spray 2 Sprays by Both Nostrils route daily. 1 Bottle 11    Cholecalciferol, Vitamin D3, (VITAMIN D3) 1,000 unit cap Take 1,000 Units by mouth daily.  aspirin-dipyridamole (AGGRENOX)  mg per SR capsule Take 1 Cap by mouth two (2) times a day. Allergies   Allergen Reactions    Amoxicillin-Pot Clavulanate Diarrhea    Azithromycin Diarrhea    Oxybutynin Other (comments)     Problems swallowing, dry mouth    Seroquel [Quetiapine] Other (comments)     Tremors    Sulfa (Sulfonamide Antibiotics) Diarrhea    Wellbutrin [Bupropion Hcl] Itching       Past Medical History:   Diagnosis Date    Hypercholesterolemia     Hypertension     Stroke [facility])        Past Surgical History:   Procedure Laterality Date    HX APPENDECTOMY  09/09/2015    HX CHOLECYSTECTOMY      HX GYN      partial hysterectomy       Family History   Problem Relation Age of Onset    Dementia Mother     Stroke Father     Cancer Brother     Hypertension Brother        Social History     Tobacco Use    Smoking status: Never Smoker    Smokeless tobacco: Never Used   Substance Use Topics    Alcohol use: No       ROS   Review of Systems   Constitutional: Negative for chills, fever and malaise/fatigue. HENT: Negative for ear pain and sore throat.     Eyes: Negative for blurred vision and pain.   Respiratory: Negative for cough and shortness of breath. Cardiovascular: Negative for chest pain. Gastrointestinal: Negative for abdominal pain, blood in stool and melena. Genitourinary: Negative for dysuria and hematuria. Musculoskeletal: Positive for joint pain. Negative for myalgias. Skin: Negative for rash. Neurological: Negative for tingling, focal weakness and headaches. Endo/Heme/Allergies: Does not bruise/bleed easily. Psychiatric/Behavioral: Negative for substance abuse. Objective     Vitals:    12/05/19 1144   BP: 109/46   Pulse: 60   Resp: 16   Temp: 97.4 °F (36.3 °C)   TempSrc: Oral   SpO2: 98%   Weight: 281 lb (127.5 kg)   Height: 5' 6\" (1.676 m)   PainSc:   0 - No pain       Physical Exam  Vitals signs and nursing note reviewed. HENT:      Head: Normocephalic and atraumatic. Right Ear: External ear normal.      Left Ear: External ear normal.      Nose: Nose normal.      Mouth/Throat:      Pharynx: No oropharyngeal exudate. Eyes:      General: No scleral icterus. Right eye: No discharge. Left eye: No discharge. Conjunctiva/sclera: Conjunctivae normal.   Neck:      Musculoskeletal: Neck supple. Cardiovascular:      Rate and Rhythm: Normal rate and regular rhythm. Heart sounds: Normal heart sounds. No murmur. No friction rub. No gallop. Pulmonary:      Effort: Pulmonary effort is normal. No respiratory distress. Breath sounds: Normal breath sounds. No wheezing or rales. Chest:      Chest wall: No tenderness. Abdominal:      General: Bowel sounds are normal. There is no distension. Palpations: Abdomen is soft. There is no mass. Tenderness: There is no tenderness. There is no guarding or rebound. Musculoskeletal:         General: No tenderness (BUE). Comments: She has adequate range of motion along her left foot and bilateral hip joints, better than at her last appointment.   Range along her hip joints is still restricted secondary to some pain associated with exercise. No effusions are present. Lymphadenopathy:      Cervical: No cervical adenopathy. Skin:     General: Skin is warm and dry. Findings: No erythema. Neurological:      Mental Status: She is alert and oriented to person, place, and time. Motor: No abnormal muscle tone. Gait: Gait is intact. Psychiatric:         Mood and Affect: Affect normal.         LABS   Data Review:   Lab Results   Component Value Date/Time    WBC 7.1 01/10/2019 08:55 AM    HGB 13.6 01/10/2019 08:55 AM    HCT 44.6 01/10/2019 08:55 AM    PLATELET 860 72/38/5142 08:55 AM    MCV 99.6 (H) 01/10/2019 08:55 AM       Lab Results   Component Value Date/Time    Sodium 142 11/07/2019 02:23 PM    Potassium 3.6 11/07/2019 02:23 PM    Chloride 104 11/07/2019 02:23 PM    CO2 34 (H) 11/07/2019 02:23 PM    Anion gap 4 11/07/2019 02:23 PM    Glucose 98 11/07/2019 02:23 PM    BUN 21 (H) 11/07/2019 02:23 PM    Creatinine 0.84 11/07/2019 02:23 PM    BUN/Creatinine ratio 25 (H) 11/07/2019 02:23 PM    GFR est AA >60 11/07/2019 02:23 PM    GFR est non-AA >60 11/07/2019 02:23 PM    Calcium 9.7 11/07/2019 02:23 PM       Lab Results   Component Value Date/Time    Cholesterol, total 147 11/07/2019 02:23 PM    HDL Cholesterol 53 11/07/2019 02:23 PM    LDL, calculated 61.8 11/07/2019 02:23 PM    VLDL, calculated 32.2 11/07/2019 02:23 PM    Triglyceride 161 (H) 11/07/2019 02:23 PM    CHOL/HDL Ratio 2.8 11/07/2019 02:23 PM       Lab Results   Component Value Date/Time    Hemoglobin A1c 6.2 (H) 11/07/2019 02:23 PM    Hemoglobin A1c, External 5.8 09/08/2015       Assessment/Plan:   1. Prediabetes: Worsened.  -I encouraged her to reduce her carb intake in order to prevent progression to type 2 diabetes. We discussed the importance of weight reduction as well. I will recheck her weight at her follow-up appointment. 2. OAB:   -Ordering tolterodine.   Oxybutynin added at the medication intolerance to her chart. 3. HLD: Stable. -Continue with Rx as prescribed. 4.HTN: BP is a little low. - Decreasing her atenolol-chlorthalidone to 50-25mg daily instead of 100-25mg daily.   - RTC for a BP check    5. B/l Hip Pain:   -Activity as tolerated. -Continue to follow-up with Orthopedics    6. LLE Heel Pain:   -Continue to follow-up with Podiatry  - Activity as tolerated. Health Maintenance Due   Topic Date Due    COLONOSCOPY  06/23/2018    Pneumococcal 65+ years (2 of 2 - PPSV23) 07/09/2019     Lab review: labs are reviewed in the EHR    I have discussed the diagnosis with the patient and the intended plan as seen in the above orders. The patient has received an after-visit summary and questions were answered concerning future plans. I have discussed medication side effects and warnings with the patient as well. I have reviewed the plan of care with the patient, accepted their input and they are in agreement with the treatment goals. All questions were answered. The patient understands the plan of care. Handouts provided today with above information. Pt instructed if symptoms worsen to call the office or report to the ED for continued care. Greater than 50% of the visit time was spent in counseling and/or coordination of care. Voice recognition was used to generate this report, which may have resulted in some phonetic based errors in grammar and contents. Even though attempts were made to correct all the mistakes, some may have been missed, and remained in the body of the document.           Leana Cuba MD

## 2019-12-05 NOTE — PATIENT INSTRUCTIONS
Health Maintenance Due Topic Date Due  
 COLONOSCOPY  06/23/2018  Pneumococcal 65+ years (2 of 2 - PPSV23) 07/09/2019 Patient had the Pneumococcal 23 Vaccine on 01-05-13. Body Mass Index: Care Instructions Your Care Instructions Body mass index (BMI) can help you see if your weight is raising your risk for health problems. It uses a formula to compare how much you weigh with how tall you are. · A BMI lower than 18.5 is considered underweight. · A BMI between 18.5 and 24.9 is considered healthy. · A BMI between 25 and 29.9 is considered overweight. A BMI of 30 or higher is considered obese. If your BMI is in the normal range, it means that you have a lower risk for weight-related health problems. If your BMI is in the overweight or obese range, you may be at increased risk for weight-related health problems, such as high blood pressure, heart disease, stroke, arthritis or joint pain, and diabetes. If your BMI is in the underweight range, you may be at increased risk for health problems such as fatigue, lower protection (immunity) against illness, muscle loss, bone loss, hair loss, and hormone problems. BMI is just one measure of your risk for weight-related health problems. You may be at higher risk for health problems if you are not active, you eat an unhealthy diet, or you drink too much alcohol or use tobacco products. Follow-up care is a key part of your treatment and safety. Be sure to make and go to all appointments, and call your doctor if you are having problems. It's also a good idea to know your test results and keep a list of the medicines you take. How can you care for yourself at home? · Practice healthy eating habits. This includes eating plenty of fruits, vegetables, whole grains, lean protein, and low-fat dairy. · If your doctor recommends it, get more exercise. Walking is a good choice. Bit by bit, increase the amount you walk every day.  Try for at least 30 minutes on most days of the week. · Do not smoke. Smoking can increase your risk for health problems. If you need help quitting, talk to your doctor about stop-smoking programs and medicines. These can increase your chances of quitting for good. · Limit alcohol to 2 drinks a day for men and 1 drink a day for women. Too much alcohol can cause health problems. If you have a BMI higher than 25 · Your doctor may do other tests to check your risk for weight-related health problems. This may include measuring the distance around your waist. A waist measurement of more than 40 inches in men or 35 inches in women can increase the risk of weight-related health problems. · Talk with your doctor about steps you can take to stay healthy or improve your health. You may need to make lifestyle changes to lose weight and stay healthy, such as changing your diet and getting regular exercise. If you have a BMI lower than 18.5 · Your doctor may do other tests to check your risk for health problems. · Talk with your doctor about steps you can take to stay healthy or improve your health. You may need to make lifestyle changes to gain or maintain weight and stay healthy, such as getting more healthy foods in your diet and doing exercises to build muscle. Where can you learn more? Go to http://miguel-mamta.info/. Enter S176 in the search box to learn more about \"Body Mass Index: Care Instructions. \" Current as of: March 28, 2019 Content Version: 12.2 © 7713-3946 [employer]. Care instructions adapted under license by [employer] (which disclaims liability or warranty for this information). If you have questions about a medical condition or this instruction, always ask your healthcare professional. [address] any warranty or liability for your use of this information.

## 2019-12-06 ENCOUNTER — HOSPITAL ENCOUNTER (OUTPATIENT)
Dept: PHYSICAL THERAPY | Age: 69
Discharge: HOME | End: 2019-12-06
Payer: MEDICARE

## 2019-12-06 PROCEDURE — 97035 APP MDLTY 1+ULTRASOUND EA 15: CPT

## 2019-12-06 PROCEDURE — 97110 THERAPEUTIC EXERCISES: CPT

## 2019-12-06 NOTE — PROGRESS NOTES
PT DAILY TREATMENT NOTE 10-18    Patient Name: Lotus Fleming  Date:2019  : 1950  [x]  Patient  Verified  [place]or: [place] MED[place] / Plan: [place] MEDICARE PART A & B / Product Type: Medicare /    In time:11:00  Out time:12:00  Total Treatment Time (min): 60  Visit #: 7of 12    Medicare/BCBS Only   Total Timed Codes (min):  50 1:1 Treatment Time:  44       Treatment Area: Pain in right hip [M25.551]  Left hip pain [M25.552]    SUBJECTIVE  Pain Level (0-10 scale): 6  Any medication changes, allergies to medications, adverse drug reactions, diagnosis change, or new procedure performed?: [x] No    [] Yes (see summary sheet for update)  Subjective functional status/changes:   [] No changes reported  Pt reports she felt some muscle spasms in both of her hips this morning. OBJECTIVE    Modality rationale: decrease inflammation, decrease pain and increase tissue extensibility to improve the patients ability to tolerate ADL's.    Min Type Additional Details    [] Estim:  []Unatt       []IFC  []Premod                        []Other:  []w/ice   []w/heat  Position:  Location:    [] Estim: []Att    []TENS instruct  []NMES                    []Other:  []w/US   []w/ice   []w/heat  Position:  Location:    []  Traction: [] Cervical       []Lumbar                       [] Prone          []Supine                       []Intermittent   []Continuous Lbs:  [] before manual  [] after manual   8 [x]  Ultrasound: [x]Continuous   [] Pulsed                           [x]1MHz   []3MHz W/cm2:1.5  Location: left hip    []  Iontophoresis with dexamethasone         Location: [] Take home patch   [] In clinic   10 [x]  Ice     []  heat  []  Ice massage  []  Laser   []  Anodyne Position:seated  Location: L/S/ B hips    []  Laser with stim  []  Other:  Position:  Location:    []  Vasopneumatic Device Pressure:       [] lo [] med [] hi   Temperature: [] lo [] med [] hi   [] Skin assessment post-treatment:  []intact []redness- no adverse reaction    []redness  adverse reaction:       42 min Therapeutic Exercise:  [x] See flow sheet :   Rationale: increase ROM and increase strength to improve the patients ability to perform functional task with ease. With   [] TE   [] TA   [] neuro   [] other: Patient Education: [x] Review HEP    [] Progressed/Changed HEP based on:   [] positioning   [] body mechanics   [] transfers   [] heat/ice application    [] other:      Other Objective/Functional Measures:   Standing hip abd/add- 10x ea  HR - 10x      Pain Level (0-10 scale) post treatment: 4    ASSESSMENT/Changes in Function:   Pt displayed a fair tolerance to added standing exercises noting some discomfort in the left foot with WB for an extended time due to a heel spur. Pt reports no pain with progressed therex and decreased pain post treatment. Patient will continue to benefit from skilled PT services to modify and progress therapeutic interventions, address functional mobility deficits, address ROM deficits, address strength deficits, analyze and address soft tissue restrictions, analyze and cue movement patterns, analyze and modify body mechanics/ergonomics and assess and modify postural abnormalities to attain remaining goals. [x]  See Plan of Care  []  See progress note/recertification  []  See Discharge Summary         Progress towards goals / Updated goals:  Short Term Goals: To be accomplished in 2 weeks:              5.  Patient  Will report performance of HEP daily to facilitate improved outcomes and improved self management.              EVAL Status:  Issued              11/22/19- Pt reports initiation of HEP               11/27/19 - Performing HEP Daily     Long Term Goals: To be accomplished in 4  weeks:              1.  Patient will report FOTO Score of 49 or greater to indicate significant improvement in functional status.               EVAL Status:  34              2.  Patient will  Tolerate walking dog 2 blocks without difficulty.             EVAL Status: walking between rooms moderate difficulty.             11/25/19 Progressing household distances still challenging.               LHTDSPQ:  12/1/19 Able to walk 1 block 1x.  First time in weeks.              3.  Patient will tolerate putting on socks without difficulty.             EVAL Status:  Moderate difficulty. Current status: Moderate difficulty              4.  Patient to go up and down stairs to garage/laundry room with minimal difficulty.             EVAL Status: Quite a bit of difficulty.   PLAN  []  Upgrade activities as tolerated     [x]  Continue plan of care  []  Update interventions per flow sheet       []  Discharge due to:_  []  Other:_      Terri Bob PTA 12/6/2019  11:35 AM    Future Appointments   Date Time Provider Leanna Sirena   12/9/2019  9:30 AM Kevin Alvarez, PT [facility]   12/11/2019  9:30 AM Germán Fofana, PT [facility]   12/13/2019  9:00 AM Harmeet Garcia PTA [facility]   12/16/2019 10:00 AM Kevin Alvarez, PT [facility]   12/18/2019 10:00 AM Kevin Alvarez, PT [facility]   12/20/2019  9:30 AM Jaclyn Garcia MD [address]   12/30/2019  1:00 PM Harmeet Garcia PTA [facility]   1/2/2020  1:30 PM Harmeet Garcia PTA [facility]   1/3/2020 10:00 AM Harmeet Garcia PTA [facility]   1/6/2020  2:30 PM Eze Deleon  [address] [place] [address]   3/5/2020  9:00 AM Deann Abel MD [facility]

## 2019-12-09 ENCOUNTER — HOSPITAL ENCOUNTER (OUTPATIENT)
Dept: PHYSICAL THERAPY | Age: 69
Discharge: HOME | End: 2019-12-09
Payer: MEDICARE

## 2019-12-09 PROCEDURE — 97110 THERAPEUTIC EXERCISES: CPT

## 2019-12-09 PROCEDURE — 97112 NEUROMUSCULAR REEDUCATION: CPT

## 2019-12-09 PROCEDURE — 97035 APP MDLTY 1+ULTRASOUND EA 15: CPT

## 2019-12-09 PROCEDURE — 97140 MANUAL THERAPY 1/> REGIONS: CPT

## 2019-12-09 NOTE — PROGRESS NOTES
PT DAILY TREATMENT NOTE 10-18    Patient Name: Missael Carlos  Date:2019  : 1950  [x]  Patient  Verified  Payor: Dennie Floro / Plan: [place] MEDICARE PART A & B / Product Type: Medicare /    In time: 7178 Out time:1040  Total Treatment Time (min): 68  Visit #: 8 of 12    Medicare/BCBS Only   Total Timed Codes (min):  58 1:1 Treatment Time:  53       Treatment Area: Pain in right hip [M25.551]  Left hip pain [M25.552]    SUBJECTIVE  Pain Level (0-10 scale): 5/10  Any medication changes, allergies to medications, adverse drug reactions, diagnosis change, or new procedure performed?: [x] No    [] Yes (see summary sheet for update)  Subjective functional status/changes:   [] No changes reported  Patient reports having a good day on , She has more pain today, but was busy this morning with [place] presents. Overall she is improving slowly, and not getting worse. OBJECTIVE    Modality rationale: decrease inflammation and decrease pain to improve the patients ability to Ambulate. Min Type Additional Details   8 [x]  Ultrasound: [x]Continuous   [] Pulsed                           [x]1MHz   []3MHz W/cm2: 1.5  Location: Left hip    []  Iontophoresis with dexamethasone         Location: [] Take home patch   [] In clinic   10 [x]  Ice     []  heat  []  Ice massage  []  Laser   []  Anodyne Position: Sitting  Location: Bilateral hip and LB    []  Laser with stim  []  Other:  Position:  Location:    []  Vasopneumatic Device Pressure:       [] lo [] med [] hi   Temperature: [] lo [] med [] hi   [x] Skin assessment post-treatment:  [x]intact []redness- no adverse reaction    []redness  adverse reaction:         30 min Therapeutic Exercise:  [x] See flow sheet : Upgraded standing program, with minisquats and step ups. Rationale: increase strength, improve balance and stability to improve the patients ability to ambulate and transfer.         10 min Neuromuscular Re-education:  []  See flow sheet : Postural stability using TA to pervent hyperextension at TL junction. Breath and TA recruitment with postioning, ambulation. Rationale: increase strength, increase proprioception and stability  to improve the patients ability to ambulate, perform ADL's. 10 min Manual Therapy:  MFR at T/L junction, L/S junction. Rationale: decrease pain, increase tissue extensibility and increase postural awareness to improved ADL's. With   [x] TE   [] TA   [x] neuro   [] other: Patient Education: [x] Review HEP    [] Progressed/Changed HEP based on:   [x] positioning   [x] body mechanics   [x] transfers   [] heat/ice application    [] other:      Other Objective/Functional Measures: Positive tone at T/L junction. Steps 4 inch:  Need UE support. Pain Level (0-10 scale) post treatment: 4/10    ASSESSMENT/Changes in Function: Patient has improved gait/transfer abilities. LE weakness affecting patients gait/transfers/steps. Patient will continue to benefit from skilled PT services to modify and progress therapeutic interventions, address functional mobility deficits, address ROM deficits, address strength deficits, analyze and modify body mechanics/ergonomics, assess and modify postural abnormalities and instruct in home and community integration to attain remaining goals. [x]  See Plan of Care  []  See progress note/recertification  []  See Discharge Summary         Progress towards goals / Updated goals:  Short Term Goals: To be accomplished in 2 weeks:              1.  Patient  Will report performance of HEP daily to facilitate improved outcomes and improved self management.              EVAL Status:  Issued              11/22/19- Pt reports initiation of HEP               11/27/19 - Performing HEP Daily     Long Term Goals: To be accomplished in 4  weeks:              1.  Patient will report FOTO Score of 49 or greater to indicate significant improvement in functional status.               EVAL Status:  34              2.  Patient will  Tolerate walking dog 2 blocks without difficulty.             EVAL Status: walking between rooms moderate difficulty.             11/25/19 Progressing household distances still challenging.               KPQUVUI:  12/1/19 Able to walk 1 block 1x.  First time in weeks.              3.  Patient will tolerate putting on socks without difficulty.             EVAL Status:  Moderate difficulty.             FNABLNH status: Moderate difficulty              4.  Patient to go up and down stairs to garage/laundry room with minimal difficulty.             EVAL Status: Quite a bit of difficulty. Current Status:  12/10/19  Max support of UE with Steps. PLAN  [x]  Upgrade activities as tolerated     []  Continue plan of care  []  Update interventions per flow sheet       []  Discharge due to:_  [x]  Other Emphasis on Strength.       [place], [place] 12/9/2019  9:47 AM    Future Appointments   Date Time Provider Leanna Pack   12/11/2019  9:30 AM Germán Fofana, PT [facility]   12/13/2019  9:00 AM Karey Boom, PTA [facility]   12/16/2019 10:00 AM Ning Mask, PT [facility]   12/18/2019 10:00 AM Ning Mask, PT [facility]   12/20/2019  9:30 AM Rachel Denver Marylene Marts, MD [place] [address]   12/30/2019  1:00 PM Karey Boom, PTA [facility]   1/2/2020  1:30 PM Karey Boom, PTA [facility]   1/3/2020 10:00 AM Karey Boom, PTA [facility]   1/6/2020  2:30 PM Teri Bosch  [facility]   3/5/2020  9:00 AM Dawson Yao MD [facility]

## 2019-12-11 ENCOUNTER — HOSPITAL ENCOUNTER (OUTPATIENT)
Dept: PHYSICAL THERAPY | Age: 69
Discharge: HOME | End: 2019-12-11
Payer: MEDICARE

## 2019-12-11 PROCEDURE — 97035 APP MDLTY 1+ULTRASOUND EA 15: CPT

## 2019-12-11 PROCEDURE — 97112 NEUROMUSCULAR REEDUCATION: CPT

## 2019-12-11 PROCEDURE — 97140 MANUAL THERAPY 1/> REGIONS: CPT

## 2019-12-11 NOTE — PROGRESS NOTES
PT DAILY TREATMENT NOTE 10-18    Patient Name: Elías Omer  Date:2019  : 1950  [x]  Patient  Verified  Payor: Josue Maradiaga / Plan: [place] MEDICARE PART A & B / Product Type: Medicare /    In NYCM:3470  Out time: 4311  Total Treatment Time (min): 76  Visit #: 9 of 12    Medicare/BCBS Only   Total Timed Codes (min):  58 1:1 Treatment Time:  40       Treatment Area: Pain in right hip [M25.551]  Left hip pain [M25.552]    SUBJECTIVE  Pain Level (0-10 scale): 5  Any medication changes, allergies to medications, adverse drug reactions, diagnosis change, or new procedure performed?: [x] No    [] Yes (see summary sheet for update)  Subjective functional status/changes:   [] No changes reported  \"Yesterday was the most normal day that I had. I was able to do more around the house\"    OBJECTIVE    Modality rationale: decrease inflammation, decrease pain and increase tissue extensibility to improve the patients ability to Ambulate. Min Type Additional Details   8 [x]  Ultrasound: [x]Continuous   [] Pulsed                           [x]1MHz   []3MHz W/cm2:1.5  Location: right Hip    []  Iontophoresis with dexamethasone         Location: [] Take home patch   [] In clinic   10 [x]  Ice     []  heat  []  Ice massage  []  Laser   []  Anodyne Position:Seated  Location: bilateral hips and LB   [x] Skin assessment post-treatment:  [x]intact []redness- no adverse reaction    []redness  adverse reaction:     25 min Therapeutic Exercise:  [] See flow sheet :   Rationale: increase strength and improve balance to improve the patients ability to ambulate and transfer. 15 min Neuromuscular Re-education:  [x]  See flow sheet :  Reformer Footwork, breath work, TA Stability. Rationale: increase ROM, increase strength, improve coordination and core stability  to improve the patients ability to perform ADLs. 10 min Manual Therapy:  MFR to T/L Junction in seated.     Rationale: decrease pain, increase ROM and increase tissue extensibility to Improve ability to tolerate supine position for there ex. With   [] TE   [] TA   [] neuro   [] other: Patient Education: [x] Review HEP    [] Progressed/Changed HEP based on:   [] positioning   [] body mechanics   [] transfers   [] heat/ice application    [] other:        Pain Level (0-10 scale) post treatment: 5/10    ASSESSMENT/Changes in Function:   Patient is reporting increasing amounts of activity not limited by pain. She reports flare ups following forward bending positioning and continues to be challenged with transfers. Transfer from supine to sit on Reformer was difficult requiring min assist.  Manual intervention to T/L junction has improved supine tolerance today. Patient will continue to benefit from strength increases. Patient will continue to benefit from skilled PT services to modify and progress therapeutic interventions, address functional mobility deficits, address ROM deficits, address strength deficits, analyze and address soft tissue restrictions, analyze and cue movement patterns, analyze and modify body mechanics/ergonomics and assess and modify postural abnormalities to attain remaining goals. [x]  See Plan of Care  []  See progress note/recertification  []  See Discharge Summary         Progress towards goals / Updated goals:  Short Term Goals: To be accomplished in 2 weeks:              9.  Patient  Will report performance of HEP daily to facilitate improved outcomes and improved self management.              EVAL Status:  Issued              11/22/19- Pt reports initiation of HEP               11/27/19 - Performing HEP Daily     Long Term Goals: To be accomplished in 4  weeks:              1.  Patient will report FOTO Score of 49 or greater to indicate significant improvement in functional status.             EVAL Status:  34              2.  Patient will  Tolerate walking dog 2 blocks without difficulty.               EVAL Status: walking between rooms moderate difficulty.             11/25/19 Progressing household distances still challenging.               KBQUWJW:  12/1/19 Able to walk 1 block 1x.  First time in weeks.              3.  Patient will tolerate putting on socks without difficulty.             EVAL Status:  Moderate difficulty.             FWPNCRR status: Moderate difficulty              4.  Patient to go up and down stairs to garage/laundry room with minimal difficulty.             EVAL Status: Quite a bit of difficulty. Current Status:  12/10/19  Max support of UE with Steps. PLAN  [x]  Upgrade activities as tolerated     []  Continue plan of care  []  Update interventions per flow sheet       []  Discharge due to:_  [x]  Other: Address Body Mechanics to pick items off floor.      [facility] [place] 12/11/2019  9:54 AM    Future Appointments   Date Time Provider Leanna Pack   12/13/2019  9:00 AM Caitlin Leigh PTA [facility]   12/16/2019 10:00 AM Seven Stevens, PT [facility]   12/18/2019 10:00 AM Seven Stevens, PT [facility]   12/20/2019  9:30 AM Hussein Seth MD [facility]   12/30/2019  1:00 PM Caitlin Leigh PTA [facility]   1/2/2020  1:30 PM Caitlin Leigh PTA [facility]   1/3/2020 10:00 AM Caitlin Leigh PTA [facility]   1/6/2020  2:30 PM Janell Dakins,  [address]   3/5/2020  9:00 AM Lorena Ram MD [facility]

## 2019-12-13 ENCOUNTER — HOSPITAL ENCOUNTER (OUTPATIENT)
Dept: PHYSICAL THERAPY | Age: 69
Discharge: HOME | End: 2019-12-13
Payer: MEDICARE

## 2019-12-13 PROCEDURE — 97140 MANUAL THERAPY 1/> REGIONS: CPT

## 2019-12-13 PROCEDURE — 97112 NEUROMUSCULAR REEDUCATION: CPT

## 2019-12-13 PROCEDURE — 97110 THERAPEUTIC EXERCISES: CPT

## 2019-12-13 NOTE — PROGRESS NOTES
PT DAILY TREATMENT NOTE 10-18    Patient Name: Denise Sandhoff  Date:2019  : 1950  [x]  Patient  Verified  Payor: [place] MEDICARE / Plan: [place] MEDICARE PART A & B / Product Type: Medicare /    In time:9:00  Out time:9:53  Total Treatment Time (min): 48  Visit #: 10 of 12    Medicare/BCBS Only   Total Timed Codes (min):  53 1:1 Treatment Time:  48       Treatment Area: Pain in right hip [M25.551]  Left hip pain [M25.552]    SUBJECTIVE  Pain Level (0-10 scale): 5  Any medication changes, allergies to medications, adverse drug reactions, diagnosis change, or new procedure performed?: [x] No    [] Yes (see summary sheet for update)  Subjective functional status/changes:   [] No changes reported  Pt reports she is aching today in her hip. OBJECTIVE    Modality rationale: decrease inflammation and decrease pain to improve the patients ability to ambulate with ease.    Min Type Additional Details    [] Estim:  []Unatt       []IFC  []Premod                        []Other:  []w/ice   []w/heat  Position:  Location:    [] Estim: []Att    []TENS instruct  []NMES                    []Other:  []w/US   []w/ice   []w/heat  Position:  Location:    []  Traction: [] Cervical       []Lumbar                       [] Prone          []Supine                       []Intermittent   []Continuous Lbs:   [] before manual  [] after manual   8 [x]  Ultrasound: [x]Continuous   [] Pulsed                           [x]1MHz   []3MHz W/cm2: 1.5  Location:Left Hip    []  Iontophoresis with dexamethasone         Location: [] Take home patch   [] In clinic    []  Ice     []  heat  []  Ice massage  []  Laser   []  Anodyne Position:  Location:    []  Laser with stim  []  Other:  Position:  Location:    []  Vasopneumatic Device Pressure:       [] lo [] med [] hi   Temperature: [] lo [] med [] hi   [] Skin assessment post-treatment:  []intact []redness- no adverse reaction    []redness  adverse reaction:       27 min Therapeutic Exercise:  [x] See flow sheet :   Rationale: increase ROM and increase strength to improve the patients ability to tolerate ADL's. 10 min Neuromuscular Re-education:  [x]  See flow sheet : reformer   Rationale: increase strength, improve coordination and increase proprioception  to improve the patients ability to perform functional task with ease. 8 min Manual Therapy:  MFR at T/L junction, L/S junction   Rationale: decrease pain, increase ROM, increase tissue extensibility and increase postural awareness to improve functional mobility          With   [] TE   [] TA   [] neuro   [] other: Patient Education: [x] Review HEP    [] Progressed/Changed HEP based on:   [] positioning   [] body mechanics   [] transfers   [] heat/ice application    [] other:      Other Objective/Functional Measures:   Standing Marching- 10x ea Alt     Pain Level (0-10 scale) post treatment: 4    ASSESSMENT/Changes in Function:   Pt reports continued difficulty with bending and lifting but reports some improved ease since SOC. Decreased WB on the left noted with standing exercises, good carryover following correction. Pt reports at best her hip pain is about a 4/10 and at worst 6/10. Pt had no complaints with progressed therex this visit. Slight decreased pain post treatment. Patient will continue to benefit from skilled PT services to modify and progress therapeutic interventions, address functional mobility deficits, address ROM deficits, address strength deficits, analyze and address soft tissue restrictions, analyze and cue movement patterns, analyze and modify body mechanics/ergonomics and assess and modify postural abnormalities to attain remaining goals.      [x]  See Plan of Care  []  See progress note/recertification  []  See Discharge Summary         Progress towards goals / Updated goals:  Short Term Goals: To be accomplished in 2 weeks:              2.  Patient  Will report performance of HEP daily to facilitate improved outcomes and improved self management.              EVAL Status:  Issued              11/22/19- Pt reports initiation of HEP               11/27/19 - Performing HEP Daily     Long Term Goals: To be accomplished in 4  weeks:              1.  Patient will report FOTO Score of 49 or greater to indicate significant improvement in functional status.             EVAL Status:  34   Current: Progressing 12/13/19- FOTO score 45              2.  Patient will  Tolerate walking dog 2 blocks without difficulty.             EVAL Status: walking between rooms moderate difficulty.             11/25/19 Progressing household distances still challenging.               EMSMTKT:  12/1/19 Able to walk 1 block 1x.  First time in weeks.              3.  Patient will tolerate putting on socks without difficulty.             EVAL Status:  Moderate difficulty.             PTBZLOI status: Moderate difficulty              4.  Patient to go up and down stairs to garage/laundry room with minimal difficulty.             EVAL Status: Quite a bit of difficulty.             GKPRHIL Status:  12/10/19  Max support of UE with Steps.     PLAN  []  Upgrade activities as tolerated     [x]  Continue plan of care  []  Update interventions per flow sheet       []  Discharge due to:_  []  Other:_      Jeff Tiwari PTA 12/13/2019  8:48 AM    Future Appointments   Date Time Provider Leanna Sirena   12/13/2019  9:00 AM Laura Wallis PTA [facility]   12/16/2019 10:00 AM Ale Morgan, PT [facility]   12/18/2019 10:00 AM Ale Morgan, PT [facility]   12/20/2019  9:30 AM Inder Ortiz MD [facility]   12/30/2019  1:00 PM Laura Wallis PTA [facility]   1/2/2020  1:30 PM Laura Wallis PTA [facility]   1/3/2020 10:00 AM Laura Wallis PTA [facility]   1/6/2020  2:30 PM Ruma Harry  [address] [facility]   3/5/2020  9:00 AM Natalya Byers MD [facility]

## 2019-12-16 ENCOUNTER — HOSPITAL ENCOUNTER (OUTPATIENT)
Dept: PHYSICAL THERAPY | Age: 69
Discharge: HOME | End: 2019-12-16
Payer: MEDICARE

## 2019-12-16 PROCEDURE — 97530 THERAPEUTIC ACTIVITIES: CPT

## 2019-12-16 PROCEDURE — 97035 APP MDLTY 1+ULTRASOUND EA 15: CPT

## 2019-12-16 PROCEDURE — 97140 MANUAL THERAPY 1/> REGIONS: CPT

## 2019-12-16 NOTE — PROGRESS NOTES
[facility]  [address]  [place], [address]  [phone] fax    Continued Plan of Care/ Re-certification for Physical Therapy Services    Patient name: Ingrid Sofia Start of Care: 19   Referral source: Joe Huerta MD : 1950   Medical/Treatment Diagnosis: Pain in right hip [M25.551]  Left hip pain [M25.552]  Payor: VA MEDICARE / Plan: VA MEDICARE PART A & B / Product Type: Medicare /  Onset Date:2019     Prior Hospitalization: see medical history Provider#: [id number]   Medications: Verified on Patient Summary List    Comorbidities: Left Knee OATS surgery with continued pain, CVA 2009, HTN, LBP, Depression  Prior Level of Function:Patient was participating in [employer], [place] walking and walking dog in neighborhood. Visits from Start of Care: 10    Missed Visits: 0    The Plan of Care and following information is based on the patient's current status:    Key functional changes: Patient is making gradual progress with PT. She demonstrates improved LE strength,  transfers, gait and reported increase in functional activities.         Problem List: pain affecting function, decrease strength, decrease ADL/ functional abilitiies, decrease activity tolerance, decrease flexibility/ joint mobility and decrease transfer abilities    Treatment Plan: Therapeutic exercise, Therapeutic activities, Neuromuscular re-education, Physical agent/modality, Gait/balance training, Manual therapy, Patient education and Stair training        Progress Toward Goals:    Short Term Goals:              8.  Patient  Will report performance of HEP daily to facilitate improved outcomes and  improved self management.              EVAL Status:  Issued              Current: Performing HEP Daily     Long Term Goals:              4. Yoon Chau will report FOTO Score of 49 or greater to indicate significant improvement in  functional status.             EVAL Status:  34              Current: Progressing FOTO score 45              2.  Patient will  Tolerate walking dog 2 blocks without difficulty.             EVAL Status: walking between rooms moderate difficulty.             DHHGDBW: Progressing Able to walk 1 block 1x.               3. Lars Stake will tolerate putting on socks without difficulty.             EVAL Status:  Moderate difficulty. Current:  MET Achieved increased Hip/Lumbar ROM to complete.              4.  Patient to go up and down stairs to garage/laundry room with minimal difficulty.             EVAL Status: Quite a bit of difficulty.             AEMAEFS Status: Progressing Moderate support of UE, Step to step pattern. Patient Goal (s) has been updated and includes: \" [facility]"    Updated Goals for this certification period to be accomplished in 4 weeks:    1. Improve FOTO Score to 49 or greater to indicate significant improvement in  functional status. PN:  45   2. Patient to walk dog 1 block daily without difficulty. PN:  Have attempted to walk dog one time. 3.  Patient to go up and down 4 steps into garage/laundry room without  difficulty. PN:  Moderate difficulty   4. Patient to demonstrate good body mechanics when using oven and able to  put in/take out pots and pans. PN:  Moderate difficulty     Frequency / Duration: Patient to be seen 2-3 times per week for 4  weeks:    Assessment / Recommendations:  Continue Therapy per initial plan/protocol. Emphasis on Increasing LE/Core strength stability, gait, and body mechanics training to return to ADL's with minimal limitations. Certification Period: 12/16/19 - 1/14/20    Thank you for this referral  Eliezer Wilhelm, JASMINA 12/16/2019 9:11 AM    ________________________________________________________________________  I certify that the above Therapy Services are being furnished while the patient is under my care.  I agree with the treatment plan and certify that this therapy is necessary. [] I have read the above and request that my patient continue as recommended.   [] I have read the above report and request that my patient continue therapy with the following changes/special instructions: _____________________________________________  [] I have read the above report and request that my patient be discharged from therapy    Physician's Signature:____________Date:_________TIME:________    ** Signature, Date and Time must be completed for valid certification **    Please sign and return to [facility] [address], [facility] [address]  [phone] fax

## 2019-12-16 NOTE — PROGRESS NOTES
PT DAILY TREATMENT NOTE 10-18    Patient Name: Loree Stauffer  Date:2019  : 1950  [x]  Patient  Verified  Payor: [place] MEDICARE / Plan: [place] MEDICARE PART A & B / Product Type: Medicare /    In time:958 Out time:1000  Total Treatment Time (min): 58  Visit #: 1 of 12    Medicare/BCBS Only   Total Timed Codes (min):  52 1:1 Treatment Time:  30       Treatment Area: Pain in right hip [M25.551]  Left hip pain [M25.552]    SUBJECTIVE  Pain Level (0-10 scale): 5  Any medication changes, allergies to medications, adverse drug reactions, diagnosis change, or new procedure performed?: [x] No    [] Yes (see summary sheet for update)  Subjective functional status/changes:   [] No changes reported  Patient reporting increased ability to perform light chores, he was able to vacuum for the first time. OBJECTIVE    Modality rationale: decrease inflammation, decrease pain and increase tissue extensibility to improve the patients ability to Ambulate. Min Type Additional Details   8 [x]  Ultrasound: []Continuous   [] Pulsed                           [x]1MHz   []3MHz W/cm2: 1.5  Location: Left Hip    []  Iontophoresis with dexamethasone         Location: [] Take home patch   [] In clinic   10 [x]  Ice     []  heat  []  Ice massage  []  Laser   []  Anodyne Position:  Location: Bilateral hips    []  Laser with stim  []  Other:  Position:  Location:    []  Vasopneumatic Device Pressure:       [] lo [] med [] hi   Temperature: [] lo [] med [] hi   [x] Skin assessment post-treatment:  [x]intact []redness- no adverse reaction    []redness  adverse reaction:       20 min Therapeutic Exercise:  [x] See flow sheet :   Rationale: increase ROM, increase strength and increase proprioception to improve the patients ability to Improved ADL's. 14 min Therapeutic Activity:  []  See flow sheet :   Rationale: increase strength, improve balance and increase proprioception  to improve the patients ability to transfer.        10 min Manual Therapy:  MFR at T/L Junction, Left Hip TFL. Rationale: decrease pain, increase ROM and increase tissue extensibility to improve ADL's. With   [x] TE   [] TA   [] neuro   [] other: Patient Education: [x] Review HEP    [] Progressed/Changed HEP based on:   [x] positioning   [] body mechanics   [] transfers   [] heat/ice application    [] other:       Objective:  Steps:  Step to step pattern, decrease weight baring through the Left hip. Strength:  Bilatera: l Hip Flexion:  4/5, ER/IR 4-5, Hams: 4/5, R Quad 3+/5  Right Quad Functional Strength:  Poor, unable to descend 4 inch step without UE support. Patricks:  Improved ROM, + Pain  + [place] on Right. Pain Level (0-10 scale) post treatment: 5/10    ASSESSMENT/Changes in Function: Patient has progressed well with PT. Her FOTO Score has improved from 34 to 45. She has pain limited gait and transfers and LE weakness. However, She continues to progress toward goals. Patient will continue to benefit from skilled PT services to modify and progress therapeutic interventions, address functional mobility deficits, address ROM deficits, address strength deficits and analyze and address soft tissue restrictions to attain remaining goals. []  See Plan of Care  [x]  See progress note/recertification  []  See Discharge Summary         Updated Goals for this certification period to be accomplished in 4 weeks:    1. Improve FOTO Score to 49 or greater to indicate significant improvement in  functional status. PN:  45   2. Patient to walk dog 1 block daily without difficulty. PN:  Have attempted to walk dog one time. 3.  Patient to go up and down 4 steps into garage/laundry room without  difficulty. PN:  Moderate difficulty   4. Patient to demonstrate good body mechanics when using oven and able to  put in/take out pots and pans.     PN:  Moderate difficulty        PLAN  [x]  Upgrade activities as tolerated     [x]  Continue plan of care  []  Update interventions per flow sheet       []  Discharge due to:_  []  Other:    Debby Davalos, PT 12/16/2019  9:26 AM    Future Appointments   Date Time Provider Leanna Pack   12/16/2019 10:00 AM Germán Fofana, PT [facility]   12/18/2019 10:00 AM Germán Fofana, PT [facility]   12/20/2019  9:30 AM Richard Sprague MD [place] [id number]   12/30/2019  1:00 PM Jim Scriver, PTA [facility]   1/2/2020  1:30 PM Jim Scriver, PTA [facility]   1/3/2020 10:00 AM Jim Scriver, PTA [facility]   1/6/2020  2:30 PM Denise Rosado  [address]    3/5/2020  9:00 AM Tremaine Wall MD [facility]

## 2019-12-18 ENCOUNTER — HOSPITAL ENCOUNTER (OUTPATIENT)
Dept: PHYSICAL THERAPY | Age: 69
Discharge: HOME | End: 2019-12-18
Payer: MEDICARE

## 2019-12-18 PROCEDURE — 97530 THERAPEUTIC ACTIVITIES: CPT

## 2019-12-18 PROCEDURE — 97110 THERAPEUTIC EXERCISES: CPT

## 2019-12-18 PROCEDURE — 97035 APP MDLTY 1+ULTRASOUND EA 15: CPT

## 2019-12-18 NOTE — PROGRESS NOTES
PT DAILY TREATMENT NOTE 10-18    Patient Name: Faheem Solis  Date:2019  : 1950  [x]  Patient  Verified  Payor: VA MEDICARE / Plan: VA MEDICARE PART A & B / Product Type: Medicare /    In time:9:59  Out time:1102  Total Treatment Time (min): 61  Visit #: 2 of 12    Medicare/BCBS Only   Total Timed Codes (min):  53 1:1 Treatment Time:  35       Treatment Area: Pain in right hip [M25.551]  Left hip pain [M25.552]    SUBJECTIVE  Pain Level (0-10 scale): 5/10  Any medication changes, allergies to medications, adverse drug reactions, diagnosis change, or new procedure performed?: [x] No    [] Yes (see summary sheet for update)  Subjective functional status/changes:   [] No changes reported  Dull Ache in Left Hip today, been feeling it since yesterday. Didn't notice doing anything different, except standing a lot wrapping presents, up and down stairs to garage a little more often. OBJECTIVE    Modality rationale: decrease inflammation, decrease pain and increase tissue extensibility to improve the patients ability to Ambulate.    Min Type Additional Details    [] Estim:  []Unatt       []IFC  []Premod                        []Other:  []w/ice   []w/heat  Position:  Location:    [] Estim: []Att    []TENS instruct  []NMES                    []Other:  []w/US   []w/ice   []w/heat  Position:  Location:    []  Traction: [] Cervical       []Lumbar                       [] Prone          []Supine                       []Intermittent   []Continuous Lbs:  [] before manual  [] after manual   8 []  Ultrasound: [x]Continuous   [] Pulsed                           []1MHz   []3MHz W/cm2:1.0Location: Left Hip    []  Iontophoresis with dexamethasone         Location: [] Take home patch   [] In clinic   10 [x]  Ice     []  heat  []  Ice massage  []  Laser   []  Anodyne Position: Sitting  Location: Bilateral Hips    []  Laser with stim  []  Other:  Position:  Location:    []  Vasopneumatic Device Pressure:       [] lo [] med [] hi   Temperature: [] lo [] med [] hi   [] Skin assessment post-treatment:  []intact []redness- no adverse reaction    []redness  adverse reaction:     20 min Therapeutic Exercise:  [x] See flow sheet :  Added T-band Row and Extension, SLR, Standing Foam.   Rationale: increase strength, improve balance and muscular stability and endurance. to improve the patients ability to to perform ADL's. 20 min Therapeutic Activity:  [x]  See flow sheet :  Transfers, balance on foam pad, emphasis on pelvic stability with activity. Postural endurance. Rationale: improve balance, increase proprioception and core/pelvic stability. to improve the patients ability to improve gait and ADL performance. 5 min Manual Therapy:  L hip distraction unwinding above knee. Rationale: decrease pain and increase tissue extensibility to improve ADL's. With   [x] TE   [x] TA   [] neuro   [] other: Patient Education: [x] Review HEP    [] Progressed/Changed HEP based on:   [] positioning   [x] body mechanics   [] transfers   [] heat/ice application    [] other:        Pain Level (0-10 scale) post treatment: 4/10    ASSESSMENT/Changes in Function: Patient tolerating step ups on 6 inch step, Challenged with foam pad standing and postural endurance activity. No complaints of increased pain. Patient will continue to benefit from skilled PT services to modify and progress therapeutic interventions, address functional mobility deficits, address strength deficits, analyze and address soft tissue restrictions, analyze and modify body mechanics/ergonomics and instruct in home and community integration to attain remaining goals. Updated Goals for this certification period to be accomplished in 4 weeks:               1.  Improve FOTO Score to 49 or greater to indicate significant improvement in     functional status. PN:  45              2.  Patient to walk dog 1 block daily without difficulty. PN:  Have attempted to walk dog one time. 3.  Patient to go up and down 4 steps into garage/laundry room without     difficulty. PN:  Moderate difficulty              4.  Patient to demonstrate good body mechanics when using oven and able to      put in/take out pots and pans. PN:  Moderate difficulty        PLAN  []  Upgrade activities as tolerated     [x]  Continue plan of care  []  Update interventions per flow sheet       []  Discharge due to:_  [x]  Other:Emphais on strength postural and gait stability.      [place], [place] 12/18/2019  9:06 AM    Future Appointments   Date Time Provider Leanna Lovei   12/18/2019 10:00 AM Germán Mitchell, PT [facility]   12/20/2019  9:30 AM Jaclyn Garcia MD [place] [id number]   12/30/2019  1:00 PM Harmeet Speller, PTA [facility]   1/2/2020  1:30 PM Lambert Speller, PTA [facility]   1/3/2020 10:00 AM Ahmad Sas, PT [facility]   1/6/2020 10:00 AM Ahmad Sas, PT [facility]   1/6/2020  2:30 PM Eze Deleon  [address]   1/8/2020 10:30 AM Ahmad Sas, PT [facility]   1/10/2020  9:30 AM Lambert Speller, PTA [facility]   1/13/2020  9:30 AM Ahmad Sas, PT [facility]   1/15/2020  9:30 AM Ahmad Sas, PT [facility]   1/17/2020  9:30 AM Lambert Speller, PTA [facility]   1/20/2020  9:30 AM Ahmad Sas, PT [facility]   1/22/2020  9:30 AM Ahmad Sas, PT [facility]   3/5/2020  9:00 AM Deann Abel MD [facility]

## 2019-12-20 ENCOUNTER — OFFICE VISIT (OUTPATIENT)
Dept: ORTHOPEDIC SURGERY | Age: 69
End: 2019-12-20

## 2019-12-20 VITALS
HEART RATE: 63 BPM | BODY MASS INDEX: 45.16 KG/M2 | DIASTOLIC BLOOD PRESSURE: 64 MMHG | TEMPERATURE: 96.9 F | WEIGHT: 281 LBS | SYSTOLIC BLOOD PRESSURE: 121 MMHG | RESPIRATION RATE: 12 BRPM | OXYGEN SATURATION: 96 % | HEIGHT: 66 IN

## 2019-12-20 DIAGNOSIS — M70.62 TROCHANTERIC BURSITIS OF LEFT HIP: Primary | ICD-10-CM

## 2019-12-20 RX ORDER — BETAMETHASONE SODIUM PHOSPHATE AND BETAMETHASONE ACETATE 3; 3 MG/ML; MG/ML
6 INJECTION, SUSPENSION INTRA-ARTICULAR; INTRALESIONAL; INTRAMUSCULAR; SOFT TISSUE ONCE
Qty: 1 ML | Refills: 0
Start: 2019-12-20 | End: 2019-12-20

## 2019-12-20 NOTE — PROGRESS NOTES
1. Have you been to the ER, urgent care clinic since your last visit? Hospitalized since your last visit? No    2. Have you seen or consulted any other health care providers outside of the [address] [place] [address] since your last visit? Include any pap smears or colon screening.  No

## 2019-12-20 NOTE — PROGRESS NOTES
Patient: Mariella Mc                MRN: [id number]       SSN: [id number]  YOB: 1950        AGE: 81 y.o. SEX: female  Body mass index is 45.35 kg/m². PCP: Dain Sidhu MD  12/20/19    HISTORY OF PRESENT ILLNESS:  I saw Ms. Osito Bean today in follow-up for reevaluation of low back pain without too much radiculopathy, as well as left sided hip pain. Due to body habitus we did not get a clean look at her x-rays previously therefore MRI was indicated, and it shows some bursitis, but no fracture, no avascular necrosis, no tumors. I am very pleased with this. She describes some moderate left lateral hip pain and moderate to moderately severe low back pain, and it does limit her standing from time to time. She denies much in the way of radiculopathy. She has had a previous stroke. There is no shortness of breath or chest pain currently. REVIEW OF SYSTEMS:  A 12-point review of systems performed today, pertinent positives are noted. All other systems reviewed and otherwise are negative. PHYSICAL EXAMINATION:  The hips rotate well. She has tenderness over the trochanter on the left. The low back is tender around L4-5 bilaterally. She has about 1+ pitting edema distally. Mild evidence of neuropathy involving L4. No footdrop. Tibialis anterior and EHL 5/5. Straight leg raise is negative. RADIOGRAPHS:  Review of the x-ray and MRI confirm just mild degenerative changes of the hips, multiple level degenerative disc disease of the back. MRI is positive for bursitis, but no indication for hip surgery. PROCEDURE NOTE:  Under aseptic conditions and after informed written consent with time out, left trochanteric bursa injected 1 mL Celestone preparation, i.e. 6 mg, well tolerated. IMPRESSION:  Overall impression is mainly low back pain with recurrent bursitis of the left hip. She is going to return to see us in 3 months' time.         CC:  Kamron Myers M.D. REVIEW OF SYSTEMS:      CON: negative for weight loss, fever  EYE: negative for double vision  ENT: negative for hoarseness  RS:   negative for Tb  GI:    negative for blood in stool  :  negative for blood in urine  Other systems reviewed and noted below. Past Medical History:   Diagnosis Date    Hypercholesterolemia     Hypertension     Stroke [facility])        Family History   Problem Relation Age of Onset    Dementia Mother     Stroke Father     Cancer Brother     Hypertension Brother        Current Outpatient Medications   Medication Sig Dispense Refill    betamethasone (CELESTONE SOLUSPAN) 6 mg/mL injection 1 mL by Intra artICUlar route once for 1 dose. 1 mL 0    terbinafine HCl (LAMISIL) 250 mg tablet Take 250 mg by mouth daily.  atenolol-chlorthalidone (TENORETIC) 50-25 mg per tablet Take 1 Tab by mouth daily. 90 Tab 3    tolterodine ER (DETROL-LA) 2 mg ER capsule Take 1 Cap by mouth daily. 30 Cap 5    escitalopram oxalate (LEXAPRO) 5 mg tablet Take 1 Tab by mouth daily. Take daily with a 10mg daily tab for a total daily dose of 15mg daily. 90 Tab 3    rosuvastatin (CRESTOR) 10 mg tablet TAKE 1 TABLET BY MOUTH NIGHTLY 90 Tab 1    gabapentin (NEURONTIN) 300 mg capsule Take 300 mg by mouth nightly.  fluticasone (FLONASE) 50 mcg/actuation nasal spray 2 Sprays by Both Nostrils route daily. 1 Bottle 11    Cholecalciferol, Vitamin D3, (VITAMIN D3) 1,000 unit cap Take 1,000 Units by mouth daily.  aspirin-dipyridamole (AGGRENOX)  mg per SR capsule Take 1 Cap by mouth two (2) times a day.          Allergies   Allergen Reactions    Amoxicillin-Pot Clavulanate Diarrhea    Azithromycin Diarrhea    Oxybutynin Other (comments)     Problems swallowing, dry mouth    Seroquel [Quetiapine] Other (comments)     Tremors    Sulfa (Sulfonamide Antibiotics) Diarrhea    Wellbutrin [Bupropion Hcl] Itching       Past Surgical History:   Procedure Laterality Date    HX APPENDECTOMY 09/09/2015    HX CHOLECYSTECTOMY      HX GYN      partial hysterectomy       Social History     Socioeconomic History    Marital status:      Spouse name: Not on file    Number of children: Not on file    Years of education: Not on file    Highest education level: Not on file   Occupational History    Not on file   Social Needs    Financial resource strain: Not on file    Food insecurity:     Worry: Not on file     Inability: Not on file    Transportation needs:     Medical: Not on file     Non-medical: Not on file   Tobacco Use    Smoking status: Never Smoker    Smokeless tobacco: Never Used   Substance and Sexual Activity    Alcohol use: No    Drug use: No    Sexual activity: Not on file   Lifestyle    Physical activity:     Days per week: Not on file     Minutes per session: Not on file    Stress: Not on file   Relationships    Social connections:     Talks on phone: Not on file     Gets together: Not on file     Attends [employer] service: Not on file     Active member of club or organization: Not on file     Attends meetings of clubs or organizations: Not on file     Relationship status: Not on file    Intimate partner violence:     Fear of current or ex partner: Not on file     Emotionally abused: Not on file     Physically abused: Not on file     Forced sexual activity: Not on file   Other Topics Concern    Not on file   Social History Narrative    Not on file       Visit Vitals  /64   Pulse 63   Temp 96.9 °F (36.1 °C) (Oral)   Resp 12   Ht 5' 6\" (1.676 m)   Wt 281 lb (127.5 kg)   SpO2 96%   BMI 45.35 kg/m²         PHYSICAL EXAMINATION:  GENERAL: Alert and oriented x3, in no acute distress, well-developed, well-nourished, afebrile. HEART: No JVD. EYES: No scleral icterus   NECK: No significant lymphadenopathy   LUNGS: No respiratory compromise or indrawing  ABDOMEN: Soft, non-tender, non-distended. Electronically signed by:  Benedetta Klinefelter, MD

## 2019-12-30 ENCOUNTER — HOSPITAL ENCOUNTER (OUTPATIENT)
Dept: PHYSICAL THERAPY | Age: 69
Discharge: HOME | End: 2019-12-30
Payer: MEDICARE

## 2019-12-30 PROCEDURE — 97530 THERAPEUTIC ACTIVITIES: CPT

## 2019-12-30 PROCEDURE — 97110 THERAPEUTIC EXERCISES: CPT

## 2019-12-30 PROCEDURE — 97035 APP MDLTY 1+ULTRASOUND EA 15: CPT

## 2019-12-30 PROCEDURE — 97140 MANUAL THERAPY 1/> REGIONS: CPT

## 2019-12-30 NOTE — PROGRESS NOTES
PT DAILY TREATMENT NOTE 10-18    Patient Name: Beverly Epley  Date:2019  : 1950  [x]  Patient  Verified  Payor: VA MEDICARE / Plan: VA MEDICARE PART A & B / Product Type: Medicare /    In time:12:58  Out time:1:44  Total Treatment Time (min): 55  Visit #: 3 of     Medicare/BCBS Only   Total Timed Codes (min):  46 1:1 Treatment Time:  46       Treatment Area: Pain in right hip [M25.551]  Left hip pain [M25.552]    SUBJECTIVE  Pain Level (0-10 scale): 4  Any medication changes, allergies to medications, adverse drug reactions, diagnosis change, or new procedure performed?: [x] No    [] Yes (see summary sheet for update)  Subjective functional status/changes:   [] No changes reported  Pt reports she got an injection in her left hip on Dec 20th and she notes it has helped some but not a lot. OBJECTIVE    Modality rationale: decrease pain and increase tissue extensibility to improve the patients ability to ambulate with ease.    Min Type Additional Details    [] Estim:  []Unatt       []IFC  []Premod                        []Other:  []w/ice   []w/heat  Position:  Location:    [] Estim: []Att    []TENS instruct  []NMES                    []Other:  []w/US   []w/ice   []w/heat  Position:  Location:    []  Traction: [] Cervical       []Lumbar                       [] Prone          []Supine                       []Intermittent   []Continuous Lbs:  [] before manual  [] after manual   8 [x]  Ultrasound: [x]Continuous   [] Pulsed                           [x]1MHz   []3MHz W/cm2: 1.2  Location: left hip    []  Iontophoresis with dexamethasone         Location: [] Take home patch   [] In clinic    []  Ice     []  heat  []  Ice massage  []  Laser   []  Anodyne Position:   Location:    []  Laser with stim  []  Other:  Position:  Location:    []  Vasopneumatic Device Pressure:       [] lo [] med [] hi   Temperature: [] lo [] med [] hi   [] Skin assessment post-treatment:  []intact []redness- no adverse reaction    []redness  adverse reaction:       28 min Therapeutic Exercise:  [x] See flow sheet :   Rationale: increase ROM, increase strength and improve balance to improve the patients ability to perform ADL's with ease. 10 min Therapeutic Activity:  [x]  See flow sheet : Transfers/balance   Rationale: increase strength, improve coordination and improve balance  to improve the patients ability to perform functional task with ease. With   [] TE   [] TA   [] neuro   [] other: Patient Education: [x] Review HEP    [] Progressed/Changed HEP based on:   [] positioning   [] body mechanics   [] transfers   [] heat/ice application    [] other:      Other Objective/Functional Measures:   Increased reps for most exercises. Pain Level (0-10 scale) post treatment: 2    ASSESSMENT/Changes in Function:   Pt displays fatigue with progressed therex today but was able to perform exercises as prescribed. Pt reports about a 2 hour standing and walking tolerance before pain in the left hip increases. Increased postural awareness noted with pt requiring less cueing to improve/correct posture with exercises. Patient will continue to benefit from skilled PT services to modify and progress therapeutic interventions, address functional mobility deficits, address ROM deficits, address strength deficits, analyze and address soft tissue restrictions, analyze and cue movement patterns, analyze and modify body mechanics/ergonomics and assess and modify postural abnormalities to attain remaining goals. [x]  See Plan of Care  []  See progress note/recertification  []  See Discharge Summary         Progress towards goals / Updated goals:  Updated Goals for this certification period to be accomplished in 4 weeks:               1.  Improve FOTO Score to 49 or greater to indicate significant improvement in     functional status.               HW:  45              5.  Patient to walk dog 1 block daily without difficulty.             FH: Luis Miguel Frame attempted to walk dog one time.              3.  Patient to go up and down 4 steps into garage/laundry room without     difficulty.               ZU:  Moderate difficulty              4.  Patient to demonstrate good body mechanics when using oven and able to      put in/take out pots and pans.               PN:  Moderate difficulty     PLAN  []  Upgrade activities as tolerated     [x]  Continue plan of care  []  Update interventions per flow sheet       []  Discharge due to:_  []  Other:_      Alpa Boyd PTA 12/30/2019  12:50 PM    Future Appointments   Date Time Provider Leanna Pack   12/30/2019  1:00 PM Cathleen Singh, PTA [facility]   1/2/2020  1:30 PM Cathleen Singh, PTA [facility]   1/3/2020 10:00 AM Joseph Bernard, PT [facility]   1/6/2020 10:00 AM Joseph Bernard, PT [facility]   1/6/2020  2:30 PM Skyler Kelley  [address]   1/8/2020 10:30 AM Joseph Bernard, PT [facility]   1/13/2020  9:30 AM Joseph Bernard, PT [facility]   1/15/2020  9:30 AM Joseph Bernard, PT [facility]   1/17/2020  9:30 AM Cathleen Singh, PTA [facility]   1/20/2020  9:30 AM Joseph Bernard, PT [facility]   1/22/2020  9:30 AM Joseph Bernard, PT [facility]   3/5/2020  9:00 AM Danita Capellan MD [facility]

## 2020-01-02 ENCOUNTER — HOSPITAL ENCOUNTER (OUTPATIENT)
Dept: PHYSICAL THERAPY | Age: 70
Discharge: HOME | End: 2020-01-02
Payer: MEDICARE

## 2020-01-02 PROCEDURE — 97035 APP MDLTY 1+ULTRASOUND EA 15: CPT

## 2020-01-02 PROCEDURE — 97110 THERAPEUTIC EXERCISES: CPT

## 2020-01-02 NOTE — PROGRESS NOTES
PT DAILY TREATMENT NOTE 10-18    Patient Name: Adelia Ibarra  Date:2020  : 1950  [x]  Patient  Verified  Payor: [place] MED[place] / Plan: [place] MED[place]ARE PART A & B / Product Type: Medicare /    In time:1:27 Out time:2:17  Total Treatment Time (min): 50  Visit #: 4 of     Medicare/BCBS Only   Total Timed Codes (min):  40 1:1 Treatment Time:  40       Treatment Area: Pain in right hip [M25.551]  Left hip pain [M25.552]    SUBJECTIVE   Pain Level (0-10 scale): 5  Any medication changes, allergies to medications, adverse drug reactions, diagnosis change, or new procedure performed?: [x] No    [] Yes (see summary sheet for update)  Subjective functional status/changes:   [] No changes reported  Pt reports \" I thought therapy was suppose to make you feel better, I feel like noting is changing anymore and the pain is just there all the time. \"    OBJECTIVE    Modality rationale: decrease pain and increase tissue extensibility to improve the patients ability to ambulate with ease.    Min Type Additional Details    [] Estim:  []Unatt       []IFC  []Premod                        []Other:  []w/ice   []w/heat  Position:  Location:    [] Estim: []Att    []TENS instruct  []NMES                    []Other:  []w/US   []w/ice   []w/heat  Position:  Location:    []  Traction: [] Cervical       []Lumbar                       [] Prone          []Supine                       []Intermittent   []Continuous Lbs:  [] before manual  [] after manual   10 [x]  Ultrasound: [x]Continuous   [] Pulsed                           [x]1MHz   []3MHz W/cm2: 1.2  Location:left hip    []  Iontophoresis with dexamethasone         Location: [] Take home patch   [] In clinic   10 [x]  Ice     []  heat  []  Ice massage  []  Laser   []  Anodyne Position:seated  Location:B hips    []  Laser with stim  []  Other:  Position:  Location:    []  Vasopneumatic Device Pressure:       [] lo [] med [] hi   Temperature: [] lo [] med [] hi   [] Skin assessment post-treatment:  []intact []redness- no adverse reaction    []redness  adverse reaction:       32 min Therapeutic Exercise:  [x] See flow sheet :   Rationale: increase ROM and increase strength to improve the patients ability to perform functional task with ease. With   [] TE   [] TA   [] neuro   [] other: Patient Education: [x] Review HEP    [] Progressed/Changed HEP based on:   [] positioning   [] body mechanics   [] transfers   [] heat/ice application    [] other:      Other Objective/Functional Measures:       Pain Level (0-10 scale) post treatment: 4    ASSESSMENT/Changes in Function:   Trialed side stepping at the track with SBA this visit but held at 30\" abbe due to pt getting dizzy from the movement requiring a seated rest break. Pt reports fair relief of hip pain with treatment in clinic but reports poor carryover at home. Pt's pain has maintained around 5/10. Continued treatment to strengthen the B hips and aid with ease of ADL's. Patient will continue to benefit from skilled PT services to modify and progress therapeutic interventions, address functional mobility deficits, address ROM deficits, address strength deficits, analyze and address soft tissue restrictions, analyze and cue movement patterns, analyze and modify body mechanics/ergonomics and assess and modify postural abnormalities to attain remaining goals. [x]  See Plan of Care  []  See progress note/recertification  []  See Discharge Summary         Progress towards goals / Updated goals:  Updated Goals for this certification period to be accomplished in 4 weeks:               1.  Improve FOTO Score to 49 or greater to indicate significant improvement in     functional status.             JR:  70              6.  Patient to walk dog 1 block daily without difficulty.               UCATY: Jonna Butcher attempted to walk dog one time.              3.  Patient to go up and down 4 steps into garage/laundry room without     difficulty.               VH:  Moderate difficulty              4.  Patient to demonstrate good body mechanics when using oven and able to      put in/take out pots and pans.               PN:  Moderate difficulty     PLAN  []  Upgrade activities as tolerated     [x]  Continue plan of care  []  Update interventions per flow sheet       []  Discharge due to:_  []  Other:_      Maral Ambriz, PTA 1/2/2020  12:47 PM    Future Appointments   Date Time Provider [facility]   1/2/2020  1:30 PM Madonna Caldera, PTA [facility]   1/3/2020 10:00 AM Agnieszka Ramon, PT [facility]   1/6/2020 10:00 AM Agnieszka Ramon, PT [facility]   1/6/2020  2:30 PM Antonino Ferris  [address]    1/8/2020 10:30 AM Agnieszka Ramon, PT [facility]   1/13/2020  9:30 AM Agnieszka Ramon, PT [facility]   1/15/2020  9:30 AM Agnieszka Ramon, PT [facility]   1/17/2020  9:30 AM Madonna Caldera, PTA [facility]   1/20/2020  9:30 AM Agnieszka Ramon, PT [facility]   1/22/2020  9:30 AM Agnieszka Ramon, PT [facility]   3/5/2020  9:00 AM Merline Pugh MD [facility]

## 2020-01-03 ENCOUNTER — HOSPITAL ENCOUNTER (OUTPATIENT)
Dept: PHYSICAL THERAPY | Age: 70
Discharge: HOME | End: 2020-01-03
Payer: MEDICARE

## 2020-01-03 PROCEDURE — 97110 THERAPEUTIC EXERCISES: CPT

## 2020-01-03 PROCEDURE — 97140 MANUAL THERAPY 1/> REGIONS: CPT

## 2020-01-06 ENCOUNTER — HOSPITAL ENCOUNTER (OUTPATIENT)
Dept: PHYSICAL THERAPY | Age: 70
Discharge: HOME | End: 2020-01-06
Payer: MEDICARE

## 2020-01-06 ENCOUNTER — OFFICE VISIT (OUTPATIENT)
Dept: ORTHOPEDIC SURGERY | Age: 70
End: 2020-01-06

## 2020-01-06 VITALS
RESPIRATION RATE: 16 BRPM | DIASTOLIC BLOOD PRESSURE: 68 MMHG | SYSTOLIC BLOOD PRESSURE: 100 MMHG | WEIGHT: 263.8 LBS | BODY MASS INDEX: 42.4 KG/M2 | TEMPERATURE: 98.1 F | HEIGHT: 66 IN | OXYGEN SATURATION: 97 % | HEART RATE: 62 BPM

## 2020-01-06 DIAGNOSIS — M48.061 SPINAL STENOSIS OF LUMBAR REGION WITHOUT NEUROGENIC CLAUDICATION: ICD-10-CM

## 2020-01-06 DIAGNOSIS — M51.36 DDD (DEGENERATIVE DISC DISEASE), LUMBAR: Primary | ICD-10-CM

## 2020-01-06 PROCEDURE — 97140 MANUAL THERAPY 1/> REGIONS: CPT

## 2020-01-06 PROCEDURE — 97112 NEUROMUSCULAR REEDUCATION: CPT

## 2020-01-06 PROCEDURE — 97035 APP MDLTY 1+ULTRASOUND EA 15: CPT

## 2020-01-06 PROCEDURE — 97110 THERAPEUTIC EXERCISES: CPT

## 2020-01-06 NOTE — PATIENT INSTRUCTIONS
Lumbar Spinal Stenosis: Care Instructions  Your Care Instructions    Stenosis in the spine is a narrowing of the canal that is around the spinal cord and nerve roots in your back. It can happen as part of aging. Sometimes bone and other tissue grow into this canal and press on the nerves that branch out from the spinal cord. This can cause pain, numbness, and weakness. When it happens in the lower part of your back, it is called lumbar spinal stenosis. It can cause problems in the legs, feet, and rear end (buttocks). You may be able to get relief from the symptoms of spinal stenosis by taking pain medicine. Your doctor may suggest physical therapy and exercises to keep your spine strong and flexible. Some people try steroid shots to reduce swelling. If pain and numbness in your legs are still so bad that you cannot do your normal activities, you may need surgery. Follow-up care is a key part of your treatment and safety. Be sure to make and go to all appointments, and call your doctor if you are having problems. It's also a good idea to know your test results and keep a list of the medicines you take. How can you care for yourself at home? · Take an over-the-counter pain medicine. Nonsteroidal anti-inflammatory drugs (NSAIDs) such as ibuprofen or naproxen seem to work best. But if you can't take NSAIDs, you can try acetaminophen. Be safe with medicines. Read and follow all instructions on the label. · Do not take two or more pain medicines at the same time unless the doctor told you to. Many pain medicines have acetaminophen, which is Tylenol. Too much acetaminophen (Tylenol) can be harmful. · Stay at a healthy weight. Being overweight puts extra strain on your spine. · Change positions often when you sit or stand. This can ease pain. It may also reduce pressure on the spinal cord and its nerves. · Avoid doing things that make your symptoms worse.  Walking downhill and standing for a long time may cause pain.  · Stretch and strengthen your back muscles as your doctor or physical therapist recommends. If your doctor says it is okay to do them, these exercises may help. ? Lie on your back with your knees bent. Gently pull one bent knee to your chest. Put that foot back on the floor, and then pull the other knee to your chest.  ? Do pelvic tilts. Lie on your back with your knees bent. Tighten your stomach muscles. Pull your belly button (navel) in and up toward your ribs. You should feel like your back is pressing to the floor and your hips and pelvis are slightly lifting off the floor. Hold for 6 seconds while breathing smoothly. ? Stand with your back flat against a wall. Slowly slide down until your knees are slightly bent. Hold for 10 seconds, then slide back up the wall. · Remove or change anything in your house that may cause you to fall. Keep walkways clear of clutter, electrical cords, and throw rugs. When should you call for help? Call 911 anytime you think you may need emergency care. For example, call if:    · You are unable to move a leg at all.   [facility] your doctor now or seek immediate medical care if:    · You have new or worse symptoms in your legs, belly, or buttocks. Symptoms may include:  ? Numbness or tingling. ? Weakness. ? Pain.     · You lose bladder or bowel control.    Watch closely for changes in your health, and be sure to contact your doctor if:    · You have a fever, lose weight, or don't feel well.     · You are not getting better as expected. Where can you learn more? Go to http://miguel-mamta.info/. Shlomo Moreno in the search box to learn more about \"Lumbar Spinal Stenosis: Care Instructions. \"  Current as of: June 26, 2019  Content Version: 12.2  © 7028-2836 [employer]. Care instructions adapted under license by [employer] (which disclaims liability or warranty for this information).  If you have questions about a medical condition or this instruction, always ask your healthcare professional. [place] [address] any warranty or liability for your use of this information.

## 2020-01-06 NOTE — PROGRESS NOTES
PT DAILY TREATMENT NOTE 10-18    Patient Name: Ulises Mishra  Date:2020  : 1950  [x]  Patient  Verified  Payor: [place] MEDICARE / Plan: [place] MEDICARE PART A & B / Product Type: Medicare /    In time:1000 Out time:1110  Total Treatment Time (min): 70  Visit #: 6 of 12    Medicare/BCBS Only   Total Timed Codes (min):  60 1:1 Treatment Time:  55       Treatment Area: Pain in right hip [M25.551]  Left hip pain [M25.552]    SUBJECTIVE  Pain Level (0-10 scale): 3/10  Any medication changes, allergies to medications, adverse drug reactions, diagnosis change, or new procedure performed?: [x] No    [] Yes (see summary sheet for update)  Subjective functional status/changes:   [] No changes reported  \"Felt sore after treatment on Friday, but much better over the weekend. I think the changes that we made on Friday helped. \"    OBJECTIVE    Modality rationale: decrease inflammation, decrease pain and increase tissue extensibility to improve the patients ability to improve ADL performance.    Min Type Additional Details    [] Estim:  []Unatt       []IFC  []Premod                        []Other:  []w/ice   []w/heat  Position:  Location:    [] Estim: []Att    []TENS instruct  []NMES                    []Other:  []w/US   []w/ice   []w/heat  Position:  Location:    []  Traction: [] Cervical       []Lumbar                       [] Prone          []Supine                       []Intermittent   []Continuous Lbs:  [] before manual  [] after manual   10 [x]  Ultrasound: []Continuous   [] Pulsed                           [x]1MHz   []3MHz W/cm2:1.5  Location: Left SI    []  Iontophoresis with dexamethasone         Location: [] Take home patch   [] In clinic   10 [x]  Ice     []  heat  []  Ice massage  []  Laser   []  Anodyne Position:Seated  Location: LB SI    []  Laser with stim  []  Other:  Position:  Location:    []  Vasopneumatic Device Pressure:       [] lo [] med [] hi   Temperature: [] lo [] med [] hi   [] Skin assessment post-treatment:  [x]intact []redness- no adverse reaction    []redness  adverse reaction:       30 min Therapeutic Exercise:  [x] See flow sheet :    Rationale: increase strength, increase proprioception and core stability to improve the patients ability to improved ADL function. 10 min Neuromuscular Re-education:  []  See flow sheet : Standing posture with pelvic support, core pelvic stability with step balance. Rationale: increase strength, improve balance and increase proprioception  to improve the patients ability to improve ADL performance and return to walking. 10 min Manual Therapy:  STM/MFR of L/S navya, Bilateral SI L>R   Rationale: decrease pain and increase tissue extensibility to improve ADL performance. With   [] TE   [] TA   [] neuro   [] other: Patient Education: [x] Review HEP    [] Progressed/Changed HEP based on:   [] positioning   [] body mechanics   [] transfers   [] heat/ice application    [] other:      Other Objective/Functional Measures: Standing Posture, Forward Lean. Poor eccentric quad control on Left, pelvic compensation. Pain Level (0-10 scale) post treatment: 2/10    ASSESSMENT/Changes in Function: Patient has more centralized pain over Left SI joint. Her Left LE weakness affects patients confidence in Left sided weight bearing with there ex. She favors Right LE. Standing posture is forward of MARK with weight through toes. Continuing to work on closed chain strength, stability and function. Patient will continue to benefit from skilled PT services to address strength deficits, analyze and address soft tissue restrictions, analyze and cue movement patterns and assess and modify postural abnormalities to attain remaining goals.      []  See Plan of Care  [x]  See progress note/recertification  []  See Discharge Summary         Progress towards goals / Updated goals:  Updated Goals for this certification period to be accomplished in 4 weeks:               4.  Improve FOTO Score to 49 or greater to indicate significant improvement in     functional status.             LF:  57              7.  Patient to walk dog 1 block daily without difficulty.             JX:  Have attempted to walk dog one time.              3.  Patient to go up and down 4 steps into garage/laundry room without difficulty.               SJ:  Moderate difficulty              Current:  Mild difficulty reported improved ability with steps.               4.  Patient to demonstrate good body mechanics when using oven and able to      put in/take out pots and pans.               PN:  Moderate difficulty     PLAN  [x]  Upgrade activities as tolerated     [x]  Continue plan of care  []  Update interventions per flow sheet       []  Discharge due to:_  []  Other:_      Dolly Walters, PT 1/6/2020  10:02 AM    Future Appointments   Date Time Provider [facility]   1/6/2020  2:30 PM Carleen Parker  [address]   1/8/2020 10:30 AM Anna Shelley, PT [facility]   1/13/2020  9:30 AM Anna Shelley, PT [facility]   1/15/2020  9:30 AM Anna Shelley, PT [facility]   1/17/2020  9:30 AM Jarocho Polanco, PTA [facility]   1/20/2020  9:30 AM Anna Shelley, PT [facility]   1/22/2020  9:30 AM Anna Shelley, PT [facility]   3/5/2020  9:00 AM Karime Hoffmann MD [facility]

## 2020-01-06 NOTE — PROGRESS NOTES
Dee Call presents today for   Chief Complaint   Patient presents with    LOW BACK PAIN    Hip Pain    Follow-up     referred by Dr. Shira Cerda       Is someone accompanying this pt? NO    Is the patient using any DME equipment during OV? NO    Depression Screening:  3 most recent PHQ Screens 11/7/2019   Little interest or pleasure in doing things Nearly every day   Feeling down, depressed, irritable, or hopeless Nearly every day   Total Score PHQ 2 6   Trouble falling or staying asleep, or sleeping too much More than half the days   Feeling tired or having little energy More than half the days   Poor appetite, weight loss, or overeating More than half the days   Feeling bad about yourself - or that you are a failure or have let yourself or your family down Several days   Trouble concentrating on things such as school, work, reading, or watching TV Several days   Moving or speaking so slowly that other people could have noticed; or the opposite being so fidgety that others notice Not at all   Thoughts of being better off dead, or hurting yourself in some way Not at all   PHQ 9 Score 14   How difficult have these problems made it for you to do your work, take care of your home and get along with others Very difficult       Learning Assessment:  Learning Assessment 1/10/2019   PRIMARY LEARNER Patient   HIGHEST LEVEL OF EDUCATION - PRIMARY LEARNER  SOME COLLEGE   BARRIERS PRIMARY LEARNER NONE   CO-LEARNER CAREGIVER No   PRIMARY LANGUAGE ENGLISH   LEARNER PREFERENCE PRIMARY DEMONSTRATION   ANSWERED BY patient   RELATIONSHIP SELF       Abuse Screening:  Abuse Screening Questionnaire 1/10/2019   Do you ever feel afraid of your partner? N   Are you in a relationship with someone who physically or mentally threatens you? N   Is it safe for you to go home? Y       Fall Risk  Fall Risk Assessment, last 12 mths 1/6/2020   Able to walk? Yes   Fall in past 12 months? Yes   Fall with injury?  No   Number of falls in past 12 months 1   Fall Risk Score 1       Coordination of Care:  1. Have you been to the ER, urgent care clinic since your last visit? NO  Hospitalized since your last visit? NO    2. Have you seen or consulted any other health care providers outside of the [facility] since your last visit? NO Include any pap smears or colon screening.  NO    Last  Checked 1/6/20

## 2020-01-06 NOTE — PROGRESS NOTES
Damien Farris [facility] [address].  [address], [address]  [place] [address]  Phone: [phone]  Fax: [phone]        Ammy Rosas  : 1950  PCP: Mason Trinh MD      NEW PATIENT      ASSESSMENT AND PLAN     Diagnoses and all orders for this visit:    1. DDD (degenerative disc disease), lumbar  -     REFERRAL TO PHYSICAL THERAPY    2. Spinal stenosis of lumbar region without neurogenic claudication  -     REFERRAL TO PHYSICAL THERAPY       1. Advised to stay active as tolerated. 2. Add lumbar DDD to PT with 6 additional visits as needed  3. May take Tylenol PRN  4. Increase Gabapentin to BID  5. Discussed life style modification, PT, medication, spinal injection, and surgery as treatment options  6. No indications for surgery or spinal injections at this time. 7. Given information on lumbar stenosis    F/U 4 weeks      CHIEF COMPLAINT  Monster Ordaz is seen today in consultation at the request of Dr. Serena Salas for complaints of low back pain. HISTORY OF PRESENT ILLNESS  Monster Ordaz is a 81 y.o. female. Today pt c/o low back pain of 6 month duration. Pt denies any specific incident or injury that caused their pain. Pt reports mild benefit with cortisone injection in her trochanteric bursa. She states her pain began gradually last summer and became worse in . She states she was doing [employer] classes trying to lose weight. She notes this was purely marching for an hour, while sitting. She thinks this caused her trouble due to the timing. She admits to a fall in 10/2019. She notes her pain increases with standing and walking and laying flat on her back. Pt notes at the start of her pain it extended down her leg, but now stops in her hips. Location of pain: low back  Does pain radiate into extremities: B/L hips. Tingling in feet. Does patient have weakness: no   Pt denies saddle paresthesias. Medications pt is on: Tylenol PRN.  Blood thinner. Gabapentin 300mg QHS with somnolence, no grogginess. Denies persistent fevers, chills, weight changes, neurogenic bowel or bladder symptoms. Treatments patient has tried:  Physical therapy: R hip current, minimal for back  Chiropractor: Yes little benefit  Deep massage: some benefit  Doing HEP: Yes for knee  Non-opioid medications: Yes  Spinal injections: No  Spinal surgery- No.   Last L MRI 2019: multilevel FA. Triangulation of canal L3-4     reviewed. 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 ED 11/23/19 for no diagnosis. Pain Assessment  1/6/2020   Location of Pain Back; Hip   Location Modifiers Inferior;Right;Left   Severity of Pain 9   Quality of Pain Sharp   Duration of Pain -   Frequency of Pain Intermittent   Aggravating Factors -   Relieving Factors -   Result of Injury No        MRI lumbar spine 11/5/19  IMPRESSION:     1. Mild spinal canal stenosis at L2-3 and L4-5.     2. No significant foraminal stenosis and no evidence of nerve root compression.     3. Incidental 1.4 x 1.2 cm left adrenal nodule. Stable when compared to a  9/9/2015 abdominal CT. PAST MEDICAL HISTORY   Past Medical History:   Diagnosis Date    Hypercholesterolemia     Hypertension     [facility]roke [facility] about 2006       Past Surgical History:   Procedure Laterality Date    HX APPENDECTOMY  09/09/2015    HX CHOLECYSTECTOMY      HX GYN      partial hysterectomy       MEDICATIONS      Current Outpatient Medications   Medication Sig Dispense Refill    terbinafine HCl (LAMISIL) 250 mg tablet Take 250 mg by mouth daily.  atenolol-chlorthalidone (TENORETIC) 50-25 mg per tablet Take 1 Tab by mouth daily. 90 Tab 3    tolterodine ER (DETROL-LA) 2 mg ER capsule Take 1 Cap by mouth daily. 30 Cap 5    escitalopram oxalate (LEXAPRO) 5 mg tablet Take 1 Tab by mouth daily. Take daily with a 10mg daily tab for a total daily dose of 15mg daily.  90 Tab 3    rosuvastatin (CRESTOR) 10 mg tablet TAKE 1 TABLET BY MOUTH NIGHTLY 90 Tab 1    gabapentin (NEURONTIN) 300 mg capsule Take 300 mg by mouth nightly.  fluticasone (FLONASE) 50 mcg/actuation nasal spray 2 Sprays by Both Nostrils route daily. 1 Bottle 11    Cholecalciferol, Vitamin D3, (VITAMIN D3) 1,000 unit cap Take 1,000 Units by mouth daily.  aspirin-dipyridamole (AGGRENOX)  mg per SR capsule Take 1 Cap by mouth two (2) times a day.          ALLERGIES    Allergies   Allergen Reactions    Amoxicillin-Pot Clavulanate Diarrhea    Azithromycin Diarrhea    Oxybutynin Other (comments)     Problems swallowing, dry mouth    Seroquel [Quetiapine] Other (comments)     Tremors    Sulfa (Sulfonamide Antibiotics) Diarrhea    Wellbutrin [Bupropion Hcl] Itching          SOCIAL HISTORY    Social History     Socioeconomic History    Marital status:      Spouse name: Not on file    Number of children: Not on file    Years of education: Not on file    Highest education level: Not on file   Occupational History    Not on file   Social Needs    Financial resource strain: Not on file    Food insecurity:     Worry: Not on file     Inability: Not on file    Transportation needs:     Medical: Not on file     Non-medical: Not on file   Tobacco Use    Smoking status: Never Smoker    Smokeless tobacco: Never Used   Substance and Sexual Activity    Alcohol use: No    Drug use: No    Sexual activity: Not on file   Lifestyle    Physical activity:     Days per week: Not on file     Minutes per session: Not on file    Stress: Not on file   Relationships    Social connections:     Talks on phone: Not on file     Gets together: Not on file     Attends Denominational service: Not on file     Active member of club or organization: Not on file     Attends meetings of clubs or organizations: Not on file     Relationship status: Not on file    Intimate partner violence:     Fear of current or ex partner: Not on file Emotionally abused: Not on file     Physically abused: Not on file     Forced sexual activity: Not on file   Other Topics Concern    Not on file   Social History Narrative    Not on file       FAMILY HISTORY    Family History   Problem Relation Age of Onset    Dementia Mother     Stroke Father     Cancer Brother     Hypertension Brother          REVIEW OF SYSTEMS  Review of Systems   Constitutional: Negative for chills, fever and weight loss. Respiratory: Positive for shortness of breath. Cardiovascular: Negative for chest pain. Gastrointestinal: Negative for constipation. Negative for fecal incontinence   Genitourinary: Negative for dysuria. Negative for urinary incontinence   Musculoskeletal:        Per HPI   Skin: Negative for rash. Neurological: Positive for tingling. Negative for dizziness, tremors, focal weakness and headaches. Endo/Heme/Allergies: Does not bruise/bleed easily. Psychiatric/Behavioral: The patient does not have insomnia. PHYSICAL EXAMINATION  Visit Vitals  /68   Pulse 62   Temp 98.1 °F (36.7 °C) (Oral)   Resp 16   Ht 5' 6\" (1.676 m)   Wt 263 lb 12.8 oz (119.7 kg)   SpO2 97%   BMI 42.58 kg/m²          Accompanied by self. Constitutional:  Well developed, well nourished, in no acute distress. Psychiatric: Affect and mood are appropriate. Integumentary: No rashes or abrasions noted on exposed areas. Cardiovascular/Peripheral Vascular: No peripheral edema is noted BLE. SPINE/MUSCULOSKELETAL EXAM      Lumbar spine:  No rash, ecchymosis, or gross obliquity. No fasciculations. No focal atrophy is noted. Tenderness to palpation SI joint R>L, R>L sciatic notch, B/L trochanteric bursa. Non tender to palpation lumbar spine. MOTOR:       Hip Flex  Quads Hamstrings Ankle DF EHL Ankle PF   Right +4/5 +4/5 +4/5 +4/5 +4/5 +4/5   Left +4/5 +4/5 +4/5 +4/5 +4/5 +4/5       Straight Leg raise negative. Mildly positive R MARY maneuver. Ambulation without assistive device with wide base of support. FWB. Written by Lennox Isaacs, as dictated by Aashish Morales MD.    I, Dr. Aashish Morales MD, confirm that all documentation is accurate. Ms. Erik Kaplan may have a reminder for a \"due or due soon\" health maintenance. I have asked that she contact her primary care provider for follow-up on this health maintenance.

## 2020-01-08 ENCOUNTER — HOSPITAL ENCOUNTER (OUTPATIENT)
Dept: PHYSICAL THERAPY | Age: 70
Discharge: HOME | End: 2020-01-08
Payer: MEDICARE

## 2020-01-08 PROCEDURE — 97112 NEUROMUSCULAR REEDUCATION: CPT

## 2020-01-08 PROCEDURE — 97140 MANUAL THERAPY 1/> REGIONS: CPT

## 2020-01-08 PROCEDURE — 97110 THERAPEUTIC EXERCISES: CPT

## 2020-01-08 NOTE — PROGRESS NOTES
PT DAILY TREATMENT NOTE 10-18    Patient Name: Payam Becker  Date:2020  : 1950  [x]  Patient  Verified  Payor: [place] MEDICARE / Plan: [place] MEDICARE PART A & B / Product Type: Medicare /    In time:10:03  Out time:11:00  Total Treatment Time (min): 62  Visit #: 7 of 12    Medicare/BCBS Only   Total Timed Codes (min):  47 1:1 Treatment Time:  39       Treatment Area: Pain in right hip [M25.551]  Left hip pain [M25.552]    SUBJECTIVE  Pain Level (0-10 scale): 3  Any medication changes, allergies to medications, adverse drug reactions, diagnosis change, or new procedure performed?: [x] No    [] Yes (see summary sheet for update)  Subjective functional status/changes:   [] No changes reported  Pt reports she did a lot of chores last night and took her dog for a walk this morning and is feeling a little sore but is not in a lot of pain. OBJECTIVE    Modality rationale: decrease pain and increase tissue extensibility to improve the patients ability to perform functional task with ease.    Min Type Additional Details    [] Estim:  []Unatt       []IFC  []Premod                        []Other:  []w/ice   []w/heat  Position:  Location:    [] Estim: []Att    []TENS instruct  []NMES                    []Other:  []w/US   []w/ice   []w/heat  Position:  Location:    []  Traction: [] Cervical       []Lumbar                       [] Prone          []Supine                       []Intermittent   []Continuous Lbs:  [] before manual  [] after manual    []  Ultrasound: []Continuous   [] Pulsed                           []1MHz   []3MHz W/cm2:  Location:    []  Iontophoresis with dexamethasone         Location: [] Take home patch   [] In clinic   10 [x]  Ice     []  heat  []  Ice massage  []  Laser   []  Anodyne Position:right S/L  Location: left hip    []  Laser with stim  []  Other:  Position:  Location:    []  Vasopneumatic Device Pressure:       [] lo [] med [] hi   Temperature: [] lo [] med [] hi   [] Skin assessment post-treatment:  []intact []redness- no adverse reaction    []redness  adverse reaction:       27 min Therapeutic Exercise:  [x] See flow sheet :   Rationale: increase ROM and increase strength to improve the patients ability to tolerate Functional task. 10 min Neuromuscular Re-education:  [x]  See flow sheet :   Rationale: increase strength, improve coordination, improve balance and increase proprioception  to improve the patients ability to perform ADL's with ease. 10 min Manual Therapy:  STM/MFR of L/S navya, Bilateral SI L>R   Rationale: decrease pain, increase ROM, increase tissue extensibility and decrease trigger points to improve functional mobility. With   [] TE   [] TA   [] neuro   [] other: Patient Education: [x] Review HEP    [] Progressed/Changed HEP based on:   [] positioning   [] body mechanics   [] transfers   [] heat/ice application    [] other:      Other Objective/Functional Measures:   Marching on airex- 2# 15x ea  Standing HS curls- 2# 10x ea     Pain Level (0-10 scale) post treatment: 2    ASSESSMENT/Changes in Function:   Continued limitations due to pain in the left hip. Pt reports some limitations due to pain in the low back due to lumbar stenosis. Pt able to complete therex as prescribed and reports a slight decrease in pain post treatment. Patient will continue to benefit from skilled PT services to modify and progress therapeutic interventions, address functional mobility deficits, address ROM deficits, address strength deficits, analyze and address soft tissue restrictions, analyze and cue movement patterns, analyze and modify body mechanics/ergonomics and assess and modify postural abnormalities to attain remaining goals.      [x]  See Plan of Care  []  See progress note/recertification  []  See Discharge Summary         Progress towards goals / Updated goals:  Updated Goals for this certification period to be accomplished in 4 weeks:               3.  Improve FOTO Score to 49 or greater to indicate significant improvement in     functional status.             BQ:  16              1.  Patient to walk dog 1 block daily without difficulty.             FE: Luis Miguel Frame attempted to walk dog one time. Current: Met 1/8/2020- Pt able to walk dog 1 block without difficulty, notes fatigue              3.  Patient to go up and down 4 steps into garage/laundry room without difficulty.               YL:  Moderate difficulty              ATOASNP:  Mild difficulty reported improved ability with steps.               4.  Patient to demonstrate good body mechanics when using oven and able to      put in/take out pots and pans.               PN:  Moderate difficulty     PLAN   []  Upgrade activities as tolerated     [x]  Continue plan of care  []  Update interventions per flow sheet       []  Discharge due to:_  []  Other:_      Alpa Boyd PTA 1/8/2020  10:30 AM    Future Appointments   Date Time Provider Leanna Pack   1/13/2020  9:30 AM Joseph Bernard, PT [facility]   1/15/2020  9:30 AM Joseph Bernard, PT [facility]   1/17/2020  9:30 AM Cathleen Singh PTA [facility]   1/20/2020  9:30 AM Joseph Bernard, PT [facility]   1/22/2020  9:30 AM Joseph Bernard, PT [facility]   2/10/2020  9:15 AM Skyler Kelley  [address]    3/5/2020  9:00 AM Danita Capellan MD [facility]

## 2020-01-10 ENCOUNTER — APPOINTMENT (OUTPATIENT)
Dept: PHYSICAL THERAPY | Age: 70
End: 2020-01-10
Payer: MEDICARE

## 2020-01-10 ENCOUNTER — HOSPITAL ENCOUNTER (OUTPATIENT)
Dept: LAB | Age: 70
Discharge: HOME | End: 2020-01-10
Payer: MEDICARE

## 2020-01-10 LAB
ALBUMIN SERPL-MCNC: 3.5 G/DL (ref 3.4–5)
ALBUMIN/GLOB SERPL: 1.1 {RATIO} (ref 0.8–1.7)
ALP SERPL-CCNC: 112 U/L (ref 45–117)
ALP SERPL-CCNC: 115 U/L (ref 45–117)
ALT SERPL-CCNC: 32 U/L (ref 13–56)
ALT SERPL-CCNC: 33 U/L (ref 13–56)
AST SERPL-CCNC: 25 U/L (ref 10–38)
AST SERPL-CCNC: 29 U/L (ref 10–38)
BILIRUB DIRECT SERPL-MCNC: 0.2 MG/DL (ref 0–0.2)
BILIRUB SERPL-MCNC: 0.8 MG/DL (ref 0.2–1)
GLOBULIN SER CALC-MCNC: 3.2 G/DL (ref 2–4)
PROT SERPL-MCNC: 6.7 G/DL (ref 6.4–8.2)

## 2020-01-10 PROCEDURE — 84075 ASSAY ALKALINE PHOSPHATASE: CPT

## 2020-01-10 PROCEDURE — 36415 COLL VENOUS BLD VENIPUNCTURE: CPT

## 2020-01-10 PROCEDURE — 80076 HEPATIC FUNCTION PANEL: CPT

## 2020-01-10 PROCEDURE — 84450 TRANSFERASE (AST) (SGOT): CPT

## 2020-01-10 PROCEDURE — 84460 ALANINE AMINO (ALT) (SGPT): CPT

## 2020-01-13 ENCOUNTER — HOSPITAL ENCOUNTER (OUTPATIENT)
Dept: PHYSICAL THERAPY | Age: 70
Discharge: HOME | End: 2020-01-13
Payer: MEDICARE

## 2020-01-13 PROCEDURE — 97140 MANUAL THERAPY 1/> REGIONS: CPT

## 2020-01-13 PROCEDURE — 97112 NEUROMUSCULAR REEDUCATION: CPT

## 2020-01-13 NOTE — PROGRESS NOTES
PT DAILY TREATMENT NOTE 10-18    Patient Name: Julio Fairchild  Date:2020  : 1950  [x]  Patient  Verified  Payor: [place] MEDICARE / Plan: [place] MEDICARE PART A & B / Product Type: Medicare /    In time:9:30  Out time:10:16  Total Treatment Time (min): 55  Visit #: 8 of 12    Medicare/BCBS Only   Total Timed Codes (min):  36 1:1 Treatment Time:  33       Treatment Area: Pain in right hip [M25.551]  Left hip pain [M25.552]    SUBJECTIVE  Pain Level (0-10 scale): 3/10  Any medication changes, allergies to medications, adverse drug reactions, diagnosis change, or new procedure performed?: [x] No    [] Yes (see summary sheet for update)  Subjective functional status/changes:   [] No changes reported  \"The Doctor wanted me to continue therapy for another 6 visits to help my back strength. \"    OBJECTIVE    Modality rationale: decrease pain to improve the patients ability to perform ADL's.    Min Type Additional Details    [] Estim:  []Unatt       []IFC  []Premod                        []Other:  []w/ice   []w/heat  Position:  Location:    [] Estim: []Att    []TENS instruct  []NMES                    []Other:  []w/US   []w/ice   []w/heat  Position:  Location:    []  Traction: [] Cervical       []Lumbar                       [] Prone          []Supine                       []Intermittent   []Continuous Lbs:  [] before manual  [] after manual    []  Ultrasound: []Continuous   [] Pulsed                           []1MHz   []3MHz W/cm2:  Location:    []  Iontophoresis with dexamethasone         Location: [] Take home patch   [] In clinic   10 [x]  Ice     []  heat  []  Ice massage  []  Laser   []  Anodyne Position: Seated  Location: L/S    []  Laser with stim  []  Other:  Position:  Location:    []  Vasopneumatic Device Pressure:       [] lo [] med [] hi   Temperature: [] lo [] med [] hi   [] Skin assessment post-treatment:  []intact []redness- no adverse reaction    []redness  adverse reaction:     20 min Therapeutic Exercise:  [x] See flow sheet :   Rationale: increase ROM and increase strength to improve the patients ability to perform ADL's.    8 min Neuromuscular Re-education:  [x]  See flow sheet : airex balance, eccentric step downs. Rationale: improve balance and increase proprioception  to improve the patients ability to safely negotiate community distances and stairs. 8 min Manual Therapy:  STM/DTM Left QL, L/S paraspinals, piriformis/gluteals with pt right side-lying. Rationale: decrease pain, increase ROM, increase tissue extensibility and decrease trigger points to improve ease of performing functional activities. With   [x] TE   [] TA   [] neuro   [] other: Patient Education: [x] Review HEP    [] Progressed/Changed HEP based on:   [] positioning   [] body mechanics   [] transfers   [] heat/ice application    [] other:      Other Objective/Functional Measures: FOTO 53%. Pt reports \"a little bit\" of difficulty when using the oven and reaching for pots/pans. Recommend continue PT to further increase hip strength and core stability to improve ease of performing functional activities. Pain Level (0-10 scale) post treatment: 3/10    ASSESSMENT/Changes in Function:       []  See Plan of Care  [x]  See progress note/recertification  []  See Discharge Summary         Progress towards goals / Updated goals:  Updated Goals for this certification period to be accomplished in 4 weeks:               1.  Improve FOTO Score to 49 or greater to indicate significant improvement in     functional status.             PN:  45   Current: Met, improved to 53%. 1/13/2020              2.  Patient to walk dog 1 block daily without difficulty.             TP: Luis Miguel Lassiter attempted to walk dog one time. Current: Met 1/8/2020- Pt able to walk dog 1 block without difficulty, notes fatigue              3.  Patient to go up and down 4 steps into garage/laundry room without difficulty.               GE:  LBOBFXLD difficulty              XYAJYSC:  Mild difficulty reported improved ability with steps.               4.  Patient to demonstrate good body mechanics when using oven and able to put in/take out pots and pans.               PN:  Moderate difficulty    Current: Pt reports \"a little bit\" of difficulty when using the oven and reaching for pots/pans.  1/13/2020       PLAN  []  Upgrade activities as tolerated     [x]  Continue plan of care  []  Update interventions per flow sheet       []  Discharge due to:_  []  Other:_      Sissy Pelcandi, PTA 1/13/2020  9:38 AM    Future Appointments   Date Time Provider Leanna Pack   1/15/2020  9:30 AM [facility]   1/17/2020  9:30 AM [facility]   1/20/2020  9:30 AM Mio Colon, PT [facility]   1/22/2020  9:30 AM Mio Colon, PT [facility]   2/10/2020  9:15 AM Brooklyn Domínguez  [address]    3/5/2020  9:00 AM Kendal Reyez MD [facility]

## 2020-01-15 ENCOUNTER — HOSPITAL ENCOUNTER (OUTPATIENT)
Dept: PHYSICAL THERAPY | Age: 70
Discharge: HOME | End: 2020-01-15
Payer: MEDICARE

## 2020-01-15 PROCEDURE — 97112 NEUROMUSCULAR REEDUCATION: CPT

## 2020-01-15 PROCEDURE — 97140 MANUAL THERAPY 1/> REGIONS: CPT

## 2020-01-15 PROCEDURE — 97110 THERAPEUTIC EXERCISES: CPT

## 2020-01-15 NOTE — PROGRESS NOTES
[facility]  [address]  [place], [address]  [phone] fax    Continued Plan of Care/ Re-certification for Physical Therapy Services    Patient name: Yrn Hull Start of Care: 19   Referral source: Manolo Westfall MD : 1950   Medical/Treatment Diagnosis: Pain in right hip [M25.551]  Left hip pain [M25.552]  Payor: VA MEDICARE / Plan: VA MEDICARE PART A & B / Product Type: Medicare /  Onset Date:2019     Prior Hospitalization: see medical history Provider#: [id number]   Medications: Verified on Patient Summary List    Comorbidities: Left Knee OATS surgery, CVA , HTN LBP with DDD and spinal stenosis  Prior Level of Function: Mall walking, community dog walking, Silver [employer]neakers at [facility]. Visits from Start of Care: 19    Missed Visits: 0    The Plan of Care and following information is based on the patient's current status:              1.  Improve FOTO Score to 49 or greater to indicate significant improvement in     functional status.             JK:  45              Current: Met improved to 53%              2.  Patient to walk dog 1 block daily without difficulty.             WS:  Have attempted to walk dog one time.              Current: Met Pt able to walk dog 1 block 1x per week.              3.  Patient to go up and down 4 steps into garage/laundry room without difficulty.             TE:  Moderate difficulty              [id number]:  Mild difficulty reported improved ability with steps. Negotiates with step to step  pattern.              4.  Patient to demonstrate good body mechanics when using oven and able to put in/take out pots and pans.               PN:  Moderate difficulty               Current: Pt reports \"a little bit\" of difficulty when using the oven and reaching for  pots/pans. Key functional changes:    Functional Strength: Increase Eccentric ability on 2 inch step.    Pain:  Decreased pain level on average 2/10   ADL's: Improving tolerance. Problems/ barriers to goal attainment:    Strength:  Left LE Quad weakness concentric: 4inch step, Eccentric 2 inch step. Pain:  Lumbar/SI pain. Problem List: pain affecting function, decrease strength, impaired gait/ balance and other core stability    Treatment Plan: Therapeutic exercise, Therapeutic activities, Neuromuscular re-education, Physical agent/modality, Gait/balance training, Manual therapy, Patient education, Functional mobility training and Other: core stability     Patient Goal (s) has been updated and includes: \" Walk dog 1-2 blocks daily\"    Goals for this certification period to be accomplished in 3 weeks:  1. Patient to tolerate supine position for Core Stability Exercise Program.       PN:  Pain in Lumbar spine in supine. 2.  Patient to be Independent with Updated Core Stability Exercise program       PN:  Not issued yet  3. Patient to walk dog daily without increased pain complaints following. PN:  Patient walks dog 1 block 1x/week. 4.  Patient to demonstrate ability to negotiate steps with improved pelvic control Left LE. PN:  Concentric 4 inch step, Eccentric 2 inch step. Frequency / Duration: Patient to be seen 2 times per week for 3 weeks:    Assessment / Marcellus Covarrubias is improving functionally with PT. She continues to lack core/pelvic stability. Her Left LE weakness causes pelvic asymmetry with movement and gait deviations. Her overall pain level is decreased. Patient being followed by Dr. Giuliano Lopez for Lumbar spine. She will benefit from continued care to address above goals and improve stability. Certification Period: 01/15/2020-02/14/2020    Estrella Johnson, PT 1/15/2020 12:18 PM    ________________________________________________________________________  I certify that the above Therapy Services are being furnished while the patient is under my care.  I agree with the treatment plan and certify that this therapy is necessary. [] I have read the above and request that my patient continue as recommended.   [] I have read the above report and request that my patient continue therapy with the following changes/special instructions: _____________________________________________  [] I have read the above report and request that my patient be discharged from therapy    Physician's Signature:____________Date:_________TIME:________    ** Signature, Date and Time must be completed for valid certification **    Please sign and return to [facility] [address] [address] Neli Almendarez [address] [place] [address] [address]  [phone] fax

## 2020-01-15 NOTE — PROGRESS NOTES
PT DAILY TREATMENT NOTE 10-18    Patient Name: Bobbi Fernández  Date:1/15/2020  : 1950  [x]  Patient  Verified  Payor: Rajni Flores / Plan: [place] MED[place] & B / Product Type: Medicare /    In time:930 Out time:   Total Treatment Time (min): 60  6 minute rest break while researching new script  Visit #: 1 of 6    Medicare/BCBS Only   Total Timed Codes (min):  50 1:1 Treatment Time:  40       Treatment Area: Pain in right hip [M25.551]  Left hip pain [M25.552]    SUBJECTIVE  Pain Level (0-10 scale): 2/10  Any medication changes, allergies to medications, adverse drug reactions, diagnosis change, or new procedure performed?: [x] No    [] Yes (see summary sheet for update)  Subjective functional status/changes:   [] No changes reported  I saw Dr. Alexandru Branch on Monday and she suggested core strengthening. OBJECTIVE    Modality rationale: decrease inflammation and decrease pain to improve the patients ability to ambulate perform ADL's.    Min Type Additional Details    [] Estim:  []Unatt       []IFC  []Premod                        []Other:  []w/ice   []w/heat  Position:  Location:    [] Estim: []Att    []TENS instruct  []NMES                    []Other:  []w/US   []w/ice   []w/heat  Position:  Location:    []  Traction: [] Cervical       []Lumbar                       [] Prone          []Supine                       []Intermittent   []Continuous Lbs:  [] before manual  [] after manual    []  Ultrasound: []Continuous   [] Pulsed                           []1MHz   []3MHz W/cm2:  Location:    []  Iontophoresis with dexamethasone         Location: [] Take home patch   [] In clinic   10 [x]  Ice     []  heat  []  Ice massage  []  Laser   []  Anodyne Position: Seated  Location:    []  Laser with stim  []  Other:  Position:  Location:    []  Vasopneumatic Device Pressure:       [] lo [] med [] hi   Temperature: [] lo [] med [] hi   [x] Skin assessment post-treatment:  [x]intact []redness- no adverse reaction []redness  adverse reaction:       25 min Therapeutic Exercise:  [x] See flow sheet :   Rationale: increase strength, improve coordination, improve balance and increase proprioception to improve the patients ability to ambulate perform ADL's. 15 min Neuromuscular Re-education:  [x]  See flow sheet :  Emphasis on breath work with TA contraction, balance on foam, single leg stability. Rationale: increase strength, improve balance, increase proprioception and core stability. to improve the patients ability to ambulate perform ADL's. 10 min Manual Therapy:  STM/MFR to T/L SI, Left Piriformis. Rationale: decrease pain, increase tissue extensibility and increase postural awareness to improve gait and ADL's. With   [] TE   [] TA   [] neuro   [] other: Patient Education: [x] Review HEP    [] Progressed/Changed HEP based on:   [] positioning   [] body mechanics   [] transfers   [] heat/ice application    [] other:      Other Objective/Functional Measures:   Balance: Rhomberg Eyes closed 10 sec. Single Leg stance:  Left unable, Right 5 sec  Foam Rhomberg: 10 seconds    TA:  1/5     Pain Level (0-10 scale) post treatment: 0/10    ASSESSMENT/Changes in Function: Patient challenged with core stablity, balance and Left greater than Right LE strength. Patient will continue to benefit from skilled PT services to address functional mobility deficits, address strength deficits, analyze and address soft tissue restrictions, analyze and cue movement patterns, analyze and modify body mechanics/ergonomics, assess and modify postural abnormalities and address imbalance/dizziness to attain remaining goals. []  See Plan of Care  [x]  See progress note/recertification  []  See Discharge Summary         Progress towards goals / Updated goals:  Goals for this certification period to be accomplished in 3 weeks:  1.   Patient to tolerate supine position for Core Stability Exercise Program.       PN:  Pain in Lumbar spine in supine. 2.  Patient to be Independent with Updated Core Stability Exercise program       PN:  Not issued yet  3. Patient to walk dog daily without increased pain complaints following. PN:  Patient walks dog 1 block 1x/week. 4.  Patient to demonstrate ability to negotiate steps with improved pelvic control Left LE. PN:  Concentric 4 inch step, Eccentric 2 inch step.      PLAN  []  Upgrade activities as tolerated     [x]  Continue plan of care  []  Update interventions per flow sheet       []  Discharge due to:_  []  Other:_      Kriss Cosme, PT 1/15/2020  12:47 PM    Future Appointments   Date Time Provider eLanna Pack   1/20/2020  9:30 AM [facility], PT [facility]   1/22/2020  9:30 AM Nevaeh Alvares, PT [facility]   1/27/2020 10:30 AM Nevaeh Alvares, PT [facility]   1/29/2020  9:30 AM Nevaeh Alvares, PT [facility]   2/3/2020  9:30 AM Nevaeh Alvares, PT [facility]   2/5/2020  9:30 AM Nevaeh Alvares, PT [facility]   2/10/2020  9:15 AM Hermilo Cantu  [address]    3/5/2020  9:00 AM Chio Kang MD [facility]

## 2020-01-17 ENCOUNTER — APPOINTMENT (OUTPATIENT)
Dept: PHYSICAL THERAPY | Age: 70
End: 2020-01-17
Payer: MEDICARE

## 2020-01-20 ENCOUNTER — HOSPITAL ENCOUNTER (OUTPATIENT)
Dept: PHYSICAL THERAPY | Age: 70
Discharge: HOME | End: 2020-01-20
Payer: MEDICARE

## 2020-01-20 PROCEDURE — 97140 MANUAL THERAPY 1/> REGIONS: CPT

## 2020-01-20 PROCEDURE — 97112 NEUROMUSCULAR REEDUCATION: CPT

## 2020-01-20 NOTE — PROGRESS NOTES
PT DAILY TREATMENT NOTE 10-18    Patient Name: Warden Palmer  Date:2020  : 1950  [x]  Patient  Verified  Payor: [place] MEDICARE / Plan: [place] MEDICARE PART A & B / Product Type: Medicare /    In time:9:28  Out time:10:38  Total Treatment Time (min): 70  Visit #: 2 of 6    Medicare/BCBS Only   Total Timed Codes (min):  60 1:1 Treatment Time:  30       Treatment Area: Pain in right hip [M25.551]  Left hip pain [M25.552]    SUBJECTIVE  Pain Level (0-10 scale): 3/10  Any medication changes, allergies to medications, adverse drug reactions, diagnosis change, or new procedure performed?: [x] No    [] Yes (see summary sheet for update)  Subjective functional status/changes:   [] No changes reported  Able to do more around the house, Still not able to return to walking dog. OBJECTIVE    Modality rationale: decrease inflammation and decrease pain to improve the patients ability to Perform ADL's with ease.     Min Type Additional Details    [] Estim:  []Unatt       []IFC  []Premod                        []Other:  []w/ice   []w/heat  Position:  Location:    [] Estim: []Att    []TENS instruct  []NMES                    []Other:  []w/US   []w/ice   []w/heat  Position:  Location:    []  Traction: [] Cervical       []Lumbar                       [] Prone          []Supine                       []Intermittent   []Continuous Lbs:  [] before manual  [] after manual    []  Ultrasound: []Continuous   [] Pulsed                           []1MHz   []3MHz W/cm2:  Location:    []  Iontophoresis with dexamethasone         Location: [] Take home patch   [] In clinic   10 [x]  Ice     []  heat  []  Ice massage  []  Laser   []  Anodyne Position:Seated  Location: LB    []  Laser with stim  []  Other:  Position:  Location:    []  Vasopneumatic Device Pressure:       [] lo [] med [] hi   Temperature: [] lo [] med [] hi   [x] Skin assessment post-treatment:  [x]intact []redness- no adverse reaction    []redness  adverse reaction: 30 min Therapeutic Exercise:  [x] See flow sheet : Added Wall Push ups   Rationale: increase strength and improve balance to improve the patients ability to perform ADL's with ease. 15 min Neuromuscular Re-education:  [x]  See flow sheet : Emphasis on breath work with TA contraction, patient feels pain in T-spine with supine TA work. Balance on foam, single leg stability. Rationale: increase strength and improve balance  to improve the patients ability to return to walking dog. 15 min Manual Therapy:  MFR to Left>Right Pelvis SI, Right Thoraco/Lumbar junction paraspinals in seated. Rationale: decrease pain, increase tissue extensibility, decrease trigger points and increase postural awareness to Perform ADL's with ease. With   [x] TE   [] TA   [x] neuro   [] other: Patient Education: [x] Review HEP Home Breath TA work. [] Progressed/Changed HEP based on:   [] positioning   [] body mechanics   [] transfers   [] heat/ice application    [] other:      Other Objective/Functional Measures:   TA 1-2/5, challenged with single leg march. Pain Level (0-10 scale) post treatment: 2/10    ASSESSMENT/Changes in Function:   Patient has pain in T/L junction on the right with LE marching. She has a difficult time stabilizing with cross patterns. Continues to report improving function. Patient will continue to benefit from skilled PT services to address functional mobility deficits, address strength deficits, analyze and address soft tissue restrictions, analyze and cue movement patterns and assess and modify postural abnormalities to attain remaining goals. []  See Plan of Care  []  See progress note/recertification  []  See Discharge Summary         Progress towards goals / Updated goals:  Goals for this certification period to be accomplished in 3 weeks:  1. Patient to tolerate supine position for Core Stability Exercise Program.       PN:  Pain in Lumbar spine in supine. Current: Pain in T/L Junction on Right. 2.  Patient to be Independent with Updated Core Stability Exercise program       PN:  Not issued yet  3. Patient to walk dog daily without increased pain complaints following. PN:  Patient walks dog 1 block 1x/week. 4.  Patient to demonstrate ability to negotiate steps with improved pelvic control Left LE. PN:  Concentric 4 inch step, Eccentric 2 inch step.     PLAN  []  Upgrade activities as tolerated     []  Continue plan of care  []  Update interventions per flow sheet       []  Discharge due to:_  []  Other:_      Eric Wallis, PT 1/20/2020  12:03 PM    Future Appointments   Date Time Provider Leanna Pack   1/22/2020  9:30 AM Marsha Mitchell, PT [facility]   1/27/2020 10:30 AM Rod Drake, PT [facility]   1/29/2020  9:30 AM Rod Drake, PT [facility]   2/3/2020  9:30 AM Rodrimma Drake, PT [facility]   2/5/2020  9:30 AM Rod Vidal, PT [facility]   2/10/2020  9:15 AM Oswald Davies  [address]    3/5/2020  9:00 AM Rebecca Carrera MD [facility]

## 2020-01-22 ENCOUNTER — HOSPITAL ENCOUNTER (OUTPATIENT)
Dept: PHYSICAL THERAPY | Age: 70
Discharge: HOME | End: 2020-01-22
Payer: MEDICARE

## 2020-01-22 PROCEDURE — 97112 NEUROMUSCULAR REEDUCATION: CPT

## 2020-01-22 PROCEDURE — 97140 MANUAL THERAPY 1/> REGIONS: CPT

## 2020-01-22 NOTE — PROGRESS NOTES
PT DAILY TREATMENT NOTE 10-18    Patient Name: Mathew Lee  Date:2020  : 1950  [x]  Patient  Verified  Payor: [place] MEDICARE / Plan: [place] MEDICARE PART A & B / Product Type: Medicare /    In time 9:27  Out time:10:30  Total Treatment Time (min): 63  Visit #: 3 of 6    Medicare/BCBS Only   Total Timed Codes (min):  53 1:1 Treatment Time:  30       Treatment Area: Pain in right hip [M25.551]  Left hip pain [M25.552]    SUBJECTIVE  Pain Level (0-10 scale): 2/10  Any medication changes, allergies to medications, adverse drug reactions, diagnosis change, or new procedure performed?: [x] No    [] Yes (see summary sheet for update)  Subjective functional status/changes:   [] No changes reported  \"The squats really seem to be working, I am able to get into stove much better\"    OBJECTIVE    Modality rationale: decrease inflammation and decrease pain to improve the patients ability to ambulate transfer better.     Min Type Additional Details    [] Estim:  []Unatt       []IFC  []Premod                        []Other:  []w/ice   []w/heat  Position:  Location:    [] Estim: []Att    []TENS instruct  []NMES                    []Other:  []w/US   []w/ice   []w/heat  Position:  Location:    []  Traction: [] Cervical       []Lumbar                       [] Prone          []Supine                       []Intermittent   []Continuous Lbs:  [] before manual  [] after manual    []  Ultrasound: []Continuous   [] Pulsed                           []1MHz   []3MHz W/cm2:  Location:    []  Iontophoresis with dexamethasone         Location: [] Take home patch   [] In clinic   10 [x]  Ice     []  heat  []  Ice massage  []  Laser   []  Anodyne Position: Seated  Location: LB    []  Laser with stim  []  Other:  Position:  Location:    []  Vasopneumatic Device Pressure:       [] lo [] med [] hi   Temperature: [] lo [] med [] hi   [] Skin assessment post-treatment:  [x]intact []redness- no adverse reaction    []redness  adverse reaction: 28 min Therapeutic Exercise:  [x] See flow sheet :   Rationale: increase strength, improve coordination, improve balance and stabilization to improve the patients ability to perform ADL's with ease. 15 min Neuromuscular Re-education:  [x]  See flow sheet :  Added gait training with speed variation, head turns and step overs. Rationale: increase strength, improve coordination, improve balance, increase proprioception and stabilization  to improve the patients ability to improve ability to ambulate safely without pain. 10 min Manual Therapy:  MFR/STM to Thoraco/Lumbar junction, PA grade I/II mobilization is seated. Rationale: decrease pain, increase ROM, increase tissue extensibility, increase postural awareness and stability to improve tolerance to supine position for core strengthening. With   [x] TE   [] TA   [x] neuro   [] other: Patient Education: [x] Review HEP    [] Progressed/Changed HEP based on:   [] positioning   [] body mechanics   [] transfers   [] heat/ice application    [x] other: discussed having access to 4 inch step at home to strengthen LE functionally. Other Objective/Functional Measures:   Gait with head turns:  Fair  Gait with step overs:  Poor  Stair ambulation:  Up:  Step to step bilateral railing                                  Down: Step to step max arm support. Pain Level (0-10 scale) post treatment: 2/10    ASSESSMENT/Changes in Function: Patient is progressing with current plan. She is very cautious with gait pattern, and was unable to step over 2 inch obsticle. She reports feeling stronger in LE and is willing to do more, however she continues to favor the Right LE and avoids using the Left. Continued emphasis on strength is decreasing patient's overuse of Lumbar spine with transfers and ADL's. Patient can safely pick items up off floor. Encouraged patient to attempt 5 minutes of community ambulation.   Also to find 4 inch step to practice strength training. Patient will continue to benefit from skilled PT services to address strength deficits, analyze and address soft tissue restrictions, analyze and cue movement patterns, analyze and modify body mechanics/ergonomics, assess and modify postural abnormalities and instruct in home and community integration to attain remaining goals. [x]  See Plan of Care  []  See progress note/recertification  []  See Discharge Summary         Goals for this certification period to be accomplished in 3 weeks:  1.  Patient to tolerate supine position for Core Stability Exercise Program.       PN:  Pain in Lumbar spine in supine. Current: Pain in T/L Junction on Right. 1/22/20  2.  Patient to be Independent with Updated Core Stability Exercise program       PN:  Not issued yet   Current:  Added 2 exercises for home, patient complaint 1/22/20  3.  Patient to walk dog daily without increased pain complaints following.       PN:  Patient walks dog 1 block 1x/week. 4.  Patient to demonstrate ability to negotiate steps with improved pelvic control Left LE.       PN:  Concentric 4 inch step, Eccentric 2 inch step.    Current:  Patient avoids using Left LE    PLAN  []  Upgrade activities as tolerated     []  Continue plan of care  []  Update interventions per flow sheet       []  Discharge due to:_  []  Other:_      Farooq Robles, PT 1/22/2020  12:22 PM    Future Appointments   Date Time Provider Leanna Pack   1/24/2020  9:15 AM [facility] 3D [facility]   1/27/2020 10:30 AM Germán Fofana, PT [facility]   1/29/2020  9:30 AM Eileen Puente, PT [facility]   2/3/2020  9:30 AM Eileen Puente, PT [facility]   2/5/2020  9:30 AM Eileen Puente, PT [facility]   2/10/2020  9:15 AM Khanh Adams  [address]   3/5/2020  9:00 AM Reji Salcido MD [facility]

## 2020-01-24 ENCOUNTER — HOSPITAL ENCOUNTER (OUTPATIENT)
Dept: MAMMOGRAPHY | Age: 70
Discharge: HOME | End: 2020-01-24
Attending: INTERNAL MEDICINE
Payer: MEDICARE

## 2020-01-24 ENCOUNTER — APPOINTMENT (OUTPATIENT)
Dept: PHYSICAL THERAPY | Age: 70
End: 2020-01-24
Payer: MEDICARE

## 2020-01-24 DIAGNOSIS — Z12.31 VISIT FOR SCREENING MAMMOGRAM: ICD-10-CM

## 2020-01-24 PROCEDURE — 77063 BREAST TOMOSYNTHESIS BI: CPT

## 2020-01-27 ENCOUNTER — HOSPITAL ENCOUNTER (OUTPATIENT)
Dept: PHYSICAL THERAPY | Age: 70
Discharge: HOME | End: 2020-01-27
Payer: MEDICARE

## 2020-01-27 PROCEDURE — 97112 NEUROMUSCULAR REEDUCATION: CPT

## 2020-01-27 PROCEDURE — 97140 MANUAL THERAPY 1/> REGIONS: CPT

## 2020-01-27 PROCEDURE — 97110 THERAPEUTIC EXERCISES: CPT

## 2020-01-27 NOTE — PROGRESS NOTES
PT DAILY TREATMENT NOTE 10-18    Patient Name: Che Navarro  Date:2020  : 1950  [x]  Patient  Verified  Payor: [place] MEDICARE / Plan: [place] MEDICARE PART A & B / Product Type: Medicare /    In time 1030  Out time:1130  Total Treatment Time (min): 60  Visit #: 4 of 6    Medicare/BCBS Only   Total Timed Codes (min):  55 1:1 Treatment Time:  40       Treatment Area: Pain in right hip [M25.551]  Left hip pain [M25.552]    SUBJECTIVE  Pain Level (0-10 scale): 2/10  Any medication changes, allergies to medications, adverse drug reactions, diagnosis change, or new procedure performed?: [x] No    [] Yes (see summary sheet for update)  Subjective functional status/changes:   [] No changes reported  \"  I ran a lot of errands on Friday, so I took that as my walk time\"    OBJECTIVE    Modality rationale: decrease inflammation, decrease pain and increase tissue extensibility to improve the patients ability to perform ADL's   Min Type Additional Details    [] Estim:  []Unatt       []IFC  []Premod                        []Other:  []w/ice   []w/heat  Position:  Location:    [] Estim: []Att    []TENS instruct  []NMES                    []Other:  []w/US   []w/ice   []w/heat  Position:  Location:    []  Traction: [] Cervical       []Lumbar                       [] Prone          []Supine                       []Intermittent   []Continuous Lbs:  [] before manual  [] after manual    []  Ultrasound: []Continuous   [] Pulsed                           []1MHz   []3MHz W/cm2:  Location:    []  Iontophoresis with dexamethasone         Location: [] Take home patch   [] In clinic   5 []  Ice     []  heat  []  Ice massage  []  Laser   []  Anodyne Position:Seated   Location: Seated    []  Laser with stim  []  Other:  Position:  Location:    []  Vasopneumatic Device Pressure:       [] lo [] med [] hi   Temperature: [] lo [] med [] hi   [] Skin assessment post-treatment:  []intact []redness- no adverse reaction    []redness  adverse reaction:         30 min Therapeutic Exercise:  [x] See flow sheet : Reviewed all exercises for HEP. Encouraged patient to start practicing so that all questions can be answered prior to D/C. Rationale: increase strength, improve balance and increase proprioception to improve the patients ability to perform ADL's with ease. 15 min Neuromuscular Re-education:  [x]  See flow sheet :  Step length and quality education. Rationale: improve coordination, improve balance and increase proprioception  to improve the patients ability to improve patient's ablity to ambulate safely. 10 min Manual Therapy:  MFR/STM to bilateral Thoraco/Lumbar junction, PA grade I/II seated. Rationale: decrease pain, increase tissue extensibility, decrease trigger points and increase postural awareness to improve ease of ADL's. With   [x] TE   [] TA   [] neuro   [] other: Patient Education: [x] Review HEP    [] Progressed/Changed HEP based on:   [] positioning   [] body mechanics   [] transfers   [] heat/ice application    [] other:      Other Objective/Functional Measures:   Supine position on treatment table causes pain due to pressure. Pain Level (0-10 scale) post treatment: 2/10    ASSESSMENT/Changes in Function:   Updated all patient HEP, she demonstrates an understanding of plan. While reviewing core stability training, but has pain laying on back due to hard surface. She  is increasing functional reports at home. Patient will continue to benefit from skilled PT services to address functional mobility deficits, address strength deficits, analyze and address soft tissue restrictions, analyze and modify body mechanics/ergonomics and assess and modify postural abnormalities to attain remaining goals.      [x]  See Plan of Care  []  See progress note/recertification  []  See Discharge Summary         Progress towards goals / Updated goals:  Goals for this certification period to be accomplished in 3 weeks:  1.  Patient to tolerate supine position for Core Stability Exercise Program.       PN:  Pain in Lumbar spine in supine.               Current: Pain in T/L Junction on Right. 1/22/20  2.  Patient to be Independent with Updated Core Stability Exercise program       PN:  Not issued yet              Current:  Added upgraded HEP and issued to patient. 3.  Patient to walk dog daily without increased pain complaints following.       PN:  Patient walks dog 1 block 1x/week. 4.  Patient to demonstrate ability to negotiate steps with improved pelvic control Left LE.       PN:  Concentric 4 inch step, Eccentric 2 inch step.               Current:  Patient avoids using Left LE    PLAN  []  Upgrade activities as tolerated     [x]  Continue plan of care  []  Update interventions per flow sheet       []  Discharge due to:_  []  Other:_      Nettie Sanchez, PT 1/27/2020  10:54 AM    Future Appointments   Date Time Provider Leanna Pack   1/29/2020  9:30 AM [facility]   2/3/2020  9:30 AM [facility]   2/5/2020  9:30 AM [facility]   2/10/2020  9:15 AM Paloma Hoffman  [address]    3/5/2020  9:00 AM Mike Muir MD [facility]

## 2020-01-29 ENCOUNTER — HOSPITAL ENCOUNTER (OUTPATIENT)
Dept: PHYSICAL THERAPY | Age: 70
Discharge: HOME | End: 2020-01-29
Payer: MEDICARE

## 2020-01-29 PROCEDURE — 97140 MANUAL THERAPY 1/> REGIONS: CPT

## 2020-01-29 PROCEDURE — 97112 NEUROMUSCULAR REEDUCATION: CPT

## 2020-01-29 PROCEDURE — 97110 THERAPEUTIC EXERCISES: CPT

## 2020-01-29 NOTE — PROGRESS NOTES
PT DAILY TREATMENT NOTE 10-18    Patient Name: Che Navarro  Date:2020  : 1950  [x]  Patient  Verified  Payor: [place] MEDICARE / Plan: [place] MEDICARE PART A & B / Product Type: Medicare /    In time:930  Out time:102  Total Treatment Time (min): 55  Visit #: 5 of 6    Medicare/BCBS Only   Total Timed Codes (min):  45 1:1 Treatment Time:  40       Treatment Area: Pain in right hip [M25.551]  Left hip pain [M25.552]    SUBJECTIVE  Pain Level (0-10 scale): 2  Any medication changes, allergies to medications, adverse drug reactions, diagnosis change, or new procedure performed?: [x] No    [] Yes (see summary sheet for update)  Subjective functional status/changes:   [] No changes reported   pt reports her left hip is sore. OBJECTIVE    Modality rationale: decrease inflammation and decrease pain to improve the patients ability to perform daily tasks.     Min Type Additional Details    [] Estim:  []Unatt       []IFC  []Premod                        []Other:  []w/ice   []w/heat  Position:  Location:    [] Estim: []Att    []TENS instruct  []NMES                    []Other:  []w/US   []w/ice   []w/heat  Position:  Location:    []  Traction: [] Cervical       []Lumbar                       [] Prone          []Supine                       []Intermittent   []Continuous Lbs:  [] before manual  [] after manual    []  Ultrasound: []Continuous   [] Pulsed                           []1MHz   []3MHz W/cm2:  Location:    []  Iontophoresis with dexamethasone         Location: [] Take home patch   [] In clinic   10 [x]  Ice     []  heat  []  Ice massage  []  Laser   []  Anodyne Position: sidelying  Location:left lumbar and glute    []  Laser with stim  []  Other:  Position:  Location:    []  Vasopneumatic Device Pressure:       [] lo [] med [] hi   Temperature: [] lo [] med [] hi   [] Skin assessment post-treatment:  []intact []redness- no adverse reaction    []redness  adverse reaction:         25 min Therapeutic Exercise:  [] See flow sheet :   Rationale: increase ROM and increase strength to improve the patients ability to perform daily tasls   10 min Neuromuscular Re-education:  [x]  See flow sheet : balance activities, eccentric  Control    Rationale: increase strength, improve balance and increase proprioception  to improve the patients ability to perform daily activities    10 min Manual Therapy:  STM/MFR to left QL, paraspinals, glutes and piriformis with pt in right sidelying   Rationale: decrease pain, increase ROM and increase tissue extensibility to improve ability for ADLs              With   [] TE   [] TA   [] neuro   [] other: Patient Education: [x] Review HEP    [] Progressed/Changed HEP based on:   [] positioning   [] body mechanics   [] transfers   [] heat/ice application    [] other:      Other Objective/Functional Measures: + myofascial restrictions left piriformis/glute max and QL     Pain Level (0-10 scale) post treatment: 2    ASSESSMENT/Changes in Function:  Pt is progressing with PT but slowly. She reported diminished tightness post manual but pain remains 2/10. Patient will continue to benefit from skilled PT services to modify and progress therapeutic interventions, address functional mobility deficits, address ROM deficits, address strength deficits, analyze and address soft tissue restrictions and analyze and cue movement patterns to attain remaining goals.      []  See Plan of Care  []  See progress note/recertification  []  See Discharge Summary         Progress towards goals / Updated goals:  Goals for this certification period to be accomplished in 3 weeks:  1.  Patient to tolerate supine position for Core Stability Exercise Program.       PN:  Pain in Lumbar spine in supine.               Current: Pain in T/L Junction on Right. 1/22/20  2.  Patient to be Independent with Updated Core Stability Exercise program       PN:  Not issued yet              PLAZCPH: met Added upgraded HEP and issued to patient. 3.  Patient to walk dog daily without increased pain complaints following.       PN:  Patient walks dog 1 block 1x/week.        Current: no change, unable at this time due to pain 1-29-20    4.  Patient to demonstrate ability to negotiate steps with improved pelvic control Left LE.       PN:  Concentric 4 inch step, Eccentric 2 inch step.              AITAYCM:  Patient avoids using Left LE    PLAN  []  Upgrade activities as tolerated     [x]  Continue plan of care  []  Update interventions per flow sheet       []  Discharge due to:_  []  Other:_      Johan Cruz, PT 1/29/2020  9:45 AM    Future Appointments   Date Time Provider Leanna Pack   2/3/2020  9:30 AM Romeo Mitchell, PT [id number]   2/10/2020  9:15 AM Abisai Jones  [address]    3/5/2020  9:00 AM Jose Trevino MD [facility]

## 2020-02-03 ENCOUNTER — TELEPHONE (OUTPATIENT)
Dept: INTERNAL MEDICINE CLINIC | Age: 70
End: 2020-02-03

## 2020-02-03 ENCOUNTER — HOSPITAL ENCOUNTER (OUTPATIENT)
Dept: PHYSICAL THERAPY | Age: 70
Discharge: HOME | End: 2020-02-03
Payer: MEDICARE

## 2020-02-03 PROCEDURE — 97110 THERAPEUTIC EXERCISES: CPT

## 2020-02-03 PROCEDURE — 97112 NEUROMUSCULAR REEDUCATION: CPT

## 2020-02-03 PROCEDURE — 97140 MANUAL THERAPY 1/> REGIONS: CPT

## 2020-02-03 NOTE — TELEPHONE ENCOUNTER
Tucker Dockery Key: [id number] - PA Case ID: [id number] - Rx #: [id number]      Outcome   Approvedtoday   Questionnaire submitted. PA Case [id number] Status: Approved. Authorization and Notifications Completed.    DrugTolterodine Tartrate ER 2MG er capsules

## 2020-02-03 NOTE — PROGRESS NOTES
PT DISCHARGE DAILY NOTE AND [id number]    Date:2/3/2020  Patient name: Emilee Wright Start of Care: 19   Referral source: Arnoldo Saab MD : 1950   Medical/Treatment Diagnosis: Pain in right hip [M25.551]  Left hip pain [M25.552] Onset Date:2019     Prior Hospitalization: see medical history Provider#: [id number]   Medications: Verified on Patient Summary List    Comorbidities:  Left Knee OATS surgery, CVA , HTN LBP with DDD and spinal stenosis  Prior Level of Function:Mall walking, community dog walking, Silver Sneakers at [facility]. Visits from Start of Care: 24    Missed Visits: 0    Reporting Period : 01/15/2020  to 2020    [x]  Patient  Verified  Payor: Rayne Shukla / Plan: [place] MEDICARE PART A & B / Product Type: Medicare /    In time:0930 Out time:1030  Total Treatment Time (min): 60  Total Timed Codes (min): 60  1:1 Treatment Time ( only): 54  Visit #: 6  of 6    SUBJECTIVE  Pain Level (0-10 scale): 2/10  Any medication changes, allergies to medications, adverse drug reactions, diagnosis change, or new procedure performed?: [x] No    [] Yes (see summary sheet for update)  Subjective functional status/changes:   [] No changes reported  Patient notices it is easier to turn over in bed, easier to put shoes on, able to go into oven with light food items. Walked one day 10 minutes, limited by bone spur in foot. OBJECTIV    30 min Therapeutic Exercise:  [x] See flow sheet : Reviewed all Therex for HEP   Rationale: increase strength, improve coordination and improve balance to improve the patients ability to ambulate without devieations. 15 min Neuromuscular Re-education:  [x]  See flow sheet : gait and balance activities. Head turns with gait training. Rationale: improve coordination, improve balance and increase proprioception  to improve the patients ability to perform ADL's.     15 min Manual Therapy:  MFR/STM to Bilateral T/L navya, L/SI junction and bilateral R greater L Piriformis Gluets. Rationale: decrease pain and increase tissue extensibility to improve function with ADL's. With   [x] TE   [] TA   [x] neuro   [] other: Patient Education: [x] Review HEP    [] Progressed/Changed HEP based on:   [x] positioning   [x] body mechanics   [x] transfers   [x] heat/ice application    [] other:      Other Objective/Functional Measures:   Palpation:  Myofacial restrictions  T/L Junction, Bilateral Pirifomis/Glute max. Balance:  Positive Rhomberg on Foam 10 sec. Positive with head turns. Functional Strength:  R LE eccentric control 2 inch, L LE eccentric control 4 inch  Core Strength:  Able to do TA Draw in with breath. Able perform supported march. Gait:  Flat surface straight gait is good, patient challenged with head turns obsticles     Pain Level (0-10 scale) post treatment: 2/10    Summary of Care:  Goals for this certification period to be accomplished in 3 weeks:  1.  Patient to tolerate supine position for Core Stability Exercise Program.       PN:  Pain in Lumbar spine in supine.               Current: MET:  If patient is laying on soft surface on the Bed, pain if firm surface. 2.  Patient to be Independent with Updated Core Stability Exercise program       PN:  Not issued yet              CATAEVALENTINAU: IKE SOSA upgraded HEP and issued to patient. 3.  Patient to walk dog daily without increased pain complaints following.       PN:  Patient walks dog 1 block 1x/week. Current: Progressing. 4.  Patient to demonstrate ability to negotiate steps with improved pelvic control Left LE.       PN:  Concentric 4 inch step, Eccentric 2 inch step.              Current:  Progressing Patient avoids using Left LE  When using Left LE uses hand rail, Eccentric  2 inch and concentric 4 inch. ASSESSMENT/Changes in Function: Patient has progressed well with current Physical Therapy despite having multiple deficits. Currently her function has improved. She is able to perform light household activities with minimal reported difficulty. She is able to put on shoes and reach in oven with increased ease. She has decreased pain with transfers and supine rolling. Average pain level is now 2/10 versus 5-6/10 in the past.  She does still have limitations. Her Left LE is weak and she doesn't have confidence in that side with steps and ambulation. She is challenged with supine exercise on hard surfaces. She has Myofacial tone present in Lumbo/Sacral region into bilateral hips. She has been educated and is Independent with HEP that includes, balance, strength, gait and core exercise. She will continue with HEP to continue to further progress goals.       Thank you for this referral!     PLAN  [x]Discontinue therapy: []Patient has reached or is progressing toward set goals      []Patient is non-compliant or has abdicated      []Due to lack of appreciable progress towards set goals    [facility], PT 2/3/2020  9:14 AM

## 2020-02-03 NOTE — PROGRESS NOTES
Physical Therapy Discharge Instructions      [facility]  [address]  [place], [address]  [phone] fax    Patient: Carl Hope  : 1950      Continue Home Exercise Program 1 times per day for 4 weeks, then decrease to 3 times per week as your goals are met. Perform at least 10       Continue with    [x] Ice  as needed 1 times per day for 10 minutes   [] Heat           Follow up with MD:     [] Upon completion of therapy     [x] As needed      Recommendations:     [x]   Return to activity with home program    []   Return to activity with the following modifications:       []Post Rehab Program    []Join Independent aquatic program     []Return to/join local gym        Additional Comments: Continue to slowly increase activity. [facility] and use Recumbent Bike starting at 10 minutes.            [address] [place], [place] 2/3/2020 9:22 AM

## 2020-02-03 NOTE — TELEPHONE ENCOUNTER
Patients rx for Tolterodine Tartrate ER 2MG er capsules, needs PA, patient must try the following first.      Health Plans Preferred Products   OXYBUTYNIN TAB 5MG ER   TROSPIUM CL TAB 20MG   TOLTERODINE TAB 1MG   SOLIFENACIN TAB 5MG   TOVIAZ TAB 4MG

## 2020-02-05 ENCOUNTER — APPOINTMENT (OUTPATIENT)
Dept: PHYSICAL THERAPY | Age: 70
End: 2020-02-05
Payer: MEDICARE

## 2020-02-10 ENCOUNTER — OFFICE VISIT (OUTPATIENT)
Dept: ORTHOPEDIC SURGERY | Age: 70
End: 2020-02-10

## 2020-02-10 VITALS
DIASTOLIC BLOOD PRESSURE: 61 MMHG | WEIGHT: 284 LBS | BODY MASS INDEX: 45.64 KG/M2 | TEMPERATURE: 97.4 F | SYSTOLIC BLOOD PRESSURE: 119 MMHG | HEART RATE: 63 BPM | HEIGHT: 66 IN | OXYGEN SATURATION: 97 % | RESPIRATION RATE: 16 BRPM

## 2020-02-10 DIAGNOSIS — M51.36 DDD (DEGENERATIVE DISC DISEASE), LUMBAR: Primary | ICD-10-CM

## 2020-02-10 DIAGNOSIS — M48.061 SPINAL STENOSIS OF LUMBAR REGION WITHOUT NEUROGENIC CLAUDICATION: ICD-10-CM

## 2020-02-10 NOTE — PROGRESS NOTES
Damien Farris [facility] [address]  [address], [address]  [facility], [address]  Phone: [phone]  Fax: [phone]        Gladis Owens  : 1950  PCP: Merlinda Motes, MD    PROGRESS NOTE      ASSESSMENT AND PLAN    Diagnoses and all orders for this visit:    1. DDD (degenerative disc disease), lumbar    2. Spinal stenosis of lumbar region without neurogenic claudication       1. Advised to continue HEP, add hip ROM. 2. Continue Gabapentin QHS, Tylenol PRN  3. No indications for surgery or spinal injections at this time. 4. Discussed medication changes or injections if pain worsens  5. Given information on preventing injury    F/U PRN      HISTORY OF PRESENT ILLNESS  Rubio Jasso is a 81 y.o. female. Pt presents to the office for a f/u visit for stenosis. Last OV added DDD to PT and increased Gabapentin. Pt reports somnolence with Gabapentin so she remains at [facility]. Pt affirms she is doing better since PT, only has occasional pangs of pain. Affirms she can now grocery shop without trouble. Will work her way up to walking the dog and mall walking again. Pt reports doing most stretches daily. Location of pain: low back  Does pain radiate into extremities: Tingling in feet. Does patient have weakness: no   Pt denies saddle paresthesias. Medications pt is on: Tylenol PRN. Blood thinner. Gabapentin 300mg QHS with somnolence, no grogginess. Ice PRN. Denies persistent fevers, chills, weight changes, neurogenic bowel or bladder symptoms. Pt denies recent ED visits or hospitalizations. Treatments patient has tried:  Physical therapy: R hip, back [id number] with benefit  Chiropractor: Yes little benefit  Deep massage: some benefit  Doing HEP: Yes   Non-opioid medications: Yes  Spinal injections: No  Spinal surgery- No.   Last L MRI 2019: multilevel FA. Triangulation of canal L3-4     reviewed.  PMHx of HTN, high cholesterol, overactive bladder, CVA () with speech residuals, pre-DM, LLE bone replacement. Pain Assessment  2/10/2020   Location of Pain Back   Location Modifiers (No Data)   Severity of Pain 2   Quality of Pain Dull; Other (Comment)   Quality of Pain Comment numbness & tingling   Duration of Pain A few minutes   Frequency of Pain Intermittent   Aggravating Factors Walking   Limiting Behavior Some   Relieving Factors Ice   Result of Injury No       PAST MEDICAL HISTORY   Past Medical History:   Diagnosis Date    Hypercholesterolemia     Hypertension     Stroke [facility]) about 2006       Past Surgical History:   Procedure Laterality Date    HX APPENDECTOMY  09/09/2015    HX CHOLECYSTECTOMY      HX GYN      partial hysterectomy   . MEDICATIONS      Current Outpatient Medications   Medication Sig Dispense Refill    terbinafine HCl (LAMISIL) 250 mg tablet Take 250 mg by mouth daily.  atenolol-chlorthalidone (TENORETIC) 50-25 mg per tablet Take 1 Tab by mouth daily. 90 Tab 3    tolterodine ER (DETROL-LA) 2 mg ER capsule Take 1 Cap by mouth daily. 30 Cap 5    escitalopram oxalate (LEXAPRO) 5 mg tablet Take 1 Tab by mouth daily. Take daily with a 10mg daily tab for a total daily dose of 15mg daily. 90 Tab 3    rosuvastatin (CRESTOR) 10 mg tablet TAKE 1 TABLET BY MOUTH NIGHTLY 90 Tab 1    gabapentin (NEURONTIN) 300 mg capsule Take 300 mg by mouth nightly.  fluticasone (FLONASE) 50 mcg/actuation nasal spray 2 Sprays by Both Nostrils route daily. 1 Bottle 11    Cholecalciferol, Vitamin D3, (VITAMIN D3) 1,000 unit cap Take 1,000 Units by mouth daily.  aspirin-dipyridamole (AGGRENOX)  mg per SR capsule Take 1 Cap by mouth two (2) times a day.           ALLERGIES    Allergies   Allergen Reactions    Amoxicillin-Pot Clavulanate Diarrhea    Azithromycin Diarrhea    Oxybutynin Other (comments)     Problems swallowing, dry mouth    Seroquel [Quetiapine] Other (comments)     Tremors    Sulfa (Sulfonamide Antibiotics) Diarrhea    Wellbutrin [Bupropion Hcl] Itching          SOCIAL HISTORY    Social History     Socioeconomic History    Marital status:      Spouse name: Not on file    Number of children: Not on file    Years of education: Not on file    Highest education level: Not on file   Occupational History    Not on file   Social Needs    Financial resource strain: Not on file    Food insecurity:     Worry: Not on file     Inability: Not on file    Transportation needs:     Medical: Not on file     Non-medical: Not on file   Tobacco Use    Smoking status: Never Smoker    Smokeless tobacco: Never Used   Substance and Sexual Activity    Alcohol use: No    Drug use: No    Sexual activity: Not on file   Lifestyle    Physical activity:     Days per week: Not on file     Minutes per session: Not on file    Stress: Not on file   Relationships    Social connections:     Talks on phone: Not on file     Gets together: Not on file     Attends Voodoo service: Not on file     Active member of club or organization: Not on file     Attends meetings of clubs or organizations: Not on file     Relationship status: Not on file    Intimate partner violence:     Fear of current or ex partner: Not on file     Emotionally abused: Not on file     Physically abused: Not on file     Forced sexual activity: Not on file   Other Topics Concern    Not on file   Social History Narrative    Not on file       FAMILY HISTORY    Family History   Problem Relation Age of Onset    Dementia Mother     Stroke Father     Cancer Brother     Hypertension Brother        REVIEW OF SYSTEMS  Review of Systems   Constitutional: Negative for chills, fever and weight loss. Respiratory: Negative for shortness of breath. Cardiovascular: Negative for chest pain. Gastrointestinal: Negative for constipation. Negative for fecal incontinence   Genitourinary: Negative for dysuria.         Negative for urinary incontinence   Musculoskeletal: Per HPI   Skin: Negative for rash. Neurological: Positive for tingling. Negative for dizziness, tremors, focal weakness and headaches. Endo/Heme/Allergies: Does not bruise/bleed easily. Psychiatric/Behavioral: The patient does not have insomnia. PHYSICAL EXAMINATION  Visit Vitals  /61 (BP 1 Location: Left arm, BP Patient Position: Sitting)   Pulse 63   Temp 97.4 °F (36.3 °C) (Oral)   Resp 16   Ht 5' 6\" (1.676 m)   Wt 284 lb (128.8 kg)   SpO2 97%   BMI 45.84 kg/m²         Accompanied by self. Constitutional:  Well developed, well nourished, in no acute distress. Psychiatric: Affect and mood are appropriate. Integumentary: No rashes or abrasions noted on exposed areas. Cardiovascular/Peripheral Vascular: No peripheral edema is noted BLE. SPINE/MUSCULOSKELETAL EXAM      Lumbar spine:  No rash, ecchymosis, or gross obliquity. No fasciculations. No focal atrophy is noted. Tenderness to palpation B/L SIJ, R trochanteric bursa. Non tender to palpation lumbar spine. No tenderness to palpation at the sciatic notch. MOTOR:       Hip Flex  Quads Hamstrings Ankle DF EHL Ankle PF   Right +4/5 +4/5 +4/5 +4/5 +4/5 +4/5   Left +4/5 +4/5 +4/5 +4/5 +4/5 +4/5       Straight Leg raise negative. Decreased ROM of hips. Ambulation without assistive device, gait mildly antalgic.  FWB. Written by Yin Garcia, as dictated by Yoshi Metz MD.    I, Dr. Yoshi Metz MD, confirm that all documentation is accurate. Ms. Faye Cruz may have a reminder for a \"due or due soon\" health maintenance. I have asked that she contact her primary care provider for follow-up on this health maintenance.

## 2020-02-10 NOTE — PATIENT INSTRUCTIONS
Back Care and Preventing Injuries: Care Instructions  Your Care Instructions    You can hurt your back doing many everyday activities: lifting a heavy box, bending down to garden, exercising at the gym, and even getting out of bed. But you can keep your back strong and healthy by doing some exercises. You also can follow a few tips for sitting, sleeping, and lifting to avoid hurting your back again. Talk to your doctor before you start an exercise program. Ask for help if you want to learn more about keeping your back healthy. Follow-up care is a key part of your treatment and safety. Be sure to make and go to all appointments, and call your doctor if you are having problems. It's also a good idea to know your test results and keep a list of the medicines you take. How can you care for yourself at home? · Stay at a healthy weight to avoid strain on your lower back. · Do not smoke. Smoking increases the risk of osteoporosis, which weakens the spine. If you need help quitting, talk to your doctor about stop-smoking programs and medicines. These can increase your chances of quitting for good. · Make sure you sleep in a position that maintains your back's normal curves and on a mattress that feels comfortable. Sleep on your side with a pillow between your knees, or sleep on your back with a pillow under your knees. These positions can reduce strain on your back. · When you get out of bed, lie on your side and bend both knees. Drop your feet over the edge of the bed as you push up with both arms. Scoot to the edge of the bed. Make sure your feet are in line with your rear end (buttocks), and then stand up. · If you must stand for a long time, put one foot on a stool, ledge, or box. Exercise to strengthen your back and other muscles  · Get at least 30 minutes of exercise on most days of the week. Walking is a good choice.  You also may want to do other activities, such as running, swimming, cycling, or playing tennis or team sports. · Stretch your back muscles. Here are few exercises to try:  ? Lie on your back with your knees bent and your feet flat on the floor. Gently pull one bent knee to your chest. Put that foot back on the floor, and then pull the other knee to your chest. Hold for 15 to 30 seconds. Repeat 2 to 4 times. ? Do pelvic tilts. Lie on your back with your knees bent. Tighten your stomach muscles. Pull your belly button (navel) in and up toward your ribs. You should feel like your back is pressing to the floor and your hips and pelvis are slightly lifting off the floor. Hold for 6 seconds while breathing smoothly. · Keep your core muscles strong. The muscles of your back, belly (abdomen), and buttocks support your spine. ? Pull in your belly, and imagine pulling your navel toward your spine. Hold this for 6 seconds, then relax. Remember to keep breathing normally as you tense your muscles. ? Do curl-ups. Always do them with your knees bent. Keep your low back on the floor, and curl your shoulders toward your knees using a smooth, slow motion. Keep your arms folded across your chest. If this bothers your neck, try putting your hands behind your neck (not your head), with your elbows spread apart. ? Lie on your back with your knees bent and your feet flat on the floor. Tighten your belly muscles, and then push with your feet and raise your buttocks up a few inches. Hold this position 6 seconds as you continue to breathe normally, then lower yourself slowly to the floor. Repeat 8 to 12 times. ? If you like group exercise, try Pilates or yoga. These classes have poses that strengthen the core muscles. Protect your back when you sit  · Place a small pillow, a rolled-up towel, or a lumbar roll in the curve of your back if you need extra support. · Sit in a chair that is low enough to let you place both feet flat on the floor with both knees nearly level with your hips.  If your chair or desk is too high, use a foot rest to raise your knees. · When driving, keep your knees nearly level with your hips. Sit straight, and drive with both hands on the steering wheel. Your arms should be in a slightly bent position. · Try a kneeling chair, which helps tilt your hips forward. This takes pressure off your lower back. · Try sitting on an exercise ball. It can rock from side to side, which helps keep your back loose. Lift properly  · Squat down, bending at the hips and knees only. If you need to, put one knee to the floor and extend your other knee in front of you, bent at a right angle (half kneeling). · Press your chest straight forward. This helps keep your upper back straight while keeping a slight arch in your low back. · Hold the load as close to your body as possible, at the level of your navel. · Use your feet to change direction, taking small steps. · Lead with your hips as you change direction. Keep your shoulders in line with your hips as you move. Do not twist your body. · Set down your load carefully, squatting with your knees and hips only. When should you call for help? Watch closely for changes in your health, and be sure to contact your doctor if you have any problems. Where can you learn more? Go to http://miguel-mamta.info/. Enter S810 in the search box to learn more about \"Back Care and Preventing Injuries: Care Instructions. \"  Current as of: June 26, 2019  Content Version: 12.2  © 0714-3705 [employer]. Care instructions adapted under license by [employer] (which disclaims liability or warranty for this information). If you have questions about a medical condition or this instruction, always ask your healthcare professional. [place] [address] any warranty or liability for your use of this information.

## 2020-03-05 ENCOUNTER — OFFICE VISIT (OUTPATIENT)
Dept: INTERNAL MEDICINE CLINIC | Age: 70
End: 2020-03-05

## 2020-03-05 VITALS
HEIGHT: 66 IN | TEMPERATURE: 96.4 F | OXYGEN SATURATION: 96 % | HEART RATE: 57 BPM | BODY MASS INDEX: 45.32 KG/M2 | RESPIRATION RATE: 16 BRPM | WEIGHT: 282 LBS | DIASTOLIC BLOOD PRESSURE: 55 MMHG | SYSTOLIC BLOOD PRESSURE: 114 MMHG

## 2020-03-05 DIAGNOSIS — R73.02 GLUCOSE INTOLERANCE (IMPAIRED GLUCOSE TOLERANCE): ICD-10-CM

## 2020-03-05 DIAGNOSIS — R73.9 HYPERGLYCEMIA: ICD-10-CM

## 2020-03-05 DIAGNOSIS — F32.9 CURRENT EPISODE OF MAJOR DEPRESSIVE DISORDER WITHOUT PRIOR EPISODE, UNSPECIFIED DEPRESSION EPISODE SEVERITY: ICD-10-CM

## 2020-03-05 DIAGNOSIS — M25.512 ACUTE PAIN OF LEFT SHOULDER: ICD-10-CM

## 2020-03-05 DIAGNOSIS — N32.81 OAB (OVERACTIVE BLADDER): Primary | ICD-10-CM

## 2020-03-05 DIAGNOSIS — E66.01 OBESITY, MORBID, BMI 40.0-49.9 (HCC): ICD-10-CM

## 2020-03-05 DIAGNOSIS — I10 ESSENTIAL HYPERTENSION: ICD-10-CM

## 2020-03-05 LAB — HBA1C MFR BLD HPLC: 5.8 %

## 2020-03-05 RX ORDER — TOLTERODINE 4 MG/1
4 CAPSULE, EXTENDED RELEASE ORAL DAILY
Qty: 90 CAP | Refills: 3 | Status: SHIPPED | OUTPATIENT
Start: 2020-03-05 | End: 2021-02-15

## 2020-03-05 RX ORDER — ESCITALOPRAM OXALATE 10 MG/1
10 TABLET ORAL
COMMUNITY
Start: 2020-02-02 | End: 2020-10-05

## 2020-03-05 RX ORDER — PREDNISONE 20 MG/1
40 TABLET ORAL
Qty: 10 TAB | Refills: 0 | Status: SHIPPED | OUTPATIENT
Start: 2020-03-05 | End: 2020-10-05

## 2020-03-05 NOTE — PROGRESS NOTES
Tatiana Sutton presents today for   Chief Complaint   Patient presents with    Hypertension     follow up  ROOM  12    Cholesterol Problem     follow up     Blood sugar problem     follow up on Pre Diabetes     Patient complains of Swelling on top of the Left Shoulder for years, however recently it has become bigger, and when pressing it, it is painful. Is someone accompanying this pt? no    Is the patient using any DME equipment during [address]? no    Depression Screening:  3 most recent PHQ Screens 11/7/2019   Little interest or pleasure in doing things Nearly every day   Feeling down, depressed, irritable, or hopeless Nearly every day   Total Score PHQ 2 6   Trouble falling or staying asleep, or sleeping too much More than half the days   Feeling tired or having little energy More than half the days   Poor appetite, weight loss, or overeating More than half the days   Feeling bad about yourself - or that you are a failure or have let yourself or your family down Several days   Trouble concentrating on things such as school, work, reading, or watching TV Several days   Moving or speaking so slowly that other people could have noticed; or the opposite being so fidgety that others notice Not at all   Thoughts of being better off dead, or hurting yourself in some way Not at all   PHQ 9 Score 14   How difficult have these problems made it for you to do your work, take care of your home and get along with others Very difficult       Learning Assessment:  Learning Assessment 1/10/2019   PRIMARY LEARNER Patient   HIGHEST LEVEL OF EDUCATION - PRIMARY LEARNER  SOME COLLEGE   BARRIERS PRIMARY LEARNER NONE   CO-LEARNER CAREGIVER No   PRIMARY LANGUAGE ENGLISH   LEARNER PREFERENCE PRIMARY DEMONSTRATION   ANSWERED BY patient   RELATIONSHIP SELF       Abuse Screening:  Abuse Screening Questionnaire 1/10/2019   Do you ever feel afraid of your partner?  N   Are you in a relationship with someone who physically or mentally threatens you? N   Is it safe for you to go home? Y       Fall Risk  Fall Risk Assessment, last 12 mths 1/6/2020   Able to walk? Yes   Fall in past 12 months? Yes   Fall with injury? No   Number of falls in past 12 months 1   Fall Risk Score 1       Health Maintenance reviewed and discussed and ordered per Provider. Health Maintenance Due   Topic Date Due    Colonoscopy  06/23/2018    Pneumococcal 65+ years (2 of 2 - PPSV23) 07/09/2019    GLAUCOMA SCREENING Q2Y  03/08/2020     Patient had the Pneumococcal 23 Vaccine on 01-    Coordination of Care:  1. Have you been to the ER, urgent care clinic since your last visit? Hospitalized since your last visit? no    2. Have you seen or consulted any other health care providers outside of the [address] [place] [address] since your last visit? Include any pap smears or colon screening.  no

## 2020-03-05 NOTE — PATIENT INSTRUCTIONS
Health Maintenance Due Topic Date Due  
 Colonoscopy  06/23/2018  Pneumococcal 65+ years (2 of 2 - PPSV23) 07/09/2019  GLAUCOMA SCREENING Q2Y  03/08/2020 Patient had the Pneumococcal 23 Vaccine on 01- Body Mass Index: Care Instructions Your Care Instructions Body mass index (BMI) can help you see if your weight is raising your risk for health problems. It uses a formula to compare how much you weigh with how tall you are. · A BMI lower than 18.5 is considered underweight. · A BMI between 18.5 and 24.9 is considered healthy. · A BMI between 25 and 29.9 is considered overweight. A BMI of 30 or higher is considered obese. If your BMI is in the normal range, it means that you have a lower risk for weight-related health problems. If your BMI is in the overweight or obese range, you may be at increased risk for weight-related health problems, such as high blood pressure, heart disease, stroke, arthritis or joint pain, and diabetes. If your BMI is in the underweight range, you may be at increased risk for health problems such as fatigue, lower protection (immunity) against illness, muscle loss, bone loss, hair loss, and hormone problems. BMI is just one measure of your risk for weight-related health problems. You may be at higher risk for health problems if you are not active, you eat an unhealthy diet, or you drink too much alcohol or use tobacco products. Follow-up care is a key part of your treatment and safety. Be sure to make and go to all appointments, and call your doctor if you are having problems. It's also a good idea to know your test results and keep a list of the medicines you take. How can you care for yourself at home? · Practice healthy eating habits. This includes eating plenty of fruits, vegetables, whole grains, lean protein, and low-fat dairy. · If your doctor recommends it, get more exercise.  Walking is a good choice. Bit by bit, increase the amount you walk every day. Try for at least 30 minutes on most days of the week. · Do not smoke. Smoking can increase your risk for health problems. If you need help quitting, talk to your doctor about stop-smoking programs and medicines. These can increase your chances of quitting for good. · Limit alcohol to 2 drinks a day for men and 1 drink a day for women. Too much alcohol can cause health problems. If you have a BMI higher than 25 · Your doctor may do other tests to check your risk for weight-related health problems. This may include measuring the distance around your waist. A waist measurement of more than 40 inches in men or 35 inches in women can increase the risk of weight-related health problems. · Talk with your doctor about steps you can take to stay healthy or improve your health. You may need to make lifestyle changes to lose weight and stay healthy, such as changing your diet and getting regular exercise. If you have a BMI lower than 18.5 · Your doctor may do other tests to check your risk for health problems. · Talk with your doctor about steps you can take to stay healthy or improve your health. You may need to make lifestyle changes to gain or maintain weight and stay healthy, such as getting more healthy foods in your diet and doing exercises to build muscle. Where can you learn more? Go to http://miguel-mamta.info/. Enter S176 in the search box to learn more about \"Body Mass Index: Care Instructions. \" Current as of: March 28, 2019 Content Version: 12.2 © 2446-6866 [employer]. Care instructions adapted under license by [employer] (which disclaims liability or warranty for this information). If you have questions about a medical condition or this instruction, always ask your healthcare professional. [place] [address] any warranty or liability for your use of this information.

## 2020-03-05 NOTE — PROGRESS NOTES
[facility][note 3]:  3/5/2020, MRN: [id number]      Warden Palmer is a 81 y.o. female and presents to clinic for Hypertension (follow up  ROOM  12); Cholesterol Problem (follow up ); and Blood sugar problem (follow up on Pre Diabetes)    Subjective: The patient is a 47-year-old female with history of obesity, allergic rhinitis, IBS-diarrhea, onychomycosis, prediabetes, lumbar spinal stenosis, depression, hypertension, adrenal incidentaloma seen on CT scan 2015, and history of cerebral infarction her head MRI findings (followed by Neurology). 1. HTN: BP is 114/55 today on atenolol-chlorthalidone. Note that her dose was decreased from 100-25 mg daily to 50-25 mg daily due to her BP being a little low at her last appointment. No adverse side effects with taking this medicine. 2. Prediabetes: Her last A1c was 6.2 in November. Her A1C is 5.8. Weight is 282lbs. She is not dieting. 3. HLD and H/o a Lacunar Infarction: Taking Crestor. No adverse side effects. No new neurologic symptoms. Taking Aggrenox. 4. Health Maintenance:  - Overdue for a colonoscopy. She is scheduled with GI, opting to get a colonoscopy instead of a Cologuard test.  - Overdue for a formal eye exam    5. Depression Hx: Taking Lexapro. No adverse effects of taking this medicine. Depression: yes; she increased her lexapro in between apts to 10mg daily instead of 5mg daily. Anxiety: none. 6. OAB: She was prescribed oxybutynin in the past but was unable to tolerate due to adverse effects. Tolterodine was ordered in place of this medication. Since her last appointment, she reports: the detrol works well during the day but does not help her at night. 7. Left Shoulder Pain: Associated with redness/swelling. Sometimes it can be associated with pain. No h/o trauma. No   alleviating factors are known. Sx have been present for yrs off/on but sx became more pronounced over the past month.          Patient Active Problem List    Diagnosis Date Noted    Controlled substance agreement signed 7/7/17 07/09/2017    Obesity, morbid, BMI 40.0-49.9  12/06/2016    Allergic rhinitis 12/06/2016    Irritable bowel syndrome with diarrhea 12/06/2016    Onychomycosis 12/06/2016    Glucose intolerance (impaired glucose tolerance) 12/01/2016    Single current episode of major depressive disorder (has tried celexa, wellbutrin, mirtazapine with adverse effects) 12/01/2016    Essential hypertension 12/01/2016    History of cerebral infarction - per head MRI findings 12/01/2016       Current Outpatient Medications   Medication Sig Dispense Refill    escitalopram oxalate (LEXAPRO) 10 mg tablet Take 10 mg by mouth nightly.  atenolol-chlorthalidone (TENORETIC) 50-25 mg per tablet Take 1 Tab by mouth daily. 90 Tab 3    tolterodine ER (DETROL-LA) 2 mg ER capsule Take 1 Cap by mouth daily. 30 Cap 5    rosuvastatin (CRESTOR) 10 mg tablet TAKE 1 TABLET BY MOUTH NIGHTLY 90 Tab 1    gabapentin (NEURONTIN) 300 mg capsule Take 300 mg by mouth nightly.  fluticasone (FLONASE) 50 mcg/actuation nasal spray 2 Sprays by Both Nostrils route daily. 1 Bottle 11    Cholecalciferol, Vitamin D3, (VITAMIN D3) 1,000 unit cap Take 1,000 Units by mouth daily.  aspirin-dipyridamole (AGGRENOX)  mg per SR capsule Take 1 Cap by mouth two (2) times a day.          Allergies   Allergen Reactions    Amoxicillin-Pot Clavulanate Diarrhea    Azithromycin Diarrhea    Oxybutynin Other (comments)     Problems swallowing, dry mouth    Seroquel [Quetiapine] Other (comments)     Tremors    Sulfa (Sulfonamide Antibiotics) Diarrhea    Wellbutrin [Bupropion Hcl] Itching       Past Medical History:   Diagnosis Date    Hypercholesterolemia     Hypertension     Stroke [facility]) about 2006       Past Surgical History:   Procedure Laterality Date    HX APPENDECTOMY  09/09/2015    HX CHOLECYSTECTOMY      HX GYN      partial hysterectomy       Family History   Problem Relation Age of Onset    Dementia Mother     Stroke Father     Cancer Brother     Hypertension Brother        Social History     Tobacco Use    Smoking status: Never Smoker    Smokeless tobacco: Never Used   Substance Use Topics    Alcohol use: No       ROS   Review of Systems   Constitutional: Negative for chills and fever. HENT: Positive for ear pain (right ear pain off/on). Negative for sore throat. Eyes: Negative for blurred vision and pain. Respiratory: Positive for cough (off/on but she associates sx with seasonal allergies). Negative for shortness of breath. Cardiovascular: Negative for chest pain. Gastrointestinal: Negative for abdominal pain. Genitourinary: Negative for dysuria. Musculoskeletal: Positive for back pain and joint pain. Negative for myalgias. Skin: Negative for rash. Neurological: Positive for tingling (left foot paresthesias; on gabapentin 300mg q hs, followed  by Neurology). Negative for focal weakness and headaches. Endo/Heme/Allergies: Positive for environmental allergies. Does not bruise/bleed easily. Psychiatric/Behavioral: Positive for depression. Negative for substance abuse. The patient is not nervous/anxious. Objective     Vitals:    03/05/20 0910   BP: 114/55   Pulse: (!) 57   Resp: 16   Temp: 96.4 °F (35.8 °C)   TempSrc: Oral   SpO2: 96%   Weight: 282 lb (127.9 kg)   Height: 5' 6\" (1.676 m)   PainSc:   2   PainLoc: Back       Physical Exam  Vitals signs and nursing note reviewed. HENT:      Head: Normocephalic and atraumatic. Right Ear: External ear normal.      Left Ear: External ear normal.      Nose: Nose normal.      Mouth/Throat:      Pharynx: No oropharyngeal exudate. Eyes:      General: No scleral icterus. Right eye: No discharge. Left eye: No discharge. Conjunctiva/sclera: Conjunctivae normal.   Neck:      Musculoskeletal: Neck supple. Cardiovascular:      Rate and Rhythm: Normal rate and regular rhythm.       Heart sounds: Normal heart sounds. No murmur. No friction rub. No gallop. Pulmonary:      Effort: Pulmonary effort is normal. No respiratory distress. Breath sounds: Normal breath sounds. No wheezing or rales. Abdominal:      General: Bowel sounds are normal. There is no distension. Palpations: Abdomen is soft. There is no mass. Tenderness: There is no abdominal tenderness. There is no guarding or rebound. Musculoskeletal:         General: No swelling (Bue) or tenderness (Bue). Comments: ROM along her left shoulder is intact. Her left shoulder bursa is mildly TTP. All spinous processes are nontender to palpation today. Lymphadenopathy:      Cervical: No cervical adenopathy. Skin:     General: Skin is warm and dry. Findings: No erythema. Neurological:      Mental Status: She is alert and oriented to person, place, and time. Motor: No abnormal muscle tone. Gait: Gait is intact.  Gait normal.   Psychiatric:         Mood and Affect: Mood and affect normal.         LABS   Data Review:   Lab Results   Component Value Date/Time    WBC 7.1 01/10/2019 08:55 AM    HGB 13.6 01/10/2019 08:55 AM    HCT 44.6 01/10/2019 08:55 AM    PLATELET 732 45/00/5719 08:55 AM    MCV 99.6 (H) 01/10/2019 08:55 AM       Lab Results   Component Value Date/Time    Sodium 142 11/07/2019 02:23 PM    Potassium 3.6 11/07/2019 02:23 PM    Chloride 104 11/07/2019 02:23 PM    CO2 34 (H) 11/07/2019 02:23 PM    Anion gap 4 11/07/2019 02:23 PM    Glucose 98 11/07/2019 02:23 PM    BUN 21 (H) 11/07/2019 02:23 PM    Creatinine 0.84 11/07/2019 02:23 PM    BUN/Creatinine ratio 25 (H) 11/07/2019 02:23 PM    GFR est AA >60 11/07/2019 02:23 PM    GFR est non-AA >60 11/07/2019 02:23 PM    Calcium 9.7 11/07/2019 02:23 PM       Lab Results   Component Value Date/Time    Cholesterol, total 147 11/07/2019 02:23 PM    HDL Cholesterol 53 11/07/2019 02:23 PM    LDL, calculated 61.8 11/07/2019 02:23 PM    VLDL, calculated 32.2 11/07/2019 02:23 PM    Triglyceride 161 (H) 11/07/2019 02:23 PM    CHOL/HDL Ratio 2.8 11/07/2019 02:23 PM       Lab Results   Component Value Date/Time    Hemoglobin A1c 6.2 (H) 11/07/2019 02:23 PM    Hemoglobin A1c, External 5.8 09/08/2015       Assessment/Plan:   1. HTN: BP is little low, we opted to keep her medication latest for now. If she gets dizzy, I instructed her to notify me so that I may adjust her BP meds appropriately. - C/w rx as prescribed per our discussion today.  -Checking a Urine protein CMP, and lipid panel just before her follow-up appointment. 2.  Prediabetes:  -I encouraged her to reduce her carb intake and caloric intake. I will recheck her weight at her follow-up appointment.  -Checking a CMP and A1c just before her follow-up appointment. 3. HLD: Stable. -Continue with Rx as prescribed.  -We will check a lipid panel and a CMP just before her follow-up appointment. 4. Health Maintenance:  -I encouraged her to get colon cancer screening with her GI team.  - Requesting her last formal eye exam.    5. OAB:  Detrol is working well during the day but not at night. - C/w rx as prescribed but I'm increasing her dose to 4mg daily    6. Left Shoulder Pain: Likely from bursitis from PE findings. - Activity as tolerated. - Prednisone course prescribed.  -She declined a referral to Orthopedics for injection at this time. 7. Depression/Anxiety:  - C/w rx as prescribed. She wants to hold off on increasing her rx. Health Maintenance Due   Topic Date Due    Colonoscopy  06/23/2018    Pneumococcal 65+ years (2 of 2 - PPSV23) 07/09/2019    GLAUCOMA SCREENING Q2Y  03/08/2020     Lab review: labs are reviewed in the EHR and ordered as mentioned above. I have discussed the diagnosis with the patient and the intended plan as seen in the above orders. The patient has received an after-visit summary and questions were answered concerning future plans.   I have discussed medication side effects and warnings with the patient as well. I have reviewed the plan of care with the patient, accepted their input and they are in agreement with the treatment goals. All questions were answered. The patient understands the plan of care. Handouts provided today with above information. Pt instructed if symptoms worsen to call the office or report to the ED for continued care. Greater than 50% of the visit time was spent in counseling and/or coordination of care. Voice recognition was used to generate this report, which may have resulted in some phonetic based errors in grammar and contents. Even though attempts were made to correct all the mistakes, some may have been missed, and remained in the body of the document. Follow-up and Dispositions    · Return in about 7 months (around 10/5/2020) for BP check, lab results. Gregorio Mark MD

## 2020-04-02 ENCOUNTER — TELEPHONE (OUTPATIENT)
Dept: ORTHOPEDIC SURGERY | Age: 70
End: 2020-04-02

## 2020-04-02 NOTE — TELEPHONE ENCOUNTER
Patient called for Machelle Card. Patient said that the pain on her back has gotten worse. That Machelle Card told her on her last visit on 02/10/2020 that she can get a Spinal Block injection. Patient would like to get the injection. Patient tel. [phone] or [phone].

## 2020-04-02 NOTE — TELEPHONE ENCOUNTER
unfortunately due to COVID we are not doing elective procedures such as the spinal injection. We are not able to perform these until at least June.

## 2020-07-07 NOTE — ACP (ADVANCE CARE PLANNING)
Advance Care Planning (ACP) Provider Conversation Snapshot    Date of ACP Conversation: 05/13/19  Persons included in Conversation:  patient  Length of ACP Conversation in minutes:  <16 minutes (Non-Billable)    Authorized Decision Maker (if patient is incapable of making informed decisions): This person is:   Healthcare Agent/Medical Power of  under Advance Directive     For Patients with Decision Making Capacity:   Values/Goals: Exploration of values, goals, and preferences if recovery is not expected, even with continued medical treatment in the event of:  Imminent death  Severe, permanent brain injury    Conversation Outcomes / Follow-Up Plan:   Reviewed existing Advance Directive . She has no changes to make to her advance directive - which we reviewed today during her apt.       Dr. Simona Horta  [facility] [address], [address] [place] [address] Str.  Phone: [phone]  Fax: [phone] Ketoconazole Pregnancy And Lactation Text: This medication is Pregnancy Category C and it isn't know if it is safe during pregnancy. It is also excreted in breast milk and breast feeding isn't recommended.

## 2020-08-31 ENCOUNTER — OFFICE VISIT (OUTPATIENT)
Dept: ORTHOPEDIC SURGERY | Facility: CLINIC | Age: 70
End: 2020-08-31

## 2020-08-31 VITALS
WEIGHT: 277 LBS | HEART RATE: 62 BPM | RESPIRATION RATE: 16 BRPM | BODY MASS INDEX: 44.52 KG/M2 | SYSTOLIC BLOOD PRESSURE: 108 MMHG | HEIGHT: 66 IN | DIASTOLIC BLOOD PRESSURE: 63 MMHG | TEMPERATURE: 97.2 F | OXYGEN SATURATION: 92 %

## 2020-08-31 DIAGNOSIS — M25.562 LEFT KNEE PAIN, UNSPECIFIED CHRONICITY: Primary | ICD-10-CM

## 2020-08-31 RX ORDER — DULOXETIN HYDROCHLORIDE 30 MG/1
30 CAPSULE, DELAYED RELEASE ORAL DAILY
COMMUNITY
End: 2020-10-05

## 2020-08-31 RX ORDER — BETAMETHASONE SODIUM PHOSPHATE AND BETAMETHASONE ACETATE 3; 3 MG/ML; MG/ML
6 INJECTION, SUSPENSION INTRA-ARTICULAR; INTRALESIONAL; INTRAMUSCULAR; SOFT TISSUE ONCE
Qty: 1 VIAL | Refills: 0
Start: 2020-08-31 | End: 2020-08-31

## 2020-08-31 NOTE — PROGRESS NOTES
[address] [facility], [address] [facility]  [phone]           Patient: Gogo Clark                MRN: [id number]       SSN: [id number]  YOB: 1950        AGE: 81 y.o. SEX: female  Body mass index is 44.71 kg/m². PCP: Pao Gunn MD  08/31/20            REVIEW OF SYSTEMS:  Constitutional: Negative for fever, chills, weight loss and malaise/fatigue. HENT: Negative. Eyes: Negative. Respiratory: Negative. Cardiovascular: Negative. Gastrointestinal: No bowel incontinence or constipation. Genitourinary: No bladder incontinence or saddle anesthesia. Skin: Negative. Neurological: Negative. Endo/Heme/Allergies: Negative. Psychiatric/Behavioral: Negative. Musculoskeletal: As per HPI above. Past Medical History:   Diagnosis Date    Hypercholesterolemia     Hypertension     Stroke [facility]) about 2006         Current Outpatient Medications:     DULoxetine (Cymbalta) 30 mg capsule, Take 30 mg by mouth daily. , Disp: , Rfl:     rosuvastatin (CRESTOR) 10 mg tablet, TAKE ONE TABLET BY MOUTH EVERY EVENING, Disp: 90 Tab, Rfl: 3    tolterodine ER (DETROL LA) 4 mg ER capsule, Take 1 Cap by mouth daily. , Disp: 90 Cap, Rfl: 3    atenolol-chlorthalidone (TENORETIC) 50-25 mg per tablet, Take 1 Tab by mouth daily. , Disp: 90 Tab, Rfl: 3    gabapentin (NEURONTIN) 300 mg capsule, Take 300 mg by mouth nightly., Disp: , Rfl:     fluticasone (FLONASE) 50 mcg/actuation nasal spray, 2 Sprays by Both Nostrils route daily. , Disp: 1 Bottle, Rfl: 11    aspirin-dipyridamole (AGGRENOX)  mg per SR capsule, Take 1 Cap by mouth two (2) times a day., Disp: , Rfl:     escitalopram oxalate (LEXAPRO) 10 mg tablet, Take 10 mg by mouth nightly., Disp: , Rfl:     predniSONE (DELTASONE) 20 mg tablet, Take 40 mg by mouth daily (with breakfast). , Disp: 10 Tab, Rfl: 0    Cholecalciferol, Vitamin D3, (VITAMIN D3) 1,000 unit cap, Take 1,000 Units by mouth daily. , Disp: , Rfl:     Allergies   Allergen Reactions    Amoxicillin-Pot Clavulanate Diarrhea    Azithromycin Diarrhea    Oxybutynin Other (comments)     Problems swallowing, dry mouth    Seroquel [Quetiapine] Other (comments)     Tremors    Sulfa (Sulfonamide Antibiotics) Diarrhea    Wellbutrin [Bupropion Hcl] Itching       Social History     Socioeconomic History    Marital status:      Spouse name: Not on file    Number of children: Not on file    Years of education: Not on file    Highest education level: Not on file   Occupational History    Not on file   Social Needs    Financial resource strain: Not on file    Food insecurity     Worry: Not on file     Inability: Not on file    Transportation needs     Medical: Not on file     Non-medical: Not on file   Tobacco Use    Smoking status: Never Smoker    Smokeless tobacco: Never Used   Substance and Sexual Activity    Alcohol use: No    Drug use: No    Sexual activity: Not on file   Lifestyle    Physical activity     Days per week: Not on file     Minutes per session: Not on file    Stress: Not on file   Relationships    Social connections     Talks on phone: Not on file     Gets together: Not on file     Attends Episcopalian service: Not on file     Active member of club or organization: Not on file     Attends meetings of clubs or organizations: Not on file     Relationship status: Not on file    Intimate partner violence     Fear of current or ex partner: Not on file     Emotionally abused: Not on file     Physically abused: Not on file     Forced sexual activity: Not on file   Other Topics Concern    Not on file   Social History Narrative    Not on file       Past Surgical History:   Procedure Laterality Date    HX APPENDECTOMY      HX CHOLECYSTECTOMY      HX GYN      partial hysterectomy    HX KNEE ARTHROSCOPY         Patient seen evaluate today for complaints of left knee pain.   Patient did have an arthroscopy with Dr. Edilia Pickard some 15 years ago for a cartilage tear. Did well with it. Over the past couple months has had increasing discomfort of the left knee. She has pain with ambulation. She has discomfort getting up from a chair. She has pain at night. She denies any injury to the knee. She has a burning discomfort. She denies any locking of the knee. She denies any radiating pain down the lower extremity. She does have a little bit of right knee discomfort as well. Patient denies recent fevers, chills, chest pain, SOB, or injuries. No recent systemic changes noted. A 12-point review of systems is performed today. Pertinent positives are noted. All other systems reviewed and otherwise are negative. Physical exam: General: Alert and oriented x3, nad.  well-developed, well nourished. normal affect, AF. NC/AT, EOMI, neck supple, trachea midline, no JVD present. Breathing is non-labored. Examination of lower extremities reveals pain-free range of motion the hips. There is no pain to palpation the trochanteric bursa. Negative straight leg raise. Negative calf tenderness. Negative Homans. No signs of DVT present. Each of the knees of a skin intact. There is no erythema, ecchymosis, warmth. There are no signs of infection or sores present. She does have discomfort to palpation of the medial joint line of each of her knees. Well-maintained range of motion. Some mild crepitus anteriorly with range activities noted. Radiographs in office today 8/31/2020 at the [facility] location including AP, tunnel, lateral, skyline of the left knee shows fairly advanced arthritis to the medial compartment. The right knee does confirm end-stage arthritis of the medial compartment. No acute bony abnormalities noted. There may be a small osteochondral defect noted to the medial compartment of each of the knees.     Assessment: #1 left knee advancing osteoarthritis, #2 right knee advanced osteoarthritis    Plan: At this point, we discussed treatment options. We can move forth a cortisone injection for the left knee today. After informed consent, under aseptic conditions, with US guided assitance, the left knee was prepped with betadine and 6mg of celestone was injected without complications. The patient tolerated the injection well. The patient is instructed on post-injection care. We will see her back in the office in 4 weeks time for evaluation to  efficacy of the cortisone injection. May consider an MRI of the left knee at that point. Chart reviewed for the following:  Carlo WILLARD PA-C, have reviewed the History, Physical and updated the Allergic reactions for Jorge Hard?     TIME OUT performed immediately prior to start of procedure:  Carlo WILLARD PA-C, have performed the following reviews on Wilda Seo prior to the start of the procedure:  ????????  * Patient was identified by name and date of birth   * Agreement on procedure being performed was verified  * Risks and Benefits explained to the patient  * Procedure site verified and marked as necessary  * Patient was positioned for comfort  * Consent was signed and verified    Time:9:04 AM    Body part: left knee    Medication & Dose: 6mg celestone    Date of procedure: 08/31/20    Procedure performed by: Carlo Jacobs PA-C    Provider assisted by: none    Patient assisted by: self    How tolerated by patient: tolerated the procedure well with no complications    Post Procedural Pain Scale: 8    Comments: none       JR Juan Pablo HAYS PA-C, ATC

## 2020-09-28 ENCOUNTER — APPOINTMENT (OUTPATIENT)
Dept: INTERNAL MEDICINE CLINIC | Age: 70
End: 2020-09-28

## 2020-09-28 ENCOUNTER — HOSPITAL ENCOUNTER (OUTPATIENT)
Dept: LAB | Age: 70
Discharge: HOME | End: 2020-09-28
Payer: MEDICARE

## 2020-09-28 DIAGNOSIS — I10 ESSENTIAL HYPERTENSION: ICD-10-CM

## 2020-09-28 DIAGNOSIS — R73.02 GLUCOSE INTOLERANCE (IMPAIRED GLUCOSE TOLERANCE): ICD-10-CM

## 2020-09-28 DIAGNOSIS — R73.9 HYPERGLYCEMIA: ICD-10-CM

## 2020-09-28 LAB
ALBUMIN SERPL-MCNC: 3.5 G/DL (ref 3.4–5)
ALBUMIN/GLOB SERPL: 1.1 {RATIO} (ref 0.8–1.7)
ALP SERPL-CCNC: 133 U/L (ref 45–117)
ALT SERPL-CCNC: 28 U/L (ref 13–56)
ANION GAP SERPL CALC-SCNC: 9 MMOL/L (ref 3–18)
AST SERPL-CCNC: 16 U/L (ref 10–38)
BILIRUB SERPL-MCNC: 0.6 MG/DL (ref 0.2–1)
BUN SERPL-MCNC: 17 MG/DL (ref 7–18)
BUN/CREAT SERPL: 20 (ref 12–20)
CALCIUM SERPL-MCNC: 9.4 MG/DL (ref 8.5–10.1)
CHLORIDE SERPL-SCNC: 106 MMOL/L (ref 100–111)
CHOLEST SERPL-MCNC: 125 MG/DL
CO2 SERPL-SCNC: 28 MMOL/L (ref 21–32)
CREAT SERPL-MCNC: 0.83 MG/DL (ref 0.6–1.3)
CREAT UR-MCNC: 211 MG/DL (ref 30–125)
GLOBULIN SER CALC-MCNC: 3.3 G/DL (ref 2–4)
GLUCOSE SERPL-MCNC: 91 MG/DL (ref 74–99)
HBA1C MFR BLD: 6 % (ref 4.2–5.6)
HDLC SERPL-MCNC: 52 MG/DL (ref 40–60)
HDLC SERPL: 2.4 {RATIO} (ref 0–5)
LDLC SERPL CALC-MCNC: 44 MG/DL (ref 0–100)
LIPID PROFILE,FLP: NORMAL
MICROALBUMIN UR-MCNC: 1.36 MG/DL (ref 0–3)
MICROALBUMIN/CREAT UR-RTO: 6 MG/G (ref 0–30)
POTASSIUM SERPL-SCNC: 3.6 MMOL/L (ref 3.5–5.5)
PROT SERPL-MCNC: 6.8 G/DL (ref 6.4–8.2)
SODIUM SERPL-SCNC: 143 MMOL/L (ref 136–145)
TRIGL SERPL-MCNC: 145 MG/DL (ref ?–150)
VLDLC SERPL CALC-MCNC: 29 MG/DL

## 2020-09-28 PROCEDURE — 82043 UR ALBUMIN QUANTITATIVE: CPT

## 2020-09-28 PROCEDURE — 80053 COMPREHEN METABOLIC PANEL: CPT

## 2020-09-28 PROCEDURE — 80061 LIPID PANEL: CPT

## 2020-09-28 PROCEDURE — 36415 COLL VENOUS BLD VENIPUNCTURE: CPT

## 2020-09-28 PROCEDURE — 83036 HEMOGLOBIN GLYCOSYLATED A1C: CPT

## 2020-09-29 NOTE — PROGRESS NOTES
I will discuss her results with her at her upcoming apt. No microalbuminuria. Her CMP shows no significant abnormalities other than a mildly elevated alkaline phosphatase level of 133. Her total cholesterol is 125, down from 147 ten months ago. Her triglycerides are 145, down from 161. Her HDL is 52. LDL is 44. It was 61 when previously checked. Her A1c is 6, down from 6.2 ten months ago.      Dr. Eddie Cuevas  [facility], [address] [place] [address], [address] [place] [address] Str.  Phone: [phone]  Fax: [phone]

## 2020-09-30 ENCOUNTER — OFFICE VISIT (OUTPATIENT)
Dept: ORTHOPEDIC SURGERY | Age: 70
End: 2020-09-30
Payer: MEDICARE

## 2020-09-30 VITALS
DIASTOLIC BLOOD PRESSURE: 53 MMHG | BODY MASS INDEX: 44.65 KG/M2 | HEIGHT: 66 IN | HEART RATE: 64 BPM | TEMPERATURE: 96.6 F | SYSTOLIC BLOOD PRESSURE: 113 MMHG | WEIGHT: 277.8 LBS

## 2020-09-30 DIAGNOSIS — M17.11 PRIMARY OSTEOARTHRITIS OF RIGHT KNEE: ICD-10-CM

## 2020-09-30 DIAGNOSIS — M17.12 ARTHRITIS OF LEFT KNEE: Primary | ICD-10-CM

## 2020-09-30 PROCEDURE — G8399 PT W/DXA RESULTS DOCUMENT: HCPCS | Performed by: PHYSICIAN ASSISTANT

## 2020-09-30 PROCEDURE — G8427 DOCREV CUR MEDS BY ELIG CLIN: HCPCS | Performed by: PHYSICIAN ASSISTANT

## 2020-09-30 PROCEDURE — G9899 SCRN MAM PERF RSLTS DOC: HCPCS | Performed by: PHYSICIAN ASSISTANT

## 2020-09-30 PROCEDURE — G8536 NO DOC ELDER MAL SCRN: HCPCS | Performed by: PHYSICIAN ASSISTANT

## 2020-09-30 PROCEDURE — G9717 DOC PT DX DEP/BP F/U NT REQ: HCPCS | Performed by: PHYSICIAN ASSISTANT

## 2020-09-30 PROCEDURE — G8417 CALC BMI ABV UP PARAM F/U: HCPCS | Performed by: PHYSICIAN ASSISTANT

## 2020-09-30 PROCEDURE — 1100F PTFALLS ASSESS-DOCD GE2>/YR: CPT | Performed by: PHYSICIAN ASSISTANT

## 2020-09-30 PROCEDURE — 3288F FALL RISK ASSESSMENT DOCD: CPT | Performed by: PHYSICIAN ASSISTANT

## 2020-09-30 PROCEDURE — G8752 SYS BP LESS 140: HCPCS | Performed by: PHYSICIAN ASSISTANT

## 2020-09-30 PROCEDURE — G8754 DIAS BP LESS 90: HCPCS | Performed by: PHYSICIAN ASSISTANT

## 2020-09-30 PROCEDURE — 1090F PRES/ABSN URINE INCON ASSESS: CPT | Performed by: PHYSICIAN ASSISTANT

## 2020-09-30 PROCEDURE — 99214 OFFICE O/P EST MOD 30 MIN: CPT | Performed by: PHYSICIAN ASSISTANT

## 2020-09-30 PROCEDURE — 3017F COLORECTAL CA SCREEN DOC REV: CPT | Performed by: PHYSICIAN ASSISTANT

## 2020-09-30 NOTE — PROGRESS NOTES
[address] [facility], [address] [facility]  [phone]           Patient: Emery Snow                MRN: [id number]       SSN: [id number]  YOB: 1950        AGE: 81 y.o. SEX: female  Body mass index is 44.84 kg/m². PCP: Michael Walker MD  09/30/20      This office note has been dictated. REVIEW OF SYSTEMS:  Constitutional: Negative for fever, chills, weight loss and malaise/fatigue. HENT: Negative. Eyes: Negative. Respiratory: Negative. Cardiovascular: Negative. Gastrointestinal: No bowel incontinence or constipation. Genitourinary: No bladder incontinence or saddle anesthesia. Skin: Negative. Neurological: Negative. Endo/Heme/Allergies: Negative. Psychiatric/Behavioral: Negative. Musculoskeletal: As per HPI above. Past Medical History:   Diagnosis Date    Hypercholesterolemia     Hypertension     Stroke [facility]) about 2006         Current Outpatient Medications:     DULoxetine (Cymbalta) 30 mg capsule, Take 30 mg by mouth daily. , Disp: , Rfl:     rosuvastatin (CRESTOR) 10 mg tablet, TAKE ONE TABLET BY MOUTH EVERY EVENING, Disp: 90 Tab, Rfl: 3    tolterodine ER (DETROL LA) 4 mg ER capsule, Take 1 Cap by mouth daily. , Disp: 90 Cap, Rfl: 3    atenolol-chlorthalidone (TENORETIC) 50-25 mg per tablet, Take 1 Tab by mouth daily. , Disp: 90 Tab, Rfl: 3    gabapentin (NEURONTIN) 300 mg capsule, Take 300 mg by mouth nightly., Disp: , Rfl:     fluticasone (FLONASE) 50 mcg/actuation nasal spray, 2 Sprays by Both Nostrils route daily. , Disp: 1 Bottle, Rfl: 11    Cholecalciferol, Vitamin D3, (VITAMIN D3) 1,000 unit cap, Take 1,000 Units by mouth daily. , Disp: , Rfl:     aspirin-dipyridamole (AGGRENOX)  mg per SR capsule, Take 1 Cap by mouth two (2) times a day., Disp: , Rfl:     escitalopram oxalate (LEXAPRO) 10 mg tablet, Take 10 mg by mouth nightly., Disp: , Rfl:     predniSONE (DELTASONE) 20 mg tablet, Take 40 mg by mouth daily (with breakfast). , Disp: 10 Tab, Rfl: 0    Allergies   Allergen Reactions    Amoxicillin-Pot Clavulanate Diarrhea    Azithromycin Diarrhea    Oxybutynin Other (comments)     Problems swallowing, dry mouth    Seroquel [Quetiapine] Other (comments)     Tremors    Sulfa (Sulfonamide Antibiotics) Diarrhea    Wellbutrin [Bupropion Hcl] Itching       Social History     Socioeconomic History    Marital status:      Spouse name: Not on file    Number of children: Not on file    Years of education: Not on file    Highest education level: Not on file   Occupational History    Not on file   Social Needs    Financial resource strain: Not on file    Food insecurity     Worry: Not on file     Inability: Not on file    Transportation needs     Medical: Not on file     Non-medical: Not on file   Tobacco Use    Smoking status: Never Smoker    Smokeless tobacco: Never Used   Substance and Sexual Activity    Alcohol use: No    Drug use: No    Sexual activity: Not on file   Lifestyle    Physical activity     Days per week: Not on file     Minutes per session: Not on file    Stress: Not on file   Relationships    Social connections     Talks on phone: Not on file     Gets together: Not on file     Attends Jain service: Not on file     Active member of club or organization: Not on file     Attends meetings of clubs or organizations: Not on file     Relationship status: Not on file    Intimate partner violence     Fear of current or ex partner: Not on file     Emotionally abused: Not on file     Physically abused: Not on file     Forced sexual activity: Not on file   Other Topics Concern    Not on file   Social History Narrative    Not on file       Past Surgical History:   Procedure Laterality Date    HX APPENDECTOMY      HX CHOLECYSTECTOMY      HX GYN      partial hysterectomy    HX KNEE ARTHROSCOPY           Patient seen evaluate today for bilateral knee advancing osteoarthritis. She had cortisone injection about a month ago which did help. It did not take away all of her discomfort. Still has burning discomfort on the inside part of each of her knees. She has trouble with stairs. No mechanical symptoms. She denies radiating pain down the lower extremities. Patient denies recent fevers, chills, chest pain, SOB, or injuries. No recent systemic changes noted. A 12-point review of systems is performed today. Pertinent positives are noted. All other systems reviewed and otherwise are negative. Physical exam: General: Alert and oriented x3, nad.  well-developed, well nourished. normal affect, AF. NC/AT, EOMI, neck supple, trachea midline, no JVD present. Breathing is non-labored. Examination of lower extremities reveals pain-free range of motion the hips. There is no pain to palpation of the trochanteric bursa. Negative straight leg raise. Negative calf tenderness. Negative Homans. No signs of DVT present. Each of the knees reveal skin intact. There is no erythema, ecchymosis, warmth. There are no signs of infection or cellulitis present. She does have discomfort to palpation to the medial joint line as well as patellofemoral grind bilaterally. Review of previous radiographs does confirm advanced arthritis of each of the knees with minimal joint space remaining to the medial compartment. There is evidence for lateral thrust present. Assessment: Bilateral knee advanced osteoarthritis    Plan: We will move forward with get approval for Visco supplementation by the way of Euflexxa. Once he is are preapproved we will see her back in office to begin them. She will call with any questions or concerns that shall arise.               JR Juan Pablo HAYS, PAULINA, ATC

## 2020-10-05 ENCOUNTER — VIRTUAL VISIT (OUTPATIENT)
Dept: INTERNAL MEDICINE CLINIC | Age: 70
End: 2020-10-05
Payer: MEDICARE

## 2020-10-05 DIAGNOSIS — Z12.11 SCREENING FOR COLON CANCER: ICD-10-CM

## 2020-10-05 DIAGNOSIS — N32.81 OAB (OVERACTIVE BLADDER): Primary | ICD-10-CM

## 2020-10-05 DIAGNOSIS — Z00.00 MEDICARE ANNUAL WELLNESS VISIT, SUBSEQUENT: ICD-10-CM

## 2020-10-05 DIAGNOSIS — R73.02 GLUCOSE INTOLERANCE (IMPAIRED GLUCOSE TOLERANCE): ICD-10-CM

## 2020-10-05 DIAGNOSIS — Z71.89 ADVANCE CARE PLANNING: ICD-10-CM

## 2020-10-05 DIAGNOSIS — I10 ESSENTIAL HYPERTENSION: ICD-10-CM

## 2020-10-05 DIAGNOSIS — R42 VERTIGO: ICD-10-CM

## 2020-10-05 DIAGNOSIS — Z86.73 HISTORY OF CEREBRAL INFARCTION: ICD-10-CM

## 2020-10-05 DIAGNOSIS — F32.9 CURRENT EPISODE OF MAJOR DEPRESSIVE DISORDER WITHOUT PRIOR EPISODE, UNSPECIFIED DEPRESSION EPISODE SEVERITY: ICD-10-CM

## 2020-10-05 PROCEDURE — G9899 SCRN MAM PERF RSLTS DOC: HCPCS | Performed by: INTERNAL MEDICINE

## 2020-10-05 PROCEDURE — G8756 NO BP MEASURE DOC: HCPCS | Performed by: INTERNAL MEDICINE

## 2020-10-05 PROCEDURE — 99214 OFFICE O/P EST MOD 30 MIN: CPT | Performed by: INTERNAL MEDICINE

## 2020-10-05 PROCEDURE — G8417 CALC BMI ABV UP PARAM F/U: HCPCS | Performed by: INTERNAL MEDICINE

## 2020-10-05 PROCEDURE — G8536 NO DOC ELDER MAL SCRN: HCPCS | Performed by: INTERNAL MEDICINE

## 2020-10-05 PROCEDURE — 3017F COLORECTAL CA SCREEN DOC REV: CPT | Performed by: INTERNAL MEDICINE

## 2020-10-05 PROCEDURE — G0463 HOSPITAL OUTPT CLINIC VISIT: HCPCS | Performed by: INTERNAL MEDICINE

## 2020-10-05 PROCEDURE — G9717 DOC PT DX DEP/BP F/U NT REQ: HCPCS | Performed by: INTERNAL MEDICINE

## 2020-10-05 PROCEDURE — G0439 PPPS, SUBSEQ VISIT: HCPCS | Performed by: INTERNAL MEDICINE

## 2020-10-05 PROCEDURE — G8399 PT W/DXA RESULTS DOCUMENT: HCPCS | Performed by: INTERNAL MEDICINE

## 2020-10-05 PROCEDURE — G8427 DOCREV CUR MEDS BY ELIG CLIN: HCPCS | Performed by: INTERNAL MEDICINE

## 2020-10-05 PROCEDURE — 1090F PRES/ABSN URINE INCON ASSESS: CPT | Performed by: INTERNAL MEDICINE

## 2020-10-05 PROCEDURE — 1101F PT FALLS ASSESS-DOCD LE1/YR: CPT | Performed by: INTERNAL MEDICINE

## 2020-10-05 RX ORDER — DULOXETIN HYDROCHLORIDE 30 MG/1
30 CAPSULE, DELAYED RELEASE ORAL 2 TIMES DAILY
Qty: 60 CAP | Refills: 11
Start: 2020-10-05

## 2020-10-05 NOTE — PROGRESS NOTES
Georgina Quintero is a 81 y.o. female who was seen by synchronous (real-time) audio-video technology on 10/5/2020. Assessment & Plan:   1. HTN: Stable. - C/w rx as prescribed. - C/w home BP checks  - F/u with me in the office in 6 months    2. Prediabetes: Persistent. Her A1C is 6.   - Low carb diet recommended. 3. OAB: Persistent despite rx. - Referral to Urology  - C/w detrol    4. Depression: Stable. - C/w rx as prescribed. 5. HLD/CVA Hx: Stable. - C/w rxc as prescribed. 6. Vertigo: +Hearing loss. +Tinnitus. - I encouraged her to notify me if her sx worsen as I would refer her ENT for additional studies      Lab review: labs are reviewed in the EHR and ordered as mentioned above     I have discussed the diagnosis with the patient and the intended plan as seen in the above orders. I have discussed medication side effects and warnings with the patient as well. I have reviewed the plan of care with the patient, accepted their input and they are in agreement with the treatment goals. All questions were answered. The patient understands the plan of care. Pt instructed if symptoms worsen to call the office or report to the ED for continued care. Greater than 50% of the visit time was spent in counseling and/or coordination of care. Voice recognition was used to generate this report, which may have resulted in some phonetic based errors in grammar and contents. Even though attempts were made to correct all the mistakes, some may have been missed, and remained in the body of the document. Subjective:   Georgina Quintero was seen for   Chief Complaint   Patient presents with    Follow-up     The patient is a 28-year-old female with history of obesity, allergic rhinitis, IBS-diarrhea, onychomycosis, prediabetes, lumbar spinal stenosis, depression, hypertension, adrenal incidentaloma seen on CT scan 2015, and history of cerebral infarction her head MRI findings (followed by Neurology).     1. HTN: Home BP checks: yes. Her BP today was 120/75. Taking atenolol-chlorthalidone. Recent labs show a normal creatinine. 2. Prediabetes: Her most recent A1C is down to 6. 3. HLD/CVA Hx: On aggrenox and Crestor. No adverse side effects. Her most recent labs show that her total cholesterol is 125. Her LDL is 44.     4. Depression: Lexapro was d/c in between apts. She is taking cymbalta 30mg bid instead of 30mg daily. No recent bouts of depression. 5. OAB: She still has issues with urinary incontinence despite taking detrol. +Urge incontinence. She leaks urine only at night. 6. Vertigo: She had 3 episodes in between apts. She wears hearing aids for b/l hearing loss. +Chronic tinnitus. Prior to Admission medications    Medication Sig Start Date End Date Taking? Authorizing Provider   DULoxetine (Cymbalta) 30 mg capsule Take 30 mg by mouth daily. Provider, Historical   rosuvastatin (CRESTOR) 10 mg tablet TAKE ONE TABLET BY MOUTH EVERY EVENING 5/4/20   Aspen Baca MD   escitalopram oxalate (LEXAPRO) 10 mg tablet Take 10 mg by mouth nightly. 2/2/20   Provider, Historical   tolterodine ER (DETROL LA) 4 mg ER capsule Take 1 Cap by mouth daily. 3/5/20   Aspen Baca MD   predniSONE (DELTASONE) 20 mg tablet Take 40 mg by mouth daily (with breakfast). 3/5/20   Aspen Baca MD   atenolol-chlorthalidone (TENORETIC) 50-25 mg per tablet Take 1 Tab by mouth daily. 12/5/19   Aspen Baca MD   gabapentin (NEURONTIN) 300 mg capsule Take 300 mg by mouth nightly. 1/8/18   Provider, Historical   fluticasone (FLONASE) 50 mcg/actuation nasal spray 2 Sprays by Both Nostrils route daily. 9/6/17   Aspen Baca MD   Cholecalciferol, Vitamin D3, (VITAMIN D3) 1,000 unit cap Take 1,000 Units by mouth daily. Rama Archuleta MD   aspirin-dipyridamole (AGGRENOX)  mg per SR capsule Take 1 Cap by mouth two (2) times a day.     OtherRama MD     Allergies   Allergen Reactions    Amoxicillin-Pot Clavulanate Diarrhea    Azithromycin Diarrhea    Oxybutynin Other (comments)     Problems swallowing, dry mouth    Seroquel [Quetiapine] Other (comments)     Tremors    Sulfa (Sulfonamide Antibiotics) Diarrhea    Wellbutrin [Bupropion Hcl] Itching     Past Medical History:   Diagnosis Date    Hypercholesterolemia     Hypertension     Stroke ([facility] [address] about 2006     Past Surgical History:   Procedure Laterality Date    HX APPENDECTOMY      HX CHOLECYSTECTOMY      HX GYN      partial hysterectomy    HX KNEE ARTHROSCOPY       Family History   Problem Relation Age of Onset    Dementia Mother     Stroke Father     Cancer Brother     Hypertension Brother      Social History     Socioeconomic History    Marital status:      Spouse name: Not on file    Number of children: Not on file    Years of education: Not on file    Highest education level: Not on file   Tobacco Use    Smoking status: Never Smoker    Smokeless tobacco: Never Used   Substance and Sexual Activity    Alcohol use: No    Drug use: No       ROS:  Gen: No fever/chills  HEENT: No sore throat, eye pain, ear pain, or congestion.  No HA  CV: No CP  Resp: No cough/SOB  GI: No abdominal pain  : No hematuria/dysuria  Derm: No rash  Neuro: No new paresthesias/weakness  Musc: No new myalgias/jt pain  Psych: No depression sx      Objective:     General: alert, cooperative, no distress   Mental  status: mental status: alert, oriented to person, place, and time, normal mood, behavior, speech, dress, motor activity, and thought processes   Resp: resp: normal effort and no respiratory distress   Neuro: neuro: no gross deficits   Skin: skin: no discoloration or lesions of concern on visible areas     LABS:  Lab Results   Component Value Date/Time    Sodium 143 09/28/2020 08:05 AM    Potassium 3.6 09/28/2020 08:05 AM    Chloride 106 09/28/2020 08:05 AM    CO2 28 09/28/2020 08:05 AM    Anion gap 9 09/28/2020 08:05 AM    Glucose 91 09/28/2020 08:05 AM    BUN 17 09/28/2020 08:05 AM    Creatinine 0.83 09/28/2020 08:05 AM    BUN/Creatinine ratio 20 09/28/2020 08:05 AM    GFR est AA >60 09/28/2020 08:05 AM    GFR est non-AA >60 09/28/2020 08:05 AM    Calcium 9.4 09/28/2020 08:05 AM       Lab Results   Component Value Date/Time    Cholesterol, total 125 09/28/2020 08:05 AM    HDL Cholesterol 52 09/28/2020 08:05 AM    LDL, calculated 44 09/28/2020 08:05 AM    VLDL, calculated 29 09/28/2020 08:05 AM    Triglyceride 145 09/28/2020 08:05 AM    CHOL/HDL Ratio 2.4 09/28/2020 08:05 AM       Lab Results   Component Value Date/Time    WBC 7.1 01/10/2019 08:55 AM    HGB 13.6 01/10/2019 08:55 AM    HCT 44.6 01/10/2019 08:55 AM    PLATELET 987 13/84/0781 08:55 AM    MCV 99.6 (H) 01/10/2019 08:55 AM       Lab Results   Component Value Date/Time    Hemoglobin A1c 6.0 (H) 09/28/2020 08:05 AM    Hemoglobin A1c (POC) 5.8 03/05/2020 09:22 AM    Hemoglobin A1c, External 5.8 09/08/2015       No results found for: TSH, TSH2, TSH3, TSHP, TSHEXT, TSHEXT        Due to this being a TeleHealth  evaluation, many elements of the physical examination are unable to be assessed. The pt was seen by synchronous (real-time) audio-video technology, and/or her healthcare decision maker, is aware that this patient-initiated, Telehealth encounter is a billable service, with coverage as determined by her insurance carrier. She is aware that she may receive a bill and has provided verbal consent to proceed: Yes. The pt is being evaluated by a video visit encounter for concerns as above. A caregiver was present when appropriate. Due to this being a TeleHealth encounter (During [facility]- public health emergency), evaluation of the following organ systems was limited: Vitals/Constitutional/EENT/Resp/CV/GI//MS/Neuro/Skin/Heme-Lymph-Imm.    Pursuant to the emergency declaration under the [address]5 waiver authority and the [place] [employer] and [employer] Supplemental Appropriations Act, this Virtual  Visit was conducted, with patient's (and/or legal guardian's) consent, to reduce the patient's risk of exposure to COVID-19 and provide necessary medical care. Services were provided through a video synchronous discussion virtually to substitute for in-person clinic visit. Patient and provider were located at their individual homes. We discussed the expected course, resolution and complications of the diagnosis(es) in detail. Medication risks, benefits, costs, interactions, and alternatives were discussed as indicated. I advised her to contact the office if her condition worsens, changes or fails to improve as anticipated. She expressed understanding with the diagnosis(es) and plan.      Agustin Singh MD

## 2020-10-05 NOTE — PROGRESS NOTES
INTERNISTS OF [facility]:  10/05/20, [id number]      The Subsequent Medicare Annual Wellness Exam PROGRESS NOTE    This is a Subsequent Medicare Annual Wellness Exam (AWV). I have reviewed the patient's medical history in detail and updated the computerized patient record. Nikki Manley is a 81 y.o.  female and presents for an annual wellness exam.    SUBJECTIVE    Past Medical History:   Diagnosis Date    Hypercholesterolemia     Hypertension     Stroke ([address] about 2006      Past Surgical History:   Procedure Laterality Date    HX APPENDECTOMY      HX CHOLECYSTECTOMY      HX GYN      partial hysterectomy    HX KNEE ARTHROSCOPY       Current Outpatient Medications   Medication Sig Dispense Refill    DULoxetine (Cymbalta) 30 mg capsule Take 1 Cap by mouth two (2) times a day. 60 Cap 11    rosuvastatin (CRESTOR) 10 mg tablet TAKE ONE TABLET BY MOUTH EVERY EVENING 90 Tab 3    tolterodine ER (DETROL LA) 4 mg ER capsule Take 1 Cap by mouth daily. 90 Cap 3    atenolol-chlorthalidone (TENORETIC) 50-25 mg per tablet Take 1 Tab by mouth daily. 90 Tab 3    gabapentin (NEURONTIN) 300 mg capsule Take 300 mg by mouth nightly.  fluticasone (FLONASE) 50 mcg/actuation nasal spray 2 Sprays by Both Nostrils route daily. 1 Bottle 11    Cholecalciferol, Vitamin D3, (VITAMIN D3) 1,000 unit cap Take 1,000 Units by mouth daily.  aspirin-dipyridamole (AGGRENOX)  mg per SR capsule Take 1 Cap by mouth two (2) times a day.        Allergies   Allergen Reactions    Amoxicillin-Pot Clavulanate Diarrhea    Azithromycin Diarrhea    Oxybutynin Other (comments)     Problems swallowing, dry mouth    Seroquel [Quetiapine] Other (comments)     Tremors    Sulfa (Sulfonamide Antibiotics) Diarrhea    Wellbutrin [Bupropion Hcl] Itching     Family History   Problem Relation Age of Onset    Dementia Mother     Stroke Father     Cancer Brother     Hypertension Brother      Social History     Tobacco Use    Smoking status: Never Smoker    Smokeless tobacco: Never Used   Substance Use Topics    Alcohol use: No     Patient Active Problem List   Diagnosis Code    Glucose intolerance (impaired glucose tolerance) R73.02    Single current episode of major depressive disorder (has tried celexa, wellbutrin, mirtazapine with adverse effects) F32.9    Essential hypertension I10    History of cerebral infarction - per head MRI findings Z86.73    Obesity, morbid, BMI 40.0-49.9  E66.01    Allergic rhinitis J30.9    Irritable bowel syndrome with diarrhea K58.0    Onychomycosis B35.1    Controlled substance agreement signed 7/7/17 Z79.899     The patient is a 73-year-old female with history of obesity, allergic rhinitis, IBS-diarrhea, onychomycosis, prediabetes, lumbar spinal stenosis, depression, hypertension, adrenal incidentaloma seen on CT scan 2015, and history of cerebral infarction her head MRI findings (followed by Neurology). Health Maintenance History  Immunizations reviewed: Tdap up to date   Pneumovax: over-due   Flu: over-due per our records. She had her flu shot last wk at [facility]  Zoster: up to date    Immunization History   Administered Date(s) Administered    Influenza High Dose Vaccine PF 10/31/2018, 10/01/2019    Influenza Vaccine 10/05/2017    Pneumococcal Conjugate (PCV-13) 07/09/2018    Pneumococcal Polysaccharide (PPSV-23) 01/05/2013    Tdap 01/03/2017    Zoster Recombinant 05/15/2019, 08/16/2019    Zoster Vaccine, Live 03/03/2013     Colonoscopy: Overdue per our records     Eye exam: Overdue per our records. She had her eye exam in June    Mammo: Up to date. Dexascan: Up to date    Pelvic/Pap: No bleeding. Review of Systems   Constitutional: Negative for chills and fever. HENT: Negative for ear pain and sore throat. Eyes: Negative for blurred vision and pain. Respiratory: Negative for cough and shortness of breath. Cardiovascular: Negative for chest pain. Gastrointestinal: Negative for abdominal pain, blood in stool and melena. Genitourinary: Negative for dysuria and hematuria. Musculoskeletal: Positive for joint pain (off/on, seeing Orthopedics). Negative for myalgias. Skin: Negative for rash. Neurological: Negative for tingling, focal weakness and headaches. Endo/Heme/Allergies: Does not bruise/bleed easily. Psychiatric/Behavioral: Negative for depression and substance abuse. Depression Risk Factor Screening:      Patient Health Questionnaire (PHQ-2)   Over the last 2 weeks, how often have you been bothered by any of the following problems? · Little interest or pleasure in doing things? · Not at all. [0]  · Feeling down, depressed, or hopeless? · Not at all. [0]    Total Score: 0/6  PHQ-2 Assessment Scoring:   A score of 2 or more requires further screening with the PHQ-9    Alcohol Risk Factor Screening:   Women: On any occasion during the past 3 months, have you had more than 3 drinks containing alcohol? no    Do you average more than 7 drinks per week? no    Tobacco Use Screening:     Social History     Tobacco Use   Smoking Status Never Smoker   Smokeless Tobacco Never Used       Hearing Loss    The patient wears hearing aids. Activities of Daily Living   Self-care. Requires assistance with: no ADLs  She does need assistance with ADLs/IADLs    Fall Risk   no falls within the past year    Abuse Screen   None    Additional Examination Findings: There were no vitals filed for this visit. There is no height or weight on file to calculate BMI. Evaluation of Cognitive Function:  Mood/affect: Euthymic  Appearance: Well-groomed  Family member/caregiver input: She is not with a family member during her virtual visit      General:   Well-nourished, well-groomed, pleasant, alert, in no acute distress.      Head:  Normocephalic, atraumatic  Ears:  External ears WNL  Eyes:  Clear sclera  Neuro:   Alert, conversant, appropriate, following commands  Skin:    No rashes noted  Psych: Affect, mood and judgment appropriate      Dementia Screen: Three Item Recall: 3/3    LABS   Data Review:   Lab Results   Component Value Date/Time    Sodium 143 09/28/2020 08:05 AM    Potassium 3.6 09/28/2020 08:05 AM    Chloride 106 09/28/2020 08:05 AM    CO2 28 09/28/2020 08:05 AM    Anion gap 9 09/28/2020 08:05 AM    Glucose 91 09/28/2020 08:05 AM    BUN 17 09/28/2020 08:05 AM    Creatinine 0.83 09/28/2020 08:05 AM    BUN/Creatinine ratio 20 09/28/2020 08:05 AM    GFR est AA >60 09/28/2020 08:05 AM    GFR est non-AA >60 09/28/2020 08:05 AM    Calcium 9.4 09/28/2020 08:05 AM       Lab Results   Component Value Date/Time    WBC 7.1 01/10/2019 08:55 AM    HGB 13.6 01/10/2019 08:55 AM    HCT 44.6 01/10/2019 08:55 AM    PLATELET 520 19/23/9271 08:55 AM    MCV 99.6 (H) 01/10/2019 08:55 AM       Lab Results   Component Value Date/Time    Hemoglobin A1c 6.0 (H) 09/28/2020 08:05 AM    Hemoglobin A1c (POC) 5.8 03/05/2020 09:22 AM    Hemoglobin A1c, External 5.8 09/08/2015       Lab Results   Component Value Date/Time    Cholesterol, total 125 09/28/2020 08:05 AM    HDL Cholesterol 52 09/28/2020 08:05 AM    LDL, calculated 44 09/28/2020 08:05 AM    VLDL, calculated 29 09/28/2020 08:05 AM    Triglyceride 145 09/28/2020 08:05 AM    CHOL/HDL Ratio 2.4 09/28/2020 08:05 AM         Patient Care Team:  Carolyn Caldwell MD as PCP - General (Family Medicine)  Carolyn Caldwell MD as PCP - [facility] Empaneled Provider  Tamia Joy MD as Surgeon (General Surgery)  Lisa Gibson MD as Consulting Provider (Ophthalmology)    End-of-life planning  Advanced Directive in the case than an injury or illness causes the patient to be unable to make health care decisions was discussed with the patient.      Advice/Referrals/Counselling/Plan:   Education and counseling provided:  Are appropriate based on today's review and evaluation  End-of-Life planning (with patient's consent)  Pneumococcal Vaccine  Influenza Vaccine  Screening Mammography  Screening Pap and pelvic (covered once every 2 years)  Colorectal cancer screening tests  Cardiovascular screening blood test  Bone mass measurement (DEXA)  Screening for glaucoma  Diabetes screening test  Include in education list (weight loss, physical activity, smoking cessation, fall prevention, and nutrition)    ICD-10-CM ICD-9-CM    1. OAB (overactive bladder)  N32.81 596.51 REFERRAL TO UROLOGY   2. Screening for colon cancer  Z12.11 V76.51 COLOGUARD TEST (FECAL DNA COLORECTAL CANCER SCREENING)     reviewed diet, exercise and weight control. Brief written plan, checklist    Assessment/Plan:    Health Maintenance:  - We need to get her vaccine records. - I encouraged her to get her pneumococcal vaccine. She plans to get [facility] at [facility]. - Cologuard test reordered. - We need to get a copy of her most recent eye exam        Lab review: labs are reviewed in the [address] [place] [address] (ACP) Provider Conversation     Date of ACP Conversation: 10/05/20  Persons included in Conversation:  patient    Authorized Decision Maker (if patient is incapable of making informed decisions): This person is:   Named in Advance Directive or Healthcare Power of           For Patients with Decision Making Capacity:   Values/Goals: Exploration of values, goals, and preferences if recovery is not expected, even with continued medical treatment in the event of:  Imminent death  Severe, permanent brain injury    Conversation Outcomes / Follow-Up Plan: We reviewed her preexisting advance directive. She has no plans to make changes to this document at this time. Due to this being a TeleHealth  evaluation, many elements of the physical examination are unable to be assessed.      The pt was seen by synchronous (real-time) audio-video technology, and/or her healthcare decision maker, is aware that this patient-initiated, Telehealth encounter is a billable service, with coverage as determined by her insurance carrier. She is aware that she may receive a bill and has provided verbal consent to proceed: Yes. The pt is being evaluated by a video visit encounter for concerns as above. A caregiver was present when appropriate. Due to this being a TeleHealth encounter (During [facility] emergency), evaluation of the following organ systems was limited: Vitals/Constitutional/EENT/Resp/CV/GI//MS/Neuro/Skin/Heme-Lymph-Imm. Pursuant to the emergency declaration under the [facility], [facility] waiver authority and the [facility] and [employer] Act, this Virtual  Visit was conducted, with patient's (and/or legal guardian's) consent, to reduce the patient's risk of exposure to COVID-19 and provide necessary medical care. Services were provided through a video synchronous discussion virtually to substitute for in-person clinic visit. Patient and provider were located at their individual homes.

## 2020-10-05 NOTE — ACP (ADVANCE CARE PLANNING)
Advance Care Planning  Advance Care Planning (ACP) Provider Conversation     Date of ACP Conversation: 10/05/20  Persons included in Conversation:  patient    Authorized Decision Maker (if patient is incapable of making informed decisions): This person is:   Named in Advance Directive or Healthcare Power of           For Patients with Decision Making Capacity:   Values/Goals: Exploration of values, goals, and preferences if recovery is not expected, even with continued medical treatment in the event of:  Imminent death  Severe, permanent brain injury    Conversation Outcomes / Follow-Up Plan: We reviewed her preexisting advance directive. She has no plans to make changes to this document at this time.     Dr. Sally Marie  [facility] of [address], [address] [place] [address].  Phone: [phone]  Fax: [phone]

## 2020-10-08 ENCOUNTER — DOCUMENTATION ONLY (OUTPATIENT)
Dept: ORTHOPEDIC SURGERY | Age: 70
End: 2020-10-08

## 2020-10-15 ENCOUNTER — OFFICE VISIT (OUTPATIENT)
Dept: ORTHOPEDIC SURGERY | Age: 70
End: 2020-10-15
Payer: MEDICARE

## 2020-10-15 VITALS
DIASTOLIC BLOOD PRESSURE: 60 MMHG | WEIGHT: 279.9 LBS | SYSTOLIC BLOOD PRESSURE: 105 MMHG | RESPIRATION RATE: 16 BRPM | HEART RATE: 64 BPM | BODY MASS INDEX: 44.98 KG/M2 | HEIGHT: 66 IN | OXYGEN SATURATION: 96 % | TEMPERATURE: 96.8 F

## 2020-10-15 DIAGNOSIS — M17.12 ARTHRITIS OF LEFT KNEE: Primary | ICD-10-CM

## 2020-10-15 DIAGNOSIS — M17.11 PRIMARY OSTEOARTHRITIS OF RIGHT KNEE: ICD-10-CM

## 2020-10-15 PROCEDURE — 99024 POSTOP FOLLOW-UP VISIT: CPT | Performed by: PHYSICIAN ASSISTANT

## 2020-10-15 PROCEDURE — 20611 DRAIN/INJ JOINT/BURSA W/US: CPT | Performed by: PHYSICIAN ASSISTANT

## 2020-10-15 RX ORDER — HYALURONATE SODIUM 10 MG/ML
2 SYRINGE (ML) INTRAARTICULAR ONCE
Qty: 2 ML | Refills: 0
Start: 2020-10-15 | End: 2020-10-15

## 2020-10-15 NOTE — PROGRESS NOTES
Patient: Maria M Ko                MRN: [id number]       SSN: [id number]  YOB: 1950        AGE: 81 y.o. SEX: female  Body mass index is 45.18 kg/m². PCP: Yung Goodson MD  10/15/20        Pt presents today for their 1st Euflexxa  injection for Bilateral knee . There has been no recent fevers/chills, systemic changes, or injuries to report. PE:  General A&Ox3, NAD  Exam of the knees reveals skin intact, no erythema, no ecchymosis, no signs for infection. No cyanosis, clubbing, or edema present distally. Neg calf tenderness, neg homans, no signs for DVT present. A: Bilateral knee OA    P: The Bilateral knee was injected with 2ml euflexxa using US-guided assistance without complications. Patient was instructed on post injection care. Chart reviewed for the following:  Bambi WILLARD PA-C, have reviewed the History, Physical and updated the Allergic reactions for Maria M Ko?     TIME OUT performed immediately prior to start of procedure:  Bambi WILLARD PA-C, have performed the following reviews on Maria M Ko prior to the start of the procedure:  ????????  * Patient was identified by name and date of birth   * Agreement on procedure being performed was verified  * Risks and Benefits explained to the patient  * Procedure site verified and marked as necessary  * Patient was positioned for comfort  * Consent was signed and verified    Time: 9:29 AM    Body part: Bilateral knee    Medication & Dose: 2 ml Euflexxa for each knee    Date of procedure: 10/15/2020    Procedure performed by: Danish Mckeon PA-C    Provider assisted by: none    Patient assisted by: self    How tolerated by patient: tolerated the procedure well with no complications    Post Procedural Pain Scale: 8    Comments: none    Bambi Loza PA-C

## 2020-10-18 NOTE — PATIENT INSTRUCTIONS
Medicare Wellness Visit, Female The best way to live healthy is to have a lifestyle where you eat a well-balanced diet, exercise regularly, limit alcohol use, and quit all forms of tobacco/nicotine, if applicable. Regular preventive services are another way to keep healthy. Preventive services (vaccines, screening tests, monitoring & exams) can help personalize your care plan, which helps you manage your own care. Screening tests can find health problems at the earliest stages, when they are easiest to treat. [facility] follows the current, evidence-based guidelines published by the [facility] Alex Gaitan ([facility]) when recommending preventive services for our patients. Because we follow these guidelines, sometimes recommendations change over time as research supports it. (For example, mammograms used to be recommended annually. Even though Medicare will still pay for an annual mammogram, the newer guidelines recommend a mammogram every two years for women of average risk). Of course, you and your doctor may decide to screen more often for some diseases, based on your risk and your co-morbidities (chronic disease you are already diagnosed with). Preventive services for you include: - Medicare offers their members a free annual wellness visit, which is time for you and your primary care provider to discuss and plan for your preventive service needs. Take advantage of this benefit every year! 
-All adults over the age of 72 should receive the recommended pneumonia vaccines. Current USPSTF guidelines recommend a series of two vaccines for the best pneumonia protection.  
-All adults should have a flu vaccine yearly and a tetanus vaccine every 10 years.  
-All adults age 48 and older should receive the shingles vaccines (series of two vaccines). -All adults age 38-68 who are overweight should have a diabetes screening test once every three years. -All adults born between 80 and 1965 should be screened once for Hepatitis C. 
-Other screening tests and preventive services for persons with diabetes include: an eye exam to screen for diabetic retinopathy, a kidney function test, a foot exam, and stricter control over your cholesterol.  
-Cardiovascular screening for adults with routine risk involves an electrocardiogram (ECG) at intervals determined by your doctor.  
-Colorectal cancer screenings should be done for adults age 54-65 with no increased risk factors for colorectal cancer. There are a number of acceptable methods of screening for this type of cancer. Each test has its own benefits and drawbacks. Discuss with your doctor what is most appropriate for you during your annual wellness visit. The different tests include: colonoscopy (considered the best screening method), a fecal occult blood test, a fecal DNA test, and sigmoidoscopy. 
 
-A bone mass density test is recommended when a woman turns 65 to screen for osteoporosis. This test is only recommended one time, as a screening. Some providers will use this same test as a disease monitoring tool if you already have osteoporosis. -Breast cancer screenings are recommended every other year for women of normal risk, age 54-69. 
-Cervical cancer screenings for women over age 72 are only recommended with certain risk factors. Here is a list of your current Health Maintenance items (your personalized list of preventive services) with a due date: 
Health Maintenance Due Topic Date Due  
 Colonoscopy  06/23/2018  Pneumococcal Vaccine (2 of 2 - PPSV23) 07/09/2019  Glaucoma Screening   03/08/2020 [address] [place] [address] Annual Well Visit  05/13/2020  Yearly Flu Vaccine (1) 09/01/2020 Medicare Part B Preventive Services Limitations Recommendation Scheduled Bone Mass Measurement 
(age 72 & older, biennial) Requires diagnosis related to osteoporosis or estrogen deficiency. Biennial benefit unless patient has history of long-term glucocorticoid tx or baseline is needed because initial test was by other method This should be done at age 72 and again if on osteoporosis medication at 2-3 year intervals. Up to date Cardiovascular Screening Blood Tests (every 5 years) Total cholesterol, HDL, Triglycerides Order as a panel if possible We should check your cholesterol panel at least once every 5 years. Up to date Colorectal Cancer Screening 
-Fecal occult blood test (annual) -Flexible sigmoidoscopy (5y) 
-Screening colonoscopy (10y) -Barium Enema  Due per your Gastroenterologist's recommendations. Up to date Counseling to Prevent Tobacco Use (up to 8 sessions per year) - Counseling greater than 3 and up to 10 minutes - Counseling greater than 10 minutes Patients must be asymptomatic of tobacco-related conditions to receive as preventive service  Not applicable Diabetes Screening Tests (at least every 3 years, Medicare covers annually or at 6-month intervals for prediabetic patients) Fasting blood sugar (FBS) or glucose tolerance test (GTT) Patient must be diagnosed with one of the following: 
-Hypertension, Dyslipidemia, obesity, previous impaired FBS or GTT 
Or any two of the following: overweight, FH of diabetes, age ? 72, history of gestational diabetes, birth of baby weighing more than 9 pounds Should be done yearly Up to date Diabetes Self-Management Training (DSMT) (no USPSTF recommendation) Requires referral by treating physician for patient with diabetes or renal disease. 10 hours of initial DSMT session of no less than 30 minutes each in a continuous 12-month period. 2 hours of follow-up DSMT in subsequent years. Not applicable Not applicable Glaucoma Screening (no USPSTF recommendation) Diabetes mellitus, family history, , age 48 or over,  American, age 72 or over Continue with annual eye exams. Up to date Human Immunodeficiency Virus (HIV) Screening (annually for increased risk patients) HIV-1 and HIV-2 by EIA, MORENO, rapid antibody test, or oral mucosa transudate Patient must be at increased risk for HIV infection per USPSTF guidelines or pregnant. Tests covered annually for patients at increased risk. Pregnant patients may receive up to 3 test during pregnancy. Not applicable Not applicable Medical Nutrition Therapy (MNT) (for diabetes or renal disease not recommended schedule) Requires referral by treating physician for patient with diabetes or renal disease. Can be provided in same year as diabetes self-management training (DSMT), and CMS recommends medical nutrition therapy take place after DSMT. Up to 3 hours for initial year and 2 hours in subsequent years. Not applicable Not applicable Shingles Vaccination A shingles vaccine is also recommended once in a lifetime after age 61 Vaccination recommended for shingles vaccination. Up to date Seasonal Influenza Vaccination (annually)  Continue with yearly \"flu\" shot annually Up to date but we don't have records Pneumococcal Vaccination (once after 65)  Please receive this vaccination at age 72. Overdue per our records Hepatitis B Vaccinations (if medium/high risk) Medium/high risk factors:  End-stage renal disease, Hemophiliacs who received Factor VIII or IX concentrates, Clients of institutions for the mentally retarded, Persons who live in the same house as a HepB virus carrier, Homosexual men, Illicit injectable drug abusers. Not applicable Not applicable Screening Mammography (biennial age 54-69) Annually (age 36 or over) You need a mammogram yearly to screen for breast cancer. Up to date Screening Pap Tests and Pelvic Examination (up to age 79 and after 79 if unknown history or abnormal study last 10 years) Every 24 months except high risk You need no Pap smear at this time. Not warranted at this time. Ultrasound Screening for Abdominal Aortic Aneurysm (AAA) (once) Patient must be referred through IPPE and not have had a screening for abdominal aortic aneurysm before under Medicare. Limited to patients who meet one of the following criteria: 
- Men who are 73-68 years old and have smoked more than 100 cigarettes in their lifetime. 
-Anyone with a FH of AAA 
-Anyone recommended for screening by USPSTF Not applicable Not applicable Well Visit, Over 72: Care Instructions Your Care Instructions Physical exams can help you stay healthy. Your doctor has checked your overall health and may have suggested ways to take good care of yourself. He or she also may have recommended tests. At home, you can help prevent illness with healthy eating, regular exercise, and other steps. Follow-up care is a key part of your treatment and safety. Be sure to make and go to all appointments, and call your doctor if you are having problems. It's also a good idea to know your test results and keep a list of the medicines you take. How can you care for yourself at home? · Reach and stay at a healthy weight. This will lower your risk for many problems, such as obesity, diabetes, heart disease, and high blood pressure. · Get at least 30 minutes of exercise on most days of the week. Walking is a good choice. You also may want to do other activities, such as running, swimming, cycling, or playing tennis or team sports. · Do not smoke. Smoking can make health problems worse. If you need help quitting, talk to your doctor about stop-smoking programs and medicines. These can increase your chances of quitting for good. · Protect your skin from too much sun. When you're outdoors from 10 a.m. to 4 p.m., stay in the shade or cover up with clothing and a hat with a wide brim. Wear sunglasses that block UV rays. Even when it's cloudy, put broad-spectrum sunscreen (SPF 30 or higher) on any exposed skin. · See a dentist one or two times a year for checkups and to have your teeth cleaned. · Wear a seat belt in the car. Follow your doctor's advice about when to have certain tests. These tests can spot problems early. For men and women · Cholesterol. Your doctor will tell you how often to have this done based on your overall health and other things that can increase your risk for heart attack and stroke. · Blood pressure. Have your blood pressure checked during a routine doctor visit. Your doctor will tell you how often to check your blood pressure based on your age, your blood pressure results, and other factors. · Diabetes. Ask your doctor whether you should have tests for diabetes. · Vision. Experts recommend that you have yearly exams for glaucoma and other age-related eye problems. · Hearing. Tell your doctor if you notice any change in your hearing. You can have tests to find out how well you hear. · Colon cancer tests. Keep having colon cancer tests as your doctor recommends. You can have one of several types of tests. · Heart attack and stroke risk. At least every 4 to 6 years, you should have your risk for heart attack and stroke assessed. Your doctor uses factors such as your age, blood pressure, cholesterol, and whether you smoke or have diabetes to show what your risk for a heart attack or stroke is over the next 10 years. · Osteoporosis. Talk to your doctor about whether you should have a bone density test to find out whether you have thinning bones. Ask your doctor if you need to take a calcium plus vitamin D supplement. You may be able to get enough calcium and vitamin D through your diet. For women · Pap test and pelvic exam. You may no longer need a Pap test. Talk with your doctor about whether to stop or continue to have Pap tests. · Breast exam and mammogram. Ask how often you should have a mammogram, which is an X-ray of your breasts.  A mammogram can spot breast cancer before it can be felt and when it is easiest to treat. · Thyroid disease. Talk to your doctor about whether to have your thyroid checked as part of a regular physical exam. Women have an increased chance of a thyroid problem. For men · Prostate exam. Talk to your doctor about whether you should have a blood test (called a PSA test) for prostate cancer. Experts recommend that you discuss the benefits and risks of the test with your doctor before you decide whether to have this test. Some experts say that men ages 79 and older no longer need testing. · Abdominal aortic aneurysm. Ask your doctor whether you should have a test to check for an aneurysm. You may need a test if you ever smoked or if your parent, brother, sister, or child has had an aneurysm. When should you call for help? Watch closely for changes in your health, and be sure to contact your doctor if you have any problems or symptoms that concern you. Where can you learn more? Go to http://www.gray.com/ Enter E140 in the search box to learn more about \"Well Visit, Over 65: Care Instructions. \" Current as of: May 27, 2020               Content Version: 12.6 © 3145-3837 [employer]. Care instructions adapted under license by [employer] (which disclaims liability or warranty for this information). If you have questions about a medical condition or this instruction, always ask your healthcare professional. [address] any warranty or liability for your use of this information.

## 2020-10-22 ENCOUNTER — OFFICE VISIT (OUTPATIENT)
Dept: ORTHOPEDIC SURGERY | Age: 70
End: 2020-10-22
Payer: MEDICARE

## 2020-10-22 VITALS
DIASTOLIC BLOOD PRESSURE: 61 MMHG | WEIGHT: 280.6 LBS | HEIGHT: 66 IN | HEART RATE: 68 BPM | TEMPERATURE: 96.6 F | RESPIRATION RATE: 16 BRPM | BODY MASS INDEX: 45.1 KG/M2 | SYSTOLIC BLOOD PRESSURE: 117 MMHG | OXYGEN SATURATION: 95 %

## 2020-10-22 DIAGNOSIS — M17.0 PRIMARY OSTEOARTHRITIS OF BOTH KNEES: Primary | ICD-10-CM

## 2020-10-22 PROCEDURE — 20611 DRAIN/INJ JOINT/BURSA W/US: CPT | Performed by: PHYSICIAN ASSISTANT

## 2020-10-22 PROCEDURE — 99024 POSTOP FOLLOW-UP VISIT: CPT | Performed by: PHYSICIAN ASSISTANT

## 2020-10-22 RX ORDER — HYALURONATE SODIUM 10 MG/ML
2 SYRINGE (ML) INTRAARTICULAR ONCE
Qty: 2 ML | Refills: 0
Start: 2020-10-22 | End: 2020-10-22

## 2020-10-22 NOTE — PROGRESS NOTES
Patient: Taco Ponce                MRN: [id number]       SSN: [id number]  YOB: 1950        AGE: 81 y.o. SEX: female  Body mass index is 45.29 kg/m². PCP: Neel Vela MD  10/22/20        Pt presents today for their 2nd Euflexxa  injection for Bilateral knees. There has been no recent fevers/chills, systemic changes, or injuries to report. PE:  General A&Ox3, NAD  Exam of the knees reveals skin intact, no erythema, no ecchymosis, no signs for infection. No cyanosis, clubbing, or edema present distally. Neg calf tenderness, neg homans, no signs for DVT present. A: Bilateral knee OA    P: The Bilateral knee was prepped with betadine and injected with 2ml euflexxa using US-guided assistance without complications. Patient was instructed on post injection care. Chart reviewed for the following:  Karina WILLARD PA-C, have reviewed the History, Physical and updated the Allergic reactions for aTco Ponce?     TIME OUT performed immediately prior to start of procedure:  Karina WILLARD PA-C, have performed the following reviews on Taco Ponce prior to the start of the procedure:  ????????  * Patient was identified by name and date of birth   * Agreement on procedure being performed was verified  * Risks and Benefits explained to the patient  * Procedure site verified and marked as necessary  * Patient was positioned for comfort  * Consent was signed and verified    Time: 8:55 AM    Body part: Bilateral knee    Medication & Dose: 2 ml of Euflexxa for each knee    Date of procedure: 10/22/2020    Procedure performed by: Shanae Aquino PA-C    Provider assisted by: none    Patient assisted by: self    How tolerated by patient: tolerated the procedure well with no complications    Post Procedural Pain Scale: 9    Comments: none    Karina Willingham PA-C

## 2020-10-29 ENCOUNTER — OFFICE VISIT (OUTPATIENT)
Dept: ORTHOPEDIC SURGERY | Age: 70
End: 2020-10-29
Payer: MEDICARE

## 2020-10-29 VITALS
OXYGEN SATURATION: 95 % | TEMPERATURE: 96.9 F | DIASTOLIC BLOOD PRESSURE: 63 MMHG | BODY MASS INDEX: 45.03 KG/M2 | HEIGHT: 66 IN | HEART RATE: 71 BPM | SYSTOLIC BLOOD PRESSURE: 112 MMHG | WEIGHT: 280.2 LBS

## 2020-10-29 DIAGNOSIS — M17.0 PRIMARY OSTEOARTHRITIS OF BOTH KNEES: Primary | ICD-10-CM

## 2020-10-29 DIAGNOSIS — R60.0 LEG EDEMA, LEFT: ICD-10-CM

## 2020-10-29 PROCEDURE — 20611 DRAIN/INJ JOINT/BURSA W/US: CPT | Performed by: PHYSICIAN ASSISTANT

## 2020-10-29 PROCEDURE — 99024 POSTOP FOLLOW-UP VISIT: CPT | Performed by: PHYSICIAN ASSISTANT

## 2020-10-29 RX ORDER — HYALURONATE SODIUM 10 MG/ML
2 SYRINGE (ML) INTRAARTICULAR ONCE
Qty: 2 ML | Refills: 0
Start: 2020-10-29 | End: 2020-10-29

## 2020-10-29 NOTE — PROGRESS NOTES
Patient: Montey Pump                MRN: [id number]       SSN: [id number]  YOB: 1950        AGE: 81 y.o. SEX: female  Body mass index is 45.23 kg/m². PCP: Kd Beyer MD  10/29/20        Pt presents today for their 3rd eufexxa  injection for Bilateral knee . There has been no recent fevers/chills, systemic changes, or injuries to report. PE:  General A&Ox3, NAD  Exam of the knees reveals skin intact, no erythema, no ecchymosis, no signs for infection. No cyanosis, clubbing, or edema present distally. Neg calf tenderness, neg homans, no signs for DVT present. A: Bilateral knee OA    P: The Bilateral knee was injected with 2ml euflexxa using US guided assistance without complications. Patient was instructed on post injection care. Chart reviewed for the following:  Richy WILLARD PA-C, have reviewed the History, Physical and updated the Allergic reactions for Montey Pump?     TIME OUT performed immediately prior to start of procedure:  Richy WILLARD PA-C, have performed the following reviews on Montey Pump prior to the start of the procedure:  ????????  * Patient was identified by name and date of birth   * Agreement on procedure being performed was verified  * Risks and Benefits explained to the patient  * Procedure site verified and marked as necessary  * Patient was positioned for comfort  * Consent was signed and verified    Time: 8:48 AM    Body part: Bilateral knee    Medication & Dose: 2ml euflexxa bilateral knees    Date of procedure: 10/29/2020    Procedure performed by: Chelsea Martino PA-C    Provider assisted by: none    Patient assisted by: self    How tolerated by patient: tolerated the procedure well with no complications    Post Procedural Pain Scale: 6    Comments: none    Richy Diallo PA-C

## 2020-11-18 DIAGNOSIS — I10 ESSENTIAL HYPERTENSION: ICD-10-CM

## 2020-11-20 ENCOUNTER — TELEPHONE (OUTPATIENT)
Dept: VASCULAR SURGERY | Age: 70
End: 2020-11-20

## 2020-11-20 RX ORDER — ATENOLOL AND CHLORTHALIDONE TABLET 50; 25 MG/1; MG/1
TABLET ORAL
Qty: 90 TAB | Refills: 2 | Status: SHIPPED | OUTPATIENT
Start: 2020-11-20 | End: 2021-08-15

## 2020-11-20 NOTE — TELEPHONE ENCOUNTER
Called patient but had to leave message to let her know we have cancelled new patient appointment for Monday. Attempted to reach out to referral provider office but unable to get through phone lines to notify that patient will need to be referred to another practice due to our providers leaving the practice.

## 2020-12-10 ENCOUNTER — OFFICE VISIT (OUTPATIENT)
Dept: ORTHOPEDIC SURGERY | Age: 70
End: 2020-12-10
Payer: MEDICARE

## 2020-12-10 VITALS
SYSTOLIC BLOOD PRESSURE: 111 MMHG | BODY MASS INDEX: 40.82 KG/M2 | HEART RATE: 74 BPM | TEMPERATURE: 96.9 F | DIASTOLIC BLOOD PRESSURE: 55 MMHG | HEIGHT: 66 IN | WEIGHT: 254 LBS

## 2020-12-10 DIAGNOSIS — M17.0 PRIMARY OSTEOARTHRITIS OF BOTH KNEES: Primary | ICD-10-CM

## 2020-12-10 PROCEDURE — 1090F PRES/ABSN URINE INCON ASSESS: CPT | Performed by: PHYSICIAN ASSISTANT

## 2020-12-10 PROCEDURE — 1101F PT FALLS ASSESS-DOCD LE1/YR: CPT | Performed by: PHYSICIAN ASSISTANT

## 2020-12-10 PROCEDURE — G8399 PT W/DXA RESULTS DOCUMENT: HCPCS | Performed by: PHYSICIAN ASSISTANT

## 2020-12-10 PROCEDURE — 20611 DRAIN/INJ JOINT/BURSA W/US: CPT | Performed by: PHYSICIAN ASSISTANT

## 2020-12-10 PROCEDURE — G8754 DIAS BP LESS 90: HCPCS | Performed by: PHYSICIAN ASSISTANT

## 2020-12-10 PROCEDURE — G8752 SYS BP LESS 140: HCPCS | Performed by: PHYSICIAN ASSISTANT

## 2020-12-10 PROCEDURE — G9899 SCRN MAM PERF RSLTS DOC: HCPCS | Performed by: PHYSICIAN ASSISTANT

## 2020-12-10 PROCEDURE — 99214 OFFICE O/P EST MOD 30 MIN: CPT | Performed by: PHYSICIAN ASSISTANT

## 2020-12-10 PROCEDURE — 3017F COLORECTAL CA SCREEN DOC REV: CPT | Performed by: PHYSICIAN ASSISTANT

## 2020-12-10 PROCEDURE — G8536 NO DOC ELDER MAL SCRN: HCPCS | Performed by: PHYSICIAN ASSISTANT

## 2020-12-10 PROCEDURE — G8427 DOCREV CUR MEDS BY ELIG CLIN: HCPCS | Performed by: PHYSICIAN ASSISTANT

## 2020-12-10 PROCEDURE — G8417 CALC BMI ABV UP PARAM F/U: HCPCS | Performed by: PHYSICIAN ASSISTANT

## 2020-12-10 PROCEDURE — G9717 DOC PT DX DEP/BP F/U NT REQ: HCPCS | Performed by: PHYSICIAN ASSISTANT

## 2020-12-10 RX ORDER — BETAMETHASONE SODIUM PHOSPHATE AND BETAMETHASONE ACETATE 3; 3 MG/ML; MG/ML
12 INJECTION, SUSPENSION INTRA-ARTICULAR; INTRALESIONAL; INTRAMUSCULAR; SOFT TISSUE ONCE
Status: COMPLETED | OUTPATIENT
Start: 2020-12-10 | End: 2020-12-10

## 2020-12-10 RX ADMIN — BETAMETHASONE SODIUM PHOSPHATE AND BETAMETHASONE ACETATE 12 MG: 3; 3 INJECTION, SUSPENSION INTRA-ARTICULAR; INTRALESIONAL; INTRAMUSCULAR; SOFT TISSUE at 14:20

## 2020-12-10 NOTE — PROGRESS NOTES
[address] [facility], [address] [facility]  [phone]           Patient: Darin Das                MRN: [id number]       SSN: [id number]  YOB: 1950        AGE: 78 y.o. SEX: female  Body mass index is 41 kg/m². PCP: Ruby Keenan MD  12/10/20            REVIEW OF SYSTEMS:  Constitutional: Negative for fever, chills, weight loss and malaise/fatigue. HENT: Negative. Eyes: Negative. Respiratory: Negative. Cardiovascular: Negative. Gastrointestinal: No bowel incontinence or constipation. Genitourinary: No bladder incontinence or saddle anesthesia. Skin: Negative. Neurological: Negative. Endo/Heme/Allergies: Negative. Psychiatric/Behavioral: Negative. Musculoskeletal: As per HPI above. Past Medical History:   Diagnosis Date    Hypercholesterolemia     Hypertension     Stroke [facility]) about 2006         Current Outpatient Medications:     atenoloL-chlorthalidone (TENORETIC) 50-25 mg per tablet, TAKE ONE TABLET BY MOUTH DAILY, Disp: 90 Tab, Rfl: 2    DULoxetine (Cymbalta) 30 mg capsule, Take 1 Cap by mouth two (2) times a day., Disp: 60 Cap, Rfl: 11    rosuvastatin (CRESTOR) 10 mg tablet, TAKE ONE TABLET BY MOUTH EVERY EVENING, Disp: 90 Tab, Rfl: 3    tolterodine ER (DETROL LA) 4 mg ER capsule, Take 1 Cap by mouth daily. , Disp: 90 Cap, Rfl: 3    gabapentin (NEURONTIN) 300 mg capsule, Take 300 mg by mouth nightly., Disp: , Rfl:     fluticasone (FLONASE) 50 mcg/actuation nasal spray, 2 Sprays by Both Nostrils route daily. , Disp: 1 Bottle, Rfl: 11    Cholecalciferol, Vitamin D3, (VITAMIN D3) 1,000 unit cap, Take 1,000 Units by mouth daily. , Disp: , Rfl:     aspirin-dipyridamole (AGGRENOX)  mg per SR capsule, Take 1 Cap by mouth two (2) times a day., Disp: , Rfl:     Allergies   Allergen Reactions    Amoxicillin-Pot Clavulanate Diarrhea    Azithromycin Diarrhea    Oxybutynin Other (comments) Problems swallowing, dry mouth    Seroquel [Quetiapine] Other (comments)     Tremors    Sulfa (Sulfonamide Antibiotics) Diarrhea    Wellbutrin [Bupropion Hcl] Itching       Social History     Socioeconomic History    Marital status:      Spouse name: Not on file    Number of children: Not on file    Years of education: Not on file    Highest education level: Not on file   Occupational History    Not on file   Social Needs    Financial resource strain: Not on file    Food insecurity     Worry: Not on file     Inability: Not on file    Transportation needs     Medical: Not on file     Non-medical: Not on file   Tobacco Use    Smoking status: Never Smoker    Smokeless tobacco: Never Used   Substance and Sexual Activity    Alcohol use: No    Drug use: No    Sexual activity: Not on file   Lifestyle    Physical activity     Days per week: Not on file     Minutes per session: Not on file    Stress: Not on file   Relationships    Social connections     Talks on phone: Not on file     Gets together: Not on file     Attends Christian service: Not on file     Active member of club or organization: Not on file     Attends meetings of clubs or organizations: Not on file     Relationship status: Not on file    Intimate partner violence     Fear of current or ex partner: Not on file     Emotionally abused: Not on file     Physically abused: Not on file     Forced sexual activity: Not on file   Other Topics Concern    Not on file   Social History Narrative    Not on file       Past Surgical History:   Procedure Laterality Date    HX APPENDECTOMY      HX CHOLECYSTECTOMY      HX GYN      partial hysterectomy    HX KNEE ARTHROSCOPY           Patient seen and evaluated today for bilateral knees. Patient does have known advanced arthritis of each of her knees. She is finished up a series of viscosupplementation which gave her some relief. She does have decreased walking tolerance.   She has pain at night.  She has trouble with stairs. She has had no injuries recently. She reports recently been fitted for compression stockings by Dr. Paul Bragg, vascular. She had a little increased right knee discomfort from wearing them. Patient denies recent fevers, chills, chest pain, SOB, or injuries. No recent systemic changes noted. A 12-point review of systems is performed today. Pertinent positives are noted. All other systems reviewed and otherwise are negative. Physical exam: General: Alert and oriented x3, nad.  well-developed, well nourished. normal affect, AF. NC/AT, EOMI, neck supple, trachea midline, no JVD present. Breathing is non-labored. Examination of lower extremities reveals pain-free range of motion the hips. There is no pain to palpation trochanter bursa. Negative straight leg raise. Negative calf tenderness. Negative Homans. No signs of DVT present. Each of the knees reveals skin intact. There is no erythema, ecchymosis, warmth. There are no signs of infection or cellulitis present. Does have varicosities noted in each of her lower extremities. Assessment: Bilateral knee end-stage osteoarthritis    Plan: At this point, we will move forward with a cortisone injection for each of her knees. After informed consent, under aseptic conditions, with US guided assitance, each knee was prepped with betadine and 6mg of celestone was injected without complications. The patient tolerated the injection well. The patient is instructed on post-injection care. She will see us back in 3 months time for evaluation. Chart reviewed for the following:  Gauri WILLARD PA-C, have reviewed the History, Physical and updated the Allergic reactions for [facility]?     TIME OUT performed immediately prior to start of procedure:  Gauri WILLARD PA-C, have performed the following reviews on [facility] prior to the start of the procedure:  ????????  * Patient was identified by name and date of birth   * Agreement on procedure being performed was verified  * Risks and Benefits explained to the patient  * Procedure site verified and marked as necessary  * Patient was positioned for comfort  * Consent was signed and verified    Time:2:17 PM    Body part: bilateral knees    Medication & Dose: 6mg celestone    Date of procedure: 12/10/20    Procedure performed by: Ganga Alvarez PA-C    Provider assisted by: none    Patient assisted by: self    How tolerated by patient: tolerated the procedure well with no complications    Post Procedural Pain Scale: 4    Comments: none       JR Juan Pablo HAYS PA-C, ATC

## 2021-02-13 DIAGNOSIS — N32.81 OAB (OVERACTIVE BLADDER): ICD-10-CM

## 2021-02-15 RX ORDER — TOLTERODINE 4 MG/1
CAPSULE, EXTENDED RELEASE ORAL
Qty: 90 CAP | Refills: 2 | Status: SHIPPED | OUTPATIENT
Start: 2021-02-15 | End: 2021-11-18

## 2021-03-19 ENCOUNTER — OFFICE VISIT (OUTPATIENT)
Dept: ORTHOPEDIC SURGERY | Age: 71
End: 2021-03-19
Payer: MEDICARE

## 2021-03-19 VITALS
HEART RATE: 68 BPM | RESPIRATION RATE: 20 BRPM | OXYGEN SATURATION: 97 % | BODY MASS INDEX: 45.32 KG/M2 | HEIGHT: 66 IN | WEIGHT: 282 LBS | TEMPERATURE: 95.5 F

## 2021-03-19 DIAGNOSIS — M25.562 CHRONIC PAIN OF BOTH KNEES: ICD-10-CM

## 2021-03-19 DIAGNOSIS — M17.0 PRIMARY OSTEOARTHRITIS OF BOTH KNEES: Primary | ICD-10-CM

## 2021-03-19 DIAGNOSIS — M25.561 CHRONIC PAIN OF BOTH KNEES: ICD-10-CM

## 2021-03-19 DIAGNOSIS — G89.29 CHRONIC PAIN OF BOTH KNEES: ICD-10-CM

## 2021-03-19 PROCEDURE — G8756 NO BP MEASURE DOC: HCPCS | Performed by: ORTHOPAEDIC SURGERY

## 2021-03-19 PROCEDURE — 3017F COLORECTAL CA SCREEN DOC REV: CPT | Performed by: ORTHOPAEDIC SURGERY

## 2021-03-19 PROCEDURE — 1101F PT FALLS ASSESS-DOCD LE1/YR: CPT | Performed by: ORTHOPAEDIC SURGERY

## 2021-03-19 PROCEDURE — G8536 NO DOC ELDER MAL SCRN: HCPCS | Performed by: ORTHOPAEDIC SURGERY

## 2021-03-19 PROCEDURE — 20610 DRAIN/INJ JOINT/BURSA W/O US: CPT | Performed by: ORTHOPAEDIC SURGERY

## 2021-03-19 PROCEDURE — G8417 CALC BMI ABV UP PARAM F/U: HCPCS | Performed by: ORTHOPAEDIC SURGERY

## 2021-03-19 PROCEDURE — 1090F PRES/ABSN URINE INCON ASSESS: CPT | Performed by: ORTHOPAEDIC SURGERY

## 2021-03-19 PROCEDURE — G9899 SCRN MAM PERF RSLTS DOC: HCPCS | Performed by: ORTHOPAEDIC SURGERY

## 2021-03-19 PROCEDURE — G9717 DOC PT DX DEP/BP F/U NT REQ: HCPCS | Performed by: ORTHOPAEDIC SURGERY

## 2021-03-19 PROCEDURE — 99214 OFFICE O/P EST MOD 30 MIN: CPT | Performed by: ORTHOPAEDIC SURGERY

## 2021-03-19 PROCEDURE — G8427 DOCREV CUR MEDS BY ELIG CLIN: HCPCS | Performed by: ORTHOPAEDIC SURGERY

## 2021-03-19 PROCEDURE — G8399 PT W/DXA RESULTS DOCUMENT: HCPCS | Performed by: ORTHOPAEDIC SURGERY

## 2021-03-19 RX ORDER — BETAMETHASONE SODIUM PHOSPHATE AND BETAMETHASONE ACETATE 3; 3 MG/ML; MG/ML
12 INJECTION, SUSPENSION INTRA-ARTICULAR; INTRALESIONAL; INTRAMUSCULAR; SOFT TISSUE ONCE
Status: COMPLETED | OUTPATIENT
Start: 2021-03-19 | End: 2021-03-19

## 2021-03-19 RX ADMIN — BETAMETHASONE SODIUM PHOSPHATE AND BETAMETHASONE ACETATE 12 MG: 3; 3 INJECTION, SUSPENSION INTRA-ARTICULAR; INTRALESIONAL; INTRAMUSCULAR; SOFT TISSUE at 09:41

## 2021-03-19 NOTE — PROGRESS NOTES
Patient: Lala Unger                MRN: [id number]       SSN: [id number]  YOB: 1950        AGE: 78 y.o. SEX: female  Body mass index is 45.52 kg/m². PCP: Azam Dick MD  03/19/21    HISTORY:  I had the pleasure of reviewing Wilda. Kady Hale has had a DVT since we have last seen her and she is on Xarelto. Denies any shortness of breath or chest pain. No current calf pain. She has severe arthritis involving both knees and reevaluating for both. Known varicosities and mild peripheral vascular disease as well. The pain is moderate, aching, usually non radicular, a little worse on the left than on the right. Eventually for total joint replacement. PHYSICAL EXAMINATION:  There is no shortness of breath or chest pain noted. She is breathing well. BMI is 45.5. The hips rotate nicely. She has some varicosities. Both knees are in varus. A couple degree fixed flexion deformity. She bends fairly well. RADIOGRAPHS:  Previous x-rays were reviewed, which confirms relatively severe arthritis involving both knees in a varus collapse pattern. PLAN:  She would like to have both knees injected today. We did discuss increased risk with Xarelto and also with her varicosities. Under aseptic conditions and after informed written consent, I am happy to report injections went very smoothly with no bruising or bleeding. Plans for follow up in about three to four months time. There was a discussion with the patient with regards to surgery and it was decided that it is not recommended currently due to recent DVT and also morbid obesity and she will require some weight loss prior to surgery with a BMI less than 40. I suspect we are about a year and a half to two years away. Follow up three months.     MD Radha Avalos MD        REVIEW OF SYSTEMS:      CON: negative  EYE: negative   ENT: negative  RESP: negative  GI:    negative   : negative  MSK: Positive  A twelve point review of systems was completed, positives noted and all other systems were reviewed and are negative          Past Medical History:   Diagnosis Date    Diabetes ([facility]     Pre DM    DVT (deep venous thrombosis) ([facility] 01/2021    Hypercholesterolemia     Hypertension     Sleep apnea     cpap    Stroke [facility]) early 2000    speech problem, balance problem    Thromboembolus ([facility] 1990s    left leg       Family History   Problem Relation Age of Onset    Dementia Mother     Stroke Father     Cancer Brother     Hypertension Brother        Current Outpatient Medications   Medication Sig Dispense Refill    rivaroxaban (Xarelto) 20 mg tab tablet Take  by mouth daily.  tolterodine ER (DETROL LA) 4 mg ER capsule TAKE ONE CAPSULE BY MOUTH DAILY 90 Cap 2    mirabegron ER (Myrbetriq) 50 mg ER tablet Take 1 Tab by mouth daily. 90 Tab 3    atenoloL-chlorthalidone (TENORETIC) 50-25 mg per tablet TAKE ONE TABLET BY MOUTH DAILY 90 Tab 2    DULoxetine (Cymbalta) 30 mg capsule Take 1 Cap by mouth two (2) times a day. 60 Cap 11    rosuvastatin (CRESTOR) 10 mg tablet TAKE ONE TABLET BY MOUTH EVERY EVENING 90 Tab 3    gabapentin (NEURONTIN) 300 mg capsule Take 300 mg by mouth nightly.  fluticasone (FLONASE) 50 mcg/actuation nasal spray 2 Sprays by Both Nostrils route daily. 1 Bottle 11    Cholecalciferol, Vitamin D3, (VITAMIN D3) 1,000 unit cap Take 1,000 Units by mouth daily.  aspirin-dipyridamole (AGGRENOX)  mg per SR capsule Take 1 Cap by mouth two (2) times a day.        Current Facility-Administered Medications   Medication Dose Route Frequency Provider Last Rate Last Admin    betamethasone (CELESTONE) injection 12 mg  12 mg Intra artICUlar ONCE Ethan Scott MD           Allergies   Allergen Reactions    Amoxicillin-Pot Clavulanate Diarrhea    Azithromycin Diarrhea    Oxybutynin Other (comments)     Problems swallowing, dry mouth    Seroquel [Quetiapine] Other (comments)     Tremors    Sulfa (Sulfonamide Antibiotics) Diarrhea    Wellbutrin [Bupropion Hcl] Itching       Past Surgical History:   Procedure Laterality Date    HX APPENDECTOMY      HX CHOLECYSTECTOMY      HX GYN      partial hysterectomy    HX KNEE ARTHROSCOPY  1990    Left        Social History     Socioeconomic History    Marital status:      Spouse name: Not on file    Number of children: Not on file    Years of education: Not on file    Highest education level: Not on file   Occupational History    Not on file   Social Needs    Financial resource strain: Not on file    Food insecurity     Worry: Not on file     Inability: Not on file    Transportation needs     Medical: Not on file     Non-medical: Not on file   Tobacco Use    Smoking status: Never Smoker    Smokeless tobacco: Never Used   Substance and Sexual Activity    Alcohol use: No    Drug use: No    Sexual activity: Not on file   Lifestyle    Physical activity     Days per week: Not on file     Minutes per session: Not on file    Stress: Not on file   Relationships    Social connections     Talks on phone: Not on file     Gets together: Not on file     Attends [employer] service: Not on file     Active member of club or organization: Not on file     Attends meetings of clubs or organizations: Not on file     Relationship status: Not on file    Intimate partner violence     Fear of current or ex partner: Not on file     Emotionally abused: Not on file     Physically abused: Not on file     Forced sexual activity: Not on file   Other Topics Concern    Not on file   Social History Narrative    Not on file       Visit Vitals  Pulse 68   Temp (!) 95.5 °F (35.3 °C) (Temporal)   Resp 20   Ht 5' 6\" (1.676 m)   Wt 127.9 kg (282 lb)   SpO2 97%   BMI 45.52 kg/m²         PHYSICAL EXAMINATION:  GENERAL: Alert and oriented x3, in no acute distress, well-developed, well-nourished, afebrile. HEART: No JVD.   EYES: No scleral icterus   NECK: No significant lymphadenopathy   LUNGS: No respiratory compromise or indrawing  ABDOMEN: Soft, non-tender, non-distended. Electronically signed by: MD MONTSE Jarrett, Dong Mares M.D., have reviewed the history, physical, and have updated the allergic reactions for [facility]. TIME OUT performed immediately prior to the start of procedure:  Alon Hargrove M.D., have performed the following reviews on [facility] prior to the start of the procedure:    - Patient was identified by name and date of birth  - Agreement on procedure being performed was verified  - Risks and benefits explained to the patient  - Patient was positioned for comfort  - Consent was signed and verified  - Patient was advised regarding risks of bruising, bleeding, infection and pain    Time: 9:38 AM     Body Part: intra-articular injections of bilateral knees    Medication and Dose: Each injected with 1mL Celestone preparation, i.e. 6 mg, and 3 mL 1% lidocaine    Date of Procedure: 03/19/21    PROCEDURE PERFORMED BY : Erica Mares M.D., [facility])    Provider Assisted by: Trice Lambert    Patient assisted by: self    Patient tolerated procedure well with no complications

## 2021-05-01 DIAGNOSIS — E78.5 HYPERLIPIDEMIA, UNSPECIFIED HYPERLIPIDEMIA TYPE: ICD-10-CM

## 2021-05-01 DIAGNOSIS — E66.01 OBESITY, MORBID, BMI 40.0-49.9 (HCC): ICD-10-CM

## 2021-05-03 RX ORDER — ROSUVASTATIN CALCIUM 10 MG/1
TABLET, COATED ORAL
Qty: 90 TAB | Refills: 2 | Status: SHIPPED | OUTPATIENT
Start: 2021-05-03 | End: 2022-01-25

## 2021-05-05 ENCOUNTER — HOSPITAL ENCOUNTER (OUTPATIENT)
Dept: LAB | Age: 71
Discharge: HOME | End: 2021-05-05
Payer: MEDICARE

## 2021-05-05 ENCOUNTER — APPOINTMENT (OUTPATIENT)
Dept: INTERNAL MEDICINE CLINIC | Age: 71
End: 2021-05-05

## 2021-05-05 DIAGNOSIS — I10 ESSENTIAL HYPERTENSION: ICD-10-CM

## 2021-05-05 DIAGNOSIS — R73.9 HYPERGLYCEMIA: ICD-10-CM

## 2021-05-05 DIAGNOSIS — R73.02 GLUCOSE INTOLERANCE (IMPAIRED GLUCOSE TOLERANCE): ICD-10-CM

## 2021-05-05 DIAGNOSIS — I10 ESSENTIAL HYPERTENSION: Primary | ICD-10-CM

## 2021-05-05 LAB
ALBUMIN SERPL-MCNC: 3.6 G/DL (ref 3.4–5)
ALBUMIN/GLOB SERPL: 1.1 {RATIO} (ref 0.8–1.7)
ALP SERPL-CCNC: 137 U/L (ref 45–117)
ALT SERPL-CCNC: 24 U/L (ref 13–56)
ANION GAP SERPL CALC-SCNC: 5 MMOL/L (ref 3–18)
AST SERPL-CCNC: 16 U/L (ref 10–38)
BILIRUB SERPL-MCNC: 0.5 MG/DL (ref 0.2–1)
BUN SERPL-MCNC: 14 MG/DL (ref 7–18)
BUN/CREAT SERPL: 16 (ref 12–20)
CALCIUM SERPL-MCNC: 9.2 MG/DL (ref 8.5–10.1)
CHLORIDE SERPL-SCNC: 104 MMOL/L (ref 100–111)
CHOLEST SERPL-MCNC: 127 MG/DL
CO2 SERPL-SCNC: 32 MMOL/L (ref 21–32)
CREAT SERPL-MCNC: 0.86 MG/DL (ref 0.6–1.3)
EST. AVERAGE GLUCOSE BLD GHB EST-MCNC: 126 MG/DL
GLOBULIN SER CALC-MCNC: 3.2 G/DL (ref 2–4)
GLUCOSE SERPL-MCNC: 111 MG/DL (ref 74–99)
HBA1C MFR BLD: 6 % (ref 4.2–5.6)
HDLC SERPL-MCNC: 47 MG/DL (ref 40–60)
HDLC SERPL: 2.7 {RATIO} (ref 0–5)
LDLC SERPL CALC-MCNC: 56.2 MG/DL (ref 0–100)
LIPID PROFILE,FLP: NORMAL
POTASSIUM SERPL-SCNC: 3.3 MMOL/L (ref 3.5–5.5)
PROT SERPL-MCNC: 6.8 G/DL (ref 6.4–8.2)
SODIUM SERPL-SCNC: 141 MMOL/L (ref 136–145)
TRIGL SERPL-MCNC: 119 MG/DL (ref ?–150)
VLDLC SERPL CALC-MCNC: 23.8 MG/DL

## 2021-05-05 PROCEDURE — 83036 HEMOGLOBIN GLYCOSYLATED A1C: CPT

## 2021-05-05 PROCEDURE — 80061 LIPID PANEL: CPT

## 2021-05-05 PROCEDURE — 80053 COMPREHEN METABOLIC PANEL: CPT

## 2021-05-07 RX ORDER — POTASSIUM CHLORIDE 20 MEQ/1
20 TABLET, EXTENDED RELEASE ORAL DAILY
Qty: 90 TAB | Refills: 3 | Status: SHIPPED
Start: 2021-05-07 | End: 2021-05-23

## 2021-05-07 NOTE — PROGRESS NOTES
I will discuss her results with her more in depth at her upcoming appointment. Please let her know: Her lipid panel results are normal.  Her CMP shows a potassium of 3.3. I am ordering potassium for her to take. Her alkaline phosphatase level is 137. Her A1c is unchanged at 6.     Dr. Adrian Owen  [facility], [facility], [address] [place] [address] Str.  Phone: [phone]  Fax: [phone]

## 2021-05-13 NOTE — PROGRESS NOTES
Nova Daly presents today for   Chief Complaint   Patient presents with    Follow-up    Hypertension    Cholesterol Problem    Labs     5-5-21            Coordination of Care:  1. Have you been to the ER, urgent care clinic since your last visit? Hospitalized since your last visit? no    2. Have you seen or consulted any other health care providers outside of the [address] [place] [address] since your last visit? Include any pap smears or colon screening.  yes

## 2021-05-14 ENCOUNTER — OFFICE VISIT (OUTPATIENT)
Dept: INTERNAL MEDICINE CLINIC | Age: 71
End: 2021-05-14
Payer: MEDICARE

## 2021-05-14 VITALS
OXYGEN SATURATION: 96 % | RESPIRATION RATE: 16 BRPM | BODY MASS INDEX: 45.64 KG/M2 | DIASTOLIC BLOOD PRESSURE: 62 MMHG | HEART RATE: 65 BPM | SYSTOLIC BLOOD PRESSURE: 127 MMHG | HEIGHT: 66 IN | WEIGHT: 284 LBS | TEMPERATURE: 97.4 F

## 2021-05-14 DIAGNOSIS — R74.8 ELEVATED ALKALINE PHOSPHATASE LEVEL: ICD-10-CM

## 2021-05-14 DIAGNOSIS — N32.81 OAB (OVERACTIVE BLADDER): ICD-10-CM

## 2021-05-14 DIAGNOSIS — Z78.0 POSTMENOPAUSAL: ICD-10-CM

## 2021-05-14 DIAGNOSIS — M25.562 LEFT KNEE PAIN, UNSPECIFIED CHRONICITY: ICD-10-CM

## 2021-05-14 DIAGNOSIS — Z12.31 ENCOUNTER FOR SCREENING MAMMOGRAM FOR BREAST CANCER: ICD-10-CM

## 2021-05-14 DIAGNOSIS — F32.9 CURRENT EPISODE OF MAJOR DEPRESSIVE DISORDER WITHOUT PRIOR EPISODE, UNSPECIFIED DEPRESSION EPISODE SEVERITY: ICD-10-CM

## 2021-05-14 DIAGNOSIS — I10 ESSENTIAL HYPERTENSION: ICD-10-CM

## 2021-05-14 DIAGNOSIS — I82.4Z2 DEEP VEIN THROMBOSIS (DVT) OF DISTAL VEIN OF LEFT LOWER EXTREMITY, UNSPECIFIED CHRONICITY (HCC): ICD-10-CM

## 2021-05-14 DIAGNOSIS — E78.5 HYPERLIPIDEMIA, UNSPECIFIED HYPERLIPIDEMIA TYPE: ICD-10-CM

## 2021-05-14 DIAGNOSIS — Z86.73 HISTORY OF CEREBRAL INFARCTION: ICD-10-CM

## 2021-05-14 DIAGNOSIS — M85.80 OSTEOPENIA, UNSPECIFIED LOCATION: Primary | ICD-10-CM

## 2021-05-14 DIAGNOSIS — E66.01 OBESITY, MORBID, BMI 40.0-49.9 (HCC): ICD-10-CM

## 2021-05-14 DIAGNOSIS — R73.9 HYPERGLYCEMIA: ICD-10-CM

## 2021-05-14 DIAGNOSIS — M85.80 OSTEOPENIA, UNSPECIFIED LOCATION: ICD-10-CM

## 2021-05-14 PROCEDURE — G8754 DIAS BP LESS 90: HCPCS | Performed by: INTERNAL MEDICINE

## 2021-05-14 PROCEDURE — G8752 SYS BP LESS 140: HCPCS | Performed by: INTERNAL MEDICINE

## 2021-05-14 PROCEDURE — G9899 SCRN MAM PERF RSLTS DOC: HCPCS | Performed by: INTERNAL MEDICINE

## 2021-05-14 PROCEDURE — G9717 DOC PT DX DEP/BP F/U NT REQ: HCPCS | Performed by: INTERNAL MEDICINE

## 2021-05-14 PROCEDURE — G8417 CALC BMI ABV UP PARAM F/U: HCPCS | Performed by: INTERNAL MEDICINE

## 2021-05-14 PROCEDURE — 1101F PT FALLS ASSESS-DOCD LE1/YR: CPT | Performed by: INTERNAL MEDICINE

## 2021-05-14 PROCEDURE — G8536 NO DOC ELDER MAL SCRN: HCPCS | Performed by: INTERNAL MEDICINE

## 2021-05-14 PROCEDURE — 99214 OFFICE O/P EST MOD 30 MIN: CPT | Performed by: INTERNAL MEDICINE

## 2021-05-14 PROCEDURE — G8399 PT W/DXA RESULTS DOCUMENT: HCPCS | Performed by: INTERNAL MEDICINE

## 2021-05-14 PROCEDURE — 3017F COLORECTAL CA SCREEN DOC REV: CPT | Performed by: INTERNAL MEDICINE

## 2021-05-14 PROCEDURE — G0463 HOSPITAL OUTPT CLINIC VISIT: HCPCS | Performed by: INTERNAL MEDICINE

## 2021-05-14 PROCEDURE — 1090F PRES/ABSN URINE INCON ASSESS: CPT | Performed by: INTERNAL MEDICINE

## 2021-05-14 PROCEDURE — G8427 DOCREV CUR MEDS BY ELIG CLIN: HCPCS | Performed by: INTERNAL MEDICINE

## 2021-05-14 NOTE — PROGRESS NOTES
[facility]:  5/14/2021, MRN: [id number]      Marvel Ely is a 78 y.o. female and presents to clinic for Follow-up, Hypertension, Cholesterol Problem, and Labs (5-5-21)      Subjective: The patient is a 59-year-old female with history of obesity, allergic rhinitis, IBS-diarrhea, onychomycosis, prediabetes, lumbar spinal stenosis, depression, hypertension, adrenal incidentaloma seen on CT scan 2015, LLE DVT (2021), and history of cerebral infarction her head MRI findings (followed by Neurology). 1. HLD/CVA Hx: No new neurologic sx. She used to take aggrenox but this rx was stopped after her recent diagnosis of LLE DVT. Now on xarelto. On Crestor. Her lipid panel is WNL. 2. HTN: VSS. On atenolol-chlorthalidone. Her most recent labs show that her potassium is mildly low 3.3. She is not taking the potassium I ordered because it gives her \"diarrhea. \"     3. Prediabetes: Her recent labs show that her A1C is unchanged at 6.      4. Depression: Taking cymbalta. Today she reports: no depression/anxiety. No paresthesias. She has chronic back pain.      5. Left Knee Pain: Refractory to multiple injections. Sx are worsening. Sx are limiting her activity level. 6. OAB: \"Doing pretty good. \" She saw [facility] per her hx in between apts. Taking myrbetriq and detrol. Detrol by itself did not help her sx as well. She is leaking urine \"a whole lot less than before. \"     7. LLE DVT: Diagnosed in between apts. Followed by Vascular Surgery. She is scheduled for another PVL study per her hx next wk. On AC. No bleeding.       Patient Active Problem List    Diagnosis Date Noted    Controlled substance agreement signed 7/7/17 07/09/2017    Obesity, morbid, BMI 40.0-49.9  12/06/2016    Allergic rhinitis 12/06/2016    Irritable bowel syndrome with diarrhea 12/06/2016    Onychomycosis 12/06/2016    Glucose intolerance (impaired glucose tolerance) 12/01/2016    Single current episode of major depressive disorder (has tried celexa, wellbutrin, mirtazapine with adverse effects) 12/01/2016    Essential hypertension 12/01/2016    History of cerebral infarction - per head MRI findings 12/01/2016       Current Outpatient Medications   Medication Sig Dispense Refill    rosuvastatin (CRESTOR) 10 mg tablet TAKE ONE TABLET BY MOUTH EVERY EVENING 90 Tab 2    rivaroxaban (Xarelto) 20 mg tab tablet Take  by mouth daily.  tolterodine ER (DETROL LA) 4 mg ER capsule TAKE ONE CAPSULE BY MOUTH DAILY 90 Cap 2    mirabegron ER (Myrbetriq) 50 mg ER tablet Take 1 Tab by mouth daily. 90 Tab 3    atenoloL-chlorthalidone (TENORETIC) 50-25 mg per tablet TAKE ONE TABLET BY MOUTH DAILY 90 Tab 2    DULoxetine (Cymbalta) 30 mg capsule Take 1 Cap by mouth two (2) times a day. 60 Cap 11    gabapentin (NEURONTIN) 300 mg capsule Take 300 mg by mouth nightly.  fluticasone (FLONASE) 50 mcg/actuation nasal spray 2 Sprays by Both Nostrils route daily. 1 Bottle 11    Cholecalciferol, Vitamin D3, (VITAMIN D3) 1,000 unit cap Take 1,000 Units by mouth daily.  potassium chloride (K-DUR, KLOR-CON) 20 mEq tablet Take 1 Tab by mouth daily. 90 Tab 3    aspirin-dipyridamole (AGGRENOX)  mg per SR capsule Take 1 Cap by mouth two (2) times a day.          Allergies   Allergen Reactions    Amoxicillin-Pot Clavulanate Diarrhea    Azithromycin Diarrhea    Oxybutynin Other (comments)     Problems swallowing, dry mouth    Seroquel [Quetiapine] Other (comments)     Tremors    Sulfa (Sulfonamide Antibiotics) Diarrhea    Wellbutrin [Bupropion Hcl] Itching       Past Medical History:   Diagnosis Date    [facility]     Pre DM    DVT (deep venous thrombosis) ([facility] 01/2021    Hypercholesterolemia     Hypertension     Sleep apnea     cpap    Stroke [facility]) early 2000    speech problem, balance problem    Thromboembolus ([facility] 1990s    left leg       Past Surgical History:   Procedure Laterality Date    HX APPENDECTOMY      HX CHOLECYSTECTOMY      HX GYN      partial hysterectomy    HX KNEE ARTHROSCOPY  1990    Left        Family History   Problem Relation Age of Onset    Dementia Mother     Stroke Father     Cancer Brother     Hypertension Brother        Social History     Tobacco Use    Smoking status: Never Smoker    Smokeless tobacco: Never Used   Substance Use Topics    Alcohol use: No       ROS   Review of Systems   Constitutional: Negative for chills and fever. HENT: Negative for ear pain and sore throat. Eyes: Negative for blurred vision and pain. Respiratory: Negative for cough and shortness of breath. Cardiovascular: Negative for chest pain. Gastrointestinal: Negative for abdominal pain, blood in stool and melena. Genitourinary: Negative for dysuria and urgency. Musculoskeletal: Positive for back pain and joint pain. Negative for myalgias. Skin: Negative for rash. Neurological: Negative for headaches. Endo/Heme/Allergies: Does not bruise/bleed easily. Psychiatric/Behavioral: Negative for substance abuse. Objective     Vitals:    05/14/21 0927 05/14/21 0932   BP: (!) 117/52 127/62   Pulse: 65    Resp: 16    Temp: 97.4 °F (36.3 °C)    TempSrc: Temporal    SpO2: 96%    Weight: 284 lb (128.8 kg)    Height: 5' 6\" (1.676 m)    PainSc:   0 - No pain        Physical Exam  Vitals and nursing note reviewed. HENT:      Head: Normocephalic and atraumatic. Right Ear: External ear normal.      Left Ear: External ear normal.   Eyes:      General: No scleral icterus. Right eye: No discharge. Left eye: No discharge. Conjunctiva/sclera: Conjunctivae normal.   Cardiovascular:      Rate and Rhythm: Normal rate and regular rhythm. Heart sounds: Normal heart sounds. No murmur heard. No friction rub. No gallop. Pulmonary:      Effort: Pulmonary effort is normal. No respiratory distress. Breath sounds: Normal breath sounds. No wheezing or rales.    Chest:      Chest wall: No tenderness. Abdominal:      General: Bowel sounds are normal. There is no distension. Palpations: Abdomen is soft. There is no mass. Tenderness: There is no abdominal tenderness. There is no guarding or rebound. Musculoskeletal:         General: No swelling (BUE) or tenderness (BUE). Cervical back: Neck supple. Comments: She has adequate ROM along her left knee but she has pain with ROM exercises   Lymphadenopathy:      Cervical: No cervical adenopathy. Skin:     General: Skin is warm and dry. Findings: No erythema. Neurological:      Mental Status: She is alert and oriented to person, place, and time. Motor: No abnormal muscle tone. Gait: Gait is intact.  Gait normal.   Psychiatric:         Mood and Affect: Mood and affect normal.         LABS   Data Review:   Lab Results   Component Value Date/Time    WBC 7.1 01/10/2019 08:55 AM    HGB 13.6 01/10/2019 08:55 AM    HCT 44.6 01/10/2019 08:55 AM    PLATELET 640 17/56/5172 08:55 AM    MCV 99.6 (H) 01/10/2019 08:55 AM       Lab Results   Component Value Date/Time    Sodium 141 05/05/2021 01:35 PM    Potassium 3.3 (L) 05/05/2021 01:35 PM    Chloride 104 05/05/2021 01:35 PM    CO2 32 05/05/2021 01:35 PM    Anion gap 5 05/05/2021 01:35 PM    Glucose 111 (H) 05/05/2021 01:35 PM    BUN 14 05/05/2021 01:35 PM    Creatinine 0.86 05/05/2021 01:35 PM    BUN/Creatinine ratio 16 05/05/2021 01:35 PM    GFR est AA >60 05/05/2021 01:35 PM    GFR est non-AA >60 05/05/2021 01:35 PM    Calcium 9.2 05/05/2021 01:35 PM       Lab Results   Component Value Date/Time    Cholesterol, total 127 05/05/2021 01:35 PM    HDL Cholesterol 47 05/05/2021 01:35 PM    LDL, calculated 56.2 05/05/2021 01:35 PM    VLDL, calculated 23.8 05/05/2021 01:35 PM    Triglyceride 119 05/05/2021 01:35 PM    CHOL/HDL Ratio 2.7 05/05/2021 01:35 PM       Lab Results   Component Value Date/Time    Hemoglobin A1c 6.0 (H) 05/05/2021 01:35 PM    Hemoglobin A1c (POC) 5.8 03/05/2020 09:22 AM    Hemoglobin A1c, External 5.8 09/08/2015       Assessment/Plan:   1. HLD and CVA Hx:   - C/w rx as prescribed    2. LLE DVT:   - C/w rx (xarelto)    3. HTN: Stable. +Low potassium.   - C/w rx as prescribed. She agreed to take potassium OTC and to increase her intake of potassium rich foods.  - Rechecking her potassium later this year. 4. Left Knee Pain: from OA.   - Activity as tolerated. - F/u w/ Orthopedics for further treatment recommendations. 5. Depression/Anxiety: +Lumbar radiculopathy  - C/w rx as prescribed. Notify me if sx worsen. 6. OAB: Improved. - C/w rx per Urology recommendations. 7. Prediabetes:  - Checking an A1c later this yr  - I encouraged her to reduce her carbs in order to reduce her weight and to prevent progression to type 2 DM    8. Tarun Maintenance:  - Given her last bone density study  (which was done >2 yrs ago) and her h/o osteopenia, I am checking another DEXA scan. - Mammogram ordered for breast cancer screening      Health Maintenance Due   Topic Date Due    Colorectal Cancer Screening Combo  06/23/2018         Lab review: labs are reviewed in the EHR and ordered as mentioned above    I have discussed the diagnosis with the patient and the intended plan as seen in the above orders. The patient has received an after-visit summary and questions were answered concerning future plans. I have discussed medication side effects and warnings with the patient as well. I have reviewed the plan of care with the patient, accepted their input and they are in agreement with the treatment goals. All questions were answered. The patient understands the plan of care. Handouts provided today with above information. Pt instructed if symptoms worsen to call the office or report to the ED for continued care. Greater than 50% of the visit time was spent in counseling and/or coordination of care.       Voice recognition was used to generate this report, which may have resulted in some phonetic based errors in grammar and contents. Even though attempts were made to correct all the mistakes, some may have been missed, and remained in the body of the document.           Evaristo Ruffin MD

## 2021-05-14 NOTE — PATIENT INSTRUCTIONS
Learning About Breast Cancer Screening What is breast cancer screening? Breast cancer occurs when cells that are not normal grow in one or both of your breasts. Screening tests can help find breast cancer early. Cancer is easier to treat when it's found early. Having concerns about breast cancer is common. That's why it's important to talk with your doctor about when to start and how often to get screened for breast cancer. How is breast cancer screening done? Several screening tests can be used to check for breast cancer. Mammograms. These tests check for signs of cancer using X-rays. They can show tumors that are too small for you or your doctor to feel. During a mammogram, a machine squeezes your breasts to make them flatter and easier to X-ray. At least two pictures are taken of each breast. One is taken from the top and one from the side. 3-D mammograms. These tests are also called digital breast tomosynthesis. Your breast is positioned on a flat plate. A top plate is pressed against your breast to keep it in position. The X-ray arm then moves in an arc above the breast and takes many pictures. A computer uses these X-rays to create a three-dimensional image. Clinical breast exam.  
In this exam, your doctor carefully feels your breasts and under your arms to check for lumps or other changes. Who should be screened for breast cancer? Experts agree that mammograms are the best screening test for people at average risk of breast cancer. But they don't all agree on the age at which screening should start. And they don't agree on whether it's better to be screened every year or every two years. Here are some of the recommendations from experts: 
· Start by age 36 and have a mammogram each year. · Start at age 39 and have a mammogram each year. · Start at age 48 and have a mammogram every 2 years. When to stop having mammograms is another decision.  You and your doctor can decide on the right age to start and stop screening based on your personal preferences and overall health. What is your risk for breast cancer? If you don't already know your risk of breast cancer, you can ask your doctor about it. You can also look it up at www.cancer.gov/bcrisktool/. If your doctor says that you have a high or very high risk, ask about ways to reduce your risk. These could include getting extra screening, taking medicine, or having surgery. If you have a strong family history of breast cancer, ask your doctor about genetic testing. What steps can you take to stay healthy? Some things that increase your risk of breast cancer, such as your age and being female, cannot be controlled. But you can do some things to stay as healthy as you can. · Learn what your breasts normally look and feel like. If you notice any changes, tell your doctor. · If you drink alcohol, limit how much you drink. Any amount of alcohol may increase your risk for some types of cancer. · If you smoke, quit. When you quit smoking, you lower your chances of getting many types of cancer. You can also do your best to eat well, be active, and stay at a healthy weight. Eating healthy foods and being active every day, as well as staying at a healthy weight, may help prevent cancer. Where can you learn more? Go to http://www.gray.com/ Enter O014 in the search box to learn more about \"Learning About Breast Cancer Screening. \" Current as of: December 17, 2020               Content Version: 12.8 © 2006-2021 [employer]. Care instructions adapted under license by [employer] (which disclaims liability or warranty for this information). If you have questions about a medical condition or this instruction, always ask your healthcare professional. [address] any warranty or liability for your use of this information.

## 2021-05-23 PROBLEM — I82.4Z2 DEEP VEIN THROMBOSIS (DVT) OF DISTAL VEIN OF LEFT LOWER EXTREMITY (HCC): Status: ACTIVE | Noted: 2021-05-23

## 2021-05-23 PROBLEM — M85.80 OSTEOPENIA: Status: ACTIVE | Noted: 2021-05-23

## 2021-05-23 PROBLEM — N32.81 OAB (OVERACTIVE BLADDER): Status: ACTIVE | Noted: 2021-05-23

## 2021-05-23 PROBLEM — E78.5 HYPERLIPIDEMIA: Status: ACTIVE | Noted: 2021-05-23

## 2021-06-22 ENCOUNTER — HOSPITAL ENCOUNTER (OUTPATIENT)
Dept: BONE DENSITY | Age: 71
Discharge: HOME | End: 2021-06-22
Attending: INTERNAL MEDICINE
Payer: MEDICARE

## 2021-06-22 ENCOUNTER — HOSPITAL ENCOUNTER (OUTPATIENT)
Dept: MAMMOGRAPHY | Age: 71
Discharge: HOME | End: 2021-06-22
Attending: INTERNAL MEDICINE
Payer: MEDICARE

## 2021-06-22 PROCEDURE — 77063 BREAST TOMOSYNTHESIS BI: CPT

## 2021-06-22 PROCEDURE — 77080 DXA BONE DENSITY AXIAL: CPT

## 2021-06-28 NOTE — PROGRESS NOTES
Please let her know that her osteopenia has improved per her most recent bone density study. Have her continue taking her vitamin D supplementation.     Dr. Lela Atkinson  [facility] [place], [address] [place] [address], [address] [place] [address] Str.  Phone: [phone]  Fax: [phone]

## 2021-07-02 ENCOUNTER — OFFICE VISIT (OUTPATIENT)
Dept: ORTHOPEDIC SURGERY | Age: 71
End: 2021-07-02
Payer: MEDICARE

## 2021-07-02 VITALS
BODY MASS INDEX: 44.84 KG/M2 | HEIGHT: 66 IN | OXYGEN SATURATION: 97 % | HEART RATE: 65 BPM | RESPIRATION RATE: 15 BRPM | WEIGHT: 279 LBS

## 2021-07-02 DIAGNOSIS — M25.562 PAIN IN BOTH KNEES, UNSPECIFIED CHRONICITY: Primary | ICD-10-CM

## 2021-07-02 DIAGNOSIS — M25.561 PAIN IN BOTH KNEES, UNSPECIFIED CHRONICITY: Primary | ICD-10-CM

## 2021-07-02 DIAGNOSIS — M17.0 PRIMARY OSTEOARTHRITIS OF KNEES, BILATERAL: ICD-10-CM

## 2021-07-02 PROCEDURE — 1090F PRES/ABSN URINE INCON ASSESS: CPT | Performed by: ORTHOPAEDIC SURGERY

## 2021-07-02 PROCEDURE — 1101F PT FALLS ASSESS-DOCD LE1/YR: CPT | Performed by: ORTHOPAEDIC SURGERY

## 2021-07-02 PROCEDURE — G8756 NO BP MEASURE DOC: HCPCS | Performed by: ORTHOPAEDIC SURGERY

## 2021-07-02 PROCEDURE — G8536 NO DOC ELDER MAL SCRN: HCPCS | Performed by: ORTHOPAEDIC SURGERY

## 2021-07-02 PROCEDURE — 99214 OFFICE O/P EST MOD 30 MIN: CPT | Performed by: ORTHOPAEDIC SURGERY

## 2021-07-02 PROCEDURE — G9899 SCRN MAM PERF RSLTS DOC: HCPCS | Performed by: ORTHOPAEDIC SURGERY

## 2021-07-02 PROCEDURE — G8417 CALC BMI ABV UP PARAM F/U: HCPCS | Performed by: ORTHOPAEDIC SURGERY

## 2021-07-02 PROCEDURE — G8399 PT W/DXA RESULTS DOCUMENT: HCPCS | Performed by: ORTHOPAEDIC SURGERY

## 2021-07-02 PROCEDURE — 3017F COLORECTAL CA SCREEN DOC REV: CPT | Performed by: ORTHOPAEDIC SURGERY

## 2021-07-02 PROCEDURE — G8427 DOCREV CUR MEDS BY ELIG CLIN: HCPCS | Performed by: ORTHOPAEDIC SURGERY

## 2021-07-02 PROCEDURE — 20610 DRAIN/INJ JOINT/BURSA W/O US: CPT | Performed by: ORTHOPAEDIC SURGERY

## 2021-07-02 PROCEDURE — G9717 DOC PT DX DEP/BP F/U NT REQ: HCPCS | Performed by: ORTHOPAEDIC SURGERY

## 2021-07-02 RX ORDER — CLOPIDOGREL BISULFATE 75 MG/1
75 TABLET ORAL DAILY
COMMUNITY
Start: 2021-05-27

## 2021-07-02 RX ORDER — BETAMETHASONE SODIUM PHOSPHATE AND BETAMETHASONE ACETATE 3; 3 MG/ML; MG/ML
12 INJECTION, SUSPENSION INTRA-ARTICULAR; INTRALESIONAL; INTRAMUSCULAR; SOFT TISSUE ONCE
Status: COMPLETED | OUTPATIENT
Start: 2021-07-02 | End: 2021-07-02

## 2021-07-02 RX ADMIN — BETAMETHASONE SODIUM PHOSPHATE AND BETAMETHASONE ACETATE 12 MG: 3; 3 INJECTION, SUSPENSION INTRA-ARTICULAR; INTRALESIONAL; INTRAMUSCULAR; SOFT TISSUE at 08:59

## 2021-07-02 NOTE — PROGRESS NOTES
Patient: Milo Casey                MRN: [id number]       SSN: [id number]  YOB: 1950        AGE: 78 y.o. SEX: female  Body mass index is 45.03 kg/m². PCP: Kenyon Rodriguez MD  07/02/21    Susie Germain presents today with bilateral knee pain worse on the left and on the right requesting an injection apparently she had 3 small blood clots in the left leg she is off Xarelto now on Plavix also followed by neurology her stroke was about 12 years ago and she still has some issues with numbers with this she denies any fevers or chills denies any headache denies any shortness of breath currently and the pain is moderate nonradicular some pain at night she does get a few months after the injections and was requesting bilateral for both knees.     The examination today of the Jolene [place]' sign is negative calf nontender just minimal peripheral edema bilaterally both hips rotate adequately both feet warm and well-perfused there is no shortness of breath or scleral icterus noted the knees themselves -3 degrees extension on the left and bends to 115 degrees right knee is similar although a little less severe    Slight effusions mild varicosities    Previous x-rays confirm severe arthritis involving the left knee and advanced right    There was a discussion regarding surgery it was decided is not currently recommended we should continue to pursue nonoperative measures for the time being therefore both knees were injected as per protocol to return to see us in 3 months time    REVIEW OF SYSTEMS:      CON: negative  EYE: negative   ENT: negative  RESP: negative  GI:    negative   :  negative  MSK: Positive  A twelve point review of systems was completed, positives noted and all other systems were reviewed and are negative          Past Medical History:   Diagnosis Date    Diabetes ([facility]     Pre DM    [facility] 01/2021    Hypercholesterolemia     Hypertension     Sleep apnea cpap    Stroke [facility]) early 2000    speech problem, balance problem    Thromboembolus ([address] 1990s    left leg       Family History   Problem Relation Age of Onset    Dementia Mother     Stroke Father     Cancer Brother     Hypertension Brother        Current Outpatient Medications   Medication Sig Dispense Refill    clopidogreL (Plavix) 75 mg tab       rosuvastatin (CRESTOR) 10 mg tablet TAKE ONE TABLET BY MOUTH EVERY EVENING 90 Tab 2    tolterodine ER (DETROL LA) 4 mg ER capsule TAKE ONE CAPSULE BY MOUTH DAILY 90 Cap 2    mirabegron ER (Myrbetriq) 50 mg ER tablet Take 1 Tab by mouth daily. 90 Tab 3    atenoloL-chlorthalidone (TENORETIC) 50-25 mg per tablet TAKE ONE TABLET BY MOUTH DAILY 90 Tab 2    DULoxetine (Cymbalta) 30 mg capsule Take 1 Cap by mouth two (2) times a day. 60 Cap 11    gabapentin (NEURONTIN) 300 mg capsule Take 300 mg by mouth nightly.  fluticasone (FLONASE) 50 mcg/actuation nasal spray 2 Sprays by Both Nostrils route daily. 1 Bottle 11    Cholecalciferol, Vitamin D3, (VITAMIN D3) 1,000 unit cap Take 1,000 Units by mouth daily.  rivaroxaban (Xarelto) 20 mg tab tablet Take  by mouth daily.        Current Facility-Administered Medications   Medication Dose Route Frequency Provider Last Rate Last Admin    betamethasone (CELESTONE) injection 12 mg  12 mg Intra artICUlar ONCE John Viera MD           Allergies   Allergen Reactions    Amoxicillin-Pot Clavulanate Diarrhea    Azithromycin Diarrhea    Oxybutynin Other (comments)     Problems swallowing, dry mouth    Seroquel [Quetiapine] Other (comments)     Tremors    Sulfa (Sulfonamide Antibiotics) Diarrhea    Wellbutrin [Bupropion Hcl] Itching       Past Surgical History:   Procedure Laterality Date    HX APPENDECTOMY      HX CHOLECYSTECTOMY      HX GYN      partial hysterectomy    HX KNEE ARTHROSCOPY  1990    Left        Social History     Socioeconomic History    Marital status:      Spouse name: Not on file    Number of children: Not on file    Years of education: Not on file    Highest education level: Not on file   Occupational History    Not on file   Tobacco Use    Smoking status: Never Smoker    Smokeless tobacco: Never Used   Vaping Use    Vaping Use: Never used   Substance and Sexual Activity    Alcohol use: No    Drug use: No    Sexual activity: Not on file   Other Topics Concern    Not on file   Social History Narrative    Not on file     Social Determinants of Health     Financial Resource Strain:     Difficulty of Paying Living Expenses:    Food Insecurity:     Worried About Running Out of Food in the Last Year:     [address] in the Last Year:    Transportation Needs:     Lack of Transportation (Medical):  Lack of Transportation (Non-Medical):    Physical Activity:     Days of Exercise per Week:     Minutes of Exercise per Session:    Stress:     Feeling of Stress :    Social Connections:     Frequency of Communication with Friends and Family:     Frequency of Social Gatherings with Friends and Family:     Attends [employer] Services:     Active Member of Clubs or Organizations:     Attends Club or Organization Meetings:     Marital Status:    Intimate Partner Violence:     Fear of Current or Ex-Partner:     Emotionally Abused:     Physically Abused:     Sexually Abused:        Visit Vitals  Pulse 65   Resp 15   Ht 5' 6\" (1.676 m)   Wt 279 lb (126.6 kg)   SpO2 97%   BMI 45.03 kg/m²         PHYSICAL EXAMINATION:  GENERAL: Alert and oriented x3, in no acute distress, well-developed, well-nourished, afebrile. HEART: No JVD. EYES: No scleral icterus   NECK: No significant lymphadenopathy   LUNGS: No respiratory compromise or indrawing  ABDOMEN: Soft, non-tender, non-distended. Electronically signed by: MD MONTSE Painter, Fabio Simon M.D., have reviewed the history, physical, and have updated the allergic reactions for [facility].     TIME OUT performed immediately prior to the start of procedure:  Siri Williamson M.D., have performed the following reviews on Coral Asa prior to the start of the procedure:    - Patient was identified by name and date of birth  - Agreement on procedure being performed was verified  - Risks and benefits explained to the patient  - Patient was positioned for comfort  - Consent was signed and verified  - Patient was advised regarding risks of bruising, bleeding, infection and pain    Time: 9:00 AM     Body Part: intra-articular injection of bilateral knees. Medication and Dose: 1mL Celestone preparation, i.e. 6 mg, and 3 mL 1% lidocaine    Date of Procedure: 07/02/21    PROCEDURE PERFORMED BY : Vargas Robledo M.D., [facility])    Provider Assisted by: Loralie Sandifer    Patient assisted by: self    Patient tolerated procedure well with no complications

## 2021-08-15 DIAGNOSIS — I10 ESSENTIAL HYPERTENSION: ICD-10-CM

## 2021-08-15 RX ORDER — ATENOLOL AND CHLORTHALIDONE TABLET 50; 25 MG/1; MG/1
TABLET ORAL
Qty: 90 TABLET | Refills: 2 | Status: SHIPPED
Start: 2021-08-15 | End: 2022-04-21

## 2021-09-23 NOTE — PROGRESS NOTES
Jason Amador presents today for   Chief Complaint   Patient presents with    Rash     Rash located under both breast, with itching and bleeding. Coordination of Care:  1. Have you been to the ER, urgent care clinic since your last visit? Hospitalized since your last visit? no    2. Have you seen or consulted any other health care providers outside of the [address] [place] [address] since your last visit? Include any pap smears or colon screening. yes    Jason Amador 1950 female who presents for routine immunizations. Patient denies any symptoms , reactions or allergies that would exclude them from being immunized today. Risks and adverse reactions were discussed and the VIS was given to them. All questions were addressed. Order placed for ppsv23 and HD flu,  per Verbal Order from  with read back. Patient was observed for 15 min post injection. There were no reactions observed.     Serena Rico LPN

## 2021-09-24 ENCOUNTER — OFFICE VISIT (OUTPATIENT)
Dept: INTERNAL MEDICINE CLINIC | Age: 71
End: 2021-09-24
Payer: MEDICARE

## 2021-09-24 VITALS
DIASTOLIC BLOOD PRESSURE: 60 MMHG | SYSTOLIC BLOOD PRESSURE: 126 MMHG | TEMPERATURE: 97.8 F | HEIGHT: 66 IN | WEIGHT: 278 LBS | OXYGEN SATURATION: 96 % | RESPIRATION RATE: 16 BRPM | HEART RATE: 62 BPM | BODY MASS INDEX: 44.68 KG/M2

## 2021-09-24 DIAGNOSIS — L30.4 INTERTRIGO: Primary | ICD-10-CM

## 2021-09-24 DIAGNOSIS — Z23 ENCOUNTER FOR IMMUNIZATION: ICD-10-CM

## 2021-09-24 DIAGNOSIS — Z23 NEEDS FLU SHOT: ICD-10-CM

## 2021-09-24 PROCEDURE — G9717 DOC PT DX DEP/BP F/U NT REQ: HCPCS | Performed by: INTERNAL MEDICINE

## 2021-09-24 PROCEDURE — 90732 PPSV23 VACC 2 YRS+ SUBQ/IM: CPT | Performed by: INTERNAL MEDICINE

## 2021-09-24 PROCEDURE — G8399 PT W/DXA RESULTS DOCUMENT: HCPCS | Performed by: INTERNAL MEDICINE

## 2021-09-24 PROCEDURE — G8417 CALC BMI ABV UP PARAM F/U: HCPCS | Performed by: INTERNAL MEDICINE

## 2021-09-24 PROCEDURE — 3017F COLORECTAL CA SCREEN DOC REV: CPT | Performed by: INTERNAL MEDICINE

## 2021-09-24 PROCEDURE — 1101F PT FALLS ASSESS-DOCD LE1/YR: CPT | Performed by: INTERNAL MEDICINE

## 2021-09-24 PROCEDURE — G9899 SCRN MAM PERF RSLTS DOC: HCPCS | Performed by: INTERNAL MEDICINE

## 2021-09-24 PROCEDURE — G0463 HOSPITAL OUTPT CLINIC VISIT: HCPCS | Performed by: INTERNAL MEDICINE

## 2021-09-24 PROCEDURE — G8754 DIAS BP LESS 90: HCPCS | Performed by: INTERNAL MEDICINE

## 2021-09-24 PROCEDURE — G8752 SYS BP LESS 140: HCPCS | Performed by: INTERNAL MEDICINE

## 2021-09-24 PROCEDURE — 99213 OFFICE O/P EST LOW 20 MIN: CPT | Performed by: INTERNAL MEDICINE

## 2021-09-24 PROCEDURE — 1090F PRES/ABSN URINE INCON ASSESS: CPT | Performed by: INTERNAL MEDICINE

## 2021-09-24 PROCEDURE — 90694 VACC AIIV4 NO PRSRV 0.5ML IM: CPT | Performed by: INTERNAL MEDICINE

## 2021-09-24 PROCEDURE — G8536 NO DOC ELDER MAL SCRN: HCPCS | Performed by: INTERNAL MEDICINE

## 2021-09-24 PROCEDURE — G8427 DOCREV CUR MEDS BY ELIG CLIN: HCPCS | Performed by: INTERNAL MEDICINE

## 2021-09-24 RX ORDER — NYSTATIN 100000 [USP'U]/G
POWDER TOPICAL 4 TIMES DAILY
Qty: 60 G | Refills: 11 | Status: SHIPPED | OUTPATIENT
Start: 2021-09-24

## 2021-09-24 RX ORDER — FLUCONAZOLE 150 MG/1
150 TABLET ORAL
Qty: 2 TABLET | Refills: 0 | Status: SHIPPED | OUTPATIENT
Start: 2021-09-24 | End: 2021-10-14 | Stop reason: SDUPTHER

## 2021-09-24 RX ORDER — CLOTRIMAZOLE AND BETAMETHASONE DIPROPIONATE 10; .64 MG/G; MG/G
CREAM TOPICAL 2 TIMES DAILY
Qty: 45 G | Refills: 11 | Status: SHIPPED | OUTPATIENT
Start: 2021-09-24

## 2021-09-24 NOTE — PROGRESS NOTES
INTERNISTS [facility]:  9/24/2021, MRN: [id number]      Lisa Maki is a 78 y.o. female and presents to clinic for a Rash (Rash located under both breast, with itching and bleeding. )      Subjective: The patient is a 71-year-old female with history of obesity, allergic rhinitis, IBS-diarrhea, onychomycosis, prediabetes, lumbar spinal stenosis, depression, hypertension, adrenal incidentaloma seen on CT scan 2015, LLE DVT (2021), and history of cerebral infarction her head MRI findings (followed by Neurology). Rash: Along areas inferior to bilateral breasts. Associated with itching and bleeding. Alleviating factors: none are known. Symptoms have been present: 1 week. She had a burning sensation along the affected area. No relief with use of Neosporin, an antifungal over-the-counter spray, return lotion.         Patient Active Problem List    Diagnosis Date Noted    Hyperlipidemia 05/23/2021    Osteopenia 05/23/2021    Deep vein thrombosis (DVT) of distal vein of left lower extremity ([address] 05/23/2021    OAB (overactive bladder) 05/23/2021    Controlled substance agreement signed 7/7/17 07/09/2017    Obesity, morbid, BMI 40.0-49.9  12/06/2016    Allergic rhinitis 12/06/2016    Irritable bowel syndrome with diarrhea 12/06/2016    Onychomycosis 12/06/2016    Glucose intolerance (impaired glucose tolerance) 12/01/2016    Single current episode of major depressive disorder (has tried celexa, wellbutrin, mirtazapine with adverse effects) 12/01/2016    Essential hypertension 12/01/2016    History of cerebral infarction - per head MRI findings 12/01/2016       Current Outpatient Medications   Medication Sig Dispense Refill    atenoloL-chlorthalidone (TENORETIC) 50-25 mg per tablet TAKE ONE TABLET BY MOUTH DAILY 90 Tablet 2    clopidogreL (Plavix) 75 mg tab       rosuvastatin (CRESTOR) 10 mg tablet TAKE ONE TABLET BY MOUTH EVERY EVENING 90 Tab 2    tolterodine ER (DETROL LA) 4 mg ER capsule TAKE ONE CAPSULE BY MOUTH DAILY 90 Cap 2    mirabegron ER (Myrbetriq) 50 mg ER tablet Take 1 Tab by mouth daily. 90 Tab 3    DULoxetine (Cymbalta) 30 mg capsule Take 1 Cap by mouth two (2) times a day. 60 Cap 11    gabapentin (NEURONTIN) 300 mg capsule Take 300 mg by mouth nightly.  fluticasone (FLONASE) 50 mcg/actuation nasal spray 2 Sprays by Both Nostrils route daily. 1 Bottle 11    Cholecalciferol, Vitamin D3, (VITAMIN D3) 1,000 unit cap Take 1,000 Units by mouth daily.  rivaroxaban (Xarelto) 20 mg tab tablet Take  by mouth daily. Allergies   Allergen Reactions    Amoxicillin-Pot Clavulanate Diarrhea    Azithromycin Diarrhea    Oxybutynin Other (comments)     Problems swallowing, dry mouth    Seroquel [Quetiapine] Other (comments)     Tremors    Sulfa (Sulfonamide Antibiotics) Diarrhea    Wellbutrin [Bupropion Hcl] Itching    Potassium Diarrhea       Past Medical History:   Diagnosis Date    Diabetes ([facility]     Pre DM    DVT (deep venous thrombosis) ([facility] 01/2021    Hypercholesterolemia     Hypertension     Sleep apnea     cpap    Stroke [facility]) early 2000    speech problem, balance problem    Thromboembolus ([facility] [address] [address] 1990s    left leg       Past Surgical History:   Procedure Laterality Date    HX APPENDECTOMY      HX CHOLECYSTECTOMY      HX GYN      partial hysterectomy    HX KNEE ARTHROSCOPY  1990    Left        Family History   Problem Relation Age of Onset    Dementia Mother     Stroke Father     Cancer Brother     Hypertension Brother        Social History     Tobacco Use    Smoking status: Never Smoker    Smokeless tobacco: Never Used   Substance Use Topics    Alcohol use: No       ROS   Review of Systems   Constitutional: Negative for chills and fever. HENT: Negative for ear pain and sore throat. Eyes: Negative for pain. Respiratory: Negative for cough and shortness of breath. Cardiovascular: Negative for chest pain.    Gastrointestinal: Negative for abdominal pain, blood in stool and melena. Genitourinary: Negative for dysuria. Musculoskeletal:        No new aches/pains   Skin: Positive for itching and rash. Neurological: Negative for headaches. Endo/Heme/Allergies: Does not bruise/bleed easily. Psychiatric/Behavioral: Negative for substance abuse. Objective     Vitals:    09/24/21 1000   BP: 126/60   Pulse: 62   Resp: 16   Temp: 97.8 °F (36.6 °C)   TempSrc: Temporal   SpO2: 96%   Weight: 278 lb (126.1 kg)   Height: 5' 6\" (1.676 m)   PainSc:   0 - No pain       Physical Exam  Vitals and nursing note reviewed. Exam conducted with a chaperone present. HENT:      Head: Normocephalic and atraumatic. Right Ear: External ear normal.      Left Ear: External ear normal.   Eyes:      General: No scleral icterus. Right eye: No discharge. Left eye: No discharge. Conjunctiva/sclera: Conjunctivae normal.   Cardiovascular:      Rate and Rhythm: Normal rate. Pulmonary:      Effort: No respiratory distress. Abdominal:      General: There is no distension. Musculoskeletal:         General: No swelling (BUE). Skin:     General: Skin is warm and dry. Comments: Seborrheic keratoses are present along inferior bilateral breast areas. Very mildly tender to palpation. There is mild erythema along these affected areas and just a little bit along the inferior breast tissue bilaterally. The left breast seems to be more effective versus the right. Neurological:      Mental Status: She is alert. Mental status is at baseline. Motor: No abnormal muscle tone. Gait: Gait is intact.  Gait normal.   Psychiatric:         Mood and Affect: Mood and affect normal.         LABS   Data Review:   Lab Results   Component Value Date/Time    WBC 7.1 01/10/2019 08:55 AM    HGB 13.6 01/10/2019 08:55 AM    HCT 44.6 01/10/2019 08:55 AM    PLATELET 236 00/12/6762 08:55 AM    MCV 99.6 (H) 01/10/2019 08:55 AM       Lab Results   Component Value Date/Time Sodium 141 05/05/2021 01:35 PM    Potassium 3.3 (L) 05/05/2021 01:35 PM    Chloride 104 05/05/2021 01:35 PM    CO2 32 05/05/2021 01:35 PM    Anion gap 5 05/05/2021 01:35 PM    Glucose 111 (H) 05/05/2021 01:35 PM    BUN 14 05/05/2021 01:35 PM    Creatinine 0.86 05/05/2021 01:35 PM    BUN/Creatinine ratio 16 05/05/2021 01:35 PM    GFR est AA >60 05/05/2021 01:35 PM    GFR est non-AA >60 05/05/2021 01:35 PM    Calcium 9.2 05/05/2021 01:35 PM       Lab Results   Component Value Date/Time    Cholesterol, total 127 05/05/2021 01:35 PM    HDL Cholesterol 47 05/05/2021 01:35 PM    LDL, calculated 56.2 05/05/2021 01:35 PM    VLDL, calculated 23.8 05/05/2021 01:35 PM    Triglyceride 119 05/05/2021 01:35 PM    CHOL/HDL Ratio 2.7 05/05/2021 01:35 PM       Lab Results   Component Value Date/Time    Hemoglobin A1c 6.0 (H) 05/05/2021 01:35 PM    Hemoglobin A1c (POC) 5.8 03/05/2020 09:22 AM    Hemoglobin A1c, External 5.8 09/08/2015 12:00 AM       Assessment/Plan:   1. Health Maintenance:  -The flu vaccine and pneumococcal vaccine were administered today. ORDERS:  - PNEUMOCOCCAL POLYSACCHARIDE VACCINE, 23-VALENT, ADULT OR IMMUNOSUPPRESSED PT DOSE,  - ADMIN PNEUMOCOCCAL VACCINE  - FLU (FLUAD QUAD INFLUENZA VACCINE,QUAD,ADJUVANTED)    2. Fungal Skin Infection: Per PE findings. No signs of cellulitis/mastitis at this time.  -Notify me if symptoms do not respond to the treatment listed below.  -Prescribing clotrimazole-based cream.  She can use nystatin powder in the event that she is sweating excessively.  -Ordering Diflucan 1 tablet once a week given the severity of her symptoms.           Health Maintenance Due   Topic Date Due    Colorectal Cancer Screening Combo  06/23/2018    Pneumococcal 65+ years (2 of 2 - PPSV23) 07/09/2019    Flu Vaccine (1) 09/01/2021    Medicare Yearly Exam  10/06/2021     Lab review: labs are reviewed in the EHR    I have discussed the diagnosis with the patient and the intended plan as seen in the above orders. The patient has received an after-visit summary and questions were answered concerning future plans. I have discussed medication side effects and warnings with the patient as well. I have reviewed the plan of care with the patient, accepted their input and they are in agreement with the treatment goals. All questions were answered. The patient understands the plan of care. Handouts provided today with above information. Pt instructed if symptoms worsen to call the office or report to the ED for continued care. Greater than 50% of the visit time was spent in counseling and/or coordination of care. Voice recognition was used to generate this report, which may have resulted in some phonetic based errors in grammar and contents. Even though attempts were made to correct all the mistakes, some may have been missed, and remained in the body of the document.           Rocio Gilbert MD

## 2021-09-24 NOTE — PATIENT INSTRUCTIONS
Vaccine Information Statement    Pneumococcal Polysaccharide Vaccine (PPSV23): What You Need to Know    Many Vaccine Information Statements are available in Martiniquais and other languages. See www.immunize.org/vis  Hojas de información sobre vacunas están disponibles en español y en muchos otros idiomas. Visite www.immunize.org/vis    1. Why get vaccinated? Pneumococcal polysaccharide vaccine (PPSV23) can prevent pneumococcal disease. Pneumococcal disease refers to any illness caused by pneumococcal bacteria. These bacteria can cause many types of illnesses, including pneumonia, which is an infection of the lungs. Pneumococcal bacteria are one of the most common causes of pneumonia. Besides pneumonia, pneumococcal bacteria can also cause:   Ear infections   Sinus infections   Meningitis (infection of the tissue covering the brain and spinal cord)   Bacteremia (bloodstream infection)    Anyone can get pneumococcal disease, but children under 3years of age, people with certain medical conditions, adults 72 years or older, and cigarette smokers are at the highest risk. Most pneumococcal infections are mild. However, some can result in long-term problems, such as brain damage or hearing loss. Meningitis, bacteremia, and pneumonia caused by pneumococcal disease can be fatal.     2. PPSV23     PPSV23 protects against 23 types of bacteria that cause pneumococcal disease. PPSV23 is recommended for:   All adults 72 years or older,   Anyone 2 years or older with certain medical conditions that can lead to an increased risk for pneumococcal disease. Most people need only one dose of PPSV23. A second dose of PPSV23, and another type of pneumococcal vaccine called PCV13, are recommended for certain high-risk groups. Your health care provider can give you more information.     People 65 years or older should get a dose of PPSV23 even if they have already gotten one or more doses of the vaccine before they turned 72.    3. Talk with your health care provider    Tell your vaccine provider if the person getting the vaccine:   Has had an allergic reaction after a previous dose of PPSV23, or has any severe, life-threatening allergies. In some cases, your health care provider may decide to postpone PPSV23 vaccination to a future visit. People with minor illnesses, such as a cold, may be vaccinated. People who are moderately or severely ill should usually wait until they recover before getting PPSV23. Your health care provider can give you more information. 4. Risks of a vaccine reaction     Redness or pain where the shot is given, feeling tired, fever, or muscle aches can happen after PPSV23. People sometimes faint after medical procedures, including vaccination. Tell your provider if you feel dizzy or have vision changes or ringing in the ears. As with any medicine, there is a very remote chance of a vaccine causing a severe allergic reaction, other serious injury, or death. 5. What if there is a serious problem? An allergic reaction could occur after the vaccinated person leaves the clinic. If you see signs of a severe allergic reaction (hives, swelling of the face and throat, difficulty breathing, a fast heartbeat, dizziness, or weakness), call 9-1-1 and get the person to the nearest hospital.    For other signs that concern you, call your health care provider. Adverse reactions should be reported to the Vaccine Adverse Event Reporting System (VAERS). Your health care provider will usually file this report, or you can do it yourself. Visit the VAERS website at www.vaers. hhs.gov or call 5-752.745.8576. VAERS is only for reporting reactions, and VAERS staff do not give medical advice. 6. How can I learn more?  Ask your health care provider.  Call your local or state health department.    Contact the Centers for Disease Control and Prevention (CDC):  - Call 2-622.324.8516 (8-089-XXH-INFO) or  - Visit CDCs website at www.cdc.gov/vaccines    Vaccine Information Statement   PPSV23   10/30/2019    [facility] for Disease Control and Prevention    Office Use Only      Vaccine Information Statement    Influenza (Flu) Vaccine (Inactivated or Recombinant): What You Need to Know    Many vaccine information statements are available in Mauritian and other languages. See www.immunize.org/vis. Hojas de información sobre vacunas están disponibles en español y en muchos otros idiomas. Visite www.immunize.org/vis. 1. Why get vaccinated? Influenza vaccine can prevent influenza (flu). Flu is a contagious disease that spreads around the [place] every year, usually between October and May. Anyone can get the flu, but it is more dangerous for some people. Infants and young children, people 72 years and older, pregnant people, and people with certain health conditions or a weakened immune system are at greatest risk of flu complications. Pneumonia, bronchitis, sinus infections, and ear infections are examples of flu-related complications. If you have a medical condition, such as heart disease, cancer, or diabetes, flu can make it worse. Flu can cause fever and chills, sore throat, muscle aches, fatigue, cough, headache, and runny or stuffy nose. Some people may have vomiting and diarrhea, though this is more common in children than adults. In an average year, thousands of people in the [facility] die from flu, and many more are hospitalized. Flu vaccine prevents millions of illnesses and flu-related visits to the doctor each year. 2. Influenza vaccines     CDC recommends everyone 6 months and older get vaccinated every flu season. Children 6 months through 6years of age may need 2 doses during a single flu season. Everyone else needs only 1 dose each flu season.     It takes about 2 weeks for protection to develop after vaccination. There are many flu viruses, and they are always changing. Each year a new flu vaccine is made to protect against the influenza viruses believed to be likely to cause disease in the upcoming flu season. Even when the vaccine doesnt exactly match these viruses, it may still provide some protection. Influenza vaccine does not cause flu. Influenza vaccine may be given at the same time as other vaccines. 3. Talk with your health care provider    Tell your vaccination provider if the person getting the vaccine:   Has had an allergic reaction after a previous dose of influenza vaccine, or has any severe, life-threatening allergies    Has ever had Guillain-Barré Syndrome (also called GBS)    In some cases, your health care provider may decide to postpone influenza vaccination until a future visit. Influenza vaccine can be administered at any time during pregnancy. People who are or will be pregnant during influenza season should receive inactivated influenza vaccine. People with minor illnesses, such as a cold, may be vaccinated. People who are moderately or severely ill should usually wait until they recover before getting influenza vaccine. Your health care provider can give you more information. 4. Risks of a vaccine reaction     Soreness, redness, and swelling where the shot is given, fever, muscle aches, and headache can happen after influenza vaccination.  There may be a very small increased risk of Guillain-Barré Syndrome (GBS) after inactivated influenza vaccine (the flu shot). Olevia Pastel children who get the flu shot along with pneumococcal vaccine (PCV13) and/or DTaP vaccine at the same time might be slightly more likely to have a seizure caused by fever. Tell your health care provider if a child who is getting flu vaccine has ever had a seizure. People sometimes faint after medical procedures, including vaccination.  Tell your provider if you feel dizzy or have vision changes or ringing in the ears. As with any medicine, there is a very remote chance of a vaccine causing a severe allergic reaction, other serious injury, or death. 5. What if there is a serious problem? An allergic reaction could occur after the vaccinated person leaves the clinic. If you see signs of a severe allergic reaction (hives, swelling of the face and throat, difficulty breathing, a fast heartbeat, dizziness, or weakness), call 9-1-1 and get the person to the nearest hospital.    For other signs that concern you, call your health care provider. Adverse reactions should be reported to the Vaccine Adverse Event Reporting System (VAERS). Your health care provider will usually file this report, or you can do it yourself. Visit the VAERS website at www.vaers. [facility].gov or call 0-721.163.4676. VAERS is only for reporting reactions, and VAERS staff members do not give medical advice. 6. The National Vaccine Injury Compensation Program    The [facility] Vaccine Injury Compensation Program (VICP) is a federal program that was created to compensate people who may have been injured by certain vaccines. Claims regarding alleged injury or death due to vaccination have a time limit for filing, which may be as short as two years. Visit the VICP website at www.[facility].gov/vaccinecompensation or call 8-638.727.3304 to learn about the program and about filing a claim. 7. How can I learn more?  Ask your health care provider.  Call your local or state health department.  Visit the website of the Food and Drug Administration (FDA) for vaccine package inserts and additional information at www.fda.gov/vaccines-blood-biologics/vaccines.  Contact the Centers for Disease Control and Prevention (CDC):  - Call 9-723.344.2655 (1-800-CDC-INFO) or  - Visit CDCs influenza website at www.cdc.gov/flu. Vaccine Information Statement   Inactivated Influenza Vaccine   8/6/2021  42 U. S.C. § 320DZ-02   [facility]  Centers for Disease Control and Prevention    Office Use Only         Rash: Care Instructions  Your Care Instructions  A rash is any irritation or inflammation of the skin. Rashes have many possible causes, including allergy, infection, illness, heat, and emotional stress. Follow-up care is a key part of your treatment and safety. Be sure to make and go to all appointments, and call your doctor if you are having problems. It's also a good idea to know your test results and keep a list of the medicines you take. How can you care for yourself at home? · Wash the area with water only. Soap can make dryness and itching worse. Pat dry. · Put cold, wet cloths on the rash to reduce itching. · Keep cool, and stay out of the sun. · Leave the rash open to the air as much of the time as possible. · Sometimes petroleum jelly (Vaseline) can help relieve the discomfort caused by a rash. A moisturizing lotion, such as Cetaphil, also may help. Calamine lotion may help for rashes caused by contact with something (such as a plant or soap) that irritated the skin. Use it 3 or 4 times a day. · If your doctor prescribed a cream, use it as directed. If your doctor prescribed medicine, take it exactly as directed. · If your rash itches so badly that it interferes with your normal activities, take an over-the-counter antihistamine, such as diphenhydramine (Benadryl) or loratadine (Claritin). Read and follow all instructions on the label. When should you call for help? Call your doctor now or seek immediate medical care if:    · You have signs of infection, such as:  ? Increased pain, swelling, warmth, or redness. ? Red streaks leading from the area. ? Pus draining from the area. ? A fever.     · You have joint pain along with the rash. Watch closely for changes in your health, and be sure to contact your doctor if:    · Your rash is changing or getting worse.  For example, call if you have pain along with the rash, the rash is spreading, or you have new blisters.     · You do not get better after 1 week. Where can you learn more? Go to http://www.gray.com/  Enter U711 in the search box to learn more about \"Rash: Care Instructions. \"  Current as of: March 3, 2021               Content Version: 13.0  © 7223-8515 [employer]. Care instructions adapted under license by [employer] (which disclaims liability or warranty for this information). If you have questions about a medical condition or this instruction, always ask your healthcare professional. [place] [address] any warranty or liability for your use of this information.

## 2021-10-04 ENCOUNTER — HOSPITAL ENCOUNTER (OUTPATIENT)
Dept: LAB | Age: 71
Discharge: HOME | End: 2021-10-04
Payer: MEDICARE

## 2021-10-04 ENCOUNTER — APPOINTMENT (OUTPATIENT)
Dept: INTERNAL MEDICINE CLINIC | Age: 71
End: 2021-10-04

## 2021-10-04 DIAGNOSIS — R74.8 ELEVATED ALKALINE PHOSPHATASE LEVEL: ICD-10-CM

## 2021-10-04 DIAGNOSIS — M85.80 OSTEOPENIA, UNSPECIFIED LOCATION: ICD-10-CM

## 2021-10-04 DIAGNOSIS — Z78.0 POSTMENOPAUSAL: ICD-10-CM

## 2021-10-04 DIAGNOSIS — R73.9 HYPERGLYCEMIA: ICD-10-CM

## 2021-10-04 LAB
ALBUMIN SERPL-MCNC: 3.6 G/DL (ref 3.4–5)
ALBUMIN/GLOB SERPL: 1.1 {RATIO} (ref 0.8–1.7)
ALP SERPL-CCNC: 141 U/L (ref 45–117)
ALT SERPL-CCNC: 22 U/L (ref 13–56)
ANION GAP SERPL CALC-SCNC: 8 MMOL/L (ref 3–18)
AST SERPL-CCNC: 17 U/L (ref 10–38)
BILIRUB SERPL-MCNC: 0.6 MG/DL (ref 0.2–1)
BUN SERPL-MCNC: 17 MG/DL (ref 7–18)
BUN/CREAT SERPL: 19 (ref 12–20)
CALCIUM SERPL-MCNC: 9.4 MG/DL (ref 8.5–10.1)
CHLORIDE SERPL-SCNC: 102 MMOL/L (ref 100–111)
CO2 SERPL-SCNC: 33 MMOL/L (ref 21–32)
CREAT SERPL-MCNC: 0.91 MG/DL (ref 0.6–1.3)
EST. AVERAGE GLUCOSE BLD GHB EST-MCNC: 128 MG/DL
GLOBULIN SER CALC-MCNC: 3.3 G/DL (ref 2–4)
GLUCOSE SERPL-MCNC: 109 MG/DL (ref 74–99)
HBA1C MFR BLD: 6.1 % (ref 4.2–5.6)
POTASSIUM SERPL-SCNC: 3.2 MMOL/L (ref 3.5–5.5)
PROT SERPL-MCNC: 6.9 G/DL (ref 6.4–8.2)
SODIUM SERPL-SCNC: 143 MMOL/L (ref 136–145)

## 2021-10-04 PROCEDURE — 83036 HEMOGLOBIN GLYCOSYLATED A1C: CPT

## 2021-10-04 PROCEDURE — 36415 COLL VENOUS BLD VENIPUNCTURE: CPT

## 2021-10-04 PROCEDURE — 80053 COMPREHEN METABOLIC PANEL: CPT

## 2021-10-05 ENCOUNTER — TELEPHONE (OUTPATIENT)
Dept: INTERNAL MEDICINE CLINIC | Age: 71
End: 2021-10-05

## 2021-10-05 RX ORDER — POTASSIUM CHLORIDE 20 MEQ/1
20 TABLET, EXTENDED RELEASE ORAL 2 TIMES DAILY
Qty: 60 TABLET | Refills: 11 | Status: SHIPPED
Start: 2021-10-05 | End: 2021-10-21

## 2021-10-05 NOTE — PROGRESS NOTES
Please let her know that her most recent labs show that her potassium is still low at 3.2. I am ordering prescription strength potassium for her to take. Meanwhile, her alkaline phosphatase level is 141. This is still elevated from May. We will discuss this more in depth at her upcoming appointment. Her A1c is up to 6.1 from 6 in May.     Dr. Igor Pelletier  [facility], [address] [place] [address], [address] [place] [address] Str.  Phone: [phone]  Fax: [phone]

## 2021-10-05 NOTE — TELEPHONE ENCOUNTER
Left message for patient :  MD Cholo Aguilar, LPN  Caller: Unspecified (Today,  2:45 PM)  Annie Jordan,     Please see if she can tolerate over the counter potassium. Have her try over the counter potassium.

## 2021-10-05 NOTE — PROGRESS NOTES
Patient aware of the following lab results. Please let her know that her most recent labs show that her potassium is still low at 3.2. I am ordering prescription strength potassium for her to take. Meanwhile, her alkaline phosphatase level is 141. This is still elevated from May. We will discuss this more in depth at her upcoming appointment. Her A1c is up to 6.1 from 6 in May. Patient verbalizes understanding.

## 2021-10-05 NOTE — TELEPHONE ENCOUNTER
Patient is aware of her lab results and recommendations. Patient states she can not take prescription strength potassium because is causes uncontrolled diarrhea.

## 2021-10-13 NOTE — PROGRESS NOTES
Maryann Spence presents today for   Chief Complaint   Patient presents with   [address]     10-4-21   [facility] Wellness Visit           Coordination of Care:  1. Have you been to the ER, urgent care clinic since your last visit? Hospitalized since your last visit? no    2. Have you seen or consulted any other health care providers outside of the [address] [place] [address] since your last visit? Include any pap smears or colon screening.  yes

## 2021-10-14 ENCOUNTER — OFFICE VISIT (OUTPATIENT)
Dept: INTERNAL MEDICINE CLINIC | Age: 71
End: 2021-10-14
Payer: MEDICARE

## 2021-10-14 VITALS
TEMPERATURE: 97.5 F | HEIGHT: 66 IN | SYSTOLIC BLOOD PRESSURE: 108 MMHG | BODY MASS INDEX: 44.36 KG/M2 | OXYGEN SATURATION: 96 % | DIASTOLIC BLOOD PRESSURE: 48 MMHG | WEIGHT: 276 LBS | HEART RATE: 61 BPM | RESPIRATION RATE: 16 BRPM

## 2021-10-14 DIAGNOSIS — Z71.89 ADVANCE CARE PLANNING: ICD-10-CM

## 2021-10-14 DIAGNOSIS — Z00.00 MEDICARE ANNUAL WELLNESS VISIT, SUBSEQUENT: ICD-10-CM

## 2021-10-14 DIAGNOSIS — R73.02 GLUCOSE INTOLERANCE (IMPAIRED GLUCOSE TOLERANCE): ICD-10-CM

## 2021-10-14 DIAGNOSIS — L30.4 INTERTRIGO: Primary | ICD-10-CM

## 2021-10-14 DIAGNOSIS — Z86.73 HISTORY OF CEREBRAL INFARCTION: ICD-10-CM

## 2021-10-14 DIAGNOSIS — R73.9 HYPERGLYCEMIA: ICD-10-CM

## 2021-10-14 DIAGNOSIS — G89.29 OTHER CHRONIC PAIN: ICD-10-CM

## 2021-10-14 DIAGNOSIS — I10 ESSENTIAL HYPERTENSION: ICD-10-CM

## 2021-10-14 PROCEDURE — G8536 NO DOC ELDER MAL SCRN: HCPCS | Performed by: INTERNAL MEDICINE

## 2021-10-14 PROCEDURE — G8417 CALC BMI ABV UP PARAM F/U: HCPCS | Performed by: INTERNAL MEDICINE

## 2021-10-14 PROCEDURE — 99214 OFFICE O/P EST MOD 30 MIN: CPT | Performed by: INTERNAL MEDICINE

## 2021-10-14 PROCEDURE — G0439 PPPS, SUBSEQ VISIT: HCPCS | Performed by: INTERNAL MEDICINE

## 2021-10-14 PROCEDURE — G8752 SYS BP LESS 140: HCPCS | Performed by: INTERNAL MEDICINE

## 2021-10-14 PROCEDURE — G8754 DIAS BP LESS 90: HCPCS | Performed by: INTERNAL MEDICINE

## 2021-10-14 PROCEDURE — 3017F COLORECTAL CA SCREEN DOC REV: CPT | Performed by: INTERNAL MEDICINE

## 2021-10-14 PROCEDURE — G8399 PT W/DXA RESULTS DOCUMENT: HCPCS | Performed by: INTERNAL MEDICINE

## 2021-10-14 PROCEDURE — 1090F PRES/ABSN URINE INCON ASSESS: CPT | Performed by: INTERNAL MEDICINE

## 2021-10-14 PROCEDURE — 1101F PT FALLS ASSESS-DOCD LE1/YR: CPT | Performed by: INTERNAL MEDICINE

## 2021-10-14 PROCEDURE — G9899 SCRN MAM PERF RSLTS DOC: HCPCS | Performed by: INTERNAL MEDICINE

## 2021-10-14 PROCEDURE — G8427 DOCREV CUR MEDS BY ELIG CLIN: HCPCS | Performed by: INTERNAL MEDICINE

## 2021-10-14 PROCEDURE — G9717 DOC PT DX DEP/BP F/U NT REQ: HCPCS | Performed by: INTERNAL MEDICINE

## 2021-10-14 RX ORDER — FLUCONAZOLE 150 MG/1
150 TABLET ORAL DAILY
Qty: 7 TABLET | Refills: 0 | Status: SHIPPED | OUTPATIENT
Start: 2021-10-14 | End: 2021-10-21

## 2021-10-14 NOTE — PROGRESS NOTES
INTERNISTS OF [facility]:  10/14/2021, MRN: [id number]      Moisés Israel is a 78 y.o. female and presents to clinic for Follow-up, Labs (10-4-21), and Annual Wellness Visit    Subjective: The patient is a 29-year-old female with history of obesity, allergic rhinitis, IBS-diarrhea, onychomycosis, prediabetes, lumbar spinal stenosis, depression, hypertension, adrenal incidentaloma seen on CT scan 2015, LLE DVT (2021), and history of cerebral infarction her head MRI findings (followed by Neurology). 1. Rash: Reported at her last apt. Resolving with topical rx. It's better but does not burn. She has residual itching. 2. HTN: Taking tenoretic. Potassium was added in between apts due to labs showing a mildly low potassium. Her BP is a little low. She was told by her neurologist that her BP medication should not be lowered any further. 3. Prediabetes:  Her A1c is up to 6.1 from 6 in May per recent labs. She's been drinking sodas. 4. H/o CVA: No new neurologic sx. On plavix and Crestor. No adverse side effects. Followed by Eddy Diop. 5. Chronic Pain: +H/o lumbar spinal stenosis. Pain is along her lower back. Radiation of pain: none. No paresthesias. Taking cymbalta and gabapentin. Today she reports: her pain is \"sometimes good and sometimes bad. \" Pain is 0/10 today. She walks with her dogs and . She declines having her rx increased today. 6. Health Maintenance:  - She wants to postpone her colonoscopy until next spring. Her colonocsopy was postponed due to a LLE DVT earlier this yr. No SOB/edema. On plavix.       Patient Active Problem List    Diagnosis Date Noted    Hyperlipidemia 05/23/2021    Osteopenia 05/23/2021    Deep vein thrombosis (DVT) of distal vein of left lower extremity ([facility] [address] 05/23/2021    OAB (overactive bladder) 05/23/2021    Controlled substance agreement signed 7/7/17 07/09/2017    Obesity, morbid, BMI 40.0-49.9  12/06/2016    Allergic rhinitis 12/06/2016    Irritable bowel syndrome with diarrhea 12/06/2016    Onychomycosis 12/06/2016    Glucose intolerance (impaired glucose tolerance) 12/01/2016    Single current episode of major depressive disorder (has tried celexa, wellbutrin, mirtazapine with adverse effects) 12/01/2016    Essential hypertension 12/01/2016    History of cerebral infarction - per head MRI findings 12/01/2016       Current Outpatient Medications   Medication Sig Dispense Refill    nystatin (MYCOSTATIN) powder Apply  to affected area four (4) times daily. Affected area: under b/l breasts 60 g 11    clotrimazole-betamethasone (LOTRISONE) topical cream Apply  to affected area two (2) times a day. Affected area: along b/l breasts areas 45 g 11    atenoloL-chlorthalidone (TENORETIC) 50-25 mg per tablet TAKE ONE TABLET BY MOUTH DAILY 90 Tablet 2    clopidogreL (Plavix) 75 mg tab       rosuvastatin (CRESTOR) 10 mg tablet TAKE ONE TABLET BY MOUTH EVERY EVENING 90 Tab 2    tolterodine ER (DETROL LA) 4 mg ER capsule TAKE ONE CAPSULE BY MOUTH DAILY 90 Cap 2    mirabegron ER (Myrbetriq) 50 mg ER tablet Take 1 Tab by mouth daily. 90 Tab 3    DULoxetine (Cymbalta) 30 mg capsule Take 1 Cap by mouth two (2) times a day. 60 Cap 11    gabapentin (NEURONTIN) 300 mg capsule Take 300 mg by mouth nightly.  fluticasone (FLONASE) 50 mcg/actuation nasal spray 2 Sprays by Both Nostrils route daily. 1 Bottle 11    Cholecalciferol, Vitamin D3, (VITAMIN D3) 1,000 unit cap Take 1,000 Units by mouth daily.  potassium chloride (K-DUR, KLOR-CON) 20 mEq tablet Take 1 Tablet by mouth two (2) times a day.  60 Tablet 11       Allergies   Allergen Reactions    Amoxicillin-Pot Clavulanate Diarrhea    Azithromycin Diarrhea    Oxybutynin Other (comments)     Problems swallowing, dry mouth    Seroquel [Quetiapine] Other (comments)     Tremors    Sulfa (Sulfonamide Antibiotics) Diarrhea    Wellbutrin [Bupropion Hcl] Itching    Potassium Diarrhea       Past Medical History:   Diagnosis Date    Arthritis 2019    spine, both hips, both knees    Depression January 2009    Diabetes [facility])     Pre DM    DVT (deep venous thrombosis) ([facility] [address] 01/2021    Hypercholesterolemia     Hypertension     Sleep apnea     cpap    Stroke [facility]) early 2000    speech problem, balance problem    Thromboembolus ([facility] [address] 1990s    left leg       Past Surgical History:   Procedure Laterality Date    HX APPENDECTOMY      HX CHOLECYSTECTOMY      HX GYN      partial hysterectomy    HX KNEE ARTHROSCOPY  1990    Left     VASCULAR SURGERY PROCEDURE UNLIST  01/05/21    blood clot behind left knee being treated with medication       Family History   Problem Relation Age of Onset    Dementia Mother     Stroke Father     Cancer Brother     Hypertension Brother     Diabetes Brother     Diabetes Maternal Uncle     Stroke Maternal Aunt     Stroke Maternal Grandmother        Social History     Tobacco Use    Smoking status: Never Smoker    Smokeless tobacco: Never Used   Substance Use Topics    Alcohol use: Never       ROS   Review of Systems   Constitutional: Negative for chills and fever. HENT: Negative for ear pain and sore throat. Eyes: Negative for blurred vision and pain. Respiratory: Negative for cough and shortness of breath. Cardiovascular: Negative for chest pain. Gastrointestinal: Negative for abdominal pain, blood in stool and melena. Genitourinary: Negative for dysuria and hematuria. Musculoskeletal: Positive for back pain and joint pain (b/l knee pain; she is scheduling to get therapeutic injections). Negative for myalgias. Skin: Negative for rash. Neurological: Negative for tingling and headaches. Endo/Heme/Allergies: Does not bruise/bleed easily. Psychiatric/Behavioral: Negative for substance abuse.        Objective     Vitals:    10/14/21 0825 10/14/21 0834   BP: (!) 96/44 (!) 108/48   Pulse: 61    Resp: 16    Temp: 97.5 °F (36.4 °C)    TempSrc: Temporal SpO2: 96%    Weight: 276 lb (125.2 kg)    Height: 5' 6\" (1.676 m)    PainSc:   0 - No pain        Physical Exam  Vitals and nursing note reviewed. HENT:      Head: Normocephalic and atraumatic. Right Ear: External ear normal.      Left Ear: External ear normal.   Eyes:      General: No scleral icterus. Right eye: No discharge. Left eye: No discharge. Conjunctiva/sclera: Conjunctivae normal.   Cardiovascular:      Rate and Rhythm: Normal rate and regular rhythm. Heart sounds: Normal heart sounds. No murmur heard. No friction rub. No gallop. Pulmonary:      Effort: Pulmonary effort is normal. No respiratory distress. Breath sounds: Normal breath sounds. No wheezing or rales. Chest:      Chest wall: No tenderness. Abdominal:      General: Bowel sounds are normal. There is no distension. Palpations: Abdomen is soft. There is no mass. Tenderness: There is no abdominal tenderness. There is no guarding or rebound. Musculoskeletal:         General: No swelling (BUE) or tenderness (BUE). Cervical back: Neck supple. Comments: Lumbar spinous processes are mildly TTP   Lymphadenopathy:      Cervical: No cervical adenopathy. Skin:     General: Skin is warm and dry. Findings: No erythema or rash. Comments: There is barely any erythema along the skin just inferior to her anterior bra line. This area is NTTP and w/o ulceration   Neurological:      Mental Status: She is alert. Mental status is at baseline. Motor: No abnormal muscle tone.       Gait: Gait normal.   Psychiatric:         Mood and Affect: Mood normal.         LABS   Data Review:   Lab Results   Component Value Date/Time    WBC 7.1 01/10/2019 08:55 AM    HGB 13.6 01/10/2019 08:55 AM    HCT 44.6 01/10/2019 08:55 AM    PLATELET 214 68/28/6337 08:55 AM    MCV 99.6 (H) 01/10/2019 08:55 AM       Lab Results   Component Value Date/Time    Sodium 143 10/04/2021 08:53 AM    Potassium 3.2 (L) 10/04/2021 08:53 AM    Chloride 102 10/04/2021 08:53 AM    CO2 33 (H) 10/04/2021 08:53 AM    Anion gap 8 10/04/2021 08:53 AM    Glucose 109 (H) 10/04/2021 08:53 AM    BUN 17 10/04/2021 08:53 AM    Creatinine 0.91 10/04/2021 08:53 AM    BUN/Creatinine ratio 19 10/04/2021 08:53 AM    GFR est AA >60 10/04/2021 08:53 AM    GFR est non-AA >60 10/04/2021 08:53 AM    Calcium 9.4 10/04/2021 08:53 AM       Lab Results   Component Value Date/Time    Cholesterol, total 127 05/05/2021 01:35 PM    HDL Cholesterol 47 05/05/2021 01:35 PM    LDL, calculated 56.2 05/05/2021 01:35 PM    VLDL, calculated 23.8 05/05/2021 01:35 PM    Triglyceride 119 05/05/2021 01:35 PM    CHOL/HDL Ratio 2.7 05/05/2021 01:35 PM       Lab Results   Component Value Date/Time    Hemoglobin A1c 6.1 (H) 10/04/2021 08:53 AM    Hemoglobin A1c (POC) 5.8 03/05/2020 09:22 AM    Hemoglobin A1c, External 5.8 09/08/2015 12:00 AM       Assessment/Plan:   1. H/o CVA: +HLD  - C/w rx as prescribed. - Checking labs in 6 months. 2. Prediabetes:  - Weight loss recommended. Referral to a nutritionist.   - We discussed weight loss medication. She declines at this time. - I encouraged her to reduce her carb intake. - Checking labs in 6 months. 3. HTN: Stable. - C/w rx as prescribed. - Checking labs in 6 months. 4. Chronic Pain: Stable, along her spine.  - C/w rx as prescribed. - Activity as tolerated. 5. Rash/Intertrigo: Resolving.  - C/w topical rx. - Diflucan course to expedite resolution of rash. Health Maintenance Due   Topic Date Due    Colorectal Cancer Screening Combo  06/23/2018    Medicare Yearly Exam  10/06/2021         Lab review: labs are reviewed and ordered as mentioned above    I have discussed the diagnosis with the patient and the intended plan as seen in the above orders. The patient has received an after-visit summary and questions were answered concerning future plans.   I have discussed medication side effects and warnings with the patient as well. I have reviewed the plan of care with the patient, accepted their input and they are in agreement with the treatment goals. All questions were answered. The patient understands the plan of care. Handouts provided today with above information. Pt instructed if symptoms worsen to call the office or report to the ED for continued care. Greater than 50% of the visit time was spent in counseling and/or coordination of care. Voice recognition was used to generate this report, which may have resulted in some phonetic based errors in grammar and contents. Even though attempts were made to correct all the mistakes, some may have been missed, and remained in the body of the document.           Jenna Johnson MD

## 2021-10-14 NOTE — PATIENT INSTRUCTIONS
Eating Healthy Foods: Care Instructions  Your Care Instructions     Eating healthy foods can help lower your risk for disease. Healthy food gives you energy and keeps your heart strong, your brain active, your muscles working, and your bones strong. A healthy diet includes a variety of foods from the basic food groups: grains, vegetables, fruits, milk and milk products, and meat and beans. Some people may eat more of their favorite foods from only one food group and, as a result, miss getting the nutrients they need. So, it is important to pay attention not only to what you eat but also to what you are missing from your diet. You can eat a healthy, balanced diet by making a few small changes. Follow-up care is a key part of your treatment and safety. Be sure to make and go to all appointments, and call your doctor if you are having problems. It's also a good idea to know your test results and keep a list of the medicines you take. How can you care for yourself at home? Look at what you eat  · Keep a food diary for a week or two and record everything you eat or drink. Track the number of servings you eat from each food group. · For a balanced diet every day, eat a variety of:  ? 6 or more ounce-equivalents of grains, such as cereals, breads, crackers, rice, or pasta, every day. An ounce-equivalent is 1 slice of bread, 1 cup of ready-to-eat cereal, or ½ cup of cooked rice, cooked pasta, or cooked cereal.  ? 2½ cups of vegetables, especially:  § Dark-green vegetables such as broccoli and spinach. § Orange vegetables such as carrots and sweet potatoes. § Dry beans (such as ray and kidney beans) and peas (such as lentils). ? 2 cups of fresh, frozen, or canned fruit. A small apple or 1 banana or orange equals 1 cup. ? 3 cups of nonfat or low-fat milk, yogurt, or other milk products. ? 5½ ounces of meat and beans, such as chicken, fish, lean meat, beans, nuts, and seeds.  One egg, 1 tablespoon of peanut butter, ½ ounce nuts or seeds, or ¼ cup of cooked beans equals 1 ounce of meat. · Learn how to read food labels for serving sizes and ingredients. Fast-food and convenience-food meals often contain few or no fruits or vegetables. Make sure you eat some fruits and vegetables to make the meal more nutritious. · Look at your food diary. For each food group, add up what you have eaten and then divide the total by the number of days. This will give you an idea of how much you are eating from each food group. See if you can find some ways to change your diet to make it more healthy. Start small  · Do not try to make dramatic changes to your diet all at once. You might feel that you are missing out on your favorite foods and then be more likely to fail. · Start slowly, and gradually change your habits. Try some of the following:  ? Use whole wheat bread instead of white bread. ? Use nonfat or low-fat milk instead of whole milk. ? Eat brown rice instead of white rice, and eat whole wheat pasta instead of white-flour pasta. ? Try low-fat cheeses and low-fat yogurt. ? Add more fruits and vegetables to meals and have them for snacks. ? Add lettuce, tomato, cucumber, and onion to sandwiches. ? Add fruit to yogurt and cereal.  Enjoy food  · You can still eat your favorite foods. You just may need to eat less of them. If your favorite foods are high in fat, salt, and sugar, limit how often you eat them, but do not cut them out entirely. · Eat a wide variety of foods. Make healthy choices when eating out  · The type of restaurant you choose can help you make healthy choices. Even fast-food chains are now offering more low-fat or healthier choices on the menu. · Choose smaller portions, or take half of your meal home. · When eating out, try:  ? A veggie pizza with a whole wheat crust or grilled chicken (instead of sausage or pepperoni).   ? Pasta with roasted vegetables, grilled chicken, or marinara sauce instead of cream sauce. ? A vegetable wrap or grilled chicken wrap. ? Broiled or poached food instead of fried or breaded items. Make healthy choices easy  · Buy packaged, prewashed, ready-to-eat fresh vegetables and fruits, such as baby carrots, salad mixes, and chopped or shredded broccoli and cauliflower. · Buy packaged, presliced fruits, such as melon or pineapple. · Choose 100% fruit or vegetable juice instead of soda. Limit juice intake to 4 to 6 oz (½ to ¾ cup) a day. · Blend low-fat yogurt, fruit juice, and canned or frozen fruit to make a smoothie for breakfast or a snack. Where can you learn more? Go to http://www.stock.com/  Enter T756 in the search box to learn more about \"Eating Healthy Foods: Care Instructions. \"  Current as of: December 17, 2020               Content Version: 13.0  © 4420-5931 [employer]. Care instructions adapted under license by [employer] (which disclaims liability or warranty for this information). If you have questions about a medical condition or this instruction, always ask your healthcare professional. [place] [address] any warranty or liability for your use of this information. Medicare Part B Preventive Services Limitations Recommendation Scheduled   Bone Mass Measurement  (age 72 & older, biennial) Requires diagnosis related to osteoporosis or estrogen deficiency. Biennial benefit unless patient has history of long-term glucocorticoid tx or baseline is needed because initial test was by other method This should be done at age 72 and again if on osteoporosis medication at 2-3 year intervals. Up to date   Cardiovascular Screening Blood Tests (every 5 years)  Total cholesterol, HDL, Triglycerides Order as a panel if possible We should check your cholesterol panel at least once every 5 years.  Up to date   Colorectal Cancer Screening  -Fecal occult blood test (annual)  -Flexible sigmoidoscopy (5y)  -Screening colonoscopy (10y)  -Barium Enema  Due per your Gastroenterologist's recommendations. Overdue   Counseling to Prevent Tobacco Use (up to 8 sessions per year)  - Counseling greater than 3 and up to 10 minutes  - Counseling greater than 10 minutes Patients must be asymptomatic of tobacco-related conditions to receive as preventive service Continue with smoking cessation Not applicable   Diabetes Screening Tests (at least every 3 years, Medicare covers annually or at 6-month intervals for prediabetic patients)    Fasting blood sugar (FBS) or glucose tolerance test (GTT) Patient must be diagnosed with one of the following:  -Hypertension, Dyslipidemia, obesity, previous impaired FBS or GTT  Or any two of the following: overweight, FH of diabetes, age ? 72, history of gestational diabetes, birth of baby weighing more than 9 pounds Should be done yearly Up to date   Diabetes Self-Management Training (DSMT) (no USPSTF recommendation) Requires referral by treating physician for patient with diabetes or renal disease. 10 hours of initial DSMT session of no less than 30 minutes each in a continuous 12-month period. 2 hours of follow-up DSMT in subsequent years. Not applicable Not applicable   Glaucoma Screening (no USPSTF recommendation) Diabetes mellitus, family history, , age 48 or over,  American, age 72 or over Continue with annual eye exams. Up to date   Human Immunodeficiency Virus (HIV) Screening (annually for increased risk patients)  HIV-1 and HIV-2 by EIA, MORENO, rapid antibody test, or oral mucosa transudate Patient must be at increased risk for HIV infection per USPSTF guidelines or pregnant. Tests covered annually for patients at increased risk. Pregnant patients may receive up to 3 test during pregnancy.  Not applicable Not applicable   Medical Nutrition Therapy (MNT) (for diabetes or renal disease not recommended schedule) Requires referral by treating physician for patient with diabetes or renal disease. Can be provided in same year as diabetes self-management training (DSMT), and CMS recommends medical nutrition therapy take place after DSMT. Up to 3 hours for initial year and 2 hours in subsequent years. Not applicable Not applicable   Shingles Vaccination A shingles vaccine is also recommended once in a lifetime after age 61 Vaccination recommended for shingles vaccination. Up to date   Seasonal Influenza Vaccination (annually)  Continue with yearly \"flu\" shot annually Up to date   Pneumococcal Vaccination (once after 65)  Please receive this vaccination at age 72. Up to date   Hepatitis B Vaccinations (if medium/high risk) Medium/high risk factors:  End-stage renal disease,  Hemophiliacs who received Factor VIII or IX concentrates, Clients of institutions for the mentally retarded, Persons who live in the same house as a HepB virus carrier, Homosexual men, Illicit injectable drug abusers. Not applicable Not applicable   Screening Mammography (biennial age 54-69) Annually (age 36 or over) You need a mammogram yearly to screen for breast cancer. Up to date   Screening Pap Tests and Pelvic Examination (up to age 79 and after 79 if unknown history or abnormal study last 10 years) Every 24 months except high risk You need no Pap smear at this time. Not warranted at this time. Ultrasound Screening for Abdominal Aortic Aneurysm (AAA) (once) Patient must be referred through IPPE and not have had a screening for abdominal aortic aneurysm before under Medicare.   Limited to patients who meet one of the following criteria:  - Men who are 73-68 years old and have smoked more than 100 cigarettes in their lifetime.  -Anyone with a FH of AAA  -Anyone recommended for screening by USPSTF Not applicable Not applicable       Medicare Wellness Visit, Female     The best way to live healthy is to have a lifestyle where you eat a well-balanced diet, exercise regularly, limit alcohol use, and quit all forms of tobacco/nicotine, if applicable. Regular preventive services are another way to keep healthy. Preventive services (vaccines, screening tests, monitoring & exams) can help personalize your care plan, which helps you manage your own care. Screening tests can find health problems at the earliest stages, when they are easiest to treat. [facility] follows the current, evidence-based guidelines published by the [facility] ([facility]) when recommending preventive services for our patients. Because we follow these guidelines, sometimes recommendations change over time as research supports it. (For example, mammograms used to be recommended annually. Even though Medicare will still pay for an annual mammogram, the newer guidelines recommend a mammogram every two years for women of average risk). Of course, you and your doctor may decide to screen more often for some diseases, based on your risk and your co-morbidities (chronic disease you are already diagnosed with). Preventive services for you include:  - Medicare offers their members a free annual wellness visit, which is time for you and your primary care provider to discuss and plan for your preventive service needs. Take advantage of this benefit every year!  -All adults over the age of 72 should receive the recommended pneumonia vaccines. Current USPSTF guidelines recommend a series of two vaccines for the best pneumonia protection.   -All adults should have a flu vaccine yearly and a tetanus vaccine every 10 years.   -All adults age 48 and older should receive the shingles vaccines (series of two vaccines).       -All adults age 38-68 who are overweight should have a diabetes screening test once every three years.   -All adults born between 80 and 1965 should be screened once for Hepatitis C.  -Other screening tests and preventive services for persons with diabetes include: an eye exam to screen for diabetic retinopathy, a kidney function test, a foot exam, and stricter control over your cholesterol.   -Cardiovascular screening for adults with routine risk involves an electrocardiogram (ECG) at intervals determined by your doctor.   -Colorectal cancer screenings should be done for adults age 54-65 with no increased risk factors for colorectal cancer. There are a number of acceptable methods of screening for this type of cancer. Each test has its own benefits and drawbacks. Discuss with your doctor what is most appropriate for you during your annual wellness visit. The different tests include: colonoscopy (considered the best screening method), a fecal occult blood test, a fecal DNA test, and sigmoidoscopy.    -A bone mass density test is recommended when a woman turns 65 to screen for osteoporosis. This test is only recommended one time, as a screening. Some providers will use this same test as a disease monitoring tool if you already have osteoporosis. -Breast cancer screenings are recommended every other year for women of normal risk, age 54-69.  -Cervical cancer screenings for women over age 72 are only recommended with certain risk factors.      Here is a list of your current Health Maintenance items (your personalized list of preventive services) with a due date:  Health Maintenance Due   Topic Date Due    Colorectal Screening  06/23/2018    Annual Well Visit  10/06/2021

## 2021-10-21 ENCOUNTER — OFFICE VISIT (OUTPATIENT)
Dept: ORTHOPEDIC SURGERY | Age: 71
End: 2021-10-21
Payer: MEDICARE

## 2021-10-21 VITALS — WEIGHT: 270 LBS | TEMPERATURE: 96.9 F | HEIGHT: 66 IN | BODY MASS INDEX: 43.39 KG/M2

## 2021-10-21 DIAGNOSIS — M25.562 PAIN IN BOTH KNEES, UNSPECIFIED CHRONICITY: ICD-10-CM

## 2021-10-21 DIAGNOSIS — M25.561 PAIN IN BOTH KNEES, UNSPECIFIED CHRONICITY: ICD-10-CM

## 2021-10-21 DIAGNOSIS — M17.0 PRIMARY OSTEOARTHRITIS OF KNEES, BILATERAL: Primary | ICD-10-CM

## 2021-10-21 PROCEDURE — 99214 OFFICE O/P EST MOD 30 MIN: CPT | Performed by: ORTHOPAEDIC SURGERY

## 2021-10-21 PROCEDURE — 1090F PRES/ABSN URINE INCON ASSESS: CPT | Performed by: ORTHOPAEDIC SURGERY

## 2021-10-21 PROCEDURE — G8427 DOCREV CUR MEDS BY ELIG CLIN: HCPCS | Performed by: ORTHOPAEDIC SURGERY

## 2021-10-21 PROCEDURE — G8399 PT W/DXA RESULTS DOCUMENT: HCPCS | Performed by: ORTHOPAEDIC SURGERY

## 2021-10-21 PROCEDURE — G9899 SCRN MAM PERF RSLTS DOC: HCPCS | Performed by: ORTHOPAEDIC SURGERY

## 2021-10-21 PROCEDURE — 1101F PT FALLS ASSESS-DOCD LE1/YR: CPT | Performed by: ORTHOPAEDIC SURGERY

## 2021-10-21 PROCEDURE — 3017F COLORECTAL CA SCREEN DOC REV: CPT | Performed by: ORTHOPAEDIC SURGERY

## 2021-10-21 PROCEDURE — 20610 DRAIN/INJ JOINT/BURSA W/O US: CPT | Performed by: ORTHOPAEDIC SURGERY

## 2021-10-21 PROCEDURE — G8756 NO BP MEASURE DOC: HCPCS | Performed by: ORTHOPAEDIC SURGERY

## 2021-10-21 PROCEDURE — G8536 NO DOC ELDER MAL SCRN: HCPCS | Performed by: ORTHOPAEDIC SURGERY

## 2021-10-21 PROCEDURE — G9717 DOC PT DX DEP/BP F/U NT REQ: HCPCS | Performed by: ORTHOPAEDIC SURGERY

## 2021-10-21 PROCEDURE — G8417 CALC BMI ABV UP PARAM F/U: HCPCS | Performed by: ORTHOPAEDIC SURGERY

## 2021-10-21 RX ORDER — BETAMETHASONE SODIUM PHOSPHATE AND BETAMETHASONE ACETATE 3; 3 MG/ML; MG/ML
12 INJECTION, SUSPENSION INTRA-ARTICULAR; INTRALESIONAL; INTRAMUSCULAR; SOFT TISSUE ONCE
Status: COMPLETED | OUTPATIENT
Start: 2021-10-21 | End: 2021-10-21

## 2021-10-21 RX ADMIN — BETAMETHASONE SODIUM PHOSPHATE AND BETAMETHASONE ACETATE 12 MG: 3; 3 INJECTION, SUSPENSION INTRA-ARTICULAR; INTRALESIONAL; INTRAMUSCULAR; SOFT TISSUE at 15:52

## 2021-10-21 NOTE — PROGRESS NOTES
INTERNISTS OF [facility]:  10/21/21, [id number]      The Subsequent Medicare Annual Wellness Exam PROGRESS NOTE    This is a Subsequent Medicare Annual Wellness Exam (AWV). I have reviewed the patient's medical history in detail and updated the computerized patient record. Edmar Jacobson is a 78 y.o.  female and presents for an annual wellness exam.    SUBJECTIVE    Past Medical History:   Diagnosis Date    Arthritis 2019    spine, both hips, both knees    Depression January 2009    Diabetes [facility])     Pre DM    DVT (deep venous thrombosis) ([facility] [address] 01/2021    Hypercholesterolemia     Hypertension     Sleep apnea     cpap    Stroke [facility]) early 2000    speech problem, balance problem    Thromboembolus ([facility] [address] 1990s    left leg      Past Surgical History:   Procedure Laterality Date    HX APPENDECTOMY      HX CHOLECYSTECTOMY      HX GYN      partial hysterectomy    HX KNEE ARTHROSCOPY  1990    Left     VASCULAR SURGERY PROCEDURE UNLIST  01/05/21    blood clot behind left knee being treated with medication     Current Outpatient Medications   Medication Sig Dispense Refill    fluconazole (DIFLUCAN) 150 mg tablet Take 1 Tablet by mouth daily for 7 days. FDA advises cautious prescribing of oral fluconazole in pregnancy. 7 Tablet 0    nystatin (MYCOSTATIN) powder Apply  to affected area four (4) times daily. Affected area: under b/l breasts 60 g 11    clotrimazole-betamethasone (LOTRISONE) topical cream Apply  to affected area two (2) times a day. Affected area: along b/l breasts areas 45 g 11    atenoloL-chlorthalidone (TENORETIC) 50-25 mg per tablet TAKE ONE TABLET BY MOUTH DAILY 90 Tablet 2    clopidogreL (Plavix) 75 mg tab       rosuvastatin (CRESTOR) 10 mg tablet TAKE ONE TABLET BY MOUTH EVERY EVENING 90 Tab 2    tolterodine ER (DETROL LA) 4 mg ER capsule TAKE ONE CAPSULE BY MOUTH DAILY 90 Cap 2    mirabegron ER (Myrbetriq) 50 mg ER tablet Take 1 Tab by mouth daily.  90 Tab 3    DULoxetine (Cymbalta) 30 mg capsule Take 1 Cap by mouth two (2) times a day. 60 Cap 11    gabapentin (NEURONTIN) 300 mg capsule Take 300 mg by mouth nightly.  fluticasone (FLONASE) 50 mcg/actuation nasal spray 2 Sprays by Both Nostrils route daily. 1 Bottle 11    Cholecalciferol, Vitamin D3, (VITAMIN D3) 1,000 unit cap Take 1,000 Units by mouth daily.  potassium chloride (K-DUR, KLOR-CON) 20 mEq tablet Take 1 Tablet by mouth two (2) times a day.  60 Tablet 11     Allergies   Allergen Reactions    Amoxicillin-Pot Clavulanate Diarrhea    Azithromycin Diarrhea    Oxybutynin Other (comments)     Problems swallowing, dry mouth    Seroquel [Quetiapine] Other (comments)     Tremors    Sulfa (Sulfonamide Antibiotics) Diarrhea    Wellbutrin [Bupropion Hcl] Itching    Potassium Diarrhea     Family History   Problem Relation Age of Onset    Dementia Mother     Stroke Father     Cancer Brother     Hypertension Brother     Diabetes Brother     Diabetes Maternal Uncle     Stroke Maternal Aunt     Stroke Maternal Grandmother      Social History     Tobacco Use    Smoking status: Never Smoker    Smokeless tobacco: Never Used   Substance Use Topics    Alcohol use: Never     Patient Active Problem List   Diagnosis Code    Glucose intolerance (impaired glucose tolerance) R73.02    Single current episode of major depressive disorder (has tried celexa, wellbutrin, mirtazapine with adverse effects) F32.9    Essential hypertension I10    History of cerebral infarction - per head MRI findings Z86.73    Obesity, morbid, BMI 40.0-49.9  E66.01    Allergic rhinitis J30.9    Irritable bowel syndrome with diarrhea K58.0    Onychomycosis B35.1    Controlled substance agreement signed 7/7/17 Z79.899    Hyperlipidemia E78.5    Osteopenia M85.80    Deep vein thrombosis (DVT) of distal vein of left lower extremity (HCC) I82.4Z2    OAB (overactive bladder) N32.81     The patient is a 73-year-old female with history of obesity, allergic rhinitis, IBS-diarrhea, onychomycosis, prediabetes, lumbar spinal stenosis, depression, hypertension, adrenal incidentaloma seen on CT scan 2015, LLE DVT (2021), and history of cerebral infarction her head MRI findings (followed by Neurology). Health Maintenance History  Immunizations reviewed: Tdap up to date   Pneumovax: up to date   Flu: up to date  Zoster: up to date    Immunization History   Administered Date(s) Administered    Covid-19, MODERNA, Mrna, Lnp-s, Pf, 100mcg/0.5mL 02/25/2021, 03/25/2021    Influenza High Dose Vaccine PF 10/31/2018, 10/01/2019    Influenza Vaccine 10/05/2017    Influenza, Quadrivalent, Adjuvanted (>65 Yrs FLUAD QUAD [id number]) 09/24/2021    Pneumococcal Conjugate (PCV-13) 07/09/2018    Pneumococcal Polysaccharide (PPSV-23) 01/05/2013, 09/24/2021    Tdap 01/03/2017    Zoster Recombinant 05/15/2019, 08/16/2019    Zoster Vaccine, Live 03/03/2013       Colonoscopy: Overdue    Eye exam: Up to date. Mammo: Up to date. Dexascan: Up to date    Pelvic/Pap: No bleeding. Review of Systems   Constitutional: Negative for chills and fever. HENT: Negative for ear pain and sore throat. Eyes: Negative for blurred vision and pain. Respiratory: Negative for cough and shortness of breath. Cardiovascular: Negative for chest pain. Gastrointestinal: Negative for abdominal pain, blood in stool and melena. Genitourinary: Negative for dysuria and hematuria. Musculoskeletal: Positive for joint pain. Negative for myalgias. Skin: Negative for rash. Neurological: Negative for headaches. Endo/Heme/Allergies: Does not bruise/bleed easily. Psychiatric/Behavioral: Negative for depression and substance abuse. Depression Risk Factor Screening:      Patient Health Questionnaire (PHQ-2)   Over the last 2 weeks, how often have you been bothered by any of the following problems? · Little interest or pleasure in doing things? · Not at all. [0]  · Feeling down, depressed, or hopeless? · Not at all. [0]    Total Score: 0/6  PHQ-2 Assessment Scoring:   A score of 2 or more requires further screening with the PHQ-9    Alcohol Risk Factor Screening:   Women: On any occasion during the past 3 months, have you had more than 3 drinks containing alcohol? no    Do you average more than 7 drinks per week? no    Tobacco Use Screening:     Social History     Tobacco Use   Smoking Status Never Smoker   Smokeless Tobacco Never Used       Hearing Loss    There have been no changes to the pt's hearing. No additional studies/evaluation is warranted at this time    Activities of Daily Living   Self-care. Requires assistance with: no ADLs  She does not need help with ADLs/IADLs    Fall Risk   no falls w/i the past year. Abuse Screen   None    Additional Examination Findings:  Vitals:    10/14/21 0825 10/14/21 0834   BP: (!) 96/44 (!) 108/48   Pulse: 61    Resp: 16    Temp: 97.5 °F (36.4 °C)    TempSrc: Temporal    SpO2: 96%    Weight: 276 lb (125.2 kg)    Height: 5' 6\" (1.676 m)    PainSc:   0 - No pain       Body mass index is 44.55 kg/m². Evaluation of Cognitive Function:  Mood/affect: Euthymic  Appearance: Well-groomed  Family member/caregiver input: She is not with a family member today      General:   Well-nourished, well-groomed, pleasant, alert, in no acute distress. Head:  Normocephalic, atraumatic  Ears:  External ears WNL  Eyes:  Clear sclera  Neuro:   Alert, conversant, appropriate, following commands  Skin:    No rashes noted  Psych: Affect, mood and judgment appropriate      Dementia Screen:  Clock Drawing (ten past eleven) Exercise: Unremarkable.        LABS   Data Review:   Lab Results   Component Value Date/Time    Sodium 143 10/04/2021 08:53 AM    Potassium 3.2 (L) 10/04/2021 08:53 AM    Chloride 102 10/04/2021 08:53 AM    CO2 33 (H) 10/04/2021 08:53 AM    Anion gap 8 10/04/2021 08:53 AM    Glucose 109 (H) 10/04/2021 08:53 AM    BUN 17 10/04/2021 08:53 AM    Creatinine 0.91 10/04/2021 08:53 AM    BUN/Creatinine ratio 19 10/04/2021 08:53 AM    GFR est AA >60 10/04/2021 08:53 AM    GFR est non-AA >60 10/04/2021 08:53 AM    Calcium 9.4 10/04/2021 08:53 AM       Lab Results   Component Value Date/Time    WBC 7.1 01/10/2019 08:55 AM    HGB 13.6 01/10/2019 08:55 AM    HCT 44.6 01/10/2019 08:55 AM    PLATELET 883 03/86/3066 08:55 AM    MCV 99.6 (H) 01/10/2019 08:55 AM       Lab Results   Component Value Date/Time    Hemoglobin A1c 6.1 (H) 10/04/2021 08:53 AM    Hemoglobin A1c (POC) 5.8 03/05/2020 09:22 AM    Hemoglobin A1c, External 5.8 09/08/2015 12:00 AM       Lab Results   Component Value Date/Time    Cholesterol, total 127 05/05/2021 01:35 PM    HDL Cholesterol 47 05/05/2021 01:35 PM    LDL, calculated 56.2 05/05/2021 01:35 PM    VLDL, calculated 23.8 05/05/2021 01:35 PM    Triglyceride 119 05/05/2021 01:35 PM    CHOL/HDL Ratio 2.7 05/05/2021 01:35 PM         Patient Care Team:  Kriss Sutton MD as PCP - General (Family Medicine)  Kriss Sutton MD as PCP - [facility]  Catrachita Rainey MD as Surgeon (General Surgery)  Hardeep Davis MD as Consulting Provider (Ophthalmology)    End-of-life planning  Advanced Directive in the case than an injury or illness causes the patient to be unable to make health care decisions was discussed with the patient.      Advice/Referrals/Counselling/Plan:   Education and counseling provided:  Are appropriate based on today's review and evaluation  End-of-Life planning (with patient's consent)  Pneumococcal Vaccine  Influenza Vaccine  Screening Mammography  Screening Pap and pelvic (covered once every 2 years)  Colorectal cancer screening tests  Cardiovascular screening blood test  Bone mass measurement (DEXA)  Screening for glaucoma  Diabetes screening test  Include in education list (weight loss, physical activity, smoking cessation, fall prevention, and nutrition)    ICD-10-CM ICD-9-CM 1. Intertrigo  L30.4 695.89 fluconazole (DIFLUCAN) 150 mg tablet   2. Essential hypertension  I10 401.9 REFERRAL TO NUTRITION   3. Glucose intolerance (impaired glucose tolerance)  R73.02 790.22 REFERRAL TO NUTRITION   4. Hyperglycemia  R73.9 790.29    5. History of cerebral infarction - per head MRI findings  Z86.73 V12.54    6. Other chronic pain  G89.29 338.29      reviewed diet, exercise and weight control. Brief written plan, checklist    Assessment/Plan:    Health Maintenance:  - We will pursue colon cancer screening. She declines at this time but is ok with having this done in the spring of 2022        Lab review: labs are reviewed in the [address] [place] [address] (ACP) Provider Conversation     Date of ACP Conversation: 10/14/21  Persons included in Corpus care[place]:  patient    Authorized Decision Maker (if patient is incapable of making informed decisions): This person is:   Named in Advance Directive or Healthcare Power of           For Patients with Decision Making Capacity:   Values/Goals: Exploration of values, goals, and preferences if recovery is not expected, even with continued medical treatment in the event of:  Imminent death  Severe, permanent brain injury    Conversation Outcomes / Follow-Up Plan:   ACP complete - no further action today. She has no changes to make to her preexisting advance directive. I have discussed the diagnosis with the patient and the intended plan as seen in the above orders. The patient has received an after-visit summary and questions were answered concerning future plans. I have discussed medication side effects and warnings with the patient as well. I have reviewed the plan of care with the patient, accepted their input and they are in agreement with the treatment goals. Follow-up and Dispositions    · Return in about 6 months (around 4/14/2022) for BP check.

## 2021-10-21 NOTE — ACP (ADVANCE CARE PLANNING)
Advance Care Planning  Advance Care Planning (ACP) Provider Conversation     Date of ACP Conversation: 10/14/21  Persons included in Conversation:  patient    Authorized Decision Maker (if patient is incapable of making informed decisions): This person is:   Named in Advance Directive or Healthcare Power of           For Patients with Decision Making Capacity:   Values/Goals: Exploration of values, goals, and preferences if recovery is not expected, even with continued medical treatment in the event of:  Imminent death  Severe, permanent brain injury    Conversation Outcomes / Follow-Up Plan:   ACP complete - no further action today. She has no changes to make to her preexisting advance directive.      Dr. Giovani Broderick  [facility] of [facility], [address] [place] [address], [address] [place] [address] Str.  Phone: [phone]  Fax: [phone]

## 2021-10-21 NOTE — PROGRESS NOTES
Patient: Everette Lao                MRN: [id number]       SSN: [id number]  YOB: 1950        AGE: 78 y.o. SEX: female  Body mass index is 43.58 kg/m².     PCP: Manoj Cook MD  10/21/21    He presents today with bilateral knee pain little bit more tender on the left and on the right with arthritic findings on x-ray little worse on the right than on the left the injections do help her she is complaining of moderate pain nonradicular worse with stairs kneeling getting up and down from a chair otherwise been feeling well she is got a birthday coming up soon    The examination on today she is not short of breath there is no chest pain noted no respiratory compromise no scleral icterus the hips rotate well each knee is in varus a little worse on the right and on the left -2 degrees extension bilaterally bends well 120 degrees she does have some varicosities over the lower extremities none are thrombosed there is no foot drop tib and EHL 5 out of 5 and Homans' sign negative calf nontender previous x-rays confirm relatively severe arthritis bilaterally little worse on the right and on the left    We had a discussion regarding surgery was decided not currently recommended she still doing well with nonoperative measures therefore each of the 2 knees injected and well-tolerated as per protocol    When she returns we will get new weightbearing x-rays of both knees thank you    REVIEW OF SYSTEMS:      CON: negative  EYE: negative   ENT: negative  RESP: negative  GI:    negative   :  negative  MSK: Positive  A twelve point review of systems was completed, positives noted and all other systems were reviewed and are negative          Past Medical History:   Diagnosis Date    Arthritis 2019    spine, both hips, both knees    Depression January 2009    Diabetes [facility])     Pre DM    DVT (deep venous thrombosis) ([facility] 01/2021    Hypercholesterolemia     Hypertension     Sleep apnea     cpap    Stroke [facility]) early 2000    speech problem, balance problem    Thromboembolus ([address] 1990s    left leg       Family History   Problem Relation Age of Onset    Dementia Mother     Stroke Father     Cancer Brother     Hypertension Brother     Diabetes Brother     Diabetes Maternal Uncle     Stroke Maternal Aunt     Stroke Maternal Grandmother        Current Outpatient Medications   Medication Sig Dispense Refill    fluconazole (DIFLUCAN) 150 mg tablet Take 1 Tablet by mouth daily for 7 days. FDA advises cautious prescribing of oral fluconazole in pregnancy. 7 Tablet 0    nystatin (MYCOSTATIN) powder Apply  to affected area four (4) times daily. Affected area: under b/l breasts 60 g 11    clotrimazole-betamethasone (LOTRISONE) topical cream Apply  to affected area two (2) times a day. Affected area: along b/l breasts areas 45 g 11    atenoloL-chlorthalidone (TENORETIC) 50-25 mg per tablet TAKE ONE TABLET BY MOUTH DAILY 90 Tablet 2    clopidogreL (Plavix) 75 mg tab       rosuvastatin (CRESTOR) 10 mg tablet TAKE ONE TABLET BY MOUTH EVERY EVENING 90 Tab 2    tolterodine ER (DETROL LA) 4 mg ER capsule TAKE ONE CAPSULE BY MOUTH DAILY 90 Cap 2    mirabegron ER (Myrbetriq) 50 mg ER tablet Take 1 Tab by mouth daily. 90 Tab 3    DULoxetine (Cymbalta) 30 mg capsule Take 1 Cap by mouth two (2) times a day. 60 Cap 11    gabapentin (NEURONTIN) 300 mg capsule Take 300 mg by mouth nightly.  fluticasone (FLONASE) 50 mcg/actuation nasal spray 2 Sprays by Both Nostrils route daily. 1 Bottle 11    Cholecalciferol, Vitamin D3, (VITAMIN D3) 1,000 unit cap Take 1,000 Units by mouth daily.          Allergies   Allergen Reactions    Amoxicillin-Pot Clavulanate Diarrhea    Azithromycin Diarrhea    Oxybutynin Other (comments)     Problems swallowing, dry mouth    Seroquel [Quetiapine] Other (comments)     Tremors    Sulfa (Sulfonamide Antibiotics) Diarrhea    Wellbutrin [Bupropion Hcl] Itching    Potassium Diarrhea Past Surgical History:   Procedure Laterality Date    HX APPENDECTOMY      HX CHOLECYSTECTOMY      HX GYN      partial hysterectomy    HX KNEE ARTHROSCOPY  1990    Left     VASCULAR SURGERY PROCEDURE UNLIST  01/05/21    blood clot behind left knee being treated with medication       Social History     Socioeconomic History    Marital status:      Spouse name: Not on file    Number of children: Not on file    Years of education: Not on file    Highest education level: Not on file   Occupational History    Not on file   Tobacco Use    Smoking status: Never Smoker    Smokeless tobacco: Never Used   Vaping Use    Vaping Use: Never used   Substance and Sexual Activity    Alcohol use: Never    Drug use: Never    Sexual activity: Yes     Partners: Male     Birth control/protection: None   Other Topics Concern    Not on file   Social History Narrative    Not on file     Social Determinants of Health     Financial Resource Strain:     Difficulty of Paying Living Expenses:    Food Insecurity:     Worried About Running Out of Food in the Last Year:     Ran Out of Food in the Last Year:    Transportation Needs:     Lack of Transportation (Medical):      Lack of Transportation (Non-Medical):    Physical Activity:     Days of Exercise per Week:     Minutes of Exercise per Session:    Stress:     Feeling of Stress :    Social Connections:     Frequency of Communication with Friends and Family:     Frequency of Social Gatherings with Friends and Family:     Attends Rastafarian Services:     Active Member of Clubs or Organizations:     Attends Club or Organization Meetings:     Marital Status:    Intimate Partner Violence:     Fear of Current or Ex-Partner:     Emotionally Abused:     Physically Abused:     Sexually Abused:        Visit Vitals  Temp 96.9 °F (36.1 °C)   Ht 5' 6\" (1.676 m)   Wt 122.5 kg (270 lb)   BMI 43.58 kg/m²         PHYSICAL EXAMINATION:  GENERAL: Alert and oriented x3, in no acute distress, well-developed, well-nourished, afebrile. HEART: No JVD. EYES: No scleral icterus   NECK: No significant lymphadenopathy   LUNGS: No respiratory compromise or indrawing  ABDOMEN: Soft, non-tender, non-distended. Note: This note was completed using voice recognition software. Any typographical/name errors or mistakes are unintentional.    Electronically signed by: MD MONTSE Draper, Radha Navarro M.D., have reviewed the history, physical, and have updated the allergic reactions for [facility]. TIME OUT performed immediately prior to the start of procedure:  Alexandra Cash M.D., have performed the following reviews on [facility] prior to the start of the procedure:    - Patient was identified by name and date of birth  - Agreement on procedure being performed was verified  - Risks and benefits explained to the patient  - Patient was positioned for comfort  - Consent was signed and verified  - Patient was advised regarding risks of bruising, bleeding, infection and pain    Time: 4:00 PM     Body Part: intra-articular injection of bilateral knees. Medication and Dose: Each knee injected with 1mL Celestone preparation, i.e. 6 mg, and 3 mL 1% lidocaine    Date of Procedure: 10/21/21    PROCEDURE PERFORMED BY : Denis Navarro M.D., [facility])    Provider Assisted by: Seth Carrillo    Patient assisted by: self    Patient tolerated procedure well with no complications

## 2021-11-18 DIAGNOSIS — N32.81 OAB (OVERACTIVE BLADDER): ICD-10-CM

## 2021-11-18 RX ORDER — TOLTERODINE 4 MG/1
CAPSULE, EXTENDED RELEASE ORAL
Qty: 90 CAPSULE | Refills: 2 | Status: SHIPPED | OUTPATIENT
Start: 2021-11-18 | End: 2022-06-30

## 2021-12-10 ENCOUNTER — TELEPHONE (OUTPATIENT)
Dept: ORTHOPEDIC SURGERY | Age: 71
End: 2021-12-10

## 2021-12-10 NOTE — TELEPHONE ENCOUNTER
Called patient [phone] and verified her name and date of birth. I informed her due to the regulations. She would need to have been seen within 30 days to set up the spinal injections. Patient inquired if she would need a . I informed her that the procedures we do at the surgical center she would need a  but the ones in the office she does not. Patient stated she wanted the one in the office. I informed her that she would not need a  for that. Patient stated she has an appointment in January. I informed her that she can try calling back occasionally we do sometimes have same day cancellations. Patient verbalized understanding. No further action needed at this time.

## 2021-12-10 NOTE — TELEPHONE ENCOUNTER
Patient called for Davide Caro. Patient said that Davide Caro told her that whenever she wanted to get the Spinal injection , to just call. Patient said that due to Covid , she has not been able to call any sooner. Patient is requesting the spinal injection. Patient tel. [phone]. Note : patient last seen on 02/11/2020 by Davide Caro. Patient schedule an appt with Davide Caro first available date 01/27/2021.

## 2022-01-21 ENCOUNTER — OFFICE VISIT (OUTPATIENT)
Dept: ORTHOPEDIC SURGERY | Age: 72
End: 2022-01-21
Payer: MEDICARE

## 2022-01-21 VITALS
OXYGEN SATURATION: 98 % | HEIGHT: 66 IN | BODY MASS INDEX: 43.39 KG/M2 | TEMPERATURE: 97.4 F | HEART RATE: 71 BPM | WEIGHT: 270 LBS

## 2022-01-21 DIAGNOSIS — E66.01 OBESITY, CLASS III, BMI 40-49.9 (MORBID OBESITY) (HCC): ICD-10-CM

## 2022-01-21 DIAGNOSIS — M23.91 KNEE LOCKING, RIGHT: ICD-10-CM

## 2022-01-21 DIAGNOSIS — M25.561 MEDIAL KNEE PAIN, RIGHT: ICD-10-CM

## 2022-01-21 DIAGNOSIS — M25.561 CHRONIC PAIN OF RIGHT KNEE: ICD-10-CM

## 2022-01-21 DIAGNOSIS — G89.29 CHRONIC PAIN OF RIGHT KNEE: ICD-10-CM

## 2022-01-21 DIAGNOSIS — M17.0 PRIMARY OSTEOARTHRITIS OF KNEES, BILATERAL: Primary | ICD-10-CM

## 2022-01-21 PROCEDURE — G9899 SCRN MAM PERF RSLTS DOC: HCPCS | Performed by: ORTHOPAEDIC SURGERY

## 2022-01-21 PROCEDURE — 99214 OFFICE O/P EST MOD 30 MIN: CPT | Performed by: ORTHOPAEDIC SURGERY

## 2022-01-21 PROCEDURE — 73564 X-RAY EXAM KNEE 4 OR MORE: CPT | Performed by: ORTHOPAEDIC SURGERY

## 2022-01-21 PROCEDURE — 1101F PT FALLS ASSESS-DOCD LE1/YR: CPT | Performed by: ORTHOPAEDIC SURGERY

## 2022-01-21 PROCEDURE — 1090F PRES/ABSN URINE INCON ASSESS: CPT | Performed by: ORTHOPAEDIC SURGERY

## 2022-01-21 PROCEDURE — G9717 DOC PT DX DEP/BP F/U NT REQ: HCPCS | Performed by: ORTHOPAEDIC SURGERY

## 2022-01-21 PROCEDURE — G8536 NO DOC ELDER MAL SCRN: HCPCS | Performed by: ORTHOPAEDIC SURGERY

## 2022-01-21 PROCEDURE — G8756 NO BP MEASURE DOC: HCPCS | Performed by: ORTHOPAEDIC SURGERY

## 2022-01-21 PROCEDURE — G8417 CALC BMI ABV UP PARAM F/U: HCPCS | Performed by: ORTHOPAEDIC SURGERY

## 2022-01-21 PROCEDURE — G8427 DOCREV CUR MEDS BY ELIG CLIN: HCPCS | Performed by: ORTHOPAEDIC SURGERY

## 2022-01-21 PROCEDURE — G8399 PT W/DXA RESULTS DOCUMENT: HCPCS | Performed by: ORTHOPAEDIC SURGERY

## 2022-01-21 PROCEDURE — 20610 DRAIN/INJ JOINT/BURSA W/O US: CPT | Performed by: ORTHOPAEDIC SURGERY

## 2022-01-21 PROCEDURE — 3017F COLORECTAL CA SCREEN DOC REV: CPT | Performed by: ORTHOPAEDIC SURGERY

## 2022-01-21 RX ORDER — BETAMETHASONE SODIUM PHOSPHATE AND BETAMETHASONE ACETATE 3; 3 MG/ML; MG/ML
12 INJECTION, SUSPENSION INTRA-ARTICULAR; INTRALESIONAL; INTRAMUSCULAR; SOFT TISSUE ONCE
Status: COMPLETED | OUTPATIENT
Start: 2022-01-21 | End: 2022-01-21

## 2022-01-21 RX ADMIN — BETAMETHASONE SODIUM PHOSPHATE AND BETAMETHASONE ACETATE 12 MG: 3; 3 INJECTION, SUSPENSION INTRA-ARTICULAR; INTRALESIONAL; INTRAMUSCULAR; SOFT TISSUE at 09:14

## 2022-01-21 NOTE — PROGRESS NOTES
Patient: Silva Padilla                MRN: [id number]       SSN: [id number]  YOB: 1950        AGE: 73 y.o. SEX: female  Body mass index is 43.58 kg/m². PCP: Angie Brown MD  01/21/22    She presents today with increased right-sided knee pain knees hurt the right knee she had a flareup about a week ago they were walking and the knee got more sore there is no fall or twist little bit more swelling no shortness of breath or calf pain and the knee is actually gotten improved from them sounds like the knee perhaps locked up temporarily    And denies fevers or chills otherwise been feeling well and injections usually work quite well for her she is not diabetic    The examination at today she is in varus bilaterally perhaps slightly worse on the right than on the left there is a mild lateral thrust some varicosities about the knee the knees are not hot or red only mild effusions her quads tendon is intact she is about -3 extension and bends to but 115 degrees bilaterally calf nontender Homans' sign is negative mild evidence of neuropathy distally there is no foot drop both feet warm and well-perfused. Oanh's test just equivocal and she has distinct medial joint line tenderness especially on the right knee the MCL is intact and is nontender I think that she likely has a torn ACL and PCL as well.     That these are chronic ligamentous issues    X-rays today 1/21/2022 4 views right knee confirm severe arthritis with a lateral thrust and an enchondroma in the distal femur severe arthritis with some instability noted    There was a discussion regarding surgery we discussed the role of total knee replacement she we have decided that Bradley Buchanan can pursue nonoperative management for the time being instead of surgery therefore each of the 2 knees were injected and as per protocol return to see us in 3 to 4 weeks for follow-up as    REVIEW OF SYSTEMS:      CON: negative  EYE: negative   ENT: negative  RESP: negative  GI:    negative   :  negative  MSK: Positive  A twelve point review of systems was completed, positives noted and all other systems were reviewed and are negative          Past Medical History:   Diagnosis Date    Arthritis 2019    spine, both hips, both knees    Depression January 2009    Diabetes [facility])     Pre DM    DVT (deep venous thrombosis) ([facility] 01/2021    Hypercholesterolemia     Hypertension     Sleep apnea     cpap    Stroke [facility]) early 2000    speech problem, balance problem    Thromboembolus ([facility] 1990s    left leg       Family History   Problem Relation Age of Onset    Dementia Mother     Stroke Father     Cancer Brother     Hypertension Brother     Diabetes Brother     Diabetes Maternal Uncle     Stroke Maternal Aunt     Stroke Maternal Grandmother        Current Outpatient Medications   Medication Sig Dispense Refill    tolterodine ER (DETROL LA) 4 mg ER capsule TAKE ONE CAPSULE BY MOUTH DAILY 90 Capsule 2    nystatin (MYCOSTATIN) powder Apply  to affected area four (4) times daily. Affected area: under b/l breasts 60 g 11    clotrimazole-betamethasone (LOTRISONE) topical cream Apply  to affected area two (2) times a day. Affected area: along b/l breasts areas 45 g 11    atenoloL-chlorthalidone (TENORETIC) 50-25 mg per tablet TAKE ONE TABLET BY MOUTH DAILY 90 Tablet 2    clopidogreL (Plavix) 75 mg tab       rosuvastatin (CRESTOR) 10 mg tablet TAKE ONE TABLET BY MOUTH EVERY EVENING 90 Tab 2    mirabegron ER (Myrbetriq) 50 mg ER tablet Take 1 Tab by mouth daily. 90 Tab 3    DULoxetine (Cymbalta) 30 mg capsule Take 1 Cap by mouth two (2) times a day. 60 Cap 11    gabapentin (NEURONTIN) 300 mg capsule Take 300 mg by mouth nightly.  fluticasone (FLONASE) 50 mcg/actuation nasal spray 2 Sprays by Both Nostrils route daily. 1 Bottle 11    Cholecalciferol, Vitamin D3, (VITAMIN D3) 1,000 unit cap Take 1,000 Units by mouth daily.        Current Facility-Administered Medications   Medication Dose Route Frequency Provider Last Rate Last Admin    betamethasone (CELESTONE) injection 12 mg  12 mg Intra artICUlar ONCE Latricia Charles MD           Allergies   Allergen Reactions    Amoxicillin-Pot Clavulanate Diarrhea    Azithromycin Diarrhea    Oxybutynin Other (comments)     Problems swallowing, dry mouth    Seroquel [Quetiapine] Other (comments)     Tremors    Sulfa (Sulfonamide Antibiotics) Diarrhea    Wellbutrin [Bupropion Hcl] Itching    Potassium Diarrhea       Past Surgical History:   Procedure Laterality Date    HX APPENDECTOMY      HX CHOLECYSTECTOMY      HX GYN      partial hysterectomy    HX KNEE ARTHROSCOPY  1990    Left     VASCULAR SURGERY PROCEDURE UNLIST  01/05/21    blood clot behind left knee being treated with medication       Social History     Socioeconomic History    Marital status:      Spouse name: Not on file    Number of children: Not on file    Years of education: Not on file    Highest education level: Not on file   Occupational History    Not on file   Tobacco Use    Smoking status: Never Smoker    Smokeless tobacco: Never Used   Vaping Use    Vaping Use: Never used   Substance and Sexual Activity    Alcohol use: Never    Drug use: Never    Sexual activity: Yes     Partners: Male     Birth control/protection: None   Other Topics Concern    Not on file   Social History Narrative    Not on file     Social Determinants of Health     Financial Resource Strain:     Difficulty of Paying Living Expenses: Not on file   Food Insecurity:     Worried About Running Out of Food in the Last Year: Not on file    Rosanna of Food in the Last Year: Not on file   Transportation Needs:     Lack of Transportation (Medical): Not on file    Lack of Transportation (Non-Medical):  Not on file   Physical Activity:     Days of Exercise per Week: Not on file    Minutes of Exercise per Session: Not on file   Stress:     Feeling of Stress : Not on file   Social Connections:     Frequency of Communication with Friends and Family: Not on file    Frequency of Social Gatherings with Friends and Family: Not on file    Attends Yazidism Services: Not on file    Active Member of Clubs or Organizations: Not on file    Attends Club or Organization Meetings: Not on file    Marital Status: Not on file   Intimate Partner Violence:     Fear of Current or Ex-Partner: Not on file    Emotionally Abused: Not on file    Physically Abused: Not on file    Sexually Abused: Not on file   Housing Stability:     Unable to Pay for Housing in the Last Year: Not on file    Number of [place] in the Last Year: Not on file    Unstable Housing in the Last Year: Not on file       Visit Vitals  Pulse 71   Temp 97.4 °F (36.3 °C) (Temporal)   Ht 5' 6\" (1.676 m)   Wt 270 lb (122.5 kg)   SpO2 98%   BMI 43.58 kg/m²         PHYSICAL EXAMINATION:  GENERAL: Alert and oriented x3, in no acute distress, well-developed, well-nourished, afebrile. HEART: No JVD. EYES: No scleral icterus   NECK: No significant lymphadenopathy   LUNGS: No respiratory compromise or indrawing  ABDOMEN: Soft, non-tender, non-distended. Note: This note was completed using voice recognition software. Any typographical/name errors or mistakes are unintentional.    Electronically signed by: MD MONTSE Lu, Arielle Beaver M.D., have reviewed the history, physical, and have updated the allergic reactions for [facility].     TIME OUT performed immediately prior to the start of procedure:  Brittney Hernandez M.D., have performed the following reviews on [facility] prior to the start of the procedure:    - Patient was identified by name and date of birth  - Agreement on procedure being performed was verified  - Risks and benefits explained to the patient  - Patient was positioned for comfort  - Consent was signed and verified  - Patient was advised regarding risks of bruising, bleeding, infection and pain    Time: 9:08 AM     Body Part: intra-articular injection of bilateral knees. Medication and Dose: Each injected with 1mL Celestone preparation, i.e. 6 mg, and 3 mL 1% lidocaine    Date of Procedure: 01/21/22    PROCEDURE PERFORMED BY : Jessica Frias M.D., [facility])    Provider Assisted by: Jarred Triana    Patient assisted by: self    Patient tolerated procedure well with no complications

## 2022-01-25 DIAGNOSIS — E66.01 OBESITY, MORBID, BMI 40.0-49.9 (HCC): ICD-10-CM

## 2022-01-25 DIAGNOSIS — E78.5 HYPERLIPIDEMIA, UNSPECIFIED HYPERLIPIDEMIA TYPE: ICD-10-CM

## 2022-01-25 RX ORDER — ROSUVASTATIN CALCIUM 10 MG/1
TABLET, COATED ORAL
Qty: 90 TABLET | Refills: 2 | Status: SHIPPED | OUTPATIENT
Start: 2022-01-25

## 2022-01-26 ENCOUNTER — OFFICE VISIT (OUTPATIENT)
Dept: ORTHOPEDIC SURGERY | Age: 72
End: 2022-01-26
Payer: MEDICARE

## 2022-01-26 VITALS
HEIGHT: 66 IN | RESPIRATION RATE: 18 BRPM | OXYGEN SATURATION: 99 % | DIASTOLIC BLOOD PRESSURE: 57 MMHG | BODY MASS INDEX: 43.58 KG/M2 | HEART RATE: 62 BPM | SYSTOLIC BLOOD PRESSURE: 124 MMHG | TEMPERATURE: 96.6 F

## 2022-01-26 DIAGNOSIS — M48.061 SPINAL STENOSIS OF LUMBAR REGION WITHOUT NEUROGENIC CLAUDICATION: ICD-10-CM

## 2022-01-26 DIAGNOSIS — Z79.01 CHRONIC ANTICOAGULATION: ICD-10-CM

## 2022-01-26 DIAGNOSIS — M51.36 DDD (DEGENERATIVE DISC DISEASE), LUMBAR: Primary | ICD-10-CM

## 2022-01-26 PROCEDURE — G8427 DOCREV CUR MEDS BY ELIG CLIN: HCPCS | Performed by: PHYSICAL MEDICINE & REHABILITATION

## 2022-01-26 PROCEDURE — 3017F COLORECTAL CA SCREEN DOC REV: CPT | Performed by: PHYSICAL MEDICINE & REHABILITATION

## 2022-01-26 PROCEDURE — G8417 CALC BMI ABV UP PARAM F/U: HCPCS | Performed by: PHYSICAL MEDICINE & REHABILITATION

## 2022-01-26 PROCEDURE — G9717 DOC PT DX DEP/BP F/U NT REQ: HCPCS | Performed by: PHYSICAL MEDICINE & REHABILITATION

## 2022-01-26 PROCEDURE — G8399 PT W/DXA RESULTS DOCUMENT: HCPCS | Performed by: PHYSICAL MEDICINE & REHABILITATION

## 2022-01-26 PROCEDURE — G8754 DIAS BP LESS 90: HCPCS | Performed by: PHYSICAL MEDICINE & REHABILITATION

## 2022-01-26 PROCEDURE — 1090F PRES/ABSN URINE INCON ASSESS: CPT | Performed by: PHYSICAL MEDICINE & REHABILITATION

## 2022-01-26 PROCEDURE — G8752 SYS BP LESS 140: HCPCS | Performed by: PHYSICAL MEDICINE & REHABILITATION

## 2022-01-26 PROCEDURE — G9899 SCRN MAM PERF RSLTS DOC: HCPCS | Performed by: PHYSICAL MEDICINE & REHABILITATION

## 2022-01-26 PROCEDURE — 1101F PT FALLS ASSESS-DOCD LE1/YR: CPT | Performed by: PHYSICAL MEDICINE & REHABILITATION

## 2022-01-26 PROCEDURE — G8536 NO DOC ELDER MAL SCRN: HCPCS | Performed by: PHYSICAL MEDICINE & REHABILITATION

## 2022-01-26 PROCEDURE — 99213 OFFICE O/P EST LOW 20 MIN: CPT | Performed by: PHYSICAL MEDICINE & REHABILITATION

## 2022-01-26 NOTE — LETTER
1/26/2022    Patient: Nathan Arellano   YOB: 1950   Date of Visit: 1/26/2022     Kathia Marin, [address]  [place] [address]  Via In Basket    Dear Kathia Marin MD,      Thank you for referring Ms. Nathan Arellano to [address] [facility] for evaluation. My notes for this consultation are attached. If you have questions, please do not hesitate to call me. I look forward to following your patient along with you.       Sincerely,    Yadira White MD

## 2022-01-26 NOTE — PROGRESS NOTES
Damien Farris [facility] [address]  [address], [address]  [facility], [address]  Phone: [phone]  Fax: [phone], [place]  : 1950  PCP: Lamar Dick MD    PROGRESS NOTE      ASSESSMENT AND PLAN    Diagnoses and all orders for this visit:    1. DDD (degenerative disc disease), lumbar  -     AMB SUPPLY ORDER  -     REFERRAL TO PHYSICAL THERAPY    2. Spinal stenosis of lumbar region without neurogenic claudication  -     REFERRAL TO PHYSICAL THERAPY    3. Chronic anticoagulation        1. Mickey Beverly is a 73 y.o. female with axial low back pain limiting her walking and activities. .  2. DME order for LSO. Advised usage of LSO as needed for increased activities or severe pain. 3. Referral to PT  4. Discussed options for discogenic low back pain including lifestyle changes, medications, physical therapy, and surgery as a last resort. 5. Patient would like to stay away from additional medication. 6. Patient may take up to 2g (2,000 mg) every 24 hours for routine use, or 3g (3,000 mg) for short-term use. No NSAIDs due to chronic anticoagulation. Follow-up and Dispositions    · Return in about 6 weeks (around 3/9/2022). HISTORY OF PRESENT ILLNESS      Mickey Beverly is a 73 y.o. female presents for follow up of back pain. She reports that her pain is progressively worsening. Denies sciatic pain. Her pain is exacerbated with walking. She can only walk a few steps before she is in pain. Her standing tolerance is unaffected. Unable to walk her dog. Denies any radiating pain. She is taking Cymbalta 30 mg BID. She is also taking Gabapentin 300 mg QHS for neuropathy. Denies red flags including persistent fevers, chills, weight changes, neurogenic bowel or bladder symptoms. Pain Assessment  2022   Location of Pain Back   Location Modifiers -   Severity of Pain 4   Quality of Pain Sharp; Other (Comment)   Quality of Pain Comment lingering Duration of Pain Persistent   Frequency of Pain Constant   Date Pain First Started -   Aggravating Factors Walking;Standing   Limiting Behavior Yes   Relieving Factors Heat   Result of Injury No         Treatments patient has tried:  Physical therapy: R hip, back [id number] with benefit  Chiropractor: Yes little benefit  Deep massage: some benefit  Doing HEP: Yes   Beneficial medications: Gabapentin for neuropathy  Failed medications: Cymbalta  Spinal injections: No  Spinal surgery- No.   Last L MRI 2019: multilevel FA. 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 Has toy size dog.     PHYSICAL EXAMINATION    Visit Vitals  BP (!) 124/57   Pulse 62   Temp (!) 96.6 °F (35.9 °C) (Temporal)   Resp 18   Ht 5' 6\" (1.676 m)   SpO2 99%   BMI 43.58 kg/m²       TTP L4-5, right sciatic notch, right trochanteric bursa  LE strength intact  SLR negative  Ambulates with single point cane                Written by Kali Herbert, as dictated by Neale Runner, MD.

## 2022-01-26 NOTE — PATIENT INSTRUCTIONS
Learning About Degenerative Disc Disease  What is degenerative disc disease? Degenerative disc disease isn't really a disease. It's a term used to describe the normal changes in your spinal discs as you age. Spinal discs are small, spongy discs that separate the bones (vertebrae) that make up the spine. The discs act as shock absorbers for the spine. They let your spine flex, bend, and twist.  Degenerative disc disease can take place in one or more places along the spine. It most often occurs in the discs in the lower back and the neck. The changes in the discs can cause back and neck pain. They can also lead to osteoarthritis, a herniated disc, or spinal stenosis. What causes it? As we age, our spinal discs break down, or degenerate. This breakdown causes the symptoms of degenerative disc disease in some people. When the discs break down, they can lose fluid and dry out, and their outer layers can have tiny cracks or tears. This leads to less padding and less space between the bones in the spine. The body reacts to this by making bony growths on the spine called bone spurs. These spurs can press on the spinal nerve roots or spinal cord. This can cause pain and can affect how well the nerves work. These changes in the discs are more likely to occur if you smoke, do heavy physical work (such as repeated heavy lifting), or are very overweight. A sudden injury may also cause changes to occur. What are the symptoms? Many people with degenerative disc disease have no pain. But others have severe pain or other symptoms that limit their activities. Some of the most common symptoms are:  · Pain in the back or neck. Where the pain occurs depends on which discs are affected. · Pain that gets worse when you move, such as when you bend over, reach up, or twist.  · Pain that may occur in the rear end (buttocks), arm, or leg if a nerve is pinched. · Numbness or tingling in your arm or leg.   The pain may start after a major injury (such as from a car accident), a minor injury (such as a fall from a low height), or a normal motion (such as bending over to pick something up). It may also start gradually for no known reason and get worse over time. How is it diagnosed? A doctor can often diagnose degenerative disc disease while doing a physical exam. If your exam shows no signs of a serious condition, imaging tests (such as an X-ray) aren't likely to help your doctor find the cause of your symptoms. Sometimes degenerative disc disease is found when an X-ray is taken for another reason, such as an injury or other health problem. But even if the doctor finds degenerative disc disease, that doesn't always mean that you will have symptoms. How is degenerative disc disease treated? Self-care may be all you need to relieve pain caused by disc changes. This may include using ice or heat and taking over-the-counter medicines. Your doctor can prescribe stronger medicines if needed. If you develop health problems such as osteoarthritis, a herniated disc, or spinal stenosis, you may need other treatments. These include physical therapy and exercises for strengthening and stretching the back. In some cases, surgery may be recommended. It usually involves removing the damaged disc. In some cases, the bone is then permanently joined (fused) to protect the spinal cord. In rare cases, an artificial disc may be used to replace the disc that is removed. How can you care for yourself? Here are some things you can do to help manage pain from degenerative disc disease. · Use ice or heat (whichever feels better) on the affected area. ? Put ice or a cold pack on the area for 10 to 20 minutes at a time. Put a thin cloth between the ice and your skin. ? Put a warm water bottle, a heating pad set on low, or a warm cloth on your back. Put a thin cloth between the heating pad and your skin.  Do not go to sleep with a heating pad on your skin.  · Ask your doctor if you can take an over-the-counter pain medicine. These include acetaminophen (such as Tylenol) and nonsteroidal anti-inflammatory drugs, such as ibuprofen or naproxen. Your doctor can prescribe stronger medicines if needed. Be safe with medicines. Read and follow all instructions on the label. · Get some exercise every day. Exercise is one of the best ways to help your back feel better and stay better. It's best to start each exercise slowly. You may notice a little soreness, and that's okay. But if an exercise makes your pain worse, stop doing it. Here are things you can try:  ? Walking. It's the simplest and maybe the best activity for your back. It gets your blood moving and helps your muscles stay strong. ? Exercises that gently stretch and strengthen your stomach, back, and leg muscles. The stronger those muscles are, the better they're able to protect your back. Follow-up care is a key part of your treatment and safety. Be sure to make and go to all appointments, and call your doctor if you are having problems. It's also a good idea to know your test results and keep a list of the medicines you take. Where can you learn more? Go to http://www.gray.com/  Enter L817 in the search box to learn more about \"Learning About Degenerative Disc Disease. \"  Current as of: July 1, 2021               Content Version: 13.0  © 2106-8318 [employer]wise, Incorporated. Care instructions adapted under license by [employer] (which disclaims liability or warranty for this information). If you have questions about a medical condition or this instruction, always ask your healthcare professional. [place] [address] any warranty or liability for your use of this information.

## 2022-02-01 ENCOUNTER — HOSPITAL ENCOUNTER (OUTPATIENT)
Dept: PHYSICAL THERAPY | Age: 72
Discharge: HOME | End: 2022-02-01
Attending: PHYSICAL MEDICINE & REHABILITATION
Payer: MEDICARE

## 2022-02-01 PROCEDURE — 97535 SELF CARE MNGMENT TRAINING: CPT

## 2022-02-01 PROCEDURE — 97162 PT EVAL MOD COMPLEX 30 MIN: CPT

## 2022-02-01 PROCEDURE — 97110 THERAPEUTIC EXERCISES: CPT

## 2022-02-01 NOTE — PROGRESS NOTES
[facility]  [address]  [place], [address]  [phone] fax    Plan of Care/ Statement of Necessity for Physical Therapy Services    Patient name: Claude Flores Start of Care: 2022   Referral source: Marcelle Thompson MD : 1950    Medical Diagnosis: Low back pain [M54.50]  Payor: Ej Goel / Plan: [place] PART A & B / Product Type: Medicare /  Onset Date:4 years ago    Treatment Diagnosis: LBP   Prior Hospitalization: see medical history Provider#: [id number]   Medications: Verified on Patient summary List    Comorbidities: morbid obesity; OA; Depression; HTN; CVA    Prior Level of Function: Ambulates with SPC; limited with walking and standing      The Plan of Care and following information is based on the information from the initial evaluation. Assessment/ key information: 73y.o. year old female presents with CC of chronic LBP that limits her from standing and walking. Impairments noted today: poor TA recruitment and strength; decreased B hip abd/ext strength. Patient will benefit from physical therapy to address deficits, and ultimately to return patient to prior level of function. Evaluation Complexity History HIGH Complexity :3+ comorbidities / personal factors will impact the outcome/ POC ; Examination MEDIUM Complexity : 3 Standardized tests and measures addressing body structure, function, activity limitation and / or participation in recreation  ;Presentation MEDIUM Complexity : Evolving with changing characteristics  ; Clinical Decision Making MEDIUM Complexity : FOTO score of 26-74  Overall Complexity Rating: MEDIUM  Problem List: pain affecting function, decrease ROM, decrease strength, decrease ADL/ functional abilitiies, decrease activity tolerance and decrease flexibility/ joint mobility   Treatment Plan may include any combination of the following: Therapeutic exercise, Neuromuscular re-education, Physical agent/modality, Aquatic therapy and Patient education  Patient / Family readiness to learn indicated by: asking questions, trying to perform skills and interest  Persons(s) to be included in education: patient (P)  Barriers to Learning/Limitations: None  Patient Goal (s): to decrease the pain  Patient Self Reported Health Status: poor  Rehabilitation Potential: good    Short Term Goals: To be accomplished in 2 weeks:  1. I and compliant with HEP for self management of symptoms. Long Term Goals: To be accomplished in 4 weeks:  1. Improve FOTO to 56 to indicate improved function with daily activities. 2. Increase B hip abd strength to grossly 4/5 to improve stability for reaching into kitchen cabinets. 3. Increase B hip extension strength to grossly 4-/5 to improve ability to walk her dog. Frequency / Duration: Patient to be seen 2 times per week for 4 weeks. Patient/ Caregiver education and instruction: Diagnosis, prognosis, exercises   [x]  Plan of care has been reviewed with PTA    Certification Period: 2/1/22-3/3/22  Srikanth Matos, PT 2/1/2022 11:12 AM    ________________________________________________________________________    I certify that the above Therapy Services are being furnished while the patient is under my care. I agree with the treatment plan and certify that this therapy is necessary.     [de-identified] Signature:____________________  Date:____________Time: _________     Ivonne Tejeda MD  Please sign and return to [facility] [address] [place] [address] & [address]  [address]  [place], [address]  [phone] fax

## 2022-02-01 NOTE — PROGRESS NOTES
PT DAILY TREATMENT NOTE 10-18    Patient Name: Elana Lopez  Date:2022  : 1950  [x]  Patient  Verified  Payor: [place] MEDICARE / Plan: [place] MEDICARE PART A & B / Product Type: Medicare /    In time:1028  Out time:1105  Total Treatment Time (min): 37  Visit #: 1 of 8    Medicare/BCBS Only   Total Timed Codes (min):  23 1:1 Treatment Time:  37       Treatment Area: Low back pain [M54.50]    SUBJECTIVE  Pain Level (0-10 scale): 4  Any medication changes, allergies to medications, adverse drug reactions, diagnosis change, or new procedure performed?: [x] No    [] Yes (see summary sheet for update)  Subjective functional status/changes:   [] No changes reported  See eval    OBJECTIVE      14 min [x]Eval                  []Re-Eval       13 min Therapeutic Exercise:  [] See flow sheet :   Rationale: increase ROM and increase strength to improve the patients ability to perform ADLs  10 min Self Care/Home Management: long-term exercise at the [facility]   Rationale: increase ROM and increase strength  to improve the patients ability perform daily activities        With   [] TE   [] TA   [] neuro   [] other: Patient Education: [x] Review HEP    [] Progressed/Changed HEP based on:   [] positioning   [] body mechanics   [] transfers   [] heat/ice application    [] other:      Other Objective/Functional Measures:      Pain Level (0-10 scale) post treatment: 4    ASSESSMENT/Changes in Function: see POC    Patient will continue to benefit from skilled PT services to modify and progress therapeutic interventions, address functional mobility deficits, address ROM deficits, address strength deficits, analyze and address soft tissue restrictions, analyze and cue movement patterns and address imbalance/dizziness to attain remaining goals. []  See Plan of Care  []  See progress note/recertification  []  See Discharge Summary         Progress towards goals / Updated goals:  Short Term Goals: To be accomplished in 2 weeks:  1.  I and compliant with HEP for self management of symptoms. IE: issued HEP  Long Term Goals: To be accomplished in 4 weeks:  1. Improve FOTO to 56 to indicate improved function with daily activities. iE:40  2. Increase B hip abd strength to grossly 4/5 to improve stability for reaching into kitchen cabinets. IE:3+/5  3. Increase B hip extension strength to grossly 4-/5 to improve ability to walk her dog.   IE: 3/5  PLAN  []  Upgrade activities as tolerated     [x]  Continue plan of care  []  Update interventions per flow sheet       []  Discharge due to:_  []  Other:_      Davied Galeazzi, MPT, CMTPT 2/1/2022  11:47 AM    Future Appointments   Date Time Provider Leanna Sirena   2/4/2022  9:15 AM [facility]   2/9/2022 11:15 AM Jaida Morejon, PT [facility]   2/11/2022  8:45 AM Stephani Novoa MD [facility] BS AMB   2/15/2022  1:30 PM Jaida Morejon, PT [facility]   2/17/2022  1:45 PM Maxine Dc PTA [facility]   2/22/2022  1:30 PM Jaida Morejon, PT [facility]   3/10/2022  9:15 AM Erik Gabriel MD [facility] BS AMB   4/14/2022  7:55 AM IO LAB VISIT [facility] BS AMB   4/21/2022  8:30 AM Matheus Black MD [facility] BS AMB   5/10/2022  9:00 AM LORENE Rossi

## 2022-02-04 ENCOUNTER — HOSPITAL ENCOUNTER (OUTPATIENT)
Dept: PHYSICAL THERAPY | Age: 72
Discharge: HOME | End: 2022-02-04
Attending: PHYSICAL MEDICINE & REHABILITATION
Payer: MEDICARE

## 2022-02-04 PROCEDURE — 97113 AQUATIC THERAPY/EXERCISES: CPT

## 2022-02-04 NOTE — PROGRESS NOTES
PT DAILY TREATMENT NOTE     Patient Name: Carol Kramer  Date:2022  : 1950  [x]  Patient  Verified  Payor: VA MEDICARE / Plan: VA MEDICARE PART A & B / Product Type: Medicare /    In time:9:15  Out time:9:55  Total Treatment Time (min): 40  Visit #: 2 of 8    Medicare/BCBS Only   Total Timed Codes (min):  40 1:1 Treatment Time:  40       Treatment Area: Low back pain [M54.50]    SUBJECTIVE  Pain Level (0-10 scale): 4  Any medication changes, allergies to medications, adverse drug reactions, diagnosis change, or new procedure performed?: [x] No    [] Yes (see summary sheet for update)  Subjective functional status/changes:   [] No changes reported  Pt reports feeling okay today, has not been in a pool for a long time but is looking forward to it. OBJECTIVE      40 min Aquatic Exercise:  [x] See flow sheet :   Rationale: increase ROM and increase strength to improve the patients ability to perform ADLs            With   [] TE   [] TA   [] neuro   [] other: Patient Education: [x] Review HEP    [] Progressed/Changed HEP based on:   [] positioning   [] body mechanics   [] transfers   [] heat/ice application    [] other:      Other Objective/Functional Measures:    First f/u after Eval    Pain Level (0-10 scale) post treatment: 3    ASSESSMENT/Changes in Function: Initiated treatment program per flow sheet. Reviewed HEP for understanding and compliance. Pt requires extra time negotiating stairs with step-to pattern and bree HR. Pt reports right knee gives out and she is extra cautious. Once in the pool, pt reports decr;d pain and improved ease with amb. Good confidence in the water. Decr'd HS flexibility with c/o incr'd discomfort with HS stretch at stairs. Decr'd pain following treatment today.      Patient will continue to benefit from skilled PT services to modify and progress therapeutic interventions, address functional mobility deficits, address ROM deficits, address strength deficits, analyze and address soft tissue restrictions, analyze and cue movement patterns, analyze and modify body mechanics/ergonomics and assess and modify postural abnormalities to attain remaining goals. []  See Plan of Care  []  See progress note/recertification  []  See Discharge Summary         Progress towards goals / Updated goals:  Short Term Goals: To be accomplished in 2 weeks:  1. I and compliant with HEP for self management of symptoms.   IE: issued HEP  Current: Pt reports compliance 2/4/2022  Long Term Goals: To be accomplished in 4 weeks:  1. Improve FOTO KD 34 NP indicate improved function with daily activities. iE:40  2. Increase B hip abd strength to grossly 4/5 to improve stability for reaching into kitchen cabinets. IE:3+/5  3. Increase B hip extension strength to grossly 4-/5 to improve ability to walk her dog.   IE: 3/5    PLAN  []  Upgrade activities as tolerated     [x]  Continue plan of care  []  Update interventions per flow sheet       []  Discharge due to:_  []  Other:_      Angel Lipoma Winter, PTA 2/4/2022  9:03 AM    Future Appointments   Date Time Provider Leanna Pack   2/4/2022  9:15 AM Auburn Helm, PTA [facility]   2/9/2022 11:15 AM Real Camps, PT [facility]   2/11/2022  8:45 AM Chuckie Winkler MD [facility] BS AMB   2/15/2022  1:30 PM Real Camps, PT [facility]   2/17/2022  1:45 PM Auburn Helm, PTA [facility]   2/22/2022  1:30 PM Real Camps, PT [facility]   3/10/2022  9:15 AM Ashu Tavarez MD [facility] BS AMB   4/14/2022  7:55 AM IO LAB VISIT [facility] BS AMB   4/21/2022  8:30 AM Sera Mcghee MD [facility] BS AMB   5/10/2022  9:00 AM LORENE Cota

## 2022-02-09 ENCOUNTER — HOSPITAL ENCOUNTER (OUTPATIENT)
Dept: PHYSICAL THERAPY | Age: 72
Discharge: HOME | End: 2022-02-09
Attending: PHYSICAL MEDICINE & REHABILITATION
Payer: MEDICARE

## 2022-02-09 PROCEDURE — 97113 AQUATIC THERAPY/EXERCISES: CPT

## 2022-02-09 NOTE — PROGRESS NOTES
PT DAILY TREATMENT NOTE     Patient Name: Amarilys Pearson  BIJI:[id number]  : 1950  [x]  Patient  Verified  Payor: VA MEDICARE / Plan: VA MEDICARE PART A & B / Product Type: Medicare /    In time:1114am  Out time:1154am  Total Treatment Time (min): 40  Visit #: 3 of 8    Medicare/BCBS Only   Total Timed Codes (min):  40 1:1 Treatment Time:  40       Treatment Area: Low back pain [M54.50]    SUBJECTIVE  Pain Level (0-10 scale): 4  Any medication changes, allergies to medications, adverse drug reactions, diagnosis change, or new procedure performed?: [x] No    [] Yes (see summary sheet for update)  Subjective functional status/changes:   [] No changes reported  Pt reports no adverse effects to her first session. OBJECTIVE    40 min Aquatic [place]:  [x] See flow sheet :   Rationale: increase ROM, increase strength and improve coordination to improve the patients ability to manage ADLs with improved ease and reduced pain. With   [] TE   [] TA   [] neuro   [] other: Patient Education: [x] Review HEP    [] Progressed/Changed HEP based on:   [] positioning   [] body mechanics   [] transfers   [] heat/ice application    [] other:      Other Objective/Functional Measures: exercises per flowsheet     Pain Level (0-10 scale) post treatment: 3    ASSESSMENT/Changes in Function: Stretches were staggered today to reduce discomfort. Pt has a lot of difficulty with stair descent due to left > right knee pain and end stage OA, but improved ease getting out of pool. Good reports of reduced pain following session today.      Patient will continue to benefit from skilled PT services to modify and progress therapeutic interventions, address functional mobility deficits, address ROM deficits, address strength deficits, analyze and address soft tissue restrictions, analyze and cue movement patterns, analyze and modify body mechanics/ergonomics, assess and modify postural abnormalities, address imbalance/dizziness and instruct in home and community integration to attain remaining goals. []  See Plan of Care  []  See progress note/recertification  []  See Discharge Summary         Progress towards goals / Updated goals:  Short Term Goals: To be accomplished in 2 weeks:  1. I and compliant with HEP for self management of symptoms.   IE: issued HEP  Current: Pt reports compliance 2/4/2022  Long Term Goals: To be accomplished in 4 weeks:  1. Improve FOTO CM 72 JG indicate improved function with daily activities.   iE:40  2. Increase B hip abd strength to grossly 4/5 to improve stability for reaching into kitchen cabinets. IE:3+/5  Current: 3+/5, pt evaluated 1.5 weeks ago, strength change not yet anticipated. (2/9/2022)   3. Increase B hip extension strength to grossly 4-/5 to improve ability to walk her dog.   IE: 3/5    PLAN  []  Upgrade activities as tolerated     [x]  Continue plan of care  []  Update interventions per flow sheet       []  Discharge due to:_  []  Other:_      John Allison, PT 2/9/2022  11:12 AM    Future Appointments   Date Time Provider Leanna Pack   2/9/2022 11:15 AM Emily Tran, PT [facility]   2/11/2022  8:45 AM Rhonda Barker MD [facility] BS AMB   2/15/2022  1:30 PM Emily Tran, PT [facility]   2/17/2022  1:45 PM Maxine Dc PTA [facility]   2/22/2022  1:30 PM Emily Tran, PT [facility]   3/10/2022  9:15 AM Jessika Ventura MD [facility] BS AMB   4/14/2022  7:55 AM [facility] LAB VISIT [facility] BS AMB   4/21/2022  8:30 AM Peggy Vo MD [facility] BS AMB   5/10/2022  9:00 AM LORENE Ching

## 2022-02-11 ENCOUNTER — VIRTUAL VISIT (OUTPATIENT)
Dept: INTERNAL MEDICINE CLINIC | Age: 72
End: 2022-02-11
Payer: MEDICARE

## 2022-02-11 ENCOUNTER — OFFICE VISIT (OUTPATIENT)
Dept: ORTHOPEDIC SURGERY | Age: 72
End: 2022-02-11
Payer: MEDICARE

## 2022-02-11 VITALS — WEIGHT: 270 LBS | OXYGEN SATURATION: 96 % | HEIGHT: 66 IN | HEART RATE: 82 BPM | BODY MASS INDEX: 43.39 KG/M2

## 2022-02-11 DIAGNOSIS — J02.9 PHARYNGITIS, UNSPECIFIED ETIOLOGY: Primary | ICD-10-CM

## 2022-02-11 DIAGNOSIS — M25.561 CHRONIC PAIN OF BOTH KNEES: ICD-10-CM

## 2022-02-11 DIAGNOSIS — Z86.718 HISTORY OF BLOOD CLOTS: ICD-10-CM

## 2022-02-11 DIAGNOSIS — G89.29 CHRONIC PAIN OF BOTH KNEES: ICD-10-CM

## 2022-02-11 DIAGNOSIS — I82.4Z2 DEEP VEIN THROMBOSIS (DVT) OF DISTAL VEIN OF LEFT LOWER EXTREMITY, UNSPECIFIED CHRONICITY (HCC): ICD-10-CM

## 2022-02-11 DIAGNOSIS — M25.562 CHRONIC PAIN OF BOTH KNEES: ICD-10-CM

## 2022-02-11 DIAGNOSIS — M17.0 PRIMARY OSTEOARTHRITIS OF BOTH KNEES: Primary | ICD-10-CM

## 2022-02-11 PROCEDURE — G8399 PT W/DXA RESULTS DOCUMENT: HCPCS | Performed by: INTERNAL MEDICINE

## 2022-02-11 PROCEDURE — G8427 DOCREV CUR MEDS BY ELIG CLIN: HCPCS | Performed by: ORTHOPAEDIC SURGERY

## 2022-02-11 PROCEDURE — 1101F PT FALLS ASSESS-DOCD LE1/YR: CPT | Performed by: ORTHOPAEDIC SURGERY

## 2022-02-11 PROCEDURE — 99214 OFFICE O/P EST MOD 30 MIN: CPT | Performed by: INTERNAL MEDICINE

## 2022-02-11 PROCEDURE — G8756 NO BP MEASURE DOC: HCPCS | Performed by: ORTHOPAEDIC SURGERY

## 2022-02-11 PROCEDURE — G0463 HOSPITAL OUTPT CLINIC VISIT: HCPCS | Performed by: INTERNAL MEDICINE

## 2022-02-11 PROCEDURE — 1101F PT FALLS ASSESS-DOCD LE1/YR: CPT | Performed by: INTERNAL MEDICINE

## 2022-02-11 PROCEDURE — G8536 NO DOC ELDER MAL SCRN: HCPCS | Performed by: ORTHOPAEDIC SURGERY

## 2022-02-11 PROCEDURE — 99214 OFFICE O/P EST MOD 30 MIN: CPT | Performed by: ORTHOPAEDIC SURGERY

## 2022-02-11 PROCEDURE — G9899 SCRN MAM PERF RSLTS DOC: HCPCS | Performed by: ORTHOPAEDIC SURGERY

## 2022-02-11 PROCEDURE — G9899 SCRN MAM PERF RSLTS DOC: HCPCS | Performed by: INTERNAL MEDICINE

## 2022-02-11 PROCEDURE — 3017F COLORECTAL CA SCREEN DOC REV: CPT | Performed by: ORTHOPAEDIC SURGERY

## 2022-02-11 PROCEDURE — G9717 DOC PT DX DEP/BP F/U NT REQ: HCPCS | Performed by: ORTHOPAEDIC SURGERY

## 2022-02-11 PROCEDURE — G9717 DOC PT DX DEP/BP F/U NT REQ: HCPCS | Performed by: INTERNAL MEDICINE

## 2022-02-11 PROCEDURE — G8417 CALC BMI ABV UP PARAM F/U: HCPCS | Performed by: ORTHOPAEDIC SURGERY

## 2022-02-11 PROCEDURE — 1090F PRES/ABSN URINE INCON ASSESS: CPT | Performed by: INTERNAL MEDICINE

## 2022-02-11 PROCEDURE — 1090F PRES/ABSN URINE INCON ASSESS: CPT | Performed by: ORTHOPAEDIC SURGERY

## 2022-02-11 PROCEDURE — G8756 NO BP MEASURE DOC: HCPCS | Performed by: INTERNAL MEDICINE

## 2022-02-11 PROCEDURE — G8399 PT W/DXA RESULTS DOCUMENT: HCPCS | Performed by: ORTHOPAEDIC SURGERY

## 2022-02-11 PROCEDURE — G8427 DOCREV CUR MEDS BY ELIG CLIN: HCPCS | Performed by: INTERNAL MEDICINE

## 2022-02-11 PROCEDURE — 3017F COLORECTAL CA SCREEN DOC REV: CPT | Performed by: INTERNAL MEDICINE

## 2022-02-11 RX ORDER — CEPHALEXIN 500 MG/1
500 CAPSULE ORAL 2 TIMES DAILY
Qty: 20 CAPSULE | Refills: 0 | Status: SHIPPED | OUTPATIENT
Start: 2022-02-11 | End: 2022-02-21

## 2022-02-11 RX ORDER — NYSTATIN 100000 [USP'U]/ML
1 SUSPENSION ORAL 4 TIMES DAILY
Qty: 473 ML | Refills: 0 | Status: SHIPPED
Start: 2022-02-11 | End: 2022-03-10

## 2022-02-11 NOTE — PROGRESS NOTES
Mindy Frazier is a 73 y.o. female who was seen by synchronous (real-time) audio-video technology on 2/11/2022. Assessment & Plan:   1. Deep vein thrombosis (DVT) Hx: S/p AC. Asymptomatic. Observation. C/w plavix given h/o CVD. 2. Pharyngitis: From thrush per PE findings? Strep throat. Sx are worsening.  - Nystatin mouth wash ordered. - Keflex course prescribed. - I encouraged her to take probiotics while on abx and to notify me if sx do not respond. ORDERS:  - nystatin (MYCOSTATIN) 100,000 unit/mL suspension; Take 5 mL by mouth four (4) times daily. swish and spit  Dispense: 473 mL; Refill: 0  - cephALEXin (KEFLEX) 500 mg capsule; Take 1 Capsule by mouth two (2) times a day for 10 days. Dispense: 20 Capsule; Refill: 0            Lab review: labs are reviewed in the EHR     I have discussed the diagnosis with the patient and the intended plan as seen in the above orders. I have discussed medication side effects and warnings with the patient as well. I have reviewed the plan of care with the patient, accepted their input and they are in agreement with the treatment goals. All questions were answered. The patient understands the plan of care. Pt instructed if symptoms worsen to call the office or report to the ED for continued care. Greater than 50% of the visit time was spent in counseling and/or coordination of care. Voice recognition was used to generate this report, which may have resulted in some phonetic based errors in grammar and contents. Even though attempts were made to correct all the mistakes, some may have been missed, and remained in the body of the document.       Subjective:   Mindy Frazier was seen for   Chief Complaint   Patient presents with    Cough     The patient is a 17-year-old female with history of obesity, allergic rhinitis, IBS-diarrhea, onychomycosis, prediabetes, lumbar spinal stenosis, depression, hypertension, adrenal incidentaloma seen on CT scan 2015, LLE DVT (2021), and history of cerebral infarction her head MRI findings (followed by Neurology). 1. Cough: She reports a sore throat since Sunday. She can't get rid of it. \"Now it burns. It's hot my throat, my mouth. \" No sick contacts. No fever. She saw Orthopedics this morning and her temp was 97. +Cough (no sputum). No SOB. Her cough is not improving. Her sore throat is worsening. She is taking an antihistamine \"but they haven't been doing anything. \"    2. H/o DVT: +Chronic non occlusive DVT in left leg: posterior tibial, peroneal veins. +Acute DVT in gastronemious veins - per study 8/30/21. She continues to take Plavix, status post completing xarelto course. Prior to Admission medications    Medication Sig Start Date End Date Taking? Authorizing Provider   nystatin (MYCOSTATIN) 100,000 unit/mL suspension Take 5 mL by mouth four (4) times daily. swish and spit 2/11/22  Yes Peggy Vo MD   cephALEXin [facility]) 500 mg capsule Take 1 Capsule by mouth two (2) times a day for 10 days. 2/11/22 2/21/22 Yes Peggy Vo MD   Myrbetriq 50 mg ER tablet TAKE ONE TABLET BY MOUTH DAILY 2/2/22   LORENE Ching   rosuvastatin (CRESTOR) 10 mg tablet TAKE ONE TABLET BY MOUTH EVERY EVENING 1/25/22   Peggy Vo MD   tolterodine ER (DETROL LA) 4 mg ER capsule TAKE ONE CAPSULE BY MOUTH DAILY 11/18/21   Peggy Vo MD   nystatin (MYCOSTATIN) powder Apply  to affected area four (4) times daily. Affected area: under b/l breasts 9/24/21   Peggy Vo MD   clotrimazole-betamethasone (LOTRISONE) topical cream Apply  to affected area two (2) times a day. Affected area: along b/l breasts areas 9/24/21   Peggy Vo MD   atenoloL-chlorthalidone (TENORETIC) 50-25 mg per tablet TAKE ONE TABLET BY MOUTH DAILY 8/15/21   Peggy Vo MD   clopidogreL (Plavix) 75 mg tab  5/27/21   Provider, Historical   DULoxetine (Cymbalta) 30 mg capsule Take 1 Cap by mouth two (2) times a day.  10/5/20   Teresita Mix Tharon Harada, MD   gabapentin (NEURONTIN) 300 mg capsule Take 300 mg by mouth nightly. 1/8/18   Provider, Historical   fluticasone (FLONASE) 50 mcg/actuation nasal spray 2 Sprays by Both Nostrils route daily. 9/6/17   Lavone Gottron, MD   Cholecalciferol, Vitamin D3, (VITAMIN D3) 1,000 unit cap Take 1,000 Units by mouth daily.     Other, MD Rama     Allergies   Allergen Reactions    Amoxicillin-Pot Clavulanate Diarrhea    Azithromycin Diarrhea    Oxybutynin Other (comments)     Problems swallowing, dry mouth    Seroquel [Quetiapine] Other (comments)     Tremors    Sulfa (Sulfonamide Antibiotics) Diarrhea    Wellbutrin [Bupropion Hcl] Itching    Potassium Diarrhea     Past Medical History:   Diagnosis Date    Arthritis 2019    spine, both hips, both knees    Depression January 2009    Diabetes [facility])     Pre DM    DVT (deep venous thrombosis) ([facility] [address] 01/2021    Hypercholesterolemia     Hypertension     Sleep apnea     cpap    Stroke [facility]) early 2000    speech problem, balance problem    Thromboembolus ([facility] [address] 1990s    left leg     Past Surgical History:   Procedure Laterality Date    HX APPENDECTOMY      HX CHOLECYSTECTOMY      HX GYN      partial hysterectomy    HX KNEE ARTHROSCOPY  1990    Left     VASCULAR SURGERY PROCEDURE UNLIST  01/05/21    blood clot behind left knee being treated with medication     Family History   Problem Relation Age of Onset    Dementia Mother     Stroke Father     Cancer Brother     Hypertension Brother     Diabetes Brother     Diabetes Maternal Uncle     Stroke Maternal Aunt     Stroke Maternal Grandmother      Social History     Socioeconomic History    Marital status:    Tobacco Use    Smoking status: Never Smoker    Smokeless tobacco: Never Used   Vaping Use    Vaping Use: Never used   Substance and Sexual Activity    Alcohol use: Never    Drug use: Never    Sexual activity: Yes     Partners: Male     Birth control/protection: None ROS:  Gen: No fever/chills  HEENT: See HPI  CV: No CP  Resp: See HPI  GI: No abdominal pain  : No hematuria/dysuria  Derm: No rash  Neuro: No new paresthesias/weakness  Musc: No new myalgias/jt pain  Psych: No depression sx      Objective:     General: alert, cooperative, no distress   Mental  status: mental status: alert, oriented to person, place, and time, normal mood, behavior, speech, dress, motor activity, and thought processes   Resp: resp: normal effort and no respiratory distress   Neuro: neuro: no gross deficits   Skin: skin: no discoloration or lesions of concern on visible areas   The left side of her tongue appears to be white    LABS:  Lab Results   Component Value Date/Time    Sodium 143 10/04/2021 08:53 AM    Potassium 3.2 (L) 10/04/2021 08:53 AM    Chloride 102 10/04/2021 08:53 AM    CO2 33 (H) 10/04/2021 08:53 AM    Anion gap 8 10/04/2021 08:53 AM    Glucose 109 (H) 10/04/2021 08:53 AM    BUN 17 10/04/2021 08:53 AM    Creatinine 0.91 10/04/2021 08:53 AM    BUN/Creatinine ratio 19 10/04/2021 08:53 AM    GFR est AA >60 10/04/2021 08:53 AM    GFR est non-AA >60 10/04/2021 08:53 AM    Calcium 9.4 10/04/2021 08:53 AM       Lab Results   Component Value Date/Time    Cholesterol, total 127 05/05/2021 01:35 PM    HDL Cholesterol 47 05/05/2021 01:35 PM    LDL, calculated 56.2 05/05/2021 01:35 PM    VLDL, calculated 23.8 05/05/2021 01:35 PM    Triglyceride 119 05/05/2021 01:35 PM    CHOL/HDL Ratio 2.7 05/05/2021 01:35 PM       Lab Results   Component Value Date/Time    WBC 7.1 01/10/2019 08:55 AM    HGB 13.6 01/10/2019 08:55 AM    HCT 44.6 01/10/2019 08:55 AM    PLATELET 052 31/67/1115 08:55 AM    MCV 99.6 (H) 01/10/2019 08:55 AM       Lab Results   Component Value Date/Time    Hemoglobin A1c 6.1 (H) 10/04/2021 08:53 AM    Hemoglobin A1c (POC) 5.8 03/05/2020 09:22 AM    Hemoglobin A1c, External 5.8 09/08/2015 12:00 AM       No results found for: TSH, TSH2, TSH3, TSHP, TSHEXT, TSHEXT        Due to this being a TeleHealth  evaluation, many elements of the physical examination are unable to be assessed. The pt was seen by synchronous (real-time) audio-video technology, and/or her healthcare decision maker, is aware that this patient-initiated, Telehealth encounter is a billable service, with coverage as determined by her insurance carrier. She is aware that she may receive a bill and has provided verbal consent to proceed: Yes. The patient (or guardian if applicable) is aware that this is a billable service, which includes applicable copays. This virtual visit was conducted with the patient's (and/or legal guardian's) consent. The pt is located in a state where the provider was licensed to provide care. The pt is being evaluated by a video visit encounter for concerns as above. A caregiver was present when appropriate. Due to this being a TeleHealth encounter (During XJQVE-15 public health emergency), evaluation of the following organ systems was limited: Vitals/Constitutional/EENT/Resp/CV/GI//MS/Neuro/Skin/Heme-Lymph-Imm. Pursuant to the emergency declaration under the [facility], [facility] waiver authority and the [employer] and [employer] Act, this Virtual  Visit was conducted, with patient's (and/or legal guardian's) consent, to reduce the patient's risk of exposure to COVID-19 and provide necessary medical care. Services were provided through a video synchronous discussion virtually to substitute for in-person clinic visit. Patient and provider were located at their individual home/office respectively. We discussed the expected course, resolution and complications of the diagnosis(es) in detail. Medication risks, benefits, costs, interactions, and alternatives were discussed as indicated. I advised her to contact the office if her condition worsens, changes or fails to improve as anticipated.  She expressed understanding with the diagnosis(es) and plan.      Surya Fernandez MD

## 2022-02-11 NOTE — PROGRESS NOTES
Patient: Jenna Davis                MRN: [id number]       SSN: [id number]  YOB: 1950        AGE: 73 y.o. SEX: female  Body mass index is 43.58 kg/m². PCP: Dara Hernandez MD  02/11/22    He returns in follow-up and reevaluation of bilateral knee pain she is had at least 3 DVTs 1 after left knee scope and a couple of months last year or so she went to vascular she is not seen hematology him and recommend that for the injection for the right knee was only moderately helpful and still achy the left knee did better. Denies fevers or chills otherwise feeling well and the examination at today very pleasant lady her body mass index is at 44 she has a fair bit of varicosities and calf nontender Jason Pall' sign negative she is in varus on the right knee few degree fixed flexion deformity calf nontender Homans' sign is negative    Review of the the x-rays from previous confirm severe arthritis right knee.     Very concerned with regards to her at least 3 DVTs she may be a candidate for a filter prior to total joint replacement which she is eventually heading to I recommend a 25 pound weight loss and would like her to see hematology we will also get some basic labs as well to see if she is hypercoagulable    She is interested in surgery I think she is pretty high risk again for for DVT PE    We will see her back after she had a chance is hematology a brace there was a discussion regarding surgery and is not immediately recommended due to her weight and also we need to get a better etiology as to the origin of her recurrent blood clots we will see her back in about a month to review some blood work and hematology consult    REVIEW OF SYSTEMS:      CON: negative  EYE: negative   ENT: negative  RESP: negative  GI:    negative   :  negative  MSK: Positive  A twelve point review of systems was completed, positives noted and all other systems were reviewed and are negative          Past Medical History:   Diagnosis Date    Arthritis 2019    spine, both hips, both knees    Depression January 2009    Diabetes [facility])     Pre DM    DVT (deep venous thrombosis) ([facility] [address] 01/2021    Hypercholesterolemia     Hypertension     Sleep apnea     cpap    Stroke [facility]) early 2000    speech problem, balance problem    Thromboembolus ([facility] [address] 1990s    left leg       Family History   Problem Relation Age of Onset    Dementia Mother     Stroke Father     Cancer Brother     Hypertension Brother     Diabetes Brother     Diabetes Maternal Uncle     Stroke Maternal Aunt     Stroke Maternal Grandmother        Current Outpatient Medications   Medication Sig Dispense Refill    Myrbetriq 50 mg ER tablet TAKE ONE TABLET BY MOUTH DAILY 30 Tablet 3    rosuvastatin (CRESTOR) 10 mg tablet TAKE ONE TABLET BY MOUTH EVERY EVENING 90 Tablet 2    tolterodine ER (DETROL LA) 4 mg ER capsule TAKE ONE CAPSULE BY MOUTH DAILY 90 Capsule 2    nystatin (MYCOSTATIN) powder Apply  to affected area four (4) times daily. Affected area: under b/l breasts 60 g 11    clotrimazole-betamethasone (LOTRISONE) topical cream Apply  to affected area two (2) times a day. Affected area: along b/l breasts areas 45 g 11    atenoloL-chlorthalidone (TENORETIC) 50-25 mg per tablet TAKE ONE TABLET BY MOUTH DAILY 90 Tablet 2    clopidogreL (Plavix) 75 mg tab       DULoxetine (Cymbalta) 30 mg capsule Take 1 Cap by mouth two (2) times a day. 60 Cap 11    gabapentin (NEURONTIN) 300 mg capsule Take 300 mg by mouth nightly.  fluticasone (FLONASE) 50 mcg/actuation nasal spray 2 Sprays by Both Nostrils route daily. 1 Bottle 11    Cholecalciferol, Vitamin D3, (VITAMIN D3) 1,000 unit cap Take 1,000 Units by mouth daily.          Allergies   Allergen Reactions    Amoxicillin-Pot Clavulanate Diarrhea    Azithromycin Diarrhea    Oxybutynin Other (comments)     Problems swallowing, dry mouth    Seroquel [Quetiapine] Other (comments)     Tremors    Sulfa (Sulfonamide Antibiotics) Diarrhea    Wellbutrin [Bupropion Hcl] Itching    Potassium Diarrhea       Past Surgical History:   Procedure Laterality Date    HX APPENDECTOMY      HX CHOLECYSTECTOMY      HX GYN      partial hysterectomy    HX KNEE ARTHROSCOPY  1990    Left     VASCULAR SURGERY PROCEDURE UNLIST  01/05/21    blood clot behind left knee being treated with medication       Social History     Socioeconomic History    Marital status:      Spouse name: Not on file    Number of children: Not on file    Years of education: Not on file    Highest education level: Not on file   Occupational History    Not on file   Tobacco Use    Smoking status: Never Smoker    Smokeless tobacco: Never Used   Vaping Use    Vaping Use: Never used   Substance and Sexual Activity    Alcohol use: Never    Drug use: Never    Sexual activity: Yes     Partners: Male     Birth control/protection: None   Other Topics Concern    Not on file   Social History Narrative    Not on file     Social Determinants of Health     Financial Resource Strain:     Difficulty of Paying Living Expenses: Not on file   Food Insecurity:     Worried About Running Out of Food in the Last Year: Not on file    Rosanna of Food in the Last Year: Not on file   Transportation Needs:     Lack of Transportation (Medical): Not on file    Lack of Transportation (Non-Medical):  Not on file   Physical Activity:     Days of Exercise per Week: Not on file    Minutes of Exercise per Session: Not on file   Stress:     Feeling of Stress : Not on file   Social Connections:     Frequency of Communication with Friends and Family: Not on file    Frequency of Social Gatherings with Friends and Family: Not on file    Attends [employer] Services: Not on file    Active Member of Clubs or Organizations: Not on file    Attends Club or Organization Meetings: Not on file    Marital Status: Not on file   Intimate Partner Violence:     Fear of Current or Ex-Partner: Not on file    Emotionally Abused: Not on file    Physically Abused: Not on file    Sexually Abused: Not on file   Housing Stability:     Unable to Pay for Housing in the Last Year: Not on file    Number of Places Lived in the Last Year: Not on file    Unstable Housing in the Last Year: Not on file       Visit Vitals  Pulse 82   Ht 5' 6\" (1.676 m)   Wt 122.5 kg (270 lb)   SpO2 96%   BMI 43.58 kg/m²         PHYSICAL EXAMINATION:  GENERAL: Alert and oriented x3, in no acute distress, well-developed, well-nourished, afebrile. HEART: No JVD. EYES: No scleral icterus   NECK: No significant lymphadenopathy   LUNGS: No respiratory compromise or indrawing  ABDOMEN: Soft, non-tender, non-distended. Note: This note was completed using voice recognition software. Any typographical/name errors or mistakes are unintentional.    Electronically signed by:  Lorri Lopes MD

## 2022-02-15 ENCOUNTER — APPOINTMENT (OUTPATIENT)
Dept: PHYSICAL THERAPY | Age: 72
End: 2022-02-15
Attending: PHYSICAL MEDICINE & REHABILITATION
Payer: MEDICARE

## 2022-02-17 ENCOUNTER — HOSPITAL ENCOUNTER (OUTPATIENT)
Dept: PHYSICAL THERAPY | Age: 72
Discharge: HOME | End: 2022-02-17
Attending: PHYSICAL MEDICINE & REHABILITATION
Payer: MEDICARE

## 2022-02-17 ENCOUNTER — HOSPITAL ENCOUNTER (OUTPATIENT)
Dept: LAB | Age: 72
Discharge: HOME | End: 2022-02-17
Payer: MEDICARE

## 2022-02-17 DIAGNOSIS — Z86.718 HISTORY OF BLOOD CLOTS: ICD-10-CM

## 2022-02-17 LAB — FACT VIII ACT/NOR PPP: 153 % (ref 80–200)

## 2022-02-17 PROCEDURE — 97113 AQUATIC THERAPY/EXERCISES: CPT

## 2022-02-17 PROCEDURE — 85306 CLOT INHIBIT PROT S FREE: CPT

## 2022-02-17 PROCEDURE — 80307 DRUG TEST PRSMV CHEM ANLYZR: CPT

## 2022-02-17 PROCEDURE — 85240 CLOT FACTOR VIII AHG 1 STAGE: CPT

## 2022-02-17 PROCEDURE — 85302 CLOT INHIBIT PROT C ANTIGEN: CPT

## 2022-02-17 PROCEDURE — 36415 COLL VENOUS BLD VENIPUNCTURE: CPT

## 2022-02-17 PROCEDURE — 85415 FIBRINOLYTIC PLASMINOGEN: CPT

## 2022-02-17 NOTE — PROGRESS NOTES
PT DAILY TREATMENT NOTE     Patient Name: Jenna Davis  KPTD:  : 1950  [x]  Patient  Verified  Payor: VA MEDICARE / Plan: VA MEDICARE PART A & B / Product Type: Medicare /    In time:1:45  Out time:2:25  Total Treatment Time (min): 40  Visit #: 4 of 8    Medicare/BCBS Only   Total Timed Codes (min):  40 1:1 Treatment Time:  40       Treatment Area: Low back pain [M54.50]    SUBJECTIVE  Pain Level (0-10 scale): 4  Any medication changes, allergies to medications, adverse drug reactions, diagnosis change, or new procedure performed?: [x] No    [] Yes (see summary sheet for update)  Subjective functional status/changes:   [] No changes reported  Pt reports \"I feel like I don't do anything strenuous while I'm here but when I get home, I can feel it\"    OBJECTIVE    40 min Aquatic Exercise:  [x] See flow sheet :   Rationale: increase ROM and increase strength to improve the patients ability to perform ADLs          With   [] TE   [] TA   [] neuro   [] other: Patient Education: [x] Review HEP    [] Progressed/Changed HEP based on:   [] positioning   [] body mechanics   [] transfers   [] heat/ice application    [] other:      Other Objective/Functional Measures:   incr'd to 5.0 mph forward walking on TM     Pain Level (0-10 scale) post treatment: 3    ASSESSMENT/Changes in Function: Cont'd difficulty negotiating stair steps, requires bree UE support and step-to with extra time. Demo'd improved activity tolerance today, performed well with incr'd reps and incr'd walking pace without c/o discomfort. Patient will continue to benefit from skilled PT services to modify and progress therapeutic interventions, address functional mobility deficits, address ROM deficits, address strength deficits, analyze and address soft tissue restrictions, analyze and cue movement patterns, analyze and modify body mechanics/ergonomics and assess and modify postural abnormalities to attain remaining goals.      []  See Plan of Care  []  See progress note/recertification  []  See Discharge Summary         Progress towards goals / Updated goals:  Short Term Goals: To be accomplished in 2 weeks:  1. I and compliant with HEP for self management of symptoms.   IE: issued HEP  Current: Pt reports compliance 2/4/2022  Long Term Goals: To be accomplished in 4 weeks:  1. Improve FOTO YW 81 AY indicate improved function with daily activities.   iE:40  2. Increase B hip abd strength to grossly 4/5 to improve stability for reaching into kitchen cabinets. IE:3+/5  Current: 3+/5, pt evaluated 1.5 weeks ago, strength change not yet anticipated. (2/9/2022)   3. Increase B hip extension strength to grossly 4-/5 to improve ability to walk her dog.   IE: 3/5       PLAN  []  Upgrade activities as tolerated     [x]  Continue plan of care  []  Update interventions per flow sheet       []  Discharge due to:_  []  Other:_      Meriden Ana Maria, PTA 2/17/2022  1:29 PM    Future Appointments   Date Time Provider Leanna Pack   2/17/2022  1:45 PM [facility]   2/22/2022  1:30 PM [facility]   3/10/2022  9:15 AM Caitlin Garcia MD [facility] BS AMB   3/18/2022  9:00 AM Kannan Pickett MD [facility] BS AMB   4/14/2022  7:55 AM IO LAB VISIT [facility] BS AMB   4/21/2022  8:30 AM Sherry Wyatt MD [facility] BS AMB   5/10/2022  9:00 AM LORENE Mitchell

## 2022-02-18 LAB
COTININE UR QL SCN: NEGATIVE NG/ML
DRUG SCREEN COMMENT:, 753798: NORMAL

## 2022-02-19 LAB
PROT S ACT/NOR PPP: 90 % (ref 63–140)
PROT S AG ACT/NOR PPP IA: 115 % (ref 60–150)
PROT S FREE AG ACT/NOR PPP IA: 123 % (ref 61–136)

## 2022-02-21 LAB — PROT C AG PPP IA-ACNC: 117 % (ref 60–150)

## 2022-02-22 ENCOUNTER — APPOINTMENT (OUTPATIENT)
Dept: PHYSICAL THERAPY | Age: 72
End: 2022-02-22
Attending: PHYSICAL MEDICINE & REHABILITATION
Payer: MEDICARE

## 2022-02-22 ENCOUNTER — HOSPITAL ENCOUNTER (OUTPATIENT)
Dept: PHYSICAL THERAPY | Age: 72
Discharge: HOME | End: 2022-02-22
Attending: PHYSICAL MEDICINE & REHABILITATION
Payer: MEDICARE

## 2022-02-22 LAB — PAI1 AG PPP IA-ACNC: 21 IU/ML (ref 0–27)

## 2022-02-22 PROCEDURE — 97113 AQUATIC THERAPY/EXERCISES: CPT

## 2022-02-22 NOTE — PROGRESS NOTES
PT DAILY TREATMENT NOTE     Patient Name: Ileana Warner  Date:2022  : 1950  [x]  Patient  Verified  Payor: VA MEDICARE / Plan: VA MEDICARE PART A & B / Product Type: Medicare /    In time:140pm  Out time:218pm  Total Treatment Time (min): 38  Visit #: 5 of 8    Medicare/BCBS Only   Total Timed Codes (min):  38 1:1 Treatment Time:  38       Treatment Area: Low back pain [M54.50]    SUBJECTIVE  Pain Level (0-10 scale): 4  Any medication changes, allergies to medications, adverse drug reactions, diagnosis change, or new procedure performed?: [x] No    [] Yes (see summary sheet for update)  Subjective functional status/changes:   [] No changes reported  Right > left knee pain but left > right knee weakness. OBJECTIVE    40 min Aquatic Exercise:  [x]? See flow sheet :   Rationale: increase ROM and increase strength to improve the patients ability to perform ADLs          With   [] TE   [] TA   [] neuro   [] other: Patient Education: [x] Review HEP    [] Progressed/Changed HEP based on:   [] positioning   [] body mechanics   [] transfers   [] heat/ice application    [] other:      Other Objective/Functional Measures: 38     Pain Level (0-10 scale) post treatment: 3    ASSESSMENT/Changes in Function: Pt performs exercises in pool pain free, with ability to complete all exercises as directed. Demonstrates some improved ease of stair descent utilizing [employer] in pool for assist due to B LE weakness. Patient will continue to benefit from skilled PT services to modify and progress therapeutic interventions, address functional mobility deficits, address ROM deficits, address strength deficits, analyze and address soft tissue restrictions, analyze and cue movement patterns, analyze and modify body mechanics/ergonomics, assess and modify postural abnormalities, address imbalance/dizziness and instruct in home and community integration to attain remaining goals.      []  See Plan of Care  []  See progress note/recertification  []  See Discharge Summary         Progress towards goals / Updated goals:  Short Term Goals: To be accomplished in 2 weeks:  1. I and compliant with HEP for self management of symptoms.   IE: issued HEP  Current: Pt reports compliance 2/4/2022  Long Term Goals: To be accomplished in 4 weeks:  1. Improve FOTO ZB 32 IF indicate improved function with daily activities.   iE:40  2. Increase B hip abd strength to grossly 4/5 to improve stability for reaching into kitchen cabinets. IE:3+/5  Current: 3+/5, pt evaluated 1.5 weeks ago, strength change not yet anticipated. (2/9/2022)   3. Increase B hip extension strength to grossly 4-/5 to improve ability to walk her dog.   IE: 3/5    PLAN  []  Upgrade activities as tolerated     [x]  Continue plan of care  []  Update interventions per flow sheet       []  Discharge due to:_  []  Other:_      Serena Ku, PT 2/22/2022  1:35 PM    Future Appointments   Date Time Provider Leanna Pack   2/24/2022 10:30 AM Swetha Alvares PTA [facility]   3/1/2022  9:15 AM Swetha Alvares, PTA [facility]   3/3/2022  9:45 AM Swetha Alvares PTA [facility]   3/10/2022  9:15 AM Bob Hinojosa MD [facility] BS AMB   3/18/2022  9:00 AM Barbara Hutchinson MD [facility] AMB   4/14/2022  7:55 AM IO LAB VISIT [facility] BS AMB   4/21/2022  8:40 AM Mary Lowry MD [facility] BS AMB   5/10/2022  9:00 AM LORENE Valdivia [address] [facility]

## 2022-02-24 ENCOUNTER — HOSPITAL ENCOUNTER (OUTPATIENT)
Dept: PHYSICAL THERAPY | Age: 72
Discharge: HOME | End: 2022-02-24
Attending: PHYSICAL MEDICINE & REHABILITATION
Payer: MEDICARE

## 2022-02-24 PROCEDURE — 97113 AQUATIC THERAPY/EXERCISES: CPT

## 2022-02-24 NOTE — PROGRESS NOTES
PT DAILY TREATMENT NOTE     Patient Name: Judi Odell  Date:2022  : 1950  [x]  Patient  Verified  Payor: VA MEDICARE / Plan: VA MEDICARE PART A & B / Product Type: Medicare /    In time:10:30  Out time:11:09  Total Treatment Time (min): 39  Visit #: 6 of 8    Medicare/BCBS Only   Total Timed Codes (min):  39 1:1 Treatment Time:  39       Treatment Area: Low back pain [M54.50]    SUBJECTIVE  Pain Level (0-10 scale):510  Any medication changes, allergies to medications, adverse drug reactions, diagnosis change, or new procedure performed?: [x] No    [] Yes (see summary sheet for update)  Subjective functional status/changes:   [] No changes reported  Pt reports no new complaints of pain. 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 OBJECTIVE    39 min Therapeutic Exercise:  [x] See flow sheet : Aquatic therapy   Rationale: increase ROM and increase strength to improve the patients ability to perform ADLs with increased ease. With   [] TE   [] TA   [] neuro   [] other: Patient Education: [x] Review HEP    [] Progressed/Changed HEP based on:   [] positioning   [] body mechanics   [] transfers   [] heat/ice application    [] other:      Other Objective/Functional Measures: Held Fire hydrant exercise per patient request due to increased difficulty post sessions performing stairs at home. Pain Level (0-10 scale) post treatment: 3/10    ASSESSMENT/Changes in Function: Pt has slight decrease in symptoms post treatment but states she has difficulty ascending stairs and needs min assist with last step.      Patient will continue to benefit from skilled PT services to modify and progress therapeutic interventions, address functional mobility deficits, address ROM deficits, address strength deficits, analyze and address soft tissue restrictions, analyze and cue movement patterns, analyze and modify body mechanics/ergonomics and assess and modify postural abnormalities to attain remaining goals. []  See Plan of Care  []  See progress note/recertification  []  See Discharge Summary         Progress towards goals / Updated goals:  Short Term Goals: To be accomplished in 2 weeks:  1. I and compliant with HEP for self management of symptoms.   IE: issued HEP  Current: Pt reports compliance 2/4/2022  Long Term Goals: To be accomplished in 4 weeks:  1. Improve FOTO TC 96 NO indicate improved function with daily activities.   iE:40  2. Increase B hip abd strength to grossly 4/5 to improve stability for reaching into kitchen cabinets. IE:3+/5  Current: 3+/5, pt evaluated 1.5 weeks ago, strength change not yet anticipated. (2/9/2022)   3. Increase B hip extension strength to grossly 4-/5 to improve ability to walk her dog.   IE: 3/5    PLAN  []  Upgrade activities as tolerated     [x]  Continue plan of care  []  Update interventions per flow sheet       []  Discharge due to:_  []  Other:_      Teresa Carroll, PTA 2/24/2022  10:33 AM    Future Appointments   Date Time Provider Leanna Pack   3/1/2022  9:15 AM [facility]   3/3/2022  9:45 AM [facility]   3/10/2022  9:15 AM Georgina Haney MD [facility]   3/18/2022  9:00 AM Evelyn Shaikh MD [facility]   4/14/2022  7:55 AM [facility] LAB VISIT [facility] BS AMB   4/21/2022  8:40 AM Lamar Dick MD [facility] AMB   5/10/2022  9:00 AM Leopoldo Fletcher, [place]

## 2022-02-28 LAB — TPA AG PPP IA-MCNC: 11 NG/ML

## 2022-03-01 ENCOUNTER — HOSPITAL ENCOUNTER (OUTPATIENT)
Dept: PHYSICAL THERAPY | Age: 72
Discharge: HOME | End: 2022-03-01
Attending: PHYSICAL MEDICINE & REHABILITATION
Payer: MEDICARE

## 2022-03-01 PROCEDURE — 97113 AQUATIC THERAPY/EXERCISES: CPT

## 2022-03-01 NOTE — PROGRESS NOTES
[facility]  [address]  [place], [address]  [phone] fax    Physical Therapy Discharge Summary  Patient name: Silva Padilla Start of Care: 2022   Referral source: Yajaira Worrell MD : 1950               Medical Diagnosis: Low back pain [M54.50]  Payor: [employer] / Plan: VA MEDICARE PART A & B / Product Type: Medicare /  Onset Date:4 years ago               Treatment Diagnosis: LBP   Prior Hospitalization: see medical history Provider#: [id number]   Medications: Verified on Patient summary List    Comorbidities: morbid obesity; OA; Depression; HTN; CVA    Prior Level of Function: Ambulates with SPC; limited with walking and standing  Visits from Start of Care: 7    Missed Visits: 1  Reporting Period : 2022 to 2022      Summary of Care:  Short Term Goals: To be accomplished in 2 weeks:  1. I and compliant with HEP for self management of symptoms.   IE: issued HEP  Current: Pt reports compliance 2022  Long Term Goals: To be accomplished in 4 weeks:  1. Improve FOTO MZ 45 AG indicate improved function with daily activities.   IE:40  Current: remains 40. 2022  2. Increase B hip abd strength to grossly 4/5 to improve stability for reaching into kitchen cabinets. IE:3+/5  Current:Right hip abduction 4-/5, left hip abduction 4/5.  2022  3. Increase B hip extension strength to grossly 4-/5 to improve ability to walk her dog. IE: 3/5  Current: 3+/5 bilaterally. 2022    ASSESSMENT/RECOMMENDATIONS:Pt has made some progress towards goals with improved LE strength but overall has not seen change in function per responses to FOTO questionnaire.   Pt would like to DC at this time and is considering attending post rehab aquatic program.    [x]Discontinue therapy: []Patient has reached or is progressing toward set goals      []Patient is non-compliant or has abdicated      [x]Due to lack of appreciable progress towards set [address], PTA 3/1/2022 10:05 AM

## 2022-03-01 NOTE — PROGRESS NOTES
PT DAILY TREATMENT NOTE     Patient Name: Kely Alfred  Date:3/1/2022  : 1950  [x]  Patient  Verified  Payor: VA MEDICARE / Plan: VA MEDICARE PART A & B / Product Type: Medicare /    In time:9:10  Out time:9:55  Total Treatment Time (min): 45  Visit #: 7 of 8    Medicare/BCBS Only   Total Timed Codes (min):  45 1:1 Treatment Time:  45       Treatment Area: Low back pain [M54.50]    SUBJECTIVE  Pain Level (0-10 scale): 3/10  Any medication changes, allergies to medications, adverse drug reactions, diagnosis change, or new procedure performed?: [x] No    [] Yes (see summary sheet for update)  Subjective functional status/changes:   [] No changes reported  Pt reports minimal change in function or symptoms. Pt reports continued compliance with HEP. OBJECTIVE    45 min Therapeutic Exercise:  [x] See flow sheet :   Rationale: increase ROM and increase strength to improve the patients ability to perform ADLs with increased ease. With   [] TE   [] TA   [] neuro   [] other: Patient Education: [x] Review HEP    [] Progressed/Changed HEP based on:   [] positioning   [] body mechanics   [] transfers   [] heat/ice application    [] other:      Other Objective/Functional Measures: FOTO:40     Pain Level (0-10 scale) post treatment: 0/10    ASSESSMENT/Changes in Function: Pt has made some progress towards goals but overall has not seen change in function per responses to FOTO questionnaire. Pt would like to DC at this time and is considering attending post rehab aquatic program.     []  See Plan of Care  []  See progress note/recertification  [x]  See Discharge Summary         Progress towards goals / Updated goals:  Short Term Goals: To be accomplished in 2 weeks:  1. I and compliant with HEP for self management of symptoms.   IE: issued HEP  Current: Pt reports compliance 2022  Long Term Goals: To be accomplished in 4 weeks:  1.  Improve FOTO ET 64 PD indicate improved function with daily activities.   IE:40  Current: remains 40. 03/01/2022  2. Increase B hip abd strength to grossly 4/5 to improve stability for reaching into kitchen cabinets. IE:3+/5  Current:Right hip abduction 4-/5, left hip abduction 4/5.  03/01/2022  3. Increase B hip extension strength to grossly 4-/5 to improve ability to walk her dog. IE: 3/5  Current: 3+/5 bilaterally.  03/01/2022    PLAN  []  Upgrade activities as tolerated     [x]  Continue plan of care  []  Update interventions per flow sheet       []  Discharge due to:_  []  Other:_      Briseida Escobedo, PTA 3/1/2022  9:02 AM    Future Appointments   Date Time Provider Leanna Pack   3/1/2022  9:15 AM Jordan Every, PTA [facility]   3/3/2022  9:45 AM Jordan Every, PTA [facility]   3/10/2022  9:15 AM Elise Wagoner MD [facility]   3/18/2022  9:00 AM Tobin Litten, MD [facility]   4/14/2022  7:55 AM IO LAB VISIT [facility]   4/21/2022  8:40 AM Hussein Mauricio MD [facility] BS AMB   5/10/2022  9:00 AM LORENE Fish [address] [facility]

## 2022-03-03 ENCOUNTER — APPOINTMENT (OUTPATIENT)
Dept: PHYSICAL THERAPY | Age: 72
End: 2022-03-03
Attending: PHYSICAL MEDICINE & REHABILITATION
Payer: MEDICARE

## 2022-03-10 ENCOUNTER — OFFICE VISIT (OUTPATIENT)
Dept: ORTHOPEDIC SURGERY | Age: 72
End: 2022-03-10
Payer: MEDICARE

## 2022-03-10 VITALS
HEART RATE: 63 BPM | HEIGHT: 66 IN | BODY MASS INDEX: 44.36 KG/M2 | TEMPERATURE: 97 F | WEIGHT: 276 LBS | OXYGEN SATURATION: 97 %

## 2022-03-10 DIAGNOSIS — M48.061 SPINAL STENOSIS OF LUMBAR REGION WITHOUT NEUROGENIC CLAUDICATION: ICD-10-CM

## 2022-03-10 DIAGNOSIS — M47.816 LUMBAR FACET ARTHROPATHY: Primary | ICD-10-CM

## 2022-03-10 DIAGNOSIS — M47.816 LUMBAR FACET ARTHROPATHY: ICD-10-CM

## 2022-03-10 PROCEDURE — G8756 NO BP MEASURE DOC: HCPCS | Performed by: PHYSICAL MEDICINE & REHABILITATION

## 2022-03-10 PROCEDURE — 1101F PT FALLS ASSESS-DOCD LE1/YR: CPT | Performed by: PHYSICAL MEDICINE & REHABILITATION

## 2022-03-10 PROCEDURE — 99213 OFFICE O/P EST LOW 20 MIN: CPT | Performed by: PHYSICAL MEDICINE & REHABILITATION

## 2022-03-10 PROCEDURE — 1090F PRES/ABSN URINE INCON ASSESS: CPT | Performed by: PHYSICAL MEDICINE & REHABILITATION

## 2022-03-10 PROCEDURE — G8536 NO DOC ELDER MAL SCRN: HCPCS | Performed by: PHYSICAL MEDICINE & REHABILITATION

## 2022-03-10 PROCEDURE — G9899 SCRN MAM PERF RSLTS DOC: HCPCS | Performed by: PHYSICAL MEDICINE & REHABILITATION

## 2022-03-10 PROCEDURE — 3017F COLORECTAL CA SCREEN DOC REV: CPT | Performed by: PHYSICAL MEDICINE & REHABILITATION

## 2022-03-10 PROCEDURE — G9717 DOC PT DX DEP/BP F/U NT REQ: HCPCS | Performed by: PHYSICAL MEDICINE & REHABILITATION

## 2022-03-10 PROCEDURE — G8427 DOCREV CUR MEDS BY ELIG CLIN: HCPCS | Performed by: PHYSICAL MEDICINE & REHABILITATION

## 2022-03-10 PROCEDURE — G8399 PT W/DXA RESULTS DOCUMENT: HCPCS | Performed by: PHYSICAL MEDICINE & REHABILITATION

## 2022-03-10 PROCEDURE — G8417 CALC BMI ABV UP PARAM F/U: HCPCS | Performed by: PHYSICAL MEDICINE & REHABILITATION

## 2022-03-10 NOTE — PROGRESS NOTES
Damien Farris [facility] [address]  [address]  [place], [facility]  Phone: [phone]  Fax: [phone], [place]  : 1950  PCP: Sera Mcghee MD    PROGRESS NOTE      ASSESSMENT AND PLAN    Diagnoses and all orders for this visit:    1. Lumbar facet arthropathy  -     MRI LUMB SPINE WO CONT; Future    2. Spinal stenosis of lumbar region without neurogenic claudication        1. Saima Dejesus is a 73 y.o. female w/ongoing LBP, short ambulatory tolerance. Failed PT/Meds. Poss injections  2. L MRI for progressive low back pain, ambulation intolerance, failed PT/cymbalta/Gabapentin  3. Patient may take Tylenol PRN for pain. 4. Continue Gabapentin and Cymbalta  5. Advised to continue HEP as tolerated. 6. Discussed TIANA/RFA    Follow-up and Dispositions    · Return if symptoms worsen or fail to improve. HISTORY OF PRESENT ILLNESS      Saima Dejesus is a 73 y.o. female presents for follow up of back pain. LV referred to PT, LSO. She continues to have constant back pain. R > L. Denies radiating pain. Her pain is exacerbated with bending, standing, and walking. She has a 10 minute standing and walking tolerance. Denies insomnia. She is taking Gabapentin and Cymbalta with little benefit. Denies side effects. Patient also takes Tylenol PRN for pain. She is using her LSO PRN, but it does not help. Dr. Raúl Howard, neurology, note reviewed. Hx CVA, ISH    Pain Assessment  3/10/2022   Location of Pain Back   Location Modifiers -   Severity of Pain 0   Quality of Pain Dull   Quality of Pain Comment -   Duration of Pain Persistent   Frequency of Pain Intermittent   Date Pain First Started -   Aggravating Factors Other (Comment); Bending   Aggravating Factors Comment stooping   Limiting Behavior Yes   Relieving Factors Nothing   Result of Injury No         Treatments patient has tried:  Physical therapy: [facility] 3/2022 w/ some benefit.  R hip, back [id number] with benefit  Chiropractor: Yes little benefit  Deep massage: some benefit  Doing HEP: Yes   Beneficial medications: Gabapentin for neuropathy, Cymbalta  Failed medications: none  Spinal injections: No  Spinal surgery- No.     L MRI 2019: multilevel FA. 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 Has toy size dog.     PHYSICAL EXAMINATION    Visit Vitals  Pulse 63   Temp 97 °F (36.1 °C) (Temporal)   Ht 5' 6\" (1.676 m)   Wt 276 lb (125.2 kg)   SpO2 97% Comment: RA   BMI 44.55 kg/m²     TTP L4-5, hyperlordotic, right iliac crest, coccyx  Ambulation with single point cane  LE intact  SLR neg                Written by Alex Goddard, as dictated by Leslie Ely MD.

## 2022-03-10 NOTE — PROGRESS NOTES
Dorina Marin presents today for   Chief Complaint   Patient presents with    Back Pain       Is someone accompanying this pt? no    Is the patient using any DME equipment during OV? Yes, cane    Depression Screening:  3 most recent PHQ Screens 2/11/2022   PHQ Not Done Patient refuses   Little interest or pleasure in doing things -   Feeling down, depressed, irritable, or hopeless -   Total Score PHQ 2 -   Trouble falling or staying asleep, or sleeping too much -   Feeling tired or having little energy -   Poor appetite, weight loss, or overeating -   Feeling bad about yourself - or that you are a failure or have let yourself or your family down -   Trouble concentrating on things such as school, work, reading, or watching TV -   Moving or speaking so slowly that other people could have noticed; or the opposite being so fidgety that others notice -   Thoughts of being better off dead, or hurting yourself in some way -   PHQ 9 Score -   How difficult have these problems made it for you to do your work, take care of your home and get along with others -       Learning Assessment:  Learning Assessment 1/10/2019   PRIMARY LEARNER Patient   HIGHEST LEVEL OF EDUCATION - PRIMARY LEARNER  SOME COLLEGE   BARRIERS PRIMARY LEARNER NONE   CO-LEARNER CAREGIVER No   PRIMARY LANGUAGE ENGLISH   LEARNER PREFERENCE PRIMARY DEMONSTRATION   ANSWERED BY patient   RELATIONSHIP SELF       Abuse Screening:  Abuse Screening Questionnaire 10/14/2021   Do you ever feel afraid of your partner? N   Are you in a relationship with someone who physically or mentally threatens you? N   Is it safe for you to go home? Y       Fall Risk  Fall Risk Assessment, last 12 mths 1/26/2022   Able to walk? Yes   Fall in past 12 months? 0   Do you feel unsteady? -   Are you worried about falling -   Number of falls in past 12 months -   Fall with injury? -         Coordination of Care:  1.  Have you been to the ER, urgent care clinic since your last visit? no Hospitalized since your last visit? no    2. Have you seen or consulted any other health care providers outside of the [address] [place] [address] since your last visit? Yes, ortho Include any pap smears or colon screening.  no

## 2022-03-10 NOTE — LETTER
3/10/2022    Patient: Claude Backers   YOB: 1950   Date of Visit: 3/10/2022     Viky Pittman, [address]  [place] [address]  Via In Basket    Dear Viky Pittman MD,      Thank you for referring Ms. Claude Backers to [address] [facility] for evaluation. My notes for this consultation are attached. If you have questions, please do not hesitate to call me. I look forward to following your patient along with you.       Sincerely,    Yazmin Pyle MD

## 2022-03-15 ENCOUNTER — HOSPITAL ENCOUNTER (OUTPATIENT)
Age: 72
Discharge: HOME | End: 2022-03-15
Attending: PHYSICAL MEDICINE & REHABILITATION
Payer: MEDICARE

## 2022-03-15 PROCEDURE — 72148 MRI LUMBAR SPINE W/O DYE: CPT

## 2022-03-18 ENCOUNTER — OFFICE VISIT (OUTPATIENT)
Dept: ORTHOPEDIC SURGERY | Age: 72
End: 2022-03-18
Payer: MEDICARE

## 2022-03-18 VITALS — TEMPERATURE: 96.9 F | WEIGHT: 274.8 LBS | HEIGHT: 63 IN | BODY MASS INDEX: 48.69 KG/M2

## 2022-03-18 DIAGNOSIS — M17.0 PRIMARY OSTEOARTHRITIS OF BOTH KNEES: Primary | ICD-10-CM

## 2022-03-18 DIAGNOSIS — G89.29 CHRONIC PAIN OF BOTH KNEES: ICD-10-CM

## 2022-03-18 DIAGNOSIS — M25.561 CHRONIC PAIN OF BOTH KNEES: ICD-10-CM

## 2022-03-18 DIAGNOSIS — M25.562 CHRONIC PAIN OF BOTH KNEES: ICD-10-CM

## 2022-03-18 PROCEDURE — 99214 OFFICE O/P EST MOD 30 MIN: CPT | Performed by: ORTHOPAEDIC SURGERY

## 2022-03-18 PROCEDURE — G8427 DOCREV CUR MEDS BY ELIG CLIN: HCPCS | Performed by: ORTHOPAEDIC SURGERY

## 2022-03-18 PROCEDURE — G8536 NO DOC ELDER MAL SCRN: HCPCS | Performed by: ORTHOPAEDIC SURGERY

## 2022-03-18 PROCEDURE — G9899 SCRN MAM PERF RSLTS DOC: HCPCS | Performed by: ORTHOPAEDIC SURGERY

## 2022-03-18 PROCEDURE — 3017F COLORECTAL CA SCREEN DOC REV: CPT | Performed by: ORTHOPAEDIC SURGERY

## 2022-03-18 PROCEDURE — G8399 PT W/DXA RESULTS DOCUMENT: HCPCS | Performed by: ORTHOPAEDIC SURGERY

## 2022-03-18 PROCEDURE — 1101F PT FALLS ASSESS-DOCD LE1/YR: CPT | Performed by: ORTHOPAEDIC SURGERY

## 2022-03-18 PROCEDURE — G9717 DOC PT DX DEP/BP F/U NT REQ: HCPCS | Performed by: ORTHOPAEDIC SURGERY

## 2022-03-18 PROCEDURE — 1090F PRES/ABSN URINE INCON ASSESS: CPT | Performed by: ORTHOPAEDIC SURGERY

## 2022-03-18 PROCEDURE — G8417 CALC BMI ABV UP PARAM F/U: HCPCS | Performed by: ORTHOPAEDIC SURGERY

## 2022-03-18 PROCEDURE — G8756 NO BP MEASURE DOC: HCPCS | Performed by: ORTHOPAEDIC SURGERY

## 2022-03-18 NOTE — PROGRESS NOTES
Patient: Britney Alfonso                MRN: [id number]       SSN: [id number]  YOB: 1950        AGE: 73 y.o. SEX: female  Body mass index is 48.68 kg/m². PCP: Peggy Vo MD  03/18/22    Leta Oleary returns reevaluation of bilateral knee pain the injections are not lasting all that well for her its been about 8 weeks in their attenuating we sent her for some basic blood work protein C&S all negative she is seeing hematology apparently her red blood cell with was abnormal but she is following up with hematology she is had a least 3 blood clots in the legs before and I thinking a filter prior to her knee replacement surgery we also discussed her weight today body mass index is really at 49 and which translates to about a 50 pound weight loss her BMI under 40. Denies fevers or chills otherwise been feeling well and pain is moderate some days more severe than others and the exam today she is in no acute distress resting the hips rotate nicely the knees both of them have significant patellofemoral crepitus with terminal extension and she is tender with mild medial joint line tenderness and lateral some varicosities and Homans' sign is negative. Previous x-rays confirm relatively severe arthritis of both knees. There was discussion regarding surgery was decided is not currently recommended due to body mass index of 49 I recommend cortisone and some viscosupplementation for her especially for patellofemoral arthritis and a pleasure to share in her care we will plan on seeing her in 3weeks for bilateral cortisone injections followed by Visco which hopefully will be efficacious for her. We will order knee orthotics/bracing to help support her knees as she has ligamentous laxity.   your sincerely    REVIEW OF SYSTEMS:      CON: negative  EYE: negative   ENT: negative  RESP: negative  GI:    negative   :  negative  MSK: Positive  A twelve point review of systems was completed, positives noted and all other systems were reviewed and are negative          Past Medical History:   Diagnosis Date    Arthritis 2019    spine, both hips, both knees    Depression January 2009    Diabetes [facility])     Pre DM    DVT (deep venous thrombosis) ([facility] [address] 01/2021    Hypercholesterolemia     Hypertension     Sleep apnea     cpap    Stroke [facility]) early 2000    speech problem, balance problem    Thromboembolus ([facility] [address] 1990s    left leg       Family History   Problem Relation Age of Onset    Dementia Mother     Stroke Father     Cancer Brother     Hypertension Brother     Diabetes Brother     Diabetes Maternal Uncle     Stroke Maternal Aunt     Stroke Maternal Grandmother        Current Outpatient Medications   Medication Sig Dispense Refill    Myrbetriq 50 mg ER tablet TAKE ONE TABLET BY MOUTH DAILY 30 Tablet 3    rosuvastatin (CRESTOR) 10 mg tablet TAKE ONE TABLET BY MOUTH EVERY EVENING 90 Tablet 2    tolterodine ER (DETROL LA) 4 mg ER capsule TAKE ONE CAPSULE BY MOUTH DAILY 90 Capsule 2    nystatin (MYCOSTATIN) powder Apply  to affected area four (4) times daily. Affected area: under b/l breasts 60 g 11    clotrimazole-betamethasone (LOTRISONE) topical cream Apply  to affected area two (2) times a day. Affected area: along b/l breasts areas 45 g 11    atenoloL-chlorthalidone (TENORETIC) 50-25 mg per tablet TAKE ONE TABLET BY MOUTH DAILY 90 Tablet 2    clopidogreL (Plavix) 75 mg tab Take 75 mg by mouth daily.  DULoxetine (Cymbalta) 30 mg capsule Take 1 Cap by mouth two (2) times a day. 60 Cap 11    gabapentin (NEURONTIN) 300 mg capsule Take 300 mg by mouth nightly.  fluticasone (FLONASE) 50 mcg/actuation nasal spray 2 Sprays by Both Nostrils route daily. 1 Bottle 11    Cholecalciferol, Vitamin D3, (VITAMIN D3) 1,000 unit cap Take 1,000 Units by mouth daily.          Allergies   Allergen Reactions    Amoxicillin-Pot Clavulanate Diarrhea    Azithromycin Diarrhea    Oxybutynin Other (comments)     Problems swallowing, dry mouth    Seroquel [Quetiapine] Other (comments)     Tremors    Sulfa (Sulfonamide Antibiotics) Diarrhea    Wellbutrin [Bupropion Hcl] Itching    Potassium Diarrhea       Past Surgical History:   Procedure Laterality Date    HX APPENDECTOMY      HX CHOLECYSTECTOMY      HX GYN      partial hysterectomy    HX KNEE ARTHROSCOPY  1990    Left     VASCULAR SURGERY PROCEDURE UNLIST  01/05/21    blood clot behind left knee being treated with medication       Social History     Socioeconomic History    Marital status:      Spouse name: Not on file    Number of children: Not on file    Years of education: Not on file    Highest education level: Not on file   Occupational History    Not on file   Tobacco Use    Smoking status: Never Smoker    Smokeless tobacco: Never Used   Vaping Use    Vaping Use: Never used   Substance and Sexual Activity    Alcohol use: Never    Drug use: Never    Sexual activity: Yes     Partners: Male     Birth control/protection: None   Other Topics Concern    Not on file   Social History Narrative    Not on file     Social Determinants of Health     Financial Resource Strain:     Difficulty of Paying Living Expenses: Not on file   Food Insecurity:     Worried About Running Out of Food in the Last Year: Not on file    Rosanna of Food in the Last Year: Not on file   Transportation Needs:     Lack of Transportation (Medical): Not on file    Lack of Transportation (Non-Medical):  Not on file   Physical Activity:     Days of Exercise per Week: Not on file    Minutes of Exercise per Session: Not on file   Stress:     Feeling of Stress : Not on file   Social Connections:     Frequency of Communication with Friends and Family: Not on file    Frequency of Social Gatherings with Friends and Family: Not on file    Attends Voodoo Services: Not on file    Active Member of Clubs or Organizations: Not on file    Attends Club or Organization Meetings: Not on file    Marital Status: Not on file   Intimate Partner Violence:     Fear of Current or Ex-Partner: Not on file    Emotionally Abused: Not on file    Physically Abused: Not on file    Sexually Abused: Not on file   Housing Stability:     Unable to Pay for Housing in the Last Year: Not on file    Number of [place] in the Last Year: Not on file    Unstable Housing in the Last Year: Not on file       Visit Vitals  Temp 96.9 °F (36.1 °C)   Ht 5' 3\" (1.6 m)   Wt 274 lb 12.8 oz (124.6 kg)   BMI 48.68 kg/m²         PHYSICAL EXAMINATION:  GENERAL: Alert and oriented x3, in no acute distress, well-developed, well-nourished, afebrile. HEART: No JVD. EYES: No scleral icterus   NECK: No significant lymphadenopathy   LUNGS: No respiratory compromise or indrawing  ABDOMEN: Soft, non-tender, non-distended. Note: This note was completed using voice recognition software. Any typographical/name errors or mistakes are unintentional.    Electronically signed by:  Cherri Logan MD

## 2022-03-23 ENCOUNTER — DOCUMENTATION ONLY (OUTPATIENT)
Dept: ORTHOPEDIC SURGERY | Age: 72
End: 2022-03-23

## 2022-03-29 ENCOUNTER — DOCUMENTATION ONLY (OUTPATIENT)
Dept: ORTHOPEDIC SURGERY | Age: 72
End: 2022-03-29

## 2022-03-29 ENCOUNTER — TELEPHONE (OUTPATIENT)
Dept: ORTHOPEDIC SURGERY | Age: 72
End: 2022-03-29

## 2022-03-29 NOTE — PROGRESS NOTES
[address] [address] is requesting that the 2/11/2022 OVN be addended because there is no mention of ordering a right Knee Orthotic per Medicare guidelines. Form Request from Barbi Green can be found on top of the forms bin.

## 2022-04-14 ENCOUNTER — HOSPITAL ENCOUNTER (OUTPATIENT)
Dept: LAB | Age: 72
Discharge: HOME | End: 2022-04-14
Payer: MEDICARE

## 2022-04-14 ENCOUNTER — TELEPHONE (OUTPATIENT)
Dept: INTERNAL MEDICINE CLINIC | Age: 72
End: 2022-04-14

## 2022-04-14 ENCOUNTER — APPOINTMENT (OUTPATIENT)
Dept: INTERNAL MEDICINE CLINIC | Age: 72
End: 2022-04-14

## 2022-04-14 DIAGNOSIS — R73.9 HYPERGLYCEMIA: ICD-10-CM

## 2022-04-14 DIAGNOSIS — I10 ESSENTIAL HYPERTENSION: ICD-10-CM

## 2022-04-14 LAB
ALBUMIN SERPL-MCNC: 3.6 G/DL (ref 3.4–5)
ALBUMIN/GLOB SERPL: 1.2 {RATIO} (ref 0.8–1.7)
ALP SERPL-CCNC: 130 U/L (ref 45–117)
ALT SERPL-CCNC: 37 U/L (ref 13–56)
ANION GAP SERPL CALC-SCNC: 6 MMOL/L (ref 3–18)
AST SERPL-CCNC: 17 U/L (ref 10–38)
BILIRUB SERPL-MCNC: 0.9 MG/DL (ref 0.2–1)
BUN SERPL-MCNC: 14 MG/DL (ref 7–18)
BUN/CREAT SERPL: 16 (ref 12–20)
CALCIUM SERPL-MCNC: 9.6 MG/DL (ref 8.5–10.1)
CHLORIDE SERPL-SCNC: 102 MMOL/L (ref 100–111)
CHOLEST SERPL-MCNC: 116 MG/DL
CO2 SERPL-SCNC: 33 MMOL/L (ref 21–32)
CREAT SERPL-MCNC: 0.88 MG/DL (ref 0.6–1.3)
CREAT UR-MCNC: 139 MG/DL (ref 30–125)
EST. AVERAGE GLUCOSE BLD GHB EST-MCNC: 131 MG/DL
GLOBULIN SER CALC-MCNC: 3.1 G/DL (ref 2–4)
GLUCOSE SERPL-MCNC: 116 MG/DL (ref 74–99)
HBA1C MFR BLD: 6.2 % (ref 4.2–5.6)
HDLC SERPL-MCNC: 50 MG/DL (ref 40–60)
HDLC SERPL: 2.3 {RATIO} (ref 0–5)
LDLC SERPL CALC-MCNC: 46.6 MG/DL (ref 0–100)
LIPID PROFILE,FLP: NORMAL
MICROALBUMIN UR-MCNC: 0.75 MG/DL (ref 0–3)
MICROALBUMIN/CREAT UR-RTO: 5 MG/G (ref 0–30)
POTASSIUM SERPL-SCNC: 2.8 MMOL/L (ref 3.5–5.5)
PROT SERPL-MCNC: 6.7 G/DL (ref 6.4–8.2)
SODIUM SERPL-SCNC: 141 MMOL/L (ref 136–145)
TRIGL SERPL-MCNC: 97 MG/DL (ref ?–150)
VLDLC SERPL CALC-MCNC: 19.4 MG/DL

## 2022-04-14 PROCEDURE — 80053 COMPREHEN METABOLIC PANEL: CPT

## 2022-04-14 PROCEDURE — 83036 HEMOGLOBIN GLYCOSYLATED A1C: CPT

## 2022-04-14 PROCEDURE — 36415 COLL VENOUS BLD VENIPUNCTURE: CPT

## 2022-04-14 PROCEDURE — 82043 UR ALBUMIN QUANTITATIVE: CPT

## 2022-04-14 PROCEDURE — 80061 LIPID PANEL: CPT

## 2022-04-14 RX ORDER — POTASSIUM CHLORIDE 20 MEQ/1
20 TABLET, EXTENDED RELEASE ORAL 2 TIMES DAILY
Qty: 6 TABLET | Refills: 0 | Status: SHIPPED | OUTPATIENT
Start: 2022-04-14 | End: 2022-04-17

## 2022-04-15 ENCOUNTER — OFFICE VISIT (OUTPATIENT)
Dept: ORTHOPEDIC SURGERY | Age: 72
End: 2022-04-15
Payer: MEDICARE

## 2022-04-15 VITALS
OXYGEN SATURATION: 98 % | HEART RATE: 73 BPM | TEMPERATURE: 97.3 F | BODY MASS INDEX: 48.37 KG/M2 | HEIGHT: 63 IN | WEIGHT: 273 LBS

## 2022-04-15 DIAGNOSIS — G89.29 CHRONIC PAIN OF BOTH KNEES: ICD-10-CM

## 2022-04-15 DIAGNOSIS — M25.561 CHRONIC PAIN OF BOTH KNEES: ICD-10-CM

## 2022-04-15 DIAGNOSIS — M17.0 PRIMARY OSTEOARTHRITIS OF BOTH KNEES: Primary | ICD-10-CM

## 2022-04-15 DIAGNOSIS — M25.562 CHRONIC PAIN OF BOTH KNEES: ICD-10-CM

## 2022-04-15 PROCEDURE — 20610 DRAIN/INJ JOINT/BURSA W/O US: CPT | Performed by: ORTHOPAEDIC SURGERY

## 2022-04-15 PROCEDURE — 1090F PRES/ABSN URINE INCON ASSESS: CPT | Performed by: ORTHOPAEDIC SURGERY

## 2022-04-15 PROCEDURE — G9717 DOC PT DX DEP/BP F/U NT REQ: HCPCS | Performed by: ORTHOPAEDIC SURGERY

## 2022-04-15 PROCEDURE — G8399 PT W/DXA RESULTS DOCUMENT: HCPCS | Performed by: ORTHOPAEDIC SURGERY

## 2022-04-15 PROCEDURE — 3017F COLORECTAL CA SCREEN DOC REV: CPT | Performed by: ORTHOPAEDIC SURGERY

## 2022-04-15 PROCEDURE — 1101F PT FALLS ASSESS-DOCD LE1/YR: CPT | Performed by: ORTHOPAEDIC SURGERY

## 2022-04-15 PROCEDURE — G8536 NO DOC ELDER MAL SCRN: HCPCS | Performed by: ORTHOPAEDIC SURGERY

## 2022-04-15 PROCEDURE — G8417 CALC BMI ABV UP PARAM F/U: HCPCS | Performed by: ORTHOPAEDIC SURGERY

## 2022-04-15 PROCEDURE — G8756 NO BP MEASURE DOC: HCPCS | Performed by: ORTHOPAEDIC SURGERY

## 2022-04-15 PROCEDURE — G9899 SCRN MAM PERF RSLTS DOC: HCPCS | Performed by: ORTHOPAEDIC SURGERY

## 2022-04-15 PROCEDURE — 99214 OFFICE O/P EST MOD 30 MIN: CPT | Performed by: ORTHOPAEDIC SURGERY

## 2022-04-15 PROCEDURE — G8427 DOCREV CUR MEDS BY ELIG CLIN: HCPCS | Performed by: ORTHOPAEDIC SURGERY

## 2022-04-15 NOTE — TELEPHONE ENCOUNTER
I spoke with her and answered her questions. She will  her prescription and start it immediately. We will discuss at her upcoming appointment whether or not we should change her blood pressure medication. She admits to having some diarrhea earlier this week.   This likely contributed to her hypokalemia, being worse than usual.    Dr. Justino Jeans  [facility], [address] [place] [address], [address] [place] [address] Str.  Phone: [phone]  Fax: [phone]

## 2022-04-15 NOTE — PROGRESS NOTES
Patient: Mehdi Malik                MRN: [id number]       SSN: [id number]  YOB: 1950        AGE: 73 y.o. SEX: female  Body mass index is 48.36 kg/m². PCP: Joel Jerry MD  04/15/22    Giancarlo Murphy presents for reevaluation of bilateral knee pain she is little more tender on the left than the right but depends on the day also difficulties with stairs kneeling getting up and down from a chair prolonged walking she is at least a couple of DVTs in the left leg 1 after surgery about 15 years ago after an arthroscopy and articular cartilage transfer and then one a few years ago she was following with vascular but nothing recently denies any shortness of breath or chest pain and has some known varicosities in the lower extremities pain is moderate nonradicular in the knees himself. Should like to try some viscosupplementation. Examination today she has some mild varicosities about the both knees minimal peripheral edema both feet warm and well-perfused calf nontender Beau Decent' sign is negative hips are noncontributory she is in varus a couple degree fixed flexion deformity bends fairly well about 115 degrees and Homans' sign is negative there is minimal effusions. Previous x-rays confirm advancing to severe arthritis really of both knees right slightly worse on the right and on the left    Otherwise a discussion regarding surgery was decided is not currently recommended we should pursue nonoperative measures further and also note her BMI is at 48.     Therefore each of the 2 knees were injected with Euflexxa very well-tolerated as per protocol return next week for #2 postinjection care given    REVIEW OF SYSTEMS:      CON: negative  EYE: negative   ENT: negative  RESP: negative  GI:    negative   :  negative  MSK: Positive  A twelve point review of systems was completed, positives noted and all other systems were reviewed and are negative          Past Medical History:   Diagnosis Date  Arthritis 2019    spine, both hips, both knees    Depression January 2009    Diabetes [facility])     Pre DM    DVT (deep venous thrombosis) ([facility] [address] 01/2021    Hypercholesterolemia     Hypertension     Sleep apnea     cpap    Stroke [facility]) early 2000    speech problem, balance problem    Thromboembolus ([facility] [address] 1990s    left leg       Family History   Problem Relation Age of Onset    Dementia Mother     Stroke Father     Cancer Brother     Hypertension Brother     Diabetes Brother     Diabetes Maternal Uncle     Stroke Maternal Aunt     Stroke Maternal Grandmother        Current Outpatient Medications   Medication Sig Dispense Refill    potassium chloride (K-DUR, KLOR-CON M20) 20 mEq tablet Take 1 Tablet by mouth two (2) times a day for 3 days. 6 Tablet 0    Myrbetriq 50 mg ER tablet TAKE ONE TABLET BY MOUTH DAILY 30 Tablet 3    rosuvastatin (CRESTOR) 10 mg tablet TAKE ONE TABLET BY MOUTH EVERY EVENING 90 Tablet 2    tolterodine ER (DETROL LA) 4 mg ER capsule TAKE ONE CAPSULE BY MOUTH DAILY 90 Capsule 2    nystatin (MYCOSTATIN) powder Apply  to affected area four (4) times daily. Affected area: under b/l breasts 60 g 11    clotrimazole-betamethasone (LOTRISONE) topical cream Apply  to affected area two (2) times a day. Affected area: along b/l breasts areas 45 g 11    atenoloL-chlorthalidone (TENORETIC) 50-25 mg per tablet TAKE ONE TABLET BY MOUTH DAILY 90 Tablet 2    clopidogreL (Plavix) 75 mg tab Take 75 mg by mouth daily.  DULoxetine (Cymbalta) 30 mg capsule Take 1 Cap by mouth two (2) times a day. 60 Cap 11    gabapentin (NEURONTIN) 300 mg capsule Take 300 mg by mouth nightly.  fluticasone (FLONASE) 50 mcg/actuation nasal spray 2 Sprays by Both Nostrils route daily. 1 Bottle 11    Cholecalciferol, Vitamin D3, (VITAMIN D3) 1,000 unit cap Take 1,000 Units by mouth daily.        Current Facility-Administered Medications   Medication Dose Route Frequency Provider Last Rate Last Admin    sodium hyaluronate (SUPARTZ FX/EUFLEXXA/HYALGAN) 10 mg/mL injection syrg 40 mg  40 mg Intra artICUlar ONCE Mayte Villasenor MD           Allergies   Allergen Reactions    Amoxicillin-Pot Clavulanate Diarrhea    Azithromycin Diarrhea    Oxybutynin Other (comments)     Problems swallowing, dry mouth    Seroquel [Quetiapine] Other (comments)     Tremors    Sulfa (Sulfonamide Antibiotics) Diarrhea    Wellbutrin [Bupropion Hcl] Itching    Potassium Diarrhea       Past Surgical History:   Procedure Laterality Date    HX APPENDECTOMY      HX CHOLECYSTECTOMY      HX GYN      partial hysterectomy    HX KNEE ARTHROSCOPY  1990    Left     VASCULAR SURGERY PROCEDURE UNLIST  01/05/21    blood clot behind left knee being treated with medication       Social History     Socioeconomic History    Marital status:      Spouse name: Not on file    Number of children: Not on file    Years of education: Not on file    Highest education level: Not on file   Occupational History    Not on file   Tobacco Use    Smoking status: Never Smoker    Smokeless tobacco: Never Used   Vaping Use    Vaping Use: Never used   Substance and Sexual Activity    Alcohol use: Never    Drug use: Never    Sexual activity: Yes     Partners: Male     Birth control/protection: None   Other Topics Concern    Not on file   Social History Narrative    Not on file     Social Determinants of Health     Financial Resource Strain:     Difficulty of Paying Living Expenses: Not on file   Food Insecurity:     Worried About Running Out of Food in the Last Year: Not on file    Rosanna of Food in the Last Year: Not on file   Transportation Needs:     Lack of Transportation (Medical): Not on file    Lack of Transportation (Non-Medical):  Not on file   Physical Activity:     Days of Exercise per Week: Not on file    Minutes of Exercise per Session: Not on file   Stress:     Feeling of Stress : Not on file   Social Connections:     Frequency of Communication with Friends and Family: Not on file    Frequency of Social Gatherings with Friends and Family: Not on file    Attends Yazdanism Services: Not on file    Active Member of Clubs or Organizations: Not on file    Attends Club or Organization Meetings: Not on file    Marital Status: Not on file   Intimate Partner Violence:     Fear of Current or Ex-Partner: Not on file    Emotionally Abused: Not on file    Physically Abused: Not on file    Sexually Abused: Not on file   Housing Stability:     Unable to Pay for Housing in the Last Year: Not on file    Number of [place] in the Last Year: Not on file    Unstable Housing in the Last Year: Not on file       Visit Vitals  Pulse 73   Temp 97.3 °F (36.3 °C)   Ht 5' 3\" (1.6 m)   Wt 273 lb (123.8 kg)   SpO2 98%   BMI 48.36 kg/m²         PHYSICAL EXAMINATION:  GENERAL: Alert and oriented x3, in no acute distress, well-developed, well-nourished, afebrile. HEART: No JVD. EYES: No scleral icterus   NECK: No significant lymphadenopathy   LUNGS: No respiratory compromise or indrawing  ABDOMEN: Soft, non-tender, non-distended. Note: This note was completed using voice recognition software. Any typographical/name errors or mistakes are unintentional.    Electronically signed by: MD MONTSE Whitt, [facility] Jose Sibley M.D., have reviewed the history, physical, and have updated the allergic reactions for [facility].     TIME OUT performed immediately prior to the start of procedure:  Catherine Schroeder M.D., have performed the following reviews on [facility] prior to the start of the procedure:    - Patient was identified by name and date of birth  - Agreement on procedure being performed was verified  - Risks and benefits explained to the patient  -Patient was positioned for comfort  - Consent was signed and verified  - Patient was advised regarding risks of bruising, bleeding, infection and pain    Time: 7:41 AM    Body Part: intra- articular injection of bilateral  knees    Medication and Dose: Each knee injected with 2 mL standard Euflexxa preparation, i.e. 20 mg, and 3 mL 1% lidocaine    Date of Procedure: 04/15/22    PROCEDURE PERFORMED BY : Carl Crow M.D., [facility])    Provider Assisted by: Fernando Guardado    Patient assisted by: self    Patient tolerated procedure well with no complications

## 2022-04-15 NOTE — TELEPHONE ENCOUNTER
Received call from labs  k - 2.8 otherwise nl lytes/cr; no mg done    Pt on tenoretic; k trending low on review of prior labs    Unable to reach pt but left message on phone  justin called in for pt to take - 20 bid for 3 days    follow up dr Feli Quick as scheduled

## 2022-04-19 NOTE — PROGRESS NOTES
I will discuss her results more in depth with her at her upcoming appointment. No microalbuminuria. Her A1c is 6.2, up from 6.1 six months ago. I already encouraged her to increase her potassium intake. We will need to recheck her potassium at her upcoming appointment.   Her lipid panel is normal.    Dr. Radha Salcedo  [facility], [address] [place] [address], [address] [place] [address] Str.  Phone: [phone]  Fax: [phone]

## 2022-04-21 ENCOUNTER — OFFICE VISIT (OUTPATIENT)
Dept: INTERNAL MEDICINE CLINIC | Age: 72
End: 2022-04-21
Payer: MEDICARE

## 2022-04-21 VITALS
RESPIRATION RATE: 16 BRPM | SYSTOLIC BLOOD PRESSURE: 134 MMHG | HEART RATE: 60 BPM | TEMPERATURE: 97.1 F | OXYGEN SATURATION: 97 % | WEIGHT: 276 LBS | BODY MASS INDEX: 48.9 KG/M2 | HEIGHT: 63 IN | DIASTOLIC BLOOD PRESSURE: 67 MMHG

## 2022-04-21 DIAGNOSIS — I10 ESSENTIAL HYPERTENSION: Primary | ICD-10-CM

## 2022-04-21 DIAGNOSIS — I82.4Z2 DEEP VEIN THROMBOSIS (DVT) OF DISTAL VEIN OF LEFT LOWER EXTREMITY, UNSPECIFIED CHRONICITY (HCC): ICD-10-CM

## 2022-04-21 DIAGNOSIS — R73.02 GLUCOSE INTOLERANCE (IMPAIRED GLUCOSE TOLERANCE): ICD-10-CM

## 2022-04-21 PROCEDURE — G8427 DOCREV CUR MEDS BY ELIG CLIN: HCPCS | Performed by: INTERNAL MEDICINE

## 2022-04-21 PROCEDURE — 1101F PT FALLS ASSESS-DOCD LE1/YR: CPT | Performed by: INTERNAL MEDICINE

## 2022-04-21 PROCEDURE — G8417 CALC BMI ABV UP PARAM F/U: HCPCS | Performed by: INTERNAL MEDICINE

## 2022-04-21 PROCEDURE — 99214 OFFICE O/P EST MOD 30 MIN: CPT | Performed by: INTERNAL MEDICINE

## 2022-04-21 PROCEDURE — G0463 HOSPITAL OUTPT CLINIC VISIT: HCPCS | Performed by: INTERNAL MEDICINE

## 2022-04-21 PROCEDURE — G8752 SYS BP LESS 140: HCPCS | Performed by: INTERNAL MEDICINE

## 2022-04-21 PROCEDURE — G8536 NO DOC ELDER MAL SCRN: HCPCS | Performed by: INTERNAL MEDICINE

## 2022-04-21 PROCEDURE — 1090F PRES/ABSN URINE INCON ASSESS: CPT | Performed by: INTERNAL MEDICINE

## 2022-04-21 PROCEDURE — G9899 SCRN MAM PERF RSLTS DOC: HCPCS | Performed by: INTERNAL MEDICINE

## 2022-04-21 PROCEDURE — G8399 PT W/DXA RESULTS DOCUMENT: HCPCS | Performed by: INTERNAL MEDICINE

## 2022-04-21 PROCEDURE — G9717 DOC PT DX DEP/BP F/U NT REQ: HCPCS | Performed by: INTERNAL MEDICINE

## 2022-04-21 PROCEDURE — G8754 DIAS BP LESS 90: HCPCS | Performed by: INTERNAL MEDICINE

## 2022-04-21 PROCEDURE — 3017F COLORECTAL CA SCREEN DOC REV: CPT | Performed by: INTERNAL MEDICINE

## 2022-04-21 RX ORDER — HYDROCHLOROTHIAZIDE 25 MG/1
25 TABLET ORAL DAILY
Qty: 90 TABLET | Refills: 2 | Status: SHIPPED
Start: 2022-04-21 | End: 2022-04-21

## 2022-04-21 RX ORDER — ATENOLOL 50 MG/1
50 TABLET ORAL DAILY
Qty: 90 TABLET | Refills: 2 | Status: SHIPPED | OUTPATIENT
Start: 2022-04-21

## 2022-04-21 RX ORDER — SPIRONOLACTONE 25 MG/1
25 TABLET ORAL DAILY
Qty: 30 TABLET | Refills: 5 | Status: SHIPPED | OUTPATIENT
Start: 2022-04-21

## 2022-04-21 RX ORDER — ATENOLOL AND CHLORTHALIDONE TABLET 50; 25 MG/1; MG/1
1 TABLET ORAL DAILY
Qty: 90 TABLET | Refills: 2 | Status: CANCELLED | OUTPATIENT
Start: 2022-04-21

## 2022-04-21 RX ORDER — POTASSIUM CHLORIDE 1500 MG/1
TABLET, FILM COATED, EXTENDED RELEASE ORAL
COMMUNITY
Start: 2022-04-15 | End: 2022-04-21 | Stop reason: ALTCHOICE

## 2022-04-21 NOTE — PROGRESS NOTES
Shlomo Zee presents today for   Chief Complaint   Patient presents with    Follow-up    Labs     4-14-22       1. \"Have you been to the ER, urgent care clinicsince your last visit? Hospitalized since your last visit? \" no    2. \"Have you seen or consulted any other health care providers outside of the [address] [place] [address] since your last visit? \" yes     3. For patients aged 39-70: Has the patient had a colonoscopy / FIT/ Cologuard? No/due      If the patient is female:    4. For patients aged 41-77: Has the patient had a mammogram within the past 2 years? Yes - no Care Gap present  See top three    5. For patients aged 21-65: Has the patient had a pap smear?  NA - based on age or sex

## 2022-04-21 NOTE — PATIENT INSTRUCTIONS
Body Mass Index: Care Instructions  Your Care Instructions     Body mass index (BMI) can help you see if your weight is raising your risk for health problems. It uses a formula to compare how much you weigh with how tall you are. · A BMI lower than 18.5 is considered underweight. · A BMI between 18.5 and 24.9 is considered healthy. · A BMI between 25 and 29.9 is considered overweight. A BMI of 30 or higher is considered obese. If your BMI is in the normal range, it means that you have a lower risk for weight-related health problems. If your BMI is in the overweight or obese range, you may be at increased risk for weight-related health problems, such as high blood pressure, heart disease, stroke, arthritis or joint pain, and diabetes. If your BMI is in the underweight range, you may be at increased risk for health problems such as fatigue, lower protection (immunity) against illness, muscle loss, bone loss, hair loss, and hormone problems. BMI is just one measure of your risk for weight-related health problems. You may be at higher risk for health problems if you are not active, you eat an unhealthy diet, or you drink too much alcohol or use tobacco products. Follow-up care is a key part of your treatment and safety. Be sure to make and go to all appointments, and call your doctor if you are having problems. It's also a good idea to know your test results and keep a list of the medicines you take. How can you care for yourself at home? · Practice healthy eating habits. This includes eating plenty of fruits, vegetables, whole grains, lean protein, and low-fat dairy. · If your doctor recommends it, get more exercise. Walking is a good choice. Bit by bit, increase the amount you walk every day. Try for at least 30 minutes on most days of the week. · Do not smoke. Smoking can increase your risk for health problems. If you need help quitting, talk to your doctor about stop-smoking programs and medicines. These can increase your chances of quitting for good. · Limit alcohol to 2 drinks a day for men and 1 drink a day for women. Too much alcohol can cause health problems. If you have a BMI higher than 25  · Your doctor may do other tests to check your risk for weight-related health problems. This may include measuring the distance around your waist. A waist measurement of more than 40 inches in men or 35 inches in women can increase the risk of weight-related health problems. · Talk with your doctor about steps you can take to stay healthy or improve your health. You may need to make lifestyle changes to lose weight and stay healthy, such as changing your diet and getting regular exercise. If you have a BMI lower than 18.5  · Your doctor may do other tests to check your risk for health problems. · Talk with your doctor about steps you can take to stay healthy or improve your health. You may need to make lifestyle changes to gain or maintain weight and stay healthy, such as getting more healthy foods in your diet and doing exercises to build muscle. Where can you learn more? Go to http://www.stock.com/  Enter S176 in the search box to learn more about \"Body Mass Index: Care Instructions. \"  Current as of: December 27, 2021               Content Version: 13.2  © 2006-2022 Healthwise, [employer]. Care instructions adapted under license by [employer] (which disclaims liability or warranty for this information). If you have questions about a medical condition or this instruction, always ask your healthcare professional. [place] [address] any warranty or liability for your use of this information.

## 2022-04-21 NOTE — PROGRESS NOTES
INTERNISTS OF [facility]:  4/21/2022, MRN: [id number]      Cipriano Kidd is a 73 y.o. female and presents to clinic for Follow-up and Labs (4-14-22)      Subjective: The patient is a 79-year-old female with history of obesity, allergic rhinitis, IBS-diarrhea, onychomycosis, prediabetes, lumbar spinal stenosis, depression, hypertension, adrenal incidentaloma seen on CT scan 2015, LLE DVT (2021), and history of cerebral infarction her head MRI findings (followed by Neurology). 1. HTN: Recent labs showed: Hypokalemia on chlorthalidone-atenolol. BP is controlled though. +IBS-diarrhea. +H/o adrenal incidentaloma. 2. H/o LLE DVT: S/p AC. On plavix for underlying CVA hx. She met with [facility] for a coagulopathy w/u per request of her orthopedist who is planning to do surgery later this year. She was told ot lose 50lbs before having her knee replacement surgery. She was told to pursue Bariatric Surgery. She declines. 3. Prediabetes: Her recent labs: shows an A1C of 6.2.        Patient Active Problem List    Diagnosis Date Noted    Hyperlipidemia 05/23/2021    Osteopenia 05/23/2021    Deep vein thrombosis (DVT) of distal vein of left lower extremity ([address] 05/23/2021    OAB (overactive bladder) 05/23/2021    Controlled substance agreement signed 7/7/17 07/09/2017    Obesity, morbid, BMI 40.0-49.9  12/06/2016    Allergic rhinitis 12/06/2016    Irritable bowel syndrome with diarrhea 12/06/2016    Onychomycosis 12/06/2016    Glucose intolerance (impaired glucose tolerance) 12/01/2016    Single current episode of major depressive disorder (has tried celexa, wellbutrin, mirtazapine with adverse effects) 12/01/2016    Essential hypertension 12/01/2016    History of cerebral infarction - per head MRI findings 12/01/2016       Current Outpatient Medications   Medication Sig Dispense Refill    rosuvastatin (CRESTOR) 10 mg tablet TAKE ONE TABLET BY MOUTH EVERY EVENING 90 Tablet 2    tolterodine ER (DETROL LA) 4 mg ER capsule TAKE ONE CAPSULE BY MOUTH DAILY 90 Capsule 2    nystatin (MYCOSTATIN) powder Apply  to affected area four (4) times daily. Affected area: under b/l breasts 60 g 11    clotrimazole-betamethasone (LOTRISONE) topical cream Apply  to affected area two (2) times a day. Affected area: along b/l breasts areas 45 g 11    atenoloL-chlorthalidone (TENORETIC) 50-25 mg per tablet TAKE ONE TABLET BY MOUTH DAILY 90 Tablet 2    clopidogreL (Plavix) 75 mg tab Take 75 mg by mouth daily.  DULoxetine (Cymbalta) 30 mg capsule Take 1 Cap by mouth two (2) times a day. 60 Cap 11    gabapentin (NEURONTIN) 300 mg capsule Take 300 mg by mouth nightly.  fluticasone (FLONASE) 50 mcg/actuation nasal spray 2 Sprays by Both Nostrils route daily. 1 Bottle 11    Cholecalciferol, Vitamin D3, (VITAMIN D3) 1,000 unit cap Take 1,000 Units by mouth daily.       potassium chloride SR (K-TAB) 20 mEq tablet  (Patient not taking: Reported on 4/21/2022)      Myrbetriq 50 mg ER tablet TAKE ONE TABLET BY MOUTH DAILY (Patient not taking: Reported on 4/21/2022) 30 Tablet 3       Allergies   Allergen Reactions    Amoxicillin-Pot Clavulanate Diarrhea    Azithromycin Diarrhea    Oxybutynin Other (comments)     Problems swallowing, dry mouth    Seroquel [Quetiapine] Other (comments)     Tremors    Sulfa (Sulfonamide Antibiotics) Diarrhea    Wellbutrin [Bupropion Hcl] Itching    Potassium Diarrhea       Past Medical History:   Diagnosis Date    Arthritis 2019    spine, both hips, both knees    Depression January 2009    Diabetes [facility])     Pre DM    DVT (deep venous thrombosis) ([facility] 01/2021    Hypercholesterolemia     Hypertension     Sleep apnea     cpap    Stroke [facility]) early 2000    speech problem, balance problem    Thromboembolus ([facility] [address] 1990s    left leg       Past Surgical History:   Procedure Laterality Date    HX APPENDECTOMY      HX CHOLECYSTECTOMY      HX GYN      partial hysterectomy    HX KNEE ARTHROSCOPY  1990 Left     VASCULAR SURGERY PROCEDURE UNLIST  01/05/21    blood clot behind left knee being treated with medication       Family History   Problem Relation Age of Onset    Dementia Mother     Stroke Father     Cancer Brother     Hypertension Brother     Diabetes Brother     Diabetes Maternal Uncle     Stroke Maternal Aunt     Stroke Maternal Grandmother        Social History     Tobacco Use    Smoking status: Never Smoker    Smokeless tobacco: Never Used   Substance Use Topics    Alcohol use: Never       ROS   Review of Systems   Constitutional: Negative for chills and fever. HENT: Negative for ear pain and sore throat. Eyes: Negative for blurred vision and pain. Respiratory: Negative for cough and shortness of breath. Cardiovascular: Negative for chest pain. Gastrointestinal: Positive for diarrhea (chronic). Negative for abdominal pain, blood in stool and melena. Genitourinary: Negative for dysuria and hematuria. Musculoskeletal: Positive for joint pain. Negative for myalgias. Skin: Negative for rash. Neurological: Negative for headaches. Endo/Heme/Allergies: Does not bruise/bleed easily. Psychiatric/Behavioral: Negative for substance abuse. Objective     Vitals:    04/21/22 0835   BP: 134/67   Pulse: 60   Resp: 16   Temp: 97.1 °F (36.2 °C)   TempSrc: Temporal   SpO2: 97%   Weight: 276 lb (125.2 kg)   Height: 5' 3\" (1.6 m)   PainSc:   0 - No pain       Physical Exam  Vitals and nursing note reviewed. HENT:      Head: Normocephalic and atraumatic. Right Ear: External ear normal.      Left Ear: External ear normal.   Eyes:      General: No scleral icterus. Right eye: No discharge. Left eye: No discharge. Conjunctiva/sclera: Conjunctivae normal.   Cardiovascular:      Rate and Rhythm: Normal rate and regular rhythm. Heart sounds: Normal heart sounds. No murmur heard. No friction rub. No gallop.     Pulmonary:      Effort: Pulmonary effort is normal. No respiratory distress. Breath sounds: Normal breath sounds. No wheezing or rales. Abdominal:      General: Bowel sounds are normal. There is no distension. Palpations: Abdomen is soft. There is no mass. Tenderness: There is no abdominal tenderness. There is no guarding or rebound. Musculoskeletal:         General: No swelling (BUE) or tenderness (BUE). Cervical back: Neck supple. Lymphadenopathy:      Cervical: No cervical adenopathy. Skin:     General: Skin is warm and dry. Findings: No erythema or rash. Neurological:      Mental Status: She is alert. Motor: No abnormal muscle tone.       Gait: Gait normal.   Psychiatric:         Mood and Affect: Mood normal.         LABS   Data Review:   Lab Results   Component Value Date/Time    WBC 7.1 01/10/2019 08:55 AM    HGB 13.6 01/10/2019 08:55 AM    HCT 44.6 01/10/2019 08:55 AM    PLATELET 331 17/79/5663 08:55 AM    MCV 99.6 (H) 01/10/2019 08:55 AM       Lab Results   Component Value Date/Time    Sodium 141 04/14/2022 07:48 AM    Potassium 2.8 (LL) 04/14/2022 07:48 AM    Chloride 102 04/14/2022 07:48 AM    CO2 33 (H) 04/14/2022 07:48 AM    Anion gap 6 04/14/2022 07:48 AM    Glucose 116 (H) 04/14/2022 07:48 AM    BUN 14 04/14/2022 07:48 AM    Creatinine 0.88 04/14/2022 07:48 AM    BUN/Creatinine ratio 16 04/14/2022 07:48 AM    GFR est AA >60 04/14/2022 07:48 AM    GFR est non-AA >60 04/14/2022 07:48 AM    Calcium 9.6 04/14/2022 07:48 AM       Lab Results   Component Value Date/Time    Cholesterol, total 116 04/14/2022 07:48 AM    HDL Cholesterol 50 04/14/2022 07:48 AM    LDL, calculated 46.6 04/14/2022 07:48 AM    VLDL, calculated 19.4 04/14/2022 07:48 AM    Triglyceride 97 04/14/2022 07:48 AM    CHOL/HDL Ratio 2.3 04/14/2022 07:48 AM       Lab Results   Component Value Date/Time    Hemoglobin A1c 6.2 (H) 04/14/2022 07:48 AM    Hemoglobin A1c (POC) 5.8 03/05/2020 09:22 AM    Hemoglobin A1c, External 5.8 09/08/2015 12:00 AM Assessment/Plan:   1. Essential hypertension: BP is stable but she is prone to hypokalemia due to IBS-D and taking chlorthalidone.  - D/c her BP rx and ordering atenolol and spironolactone  - F/u BMP ordered. - RTC for a BP check    ORDERS:  - atenoloL (TENORMIN) 50 mg tablet; Take 1 Tablet by mouth daily. Dispense: 90 Tablet; Refill: 2  - METABOLIC PANEL, BASIC; Future  - spironolactone (ALDACTONE) 25 mg tablet; Take 1 Tablet by mouth daily. Dispense: 30 Tablet; Refill: 5    2. Prediabetes: A1C is 6.2.   - I encouraged her to maintain a low carb diet  - We will need to recheck her A1C in 6 months    3. LLE DVT Hx: S/p AC. Asymptomatic.   - Ok to f/u with [facility] per Orthopedic team's request for a check up and coagulopathy w/u      Health Maintenance Due   Topic Date Due    Colorectal Cancer Screening Combo  06/23/2018    COVID-19 Vaccine (3 - Booster for [place] [employer] series) 08/25/2021         Lab review: labs are reviewed in the EHR and ordered as mentioned above    I have discussed the diagnosis with the patient and the intended plan as seen in the above orders. The patient has received an after-visit summary and questions were answered concerning future plans. I have discussed medication side effects and warnings with the patient as well. I have reviewed the plan of care with the patient, accepted their input and they are in agreement with the treatment goals. All questions were answered. The patient understands the plan of care. Handouts provided today with above information. Pt instructed if symptoms worsen to call the office or report to the ED for continued care. Greater than 50% of the visit time was spent in counseling and/or coordination of care. Voice recognition was used to generate this report, which may have resulted in some phonetic based errors in grammar and contents.  Even though attempts were made to correct all the mistakes, some may have been missed, and remained in the body of the document.           Meghan Tracey MD

## 2022-04-22 ENCOUNTER — OFFICE VISIT (OUTPATIENT)
Dept: ORTHOPEDIC SURGERY | Age: 72
End: 2022-04-22
Payer: MEDICARE

## 2022-04-22 VITALS — WEIGHT: 276 LBS | BODY MASS INDEX: 48.89 KG/M2

## 2022-04-22 DIAGNOSIS — M17.0 PRIMARY OSTEOARTHRITIS OF BOTH KNEES: Primary | ICD-10-CM

## 2022-04-22 PROCEDURE — 20610 DRAIN/INJ JOINT/BURSA W/O US: CPT | Performed by: ORTHOPAEDIC SURGERY

## 2022-04-28 ENCOUNTER — VIRTUAL VISIT (OUTPATIENT)
Dept: ORTHOPEDIC SURGERY | Age: 72
End: 2022-04-28
Payer: MEDICARE

## 2022-04-28 DIAGNOSIS — M43.16 SPONDYLOLISTHESIS OF LUMBAR REGION: Primary | ICD-10-CM

## 2022-04-28 DIAGNOSIS — M48.061 SPINAL STENOSIS OF LUMBAR REGION WITHOUT NEUROGENIC CLAUDICATION: ICD-10-CM

## 2022-04-28 PROCEDURE — 99442 PR PHYS/QHP TELEPHONE EVALUATION 11-20 MIN: CPT | Performed by: PHYSICAL MEDICINE & REHABILITATION

## 2022-04-28 RX ORDER — PREGABALIN 75 MG/1
75 CAPSULE ORAL 2 TIMES DAILY
Qty: 60 CAPSULE | Refills: 1 | Status: SHIPPED | OUTPATIENT
Start: 2022-04-28 | End: 2022-06-27 | Stop reason: SDUPTHER

## 2022-04-28 NOTE — LETTER
NOTIFICATION RETURN TO WORK / SCHOOL    4/28/2022 12:33 PM    Ms. Paul Alvarado  [address] [facility] [id number]      To Whom It May Concern:    Paul Alvarado is currently under the care of [facility]. She will return to work/school on: ***    If there are questions or concerns please have the patient contact our office.         Sincerely,      Ramona Bonilla MD

## 2022-04-28 NOTE — PROGRESS NOTES
Paul Jenny presents today for   Chief Complaint   Patient presents with    Back Pain       Is someone accompanying this pt? no    Is the patient using any DME equipment during OV? no    Depression Screening:  3 most recent PHQ Screens 4/21/2022   PHQ Not Done -   Little interest or pleasure in doing things Not at all   Feeling down, depressed, irritable, or hopeless -   Total Score PHQ 2 -   Trouble falling or staying asleep, or sleeping too much -   Feeling tired or having little energy -   Poor appetite, weight loss, or overeating -   Feeling bad about yourself - or that you are a failure or have let yourself or your family down -   Trouble concentrating on things such as school, work, reading, or watching TV -   Moving or speaking so slowly that other people could have noticed; or the opposite being so fidgety that others notice -   Thoughts of being better off dead, or hurting yourself in some way -   PHQ 9 Score -   How difficult have these problems made it for you to do your work, take care of your home and get along with others -       Learning Assessment:  Learning Assessment 1/10/2019   PRIMARY LEARNER Patient   HIGHEST LEVEL OF EDUCATION - PRIMARY LEARNER  SOME COLLEGE   BARRIERS PRIMARY LEARNER NONE   CO-LEARNER CAREGIVER No   PRIMARY LANGUAGE ENGLISH   LEARNER PREFERENCE PRIMARY DEMONSTRATION   ANSWERED BY patient   RELATIONSHIP SELF       Abuse Screening:  Abuse Screening Questionnaire 4/21/2022   Do you ever feel afraid of your partner? N   Are you in a relationship with someone who physically or mentally threatens you? N   Is it safe for you to go home? Y       Fall Risk  Fall Risk Assessment, last 12 mths 4/21/2022   Able to walk? Yes   Fall in past 12 months? 0   Do you feel unsteady? 0   Are you worried about falling 0   Number of falls in past 12 months -   Fall with injury? -       Coordination of Care:  1.  Have you been to the ER, urgent care clinic since your last visit? no  Hospitalized since your last visit? no    2. Have you seen or consulted any other health care providers outside of the [address] [place] [address] since your last visit? Yes, ortho, pcp Include any pap smears or colon screening.  no

## 2022-04-28 NOTE — PROGRESS NOTES
[address]  [address], [address]  [place], [address] [place] [address] Street  Phone: [phone]  Fax: [phone]      Mary Carmen Briscoe is a 73 y.o. female evaluated via patient initiated telephone call on 2022. ASSESSMENT AND PLAN    Diagnoses and all orders for this visit:    1. Spondylolisthesis of lumbar region  -     pregabalin (Lyrica) 75 mg capsule; Take 1 Capsule by mouth two (2) times a day. Max Daily Amount: 150 mg. Month #1: One po qhs x 1week, then one po bid thereafter. Subsequent month one po bid    2. Spinal stenosis of lumbar region without neurogenic claudication  -     pregabalin (Lyrica) 75 mg capsule; Take 1 Capsule by mouth two (2) times a day. Max Daily Amount: 150 mg. Month #1: One po qhs x 1week, then one po bid thereafter. Subsequent month one po bid         1. Mary Carmen Briscoe is a 73 y.o. female with chronic low back pain and ambulatory intolerance. Her pain is primarily axial.  She has failed physical therapy. 2. Continue home exercise. 3. Discussed ramping gabapentin for pain, she is on this for restless leg but is unable to go up beyond 300 mg total daily dose. 4. Discontinue gabapentin  5. Trial of Lyrica 75 ramping to twice daily  6. Discussed RFA risks and benefits if symptoms not improving. 7. Discussed pain management referral if nonopioids not effective. 8. No indications for surgery. HISTORY OF PRESENT ILLNESS  Mary Carmen Briscoe is a 73 y.o. female. Last visit referred for MRI. MRI report reviewed with the patient. She continues to have low back pain interfering with her daily activities. Denies any radiations into the lower extremities. Reviewed Dr. Andrews Jain notes, she is receiving another round of Euflexxa for her knees which is helped her in the past.      IMPRESSION     1. Multilevel degenerative disease and facet arthritis, with multilevel grade 1  spondylolisthesis.     2.  Degenerative disease has slightly progressed at the L3/L4 and L5/S1 levels. Findings at other levels are not significantly changed.  -At L3/L4 there has been interval and increased mild left lateral recess  stenosis with disc contacting but not clearly compressing the left L4 nerve  root.  -At L5/S1 there has been interval progression of mild spinal canal stenosis. A  posterior disc-osteophyte-complex contacts the crossing right S1 nerve root  sleeve and but does not clearly compress it.     3. No high-grade spinal canal or foraminal stenosis. Additional level by level  details as above. Pain Assessment  4/28/2022   Location of Pain Back   Location Modifiers -   Severity of Pain 0   Quality of Pain (No Data)   Quality of Pain Comment stabbing   Duration of Pain Persistent   Frequency of Pain Intermittent   Date Pain First Started -   Aggravating Factors Bending   Aggravating Factors Comment moving around a lot, walking   Limiting Behavior Yes   Relieving Factors Nothing   Result of Injury -     Investigations:  MRI 3/2022 multilevel grade 1 listhesis, disc osteophyte complex L5/S1 with mild stenosis   L MRI 2019: multilevel FA. Triangulation of canal L3-4    Treatments patient has tried:  Physical therapy: [facility] 3/2022 w/ some benefit. R hip, back [id number] with benefit  Chiropractor: Yes little benefit  Deep massage: some benefit  Doing HEP: Yes   Beneficial medications: , Cymbalta  Failed medications:  Gabapentin for neuropathy/RLS intolerant of doses greater than 300 mg even at bedtime,Myrbetriq (cost)  Spinal injections: No  Spinal surgery- No.           PMHx of HTN, high cholesterol, overactive bladder, CVA (2006) with speech residuals, pre-DM, LLE bone replacement, B/L knee OA, DVT . Has toy size dog.         Benson Crawford, who was evaluated through a synchronous (real-time) virtual platform audio-only encounter, and/or her healthcare decision maker, is aware that it is a billable service, which includes applicable co-pays, with coverage as determined by her insurance carrier. She provided verbal consent to proceed and patient identification was verified. This visit was conducted pursuant to the emergency declaration under the [facility], [address] [place] [address] and the [employer] Act. A caregiver was present when appropriate. Ability to conduct physical exam was limited. The patient was located at home in a state where the provider was licensed to provide care. --Hira Kinney MD on 4/28/2022       I affirm this is a Patient Initiated Episode with an Established Patient who has not had a related appointment within my department in the past 7 days or scheduled within the next 24 hours. Total Time: minutes: 11-20 minutes   Portions of this document were created using Dragon voice recognition software. Unintended errors thierno be present.   Hira Kinney MD

## 2022-04-29 ENCOUNTER — OFFICE VISIT (OUTPATIENT)
Dept: ORTHOPEDIC SURGERY | Age: 72
End: 2022-04-29
Payer: MEDICARE

## 2022-04-29 VITALS — OXYGEN SATURATION: 97 % | BODY MASS INDEX: 48.9 KG/M2 | HEIGHT: 63 IN | HEART RATE: 92 BPM | WEIGHT: 276 LBS

## 2022-04-29 DIAGNOSIS — M17.0 PRIMARY OSTEOARTHRITIS OF BOTH KNEES: Primary | ICD-10-CM

## 2022-04-29 PROCEDURE — 20610 DRAIN/INJ JOINT/BURSA W/O US: CPT | Performed by: ORTHOPAEDIC SURGERY

## 2022-04-29 NOTE — PROGRESS NOTES
Patient: Baljeet Hobbs                MRN: [id number]       SSN: [id number]  YOB: 1950        AGE: 73 y.o. SEX: female  Body mass index is 48.89 kg/m². PCP: Lay Delcid MD  04/29/22    Neva Parker presents for reevaluation of bilateral knee pain she is undergoing a series of Euflexxa is she presents for #3 bilateral knees things are going smoothly for her previous portal eventually clean and dry and third and final Euflexxa's administered well-tolerated and return to see us in about 6 to 9months as needed thank you    REVIEW OF SYSTEMS:      CON: negative  EYE: negative   ENT: negative  RESP: negative  GI:    negative   :  negative  MSK: Positive  A twelve point review of systems was completed, positives noted and all other systems were reviewed and are negative    [facility]  OFFICE PROCEDURE PROGRESS NOTE      Chart reviewed for the following:  Олег WILLARD M.D., have reviewed the History, Physical and updated the Allergic reactions for Baljeet Hobbs? TIME OUT performed immediately prior to start of procedure:  Олег WILLARD M.D., have performed the following reviews on Baljeet Hobbs prior to the start of the procedure:  ????????  * Patient was identified by name and date of birth   * Patient advised regarding risks of bruising, bleeding, infection and pain  * Agreement on procedure being performed was verified  * Risks and Benefits explained to the patient  * Procedure site verified and marked as necessary  * Patient was positioned for comfort  * Consent was signed and verified    Time: 7:46 AM    Body part: bilateral knee  Intra-articular    Medication & Dose: 2mL Euflexxa to both knees    Date of procedure: 04/29/22    Procedure performed by: Vero Brenner.  Efrain Crow M.D., [facility])    Provider assisted by: Fernando Guardado LPN    Patient assisted by: self    How tolerated by patient: tolerated the procedure well with no complications      Past Medical History:   Diagnosis Date    Arthritis 2019    spine, both hips, both knees    Depression January 2009    Diabetes [facility])     Pre DM    DVT (deep venous thrombosis) ([facility] [address] 01/2021    Hypercholesterolemia     Hypertension     Sleep apnea     cpap    Stroke [facility]) early 2000    speech problem, balance problem    Thromboembolus ([facility] [address] 1990s    left leg       Family History   Problem Relation Age of Onset    Dementia Mother     Stroke Father     Cancer Brother     Hypertension Brother     Diabetes Brother     Diabetes Maternal Uncle     Stroke Maternal Aunt     Stroke Maternal Grandmother        Current Outpatient Medications   Medication Sig Dispense Refill    pregabalin (Lyrica) 75 mg capsule Take 1 Capsule by mouth two (2) times a day. Max Daily Amount: 150 mg. Month #1: One po qhs x 1week, then one po bid thereafter. Subsequent month one po bid 60 Capsule 1    atenoloL (TENORMIN) 50 mg tablet Take 1 Tablet by mouth daily. 90 Tablet 2    spironolactone (ALDACTONE) 25 mg tablet Take 1 Tablet by mouth daily. 30 Tablet 5    rosuvastatin (CRESTOR) 10 mg tablet TAKE ONE TABLET BY MOUTH EVERY EVENING 90 Tablet 2    tolterodine ER (DETROL LA) 4 mg ER capsule TAKE ONE CAPSULE BY MOUTH DAILY 90 Capsule 2    nystatin (MYCOSTATIN) powder Apply  to affected area four (4) times daily. Affected area: under b/l breasts 60 g 11    clotrimazole-betamethasone (LOTRISONE) topical cream Apply  to affected area two (2) times a day. Affected area: along b/l breasts areas 45 g 11    clopidogreL (Plavix) 75 mg tab Take 75 mg by mouth daily.  DULoxetine (Cymbalta) 30 mg capsule Take 1 Cap by mouth two (2) times a day. 60 Cap 11    fluticasone (FLONASE) 50 mcg/actuation nasal spray 2 Sprays by Both Nostrils route daily. 1 Bottle 11    Cholecalciferol, Vitamin D3, (VITAMIN D3) 1,000 unit cap Take 1,000 Units by mouth daily.        Current Facility-Administered Medications   Medication Dose Route Frequency Provider Last Rate Last Admin    sodium hyaluronate (SUPARTZ FX/EUFLEXXA/HYALGAN) 10 mg/mL injection syrg 20 mg  20 mg Intra artICUlar ONCE Hernan Schaffer MD           Allergies   Allergen Reactions    Amoxicillin-Pot Clavulanate Diarrhea    Azithromycin Diarrhea    Oxybutynin Other (comments)     Problems swallowing, dry mouth    Seroquel [Quetiapine] Other (comments)     Tremors    Sulfa (Sulfonamide Antibiotics) Diarrhea    Wellbutrin [Bupropion Hcl] Itching    Potassium Diarrhea       Past Surgical History:   Procedure Laterality Date    HX APPENDECTOMY      HX CHOLECYSTECTOMY      HX GYN      partial hysterectomy    HX KNEE ARTHROSCOPY  1990    Left     VASCULAR SURGERY PROCEDURE UNLIST  01/05/21    blood clot behind left knee being treated with medication       Social History     Socioeconomic History    Marital status:      Spouse name: Not on file    Number of children: Not on file    Years of education: Not on file    Highest education level: Not on file   Occupational History    Not on file   Tobacco Use    Smoking status: Never Smoker    Smokeless tobacco: Never Used   Vaping Use    Vaping Use: Never used   Substance and Sexual Activity    Alcohol use: Never    Drug use: Never    Sexual activity: Yes     Partners: Male     Birth control/protection: None   Other Topics Concern    Not on file   Social History Narrative    Not on file     Social Determinants of Health     Financial Resource Strain:     Difficulty of Paying Living Expenses: Not on file   Food Insecurity:     Worried About Running Out of Food in the Last Year: Not on file    Rosanna of Food in the Last Year: Not on file   Transportation Needs:     Lack of Transportation (Medical): Not on file    Lack of Transportation (Non-Medical):  Not on file   Physical Activity:     Days of Exercise per Week: Not on file    Minutes of Exercise per Session: Not on file   Stress:     Feeling of Stress : Not on file   Social Connections:     Frequency of Communication with Friends and Family: Not on file    Frequency of Social Gatherings with Friends and Family: Not on file    Attends Restoration Services: Not on file    Active Member of Clubs or Organizations: Not on file    Attends Club or Organization Meetings: Not on file    Marital Status: Not on file   Intimate Partner Violence:     Fear of Current or Ex-Partner: Not on file    Emotionally Abused: Not on file    Physically Abused: Not on file    Sexually Abused: Not on file   Housing Stability:     Unable to Pay for Housing in the Last Year: Not on file    Number of [place] in the Last Year: Not on file    Unstable Housing in the Last Year: Not on file       Visit Vitals  Pulse 92   Ht 5' 3\" (1.6 m)   Wt 125.2 kg (276 lb)   SpO2 97%   BMI 48.89 kg/m²         PHYSICAL EXAMINATION:  GENERAL: Alert and oriented x3, in no acute distress, well-developed, well-nourished, afebrile. HEART: No JVD. EYES: No scleral icterus   NECK: No significant lymphadenopathy   LUNGS: No respiratory compromise or indrawing  ABDOMEN: Soft, non-tender, non-distended. Note: This note was completed using voice recognition software. Any typographical/name errors or mistakes are unintentional.    Electronically signed by:  Ayla Philippe MD

## 2022-05-27 ENCOUNTER — APPOINTMENT (OUTPATIENT)
Dept: INTERNAL MEDICINE CLINIC | Age: 72
End: 2022-05-27

## 2022-05-27 ENCOUNTER — HOSPITAL ENCOUNTER (OUTPATIENT)
Dept: LAB | Age: 72
Discharge: HOME | End: 2022-05-27
Payer: MEDICARE

## 2022-05-27 LAB
ANION GAP SERPL CALC-SCNC: 3 MMOL/L (ref 3–18)
BUN SERPL-MCNC: 16 MG/DL (ref 7–18)
BUN/CREAT SERPL: 17 (ref 12–20)
CALCIUM SERPL-MCNC: 9.8 MG/DL (ref 8.5–10.1)
CHLORIDE SERPL-SCNC: 107 MMOL/L (ref 100–111)
CO2 SERPL-SCNC: 30 MMOL/L (ref 21–32)
CREAT SERPL-MCNC: 0.93 MG/DL (ref 0.6–1.3)
GLUCOSE SERPL-MCNC: 104 MG/DL (ref 74–99)
POTASSIUM SERPL-SCNC: 3.9 MMOL/L (ref 3.5–5.5)
SODIUM SERPL-SCNC: 140 MMOL/L (ref 136–145)

## 2022-05-27 PROCEDURE — 80048 BASIC METABOLIC PNL TOTAL CA: CPT

## 2022-05-27 PROCEDURE — 36415 COLL VENOUS BLD VENIPUNCTURE: CPT

## 2022-06-01 NOTE — PROGRESS NOTES
Please let her know that her follow-up potassium is normal.    Dr. Bernie Roche  [facility], [address] [address] [facility], [facility] [address]  Phone: [phone]  Fax: [phone]

## 2022-06-02 ENCOUNTER — TELEPHONE (OUTPATIENT)
Dept: INTERNAL MEDICINE CLINIC | Age: 72
End: 2022-06-02

## 2022-06-02 NOTE — TELEPHONE ENCOUNTER
----- Message from Deven Finley MD sent at 6/1/2022  1:52 PM EDT -----  Please let her know that her follow-up potassium is normal.    Dr. Dain Quezada  [facility], [address] [place] [address], [facility] [address]  Phone: [phone]  Fax: [phone]

## 2022-06-03 ENCOUNTER — OFFICE VISIT (OUTPATIENT)
Dept: INTERNAL MEDICINE CLINIC | Age: 72
End: 2022-06-03
Payer: MEDICARE

## 2022-06-03 VITALS
SYSTOLIC BLOOD PRESSURE: 126 MMHG | BODY MASS INDEX: 48.55 KG/M2 | HEART RATE: 61 BPM | RESPIRATION RATE: 14 BRPM | DIASTOLIC BLOOD PRESSURE: 60 MMHG | TEMPERATURE: 97.4 F | OXYGEN SATURATION: 97 % | WEIGHT: 274 LBS | HEIGHT: 63 IN

## 2022-06-03 DIAGNOSIS — I10 ESSENTIAL HYPERTENSION: Primary | ICD-10-CM

## 2022-06-03 DIAGNOSIS — R73.9 HYPERGLYCEMIA: ICD-10-CM

## 2022-06-03 PROCEDURE — 1101F PT FALLS ASSESS-DOCD LE1/YR: CPT | Performed by: INTERNAL MEDICINE

## 2022-06-03 PROCEDURE — G0463 HOSPITAL OUTPT CLINIC VISIT: HCPCS | Performed by: INTERNAL MEDICINE

## 2022-06-03 PROCEDURE — G8399 PT W/DXA RESULTS DOCUMENT: HCPCS | Performed by: INTERNAL MEDICINE

## 2022-06-03 PROCEDURE — G8427 DOCREV CUR MEDS BY ELIG CLIN: HCPCS | Performed by: INTERNAL MEDICINE

## 2022-06-03 PROCEDURE — G8417 CALC BMI ABV UP PARAM F/U: HCPCS | Performed by: INTERNAL MEDICINE

## 2022-06-03 PROCEDURE — G8536 NO DOC ELDER MAL SCRN: HCPCS | Performed by: INTERNAL MEDICINE

## 2022-06-03 PROCEDURE — 99213 OFFICE O/P EST LOW 20 MIN: CPT | Performed by: INTERNAL MEDICINE

## 2022-06-03 PROCEDURE — G9899 SCRN MAM PERF RSLTS DOC: HCPCS | Performed by: INTERNAL MEDICINE

## 2022-06-03 PROCEDURE — 1123F ACP DISCUSS/DSCN MKR DOCD: CPT | Performed by: INTERNAL MEDICINE

## 2022-06-03 PROCEDURE — 3017F COLORECTAL CA SCREEN DOC REV: CPT | Performed by: INTERNAL MEDICINE

## 2022-06-03 PROCEDURE — G8752 SYS BP LESS 140: HCPCS | Performed by: INTERNAL MEDICINE

## 2022-06-03 PROCEDURE — 1090F PRES/ABSN URINE INCON ASSESS: CPT | Performed by: INTERNAL MEDICINE

## 2022-06-03 PROCEDURE — G9717 DOC PT DX DEP/BP F/U NT REQ: HCPCS | Performed by: INTERNAL MEDICINE

## 2022-06-03 PROCEDURE — G8754 DIAS BP LESS 90: HCPCS | Performed by: INTERNAL MEDICINE

## 2022-06-03 NOTE — PATIENT INSTRUCTIONS

## 2022-06-03 NOTE — PROGRESS NOTES
[facility]:  6/3/2022, MRN: [id number]      Mary Carmen Briscoe is a 73 y.o. female and presents to clinic for Follow-up and Labs (5-27-22)      Subjective: The patient is a 68-year-old female with history of obesity, allergic rhinitis, IBS-diarrhea, onychomycosis, prediabetes, lumbar spinal stenosis, depression, hypertension, adrenal incidentaloma seen on CT scan 2015, LLE DVT (2021), and history of cerebral infarction her head MRI findings (followed by Neurology). 1. HTN: BP is 126/60, on atenolol and aldactone. Note that aldactone replaced chlorthalidone due to persistent hypokalemia with taking this rx. F/u labs since taking aldactone in place of chlorthalidone show normal electrolytes. No adverse side effects from taking aldactone in place of chlorthalidone. 2. Prediabetes: Her last A1C was 6.2. Weight is 274lbs. BMI is 48. Patient Active Problem List    Diagnosis Date Noted    Hyperlipidemia 05/23/2021    Osteopenia 05/23/2021    Deep vein thrombosis (DVT) of distal vein of left lower extremity ([facility] [address] 05/23/2021    OAB (overactive bladder) 05/23/2021    Controlled substance agreement signed 7/7/17 07/09/2017    Obesity, morbid, BMI 40.0-49.9  12/06/2016    Allergic rhinitis 12/06/2016    Irritable bowel syndrome with diarrhea 12/06/2016    Onychomycosis 12/06/2016    Glucose intolerance (impaired glucose tolerance) 12/01/2016    Single current episode of major depressive disorder (has tried celexa, wellbutrin, mirtazapine with adverse effects) 12/01/2016    Essential hypertension 12/01/2016    History of cerebral infarction - per head MRI findings 12/01/2016       Current Outpatient Medications   Medication Sig Dispense Refill    mirabegron ER (Myrbetriq) 50 mg ER tablet Take 1 Tablet by mouth daily. 30 Tablet 5    pregabalin (Lyrica) 75 mg capsule Take 1 Capsule by mouth two (2) times a day. Max Daily Amount: 150 mg. Month #1: One po qhs x 1week, then one po bid thereafter. Subsequent month one po bid 60 Capsule 1    atenoloL (TENORMIN) 50 mg tablet Take 1 Tablet by mouth daily. 90 Tablet 2    spironolactone (ALDACTONE) 25 mg tablet Take 1 Tablet by mouth daily. 30 Tablet 5    rosuvastatin (CRESTOR) 10 mg tablet TAKE ONE TABLET BY MOUTH EVERY EVENING 90 Tablet 2    tolterodine ER (DETROL LA) 4 mg ER capsule TAKE ONE CAPSULE BY MOUTH DAILY 90 Capsule 2    nystatin (MYCOSTATIN) powder Apply  to affected area four (4) times daily. Affected area: under b/l breasts 60 g 11    clotrimazole-betamethasone (LOTRISONE) topical cream Apply  to affected area two (2) times a day. Affected area: along b/l breasts areas 45 g 11    clopidogreL (Plavix) 75 mg tab Take 75 mg by mouth daily.  DULoxetine (Cymbalta) 30 mg capsule Take 1 Cap by mouth two (2) times a day. 60 Cap 11    fluticasone (FLONASE) 50 mcg/actuation nasal spray 2 Sprays by Both Nostrils route daily. 1 Bottle 11    Cholecalciferol, Vitamin D3, (VITAMIN D3) 1,000 unit cap Take 1,000 Units by mouth daily.          Allergies   Allergen Reactions    Amoxicillin-Pot Clavulanate Diarrhea    Azithromycin Diarrhea    Oxybutynin Other (comments)     Problems swallowing, dry mouth    Seroquel [Quetiapine] Other (comments)     Tremors    Sulfa (Sulfonamide Antibiotics) Diarrhea    Wellbutrin [Bupropion Hcl] Itching    Potassium Diarrhea       Past Medical History:   Diagnosis Date    Arthritis 2019    spine, both hips, both knees    Depression January 2009    Diabetes [facility])     Pre DM    DVT (deep venous thrombosis) ([facility] [address] 01/2021    Hypercholesterolemia     Hypertension     Sleep apnea     cpap    Stroke [facility]) early 2000    speech problem, balance problem    Thromboembolus ([facility] [address] 1990s    left leg       Past Surgical History:   Procedure Laterality Date    HX APPENDECTOMY      HX CHOLECYSTECTOMY      HX GYN      partial hysterectomy    HX KNEE ARTHROSCOPY  1990    Left     VASCULAR SURGERY PROCEDURE UNLIST 01/05/21    blood clot behind left knee being treated with medication       Family History   Problem Relation Age of Onset    Dementia Mother     Stroke Father     Cancer Brother     Hypertension Brother     Diabetes Brother     Diabetes Maternal Uncle     Stroke Maternal Aunt     Stroke Maternal Grandmother        Social History     Tobacco Use    Smoking status: Never Smoker    Smokeless tobacco: Never Used   Substance Use Topics    Alcohol use: Never       ROS   Review of Systems   Constitutional: Negative for chills and fever. HENT: Negative for ear pain and sore throat. Eyes: Negative for blurred vision and pain. Respiratory: Negative for cough and shortness of breath. Cardiovascular: Negative for chest pain. Gastrointestinal: Negative for abdominal pain, blood in stool and melena. Genitourinary: Negative for dysuria and hematuria. Musculoskeletal: Negative for joint pain and myalgias. Skin: Negative for rash. Neurological: Negative for headaches. Endo/Heme/Allergies: Does not bruise/bleed easily. Psychiatric/Behavioral: Negative for substance abuse. Objective     Vitals:    06/03/22 1024   BP: 126/60   Pulse: 61   Resp: 14   Temp: 97.4 °F (36.3 °C)   TempSrc: Temporal   SpO2: 97%   Weight: 274 lb (124.3 kg)   Height: 5' 3\" (1.6 m)   PainSc:   0 - No pain       Physical Exam  Vitals and nursing note reviewed. HENT:      Head: Normocephalic and atraumatic. Right Ear: External ear normal.      Left Ear: External ear normal.   Eyes:      General: No scleral icterus. Right eye: No discharge. Left eye: No discharge. Conjunctiva/sclera: Conjunctivae normal.   Cardiovascular:      Rate and Rhythm: Normal rate and regular rhythm. Heart sounds: Normal heart sounds. No murmur heard. No friction rub. No gallop. Pulmonary:      Effort: Pulmonary effort is normal. No respiratory distress. Breath sounds: Normal breath sounds.  No wheezing or rales.   Abdominal:      General: Bowel sounds are normal. There is no distension. Palpations: Abdomen is soft. There is no mass. Tenderness: There is no abdominal tenderness. There is no guarding or rebound. Musculoskeletal:         General: No swelling (BUE) or tenderness (BUE). Cervical back: Neck supple. Lymphadenopathy:      Cervical: No cervical adenopathy. Skin:     General: Skin is warm and dry. Findings: No erythema or rash. Neurological:      Mental Status: She is alert. Motor: No abnormal muscle tone. Gait: Gait normal.   Psychiatric:         Mood and Affect: Mood normal.         LABS   Data Review:   Lab Results   Component Value Date/Time    WBC 7.1 01/10/2019 08:55 AM    HGB 13.6 01/10/2019 08:55 AM    HCT 44.6 01/10/2019 08:55 AM    PLATELET 547 76/31/3590 08:55 AM    MCV 99.6 (H) 01/10/2019 08:55 AM       Lab Results   Component Value Date/Time    Sodium 140 05/27/2022 10:03 AM    Potassium 3.9 05/27/2022 10:03 AM    Chloride 107 05/27/2022 10:03 AM    CO2 30 05/27/2022 10:03 AM    Anion gap 3 05/27/2022 10:03 AM    Glucose 104 (H) 05/27/2022 10:03 AM    BUN 16 05/27/2022 10:03 AM    Creatinine 0.93 05/27/2022 10:03 AM    BUN/Creatinine ratio 17 05/27/2022 10:03 AM    GFR est AA >60 05/27/2022 10:03 AM    GFR est non-AA 60 (L) 05/27/2022 10:03 AM    Calcium 9.8 05/27/2022 10:03 AM       Lab Results   Component Value Date/Time    Cholesterol, total 116 04/14/2022 07:48 AM    HDL Cholesterol 50 04/14/2022 07:48 AM    LDL, calculated 46.6 04/14/2022 07:48 AM    VLDL, calculated 19.4 04/14/2022 07:48 AM    Triglyceride 97 04/14/2022 07:48 AM    CHOL/HDL Ratio 2.3 04/14/2022 07:48 AM       Lab Results   Component Value Date/Time    Hemoglobin A1c 6.2 (H) 04/14/2022 07:48 AM    Hemoglobin A1c (POC) 5.8 03/05/2020 09:22 AM    Hemoglobin A1c, External 5.8 09/08/2015 12:00 AM       Assessment/Plan:   1. HTN: Stable.  Electrolytes are reassuring since replacing chlorthalidone with aldactone. - C/w rx (atenolol and aldactone). - RTC for a BP check    2. Prediabetes: A1C was 6.2.  - Low carb diet  - Checking a f/u A1C in 5 months      Health Maintenance Due   Topic Date Due    Colorectal Cancer Screening Combo  06/23/2018    COVID-19 Vaccine (3 - Booster for Redia Milch series) 08/25/2021     Lab review: labs are reviewed and ordered as mentioned above    I have discussed the diagnosis with the patient and the intended plan as seen in the above orders. The patient has received an after-visit summary and questions were answered concerning future plans. I have discussed medication side effects and warnings with the patient as well. I have reviewed the plan of care with the patient, accepted their input and they are in agreement with the treatment goals. All questions were answered. The patient understands the plan of care. Handouts provided today with above information. Pt instructed if symptoms worsen to call the office or report to the ED for continued care. Greater than 50% of the visit time was spent in counseling and/or coordination of care. Voice recognition was used to generate this report, which may have resulted in some phonetic based errors in grammar and contents. Even though attempts were made to correct all the mistakes, some may have been missed, and remained in the body of the document.           Krissy Hernandez MD

## 2022-06-03 NOTE — PROGRESS NOTES
Essiepapito Jackie presents today for   Chief Complaint   Patient presents with    Follow-up    Labs     5-27-22       1. \"Have you been to the ER, urgent care clinic since your last visit? Hospitalized since your last visit? \" no    2. \"Have you seen or consulted any other health care providers outside of the [address] [place] [address] since your last visit? \" yes     3. For patients aged 39-70: Has the patient had a colonoscopy / FIT/ Cologuard? No/due      If the patient is female:    4. For patients aged 41-77: Has the patient had a mammogram within the past 2 years? Yes - no Care Gap present  See top three    5. For patients aged 21-65: Has the patient had a pap smear?  Yes - no Care Gap present

## 2022-06-30 DIAGNOSIS — N32.81 OAB (OVERACTIVE BLADDER): ICD-10-CM

## 2022-06-30 RX ORDER — TOLTERODINE 4 MG/1
CAPSULE, EXTENDED RELEASE ORAL
Qty: 90 CAPSULE | Refills: 2 | Status: SHIPPED | OUTPATIENT
Start: 2022-06-30

## 2022-10-10 ENCOUNTER — DOCUMENTATION ONLY (OUTPATIENT)
Dept: INTERNAL MEDICINE CLINIC | Age: 72
End: 2022-10-10

## 2022-10-10 NOTE — PROGRESS NOTES
Pt has moved away and transferred care, records request received which has been forwarded to [facility] for processing

## 2024-12-14 NOTE — PROGRESS NOTES
Discharge Diagnosis  #Upper lip/L sided face cellulitis 2/2 sharing razor blade  #RAMIRO, resolved    Issues Requiring Follow-Up  Please clean your upper lip with warm water and mild soap at least once a day. Apply petrolatum ointment or lip balm to the lip to help keep it moist.    Please follow-up with PCP within a week.  Follow-up with ENT outpatient within a week, we included the information above.  We recommend they obtain repeat hepatitis/HIV panel 6 weeks from this admission.    Discharge Meds     Medication List      ASK your doctor about these medications     * buprenorphine-naloxone 8-2 mg SL tablet; Commonly known as: Suboxone   * buprenorphine-naloxone 8-2 mg SL tablet; Commonly known as: Suboxone   doxepin 50 mg capsule; Commonly known as: SINEquan   doxycycline 100 mg capsule; Commonly known as: Monodox   QUEtiapine 50 mg tablet; Commonly known as: SEROquel  * This list has 2 medication(s) that are the same as other medications   prescribed for you. Read the directions carefully, and ask your doctor or   other care provider to review them with you.       Test Results Pending At Discharge  Pending Labs       Order Current Status    Tissue/Wound Culture/Smear Preliminary result            Hospital Course  Valente Viera is a 55 y.o. male presenting to [facility] on 12/10 for significant upper lip swelling and pain after failing outpatient oral ABX.  Etiology was from sharing roommates used/bloody razor blade.  ENT consulted and performed incision/drainage of site significantly reducing pain/swelling day by day.  Due to patient's past chronic opioid abuse, pain control was with Tylenol and low-dose tramadol.  Social work consulted and provided educational pamphlets/resources for [facility] and confirmed outpatient addiction treatment follow-up.  Hepatitis/HIV panels unremarkable, recommending repeat 6 weeks after admission.  Cultures positive for MRSA, patient received 5 days vancomycin and was discharged with  Patient: Sumit Lott                MRN: [id number]       SSN: [id number]  YOB: 1950        AGE: 73 y.o. SEX: female  Body mass index is 48.89 kg/m². PCP: Puja Burch MD  04/22/22    Ms Champ Stratton presents for Euflexxa No. 2 normal no negative sequelae her health is been stable she remains besides on Plavix she denies known varicosities involving the left lower extremity. Did examine her she is in bilaterally in varus hips rotate nicely feet are warm and well-perfused and after informed written consent as per protocol both knees injected with Euflexxa No. 2 well-tolerated return next week for #3    REVIEW OF SYSTEMS:      CON: negative  EYE: negative   ENT: negative  RESP: negative  GI:    negative   :  negative  MSK: Positive  A twelve point review of systems was completed, positives noted and all other systems were reviewed and are negative          Past Medical History:   Diagnosis Date    Arthritis 2019    spine, both hips, both knees    Depression January 2009    Diabetes [facility])     Pre DM    DVT (deep venous thrombosis) ([facility] [address] 01/2021    Hypercholesterolemia     Hypertension     Sleep apnea     cpap    Stroke [facility]) early 2000    speech problem, balance problem    Thromboembolus ([facility] [address] 1990s    left leg       Family History   Problem Relation Age of Onset    Dementia Mother     Stroke Father     Cancer Brother     Hypertension Brother     Diabetes Brother     Diabetes Maternal Uncle     Stroke Maternal Aunt     Stroke Maternal Grandmother        Current Outpatient Medications   Medication Sig Dispense Refill    atenoloL (TENORMIN) 50 mg tablet Take 1 Tablet by mouth daily. 90 Tablet 2    spironolactone (ALDACTONE) 25 mg tablet Take 1 Tablet by mouth daily.  30 Tablet 5    rosuvastatin (CRESTOR) 10 mg tablet TAKE ONE TABLET BY MOUTH EVERY EVENING 90 Tablet 2    tolterodine ER (DETROL LA) 4 mg ER capsule TAKE ONE CAPSULE BY MOUTH DAILY 90 Capsule 2    nystatin (MYCOSTATIN) powder Apply  to affected area four (4) times daily. Affected area: under b/l breasts 60 g 11    clotrimazole-betamethasone (LOTRISONE) topical cream Apply  to affected area two (2) times a day. Affected area: along b/l breasts areas 45 g 11    clopidogreL (Plavix) 75 mg tab Take 75 mg by mouth daily.  DULoxetine (Cymbalta) 30 mg capsule Take 1 Cap by mouth two (2) times a day. 60 Cap 11    gabapentin (NEURONTIN) 300 mg capsule Take 300 mg by mouth nightly.  fluticasone (FLONASE) 50 mcg/actuation nasal spray 2 Sprays by Both Nostrils route daily. 1 Bottle 11    Cholecalciferol, Vitamin D3, (VITAMIN D3) 1,000 unit cap Take 1,000 Units by mouth daily.       Myrbetriq 50 mg ER tablet TAKE ONE TABLET BY MOUTH DAILY (Patient not taking: Reported on 4/21/2022) 30 Tablet 3       Allergies   Allergen Reactions    Amoxicillin-Pot Clavulanate Diarrhea    Azithromycin Diarrhea    Oxybutynin Other (comments)     Problems swallowing, dry mouth    Seroquel [Quetiapine] Other (comments)     Tremors    Sulfa (Sulfonamide Antibiotics) Diarrhea    Wellbutrin [Bupropion Hcl] Itching    Potassium Diarrhea       Past Surgical History:   Procedure Laterality Date    HX APPENDECTOMY      HX CHOLECYSTECTOMY      HX GYN      partial hysterectomy    HX KNEE ARTHROSCOPY  1990    Left     VASCULAR SURGERY PROCEDURE UNLIST  01/05/21    blood clot behind left knee being treated with medication       Social History     Socioeconomic History    Marital status:      Spouse name: Not on file    Number of children: Not on file    Years of education: Not on file    Highest education level: Not on file   Occupational History    Not on file   Tobacco Use    Smoking status: Never Smoker    Smokeless tobacco: Never Used   Vaping Use    Vaping Use: Never used   Substance and Sexual Activity    Alcohol use: Never    Drug use: Never    Sexual activity: Yes     Partners: Male     Birth 9-day remaining clindamycin course to equal 14-day total antibiotics.  Patient recommended follow-up with PCP, ENT within 1 week.  Patient medically optimized for discharge.    Pertinent Physical Exam At Time of Discharge  Physical Exam    General:  Pleasant and cooperative. No apparent distress.  HEENT: Upper lip significantly less swollen  Chest:  Clear to auscultation. Bilateral and appropriate chest rise  CV:  Regular rate and rhythm.    Abdomen: Abdomen is soft, non-tender, non-distended. BS +   Extremities:  No lower extremity edema or cyanosis.   Neurological:  Conversing and answering questions appropriately   Skin:  Warm and dry.    Lizabeth Matthews, DO  PGY-2, Internal Medicine  Please SecureChat for any further questions  This is a preliminary note, please await attending attestation for final A/P   control/protection: None   Other Topics Concern    Not on file   Social History Narrative    Not on file     Social Determinants of Health     Financial Resource Strain:     Difficulty of Paying Living Expenses: Not on file   Food Insecurity:     Worried About Running Out of Food in the Last Year: Not on file    Rosanna of Food in the Last Year: Not on file   Transportation Needs:     Lack of Transportation (Medical): Not on file    Lack of Transportation (Non-Medical): Not on file   Physical Activity:     Days of Exercise per Week: Not on file    Minutes of Exercise per Session: Not on file   Stress:     Feeling of Stress : Not on file   Social Connections:     Frequency of Communication with Friends and Family: Not on file    Frequency of Social Gatherings with Friends and Family: Not on file    Attends [employer] Services: Not on file    Active Member of [address] [place] [address] [employer] or Organizations: Not on file    Attends Club or Organization Meetings: Not on file    Marital Status: Not on file   Intimate Partner Violence:     Fear of Current or Ex-Partner: Not on file    Emotionally Abused: Not on file    Physically Abused: Not on file    Sexually Abused: Not on file   Housing Stability:     Unable to Pay for Housing in the Last Year: Not on file    Number of [place] in the Last Year: Not on file    Unstable Housing in the Last Year: Not on file       Visit Vitals  Wt 125.2 kg (276 lb)   BMI 48.89 kg/m²         PHYSICAL EXAMINATION:  GENERAL: Alert and oriented x3, in no acute distress, well-developed, well-nourished, afebrile. HEART: No JVD. EYES: No scleral icterus   NECK: No significant lymphadenopathy   LUNGS: No respiratory compromise or indrawing  ABDOMEN: Soft, non-tender, non-distended. Note: This note was completed using voice recognition software. Any typographical/name errors or mistakes are unintentional.    Electronically signed by: MD MONTSE Bustillo, Agnieszka Landry M.D., have reviewed the history, physical, and have updated the allergic reactions for [facility]. TIME OUT performed immediately prior to the start of procedure:  Susie Marcano M.D., have performed the following reviews on [facility] prior to the start of the procedure:    - Patient was identified by name and date of birth  - Agreement on procedure being performed was verified  - Risks and benefits explained to the patient  -Patient was positioned for comfort  - Consent was signed and verified  - Patient was advised regarding risks of bruising, bleeding, infection and pain    Time: 7:46 AM    Body Part: intra- articular injection of bilateral  knees    Medication and Dose: Each knee injected with 2 mL standard Euflexxa preparation, i.e. 20 mg, and 3 mL 1% lidocaine    Date of Procedure: 04/22/22    PROCEDURE PERFORMED BY : Warner Vega M.D., [facility])    Provider Assisted by: Rashaad Bush    Patient assisted by: self    Patient tolerated procedure well with no complications